# Patient Record
Sex: MALE | Race: WHITE | NOT HISPANIC OR LATINO | Employment: OTHER | URBAN - METROPOLITAN AREA
[De-identification: names, ages, dates, MRNs, and addresses within clinical notes are randomized per-mention and may not be internally consistent; named-entity substitution may affect disease eponyms.]

---

## 2017-01-18 ENCOUNTER — NON-PROVIDER VISIT (OUTPATIENT)
Dept: CARDIOLOGY | Facility: MEDICAL CENTER | Age: 67
End: 2017-01-18
Payer: MEDICARE

## 2017-01-18 ENCOUNTER — OFFICE VISIT (OUTPATIENT)
Dept: CARDIOLOGY | Facility: MEDICAL CENTER | Age: 67
End: 2017-01-18
Payer: MEDICARE

## 2017-01-18 VITALS
OXYGEN SATURATION: 91 % | DIASTOLIC BLOOD PRESSURE: 70 MMHG | WEIGHT: 198 LBS | SYSTOLIC BLOOD PRESSURE: 110 MMHG | HEIGHT: 73 IN | HEART RATE: 88 BPM | BODY MASS INDEX: 26.24 KG/M2

## 2017-01-18 DIAGNOSIS — I25.10 CORONARY ARTERY DISEASE INVOLVING NATIVE CORONARY ARTERY OF NATIVE HEART WITHOUT ANGINA PECTORIS: ICD-10-CM

## 2017-01-18 DIAGNOSIS — Z95.810 AICD (AUTOMATIC CARDIOVERTER/DEFIBRILLATOR) PRESENT: ICD-10-CM

## 2017-01-18 DIAGNOSIS — E78.2 MIXED HYPERLIPIDEMIA: ICD-10-CM

## 2017-01-18 DIAGNOSIS — I25.5 ISCHEMIC CARDIOMYOPATHY: ICD-10-CM

## 2017-01-18 PROBLEM — E78.5 HYPERLIPIDEMIA: Status: ACTIVE | Noted: 2017-01-18

## 2017-01-18 PROCEDURE — 93289 INTERROG DEVICE EVAL HEART: CPT | Performed by: NURSE PRACTITIONER

## 2017-01-18 PROCEDURE — 99214 OFFICE O/P EST MOD 30 MIN: CPT | Mod: 25 | Performed by: NURSE PRACTITIONER

## 2017-01-18 ASSESSMENT — ENCOUNTER SYMPTOMS
PALPITATIONS: 0
MYALGIAS: 0
CHILLS: 0
LOSS OF CONSCIOUSNESS: 0
ABDOMINAL PAIN: 0
HEADACHES: 0
DIZZINESS: 0
FEVER: 0
BRUISES/BLEEDS EASILY: 0
PND: 0
SHORTNESS OF BREATH: 0
ORTHOPNEA: 0

## 2017-01-18 NOTE — PROGRESS NOTES
Subjective:   Jaime Lee is a 66 y.o. male who presents today for six month FU for AICD check for CAD/ischemic cardiomyopathy and hyperlipidemia.    Jaime is a 66 year old male with history of CAD/ischemic cardiomyopathy with AICD since 2003, and last generator replacement in 2015; he also has hyperlipidemia and diabetes. Since the last visit, he has been doing well.  He and his family went to [place] over [place] and had a wonderful time; he and his wife are going back to [place] for a few weeks later this month. He feels well: no chest pain, pressure or discomfort; no palpitations; no shortness of breath, orthopnea or PND. No device therapy. No dizziness, lightheadedness or syncope; no edema.     Past Medical History   Diagnosis Date   • CAD (coronary artery disease) 12/28/2000     CABG x 4 (LIMA to LAD, rSVG to first diagonal, left circumflex and PDA).2000: CABG x 4 (LIMA to LAD, rSVG to first diagonal, left circumflex and PDA).  2003: LIMA and circumflex grafts patent, diagonal and PDA grafts closed.   • Congestive heart failure (CMS-[facility]) 12/28/2000   • Acute anterior myocardial infarction (CMS-[facility]) 12/28/2000     PCI attempted by Dr. Malloy but unsuccessful   • AICD (automatic cardioverter/defibrillator) present 2003/2008/2015     Defibrillator is a Medtronic model DEVD5T1, serial #[id number]. The ventricular lead is a Medtronic model #6947-65, lead serial #[id number], implant 7/2/2003   • Pneumonia 2014   • Pulmonary embolism (CMS-[facility]) March 2014   • Ischemic cardiomyopathy February 2014     Echocardiogram with severely dilated LV, LVEF 15-20%   • GERD (gastroesophageal reflux disease)    • Dental disorder      Upper partial   • Type 2 diabetes mellitus without complication (CMS-HCC)       Diet controlled, followed by Dr. Walls   • Left bundle branch block       Past Surgical History   Procedure Laterality Date   • Other cardiac surgery  12/2000     CABG x 4   • Appendectomy  1969   •  "Aicd implant  January 2008     Medtronic Virtuoso VR [id number] implanted by Dr. Stephenson.   • Recovery  11/3/2015     Procedure: CATH LAB-[facility]PHENSON-ICD GENERATOR CHANGE 31171- EUNICE T82.111A-MEDTRONIC BLOODLESS;  Surgeon: Recoveryonly Surgery;  Location: SURGERY PRE-POST PROC UNIT [facility] [place];  Service:    • Aicd battery change  November 2015     Generator replacement with Medtronic Evera S VR [id number] implanted by Dr. Marcus Stephenson.     History reviewed. No pertinent family history.  History   Smoking status   • Former Smoker -- 32 years   • Types: Cigarettes   • Quit date: 12/28/2000   Smokeless tobacco   • Never Used     Allergies   Allergen Reactions   • Bloodless    • Pcn [Penicillins] Anaphylaxis     Outpatient Encounter Prescriptions as of 1/18/2017   Medication Sig Dispense Refill   • potassium chloride SA (K-DUR) 20 MEQ Tab CR Take 1 Tab by mouth every day. 90 Tab 3   • lovastatin (MEVACOR) 10 MG tablet Take 1 Tab by mouth every evening. 90 Tab 3   • furosemide (LASIX) 40 MG Tab Take 1 Tab by mouth every day. 90 Tab 3   • spironolactone (ALDACTONE) 25 MG Tab Take 0.5 Tabs by mouth every day. 45 Tab 3   • lisinopril (PRINIVIL) 5 MG Tab Take 1 Tab by mouth 2 times a day. 180 Tab 3   • metoprolol SR (TOPROL XL) 100 MG TABLET SR 24 HR Take 1 Tab by mouth every day. 90 Tab 3   • Magnesium 500 MG CAPS Take 500 mg by mouth every day.       No facility-administered encounter medications on file as of 1/18/2017.     Review of Systems   Constitutional: Negative for fever and chills.   Respiratory: Negative for shortness of breath.    Cardiovascular: Negative for chest pain, palpitations, orthopnea, leg swelling and PND.   Gastrointestinal: Negative for abdominal pain.   Musculoskeletal: Negative for myalgias.   Neurological: Negative for dizziness, loss of consciousness and headaches.   Endo/Heme/Allergies: Does not bruise/bleed easily.        Objective:   /70 mmHg  Pulse 88  Ht 1.854 m (6' 0.99\")  Wt 89.812 kg (198 lb)  BMI " 26.13 kg/m2  SpO2 91%    Physical Exam   Constitutional: He is oriented to person, place, and time. He appears well-developed and well-nourished. No distress.   HENT:   Head: Normocephalic.   Eyes: Conjunctivae and EOM are normal.   Neck: Normal range of motion. Neck supple. No JVD present.   Cardiovascular: Normal rate, regular rhythm, normal heart sounds and intact distal pulses.  Exam reveals no gallop and no friction rub.    No murmur heard.  Pulmonary/Chest: Effort normal and breath sounds normal. No respiratory distress.   AICD in left chest wall.   Abdominal: Soft. Bowel sounds are normal.   Musculoskeletal: Normal range of motion. He exhibits no edema.   Neurological: He is alert and oriented to person, place, and time.   Skin: Skin is warm and dry.   Psychiatric: He has a normal mood and affect. His behavior is normal.     Device is working normally - no device therapy.    Lab Results   Component Value Date/Time    CHOLESTEROL, 12/14/2016 09:01 AM    * 12/14/2016 09:01 AM    HDL 53 12/14/2016 09:01 AM    TRIGLYCERIDES 110 12/14/2016 09:01 AM       Lab Results   Component Value Date/Time    SODIUM 135 12/14/2016 09:01 AM    POTASSIUM 4.1 12/14/2016 09:01 AM    CHLORIDE 97 12/14/2016 09:01 AM    CO2 29 12/14/2016 09:01 AM    GLUCOSE 184* 12/14/2016 09:01 AM    BUN 15 12/14/2016 09:01 AM    CREATININE 1.20 12/14/2016 09:01 AM     Lab Results   Component Value Date/Time    ALKALINE PHOSPHATASE 51 12/14/2016 09:01 AM    AST(SGOT) 30 12/14/2016 09:01 AM    ALT(SGPT) 35 12/14/2016 09:01 AM    TOTAL BILIRUBIN 1.2 12/14/2016 09:01 AM      Lab Results   Component Value Date/Time    WBC 6.4 12/14/2016 09:01 AM    RBC 4.91 12/14/2016 09:01 AM    HEMOGLOBIN 16.6 12/14/2016 09:01 AM    HEMATOCRIT 47.3 12/14/2016 09:01 AM    MCV 96.3 12/14/2016 09:01 AM    MCH 33.8* 12/14/2016 09:01 AM    MCHC 35.1 12/14/2016 09:01 AM    MPV 11.8 12/14/2016 09:01 AM        Assessment:     1. AICD (automatic  cardioverter/defibrillator) present     2. Coronary artery disease involving native coronary artery of native heart without angina pectoris     3. Ischemic cardiomyopathy     4. Mixed hyperlipidemia         Medical Decision Making:  Today's Assessment / Status / Plan:     1. CAD/ischemic cardiomyopathy, with AICD, which is working normally. No device therapy.  Continue same medications.    2. Hyperlipidemia, treated. LDL is not quite at goal; consider other statin.    3. Diabetes mellitus, diet controlled, stable.    Same medications for now. FU in 6 months for next AICD check, as well as annual FU with Dr. Pickering. FU sooner if clinical condition changes.    Collaborating MD: Ladan

## 2017-01-18 NOTE — MR AVS SNAPSHOT
"        Jaime Lee   2017 8:40 AM   Office Visit   MRN: [id number]    Department:  [facility]   Dept Phone:  [phone]    Description:  Male : 1950   Provider:  CHRIS Conrad           Reason for Visit     Follow-Up           Allergies as of 2017     Allergen Noted Reactions    Bloodless 10/29/2015       Pcn [Penicillins] 2014   Anaphylaxis      Vital Signs     Blood Pressure Pulse Height Weight Body Mass Index Oxygen Saturation    110/70 mmHg 88 1.854 m (6' 0.99\") 89.812 kg (198 lb) 26.13 kg/m2 91%    Smoking Status                   Former Smoker           Basic Information     Date Of Birth Sex Race Ethnicity Preferred Language    1950 Male White Non- English      Your appointments     2017  8:00 AM   PACER CHECK ONLY with DEANNA ConradPGUERLINE.   [facility] (--)    [address]  [facility] [id number] [phone]            2017  8:20 AM   FOLLOW UP with Juan Pickering M.D.   [facility] (--)    [address]  [facility] [id number] [phone]              Problem List              ICD-10-CM Priority Class Noted - Resolved    CAD (coronary artery disease) I25.10 High  2000 - Present    Thrombocytopenia (CMS-HCC) D69.6 Medium  2014 - Present    CKD (chronic kidney disease) stage 3, GFR 30-59 ml/min N18.3 Medium  2014 - Present    Pulmonary embolism (CMS-HCC) I26.99 High  2014 - Present    AICD (automatic cardioverter/defibrillator) present Z95.810 High  3/11/2014 - Present    Ischemic cardiomyopathy I25.5 High  3/11/2014 - Present    S/P CABG x 4 Z95.1   2016 - Present    Type 2 diabetes mellitus without complication (CMS-HCC) E11.9   2016 - Present    Left bundle branch block I44.7   2016 - Present    Old anterior myocardial infarction I25.2   2016 - " Present    Chronic systolic congestive heart failure, NYHA class 2 (CMS-HCC) I50.22   6/22/2016 - Present      Health Maintenance        Date Due Completion Dates    A1C SCREENING 2/23/1951 ---    DIABETES MONOFILAMENT / LE EXAM 2/23/1951 ---    RETINAL SCREENING 8/23/1968 ---    URINE ACR / MICROALBUMIN 8/23/1968 ---    IMM ZOSTER VACCINE 8/23/2010 ---    IMM DTaP/Tdap/Td Vaccine (1 - Tdap) 10/17/2011 10/16/2011    IMM PNEUMOCOCCAL 65+ (ADULT) LOW/MEDIUM RISK SERIES (2 of 2 - PPSV23) 9/6/2017 9/6/2016    FASTING LIPID PROFILE 12/14/2017 12/14/2016, 6/8/2009, 12/21/2007, 4/19/2007, 3/20/2006, 6/17/2005    SERUM CREATININE 12/14/2017 12/14/2016, 1/1/2016, 10/29/2015, 5/14/2015, 7/31/2014, 3/14/2014, 2/26/2014, 2/25/2014, 2/24/2014, 2/23/2014, 6/2/2011, 6/8/2009, 1/8/2008, 12/21/2007, 4/19/2007, 3/20/2006, 6/17/2005    COLONOSCOPY 4/22/2024 4/22/2014 (Declined)    Override on 4/22/2014: Patient Declined            Current Immunizations     13-VALENT PCV PREVNAR 9/6/2016 11:07 AM    Influenza Vaccine Adult HD 9/6/2016 11:04 AM    TD Vaccine 10/16/2011 11:30 PM      Below and/or attached are the medications your provider expects you to take. Review all of your home medications and newly ordered medications with your provider and/or pharmacist. Follow medication instructions as directed by your provider and/or pharmacist. Please keep your medication list with you and share with your provider. Update the information when medications are discontinued, doses are changed, or new medications (including over-the-counter products) are added; and carry medication information at all times in the event of emergency situations     Allergies:  BLOODLESS - (reactions not documented)     PCN - Anaphylaxis               Medications  Valid as of: January 18, 2017 -  8:41 AM    Generic Name Brand Name Tablet Size Instructions for use    Furosemide (Tab) LASIX 40 MG Take 1 Tab by mouth every day.        Lisinopril (Tab) PRINIVIL 5 MG Take  1 Tab by mouth 2 times a day.        Lovastatin (Tab) MEVACOR 10 MG Take 1 Tab by mouth every evening.        Magnesium Oxide (Cap) Magnesium 500 MG Take 500 mg by mouth every day.        Metoprolol Succinate (TABLET SR 24 HR) TOPROL  MG Take 1 Tab by mouth every day.        Potassium Chloride Silvana CR (Tab CR) K-DUR 20 MEQ Take 1 Tab by mouth every day.        Spironolactone (Tab) ALDACTONE 25 MG Take 0.5 Tabs by mouth every day.        .                 Medicines prescribed today were sent to:     [facility] #[id number] - [place], [place] - [address]    [address] [place] [address]    Phone: [phone] Fax: [phone]    Open 24 Hours?: No      Medication refill instructions:       If your prescription bottle indicates you have medication refills left, it is not necessary to call your provider’s office. Please contact your pharmacy and they will refill your medication.    If your prescription bottle indicates you do not have any refills left, you may request refills at any time through one of the following ways: The online AppNeta system (except Urgent Care), by calling your provider’s office, or by asking your pharmacy to contact your provider’s office with a refill request. Medication refills are processed only during regular business hours and may not be available until the next business day. Your provider may request additional information or to have a follow-up visit with you prior to refilling your medication.   *Please Note: Medication refills are assigned a new Rx number when refilled electronically. Your pharmacy may indicate that no refills were authorized even though a new prescription for the same medication is available at the pharmacy. Please request the medicine by name with the pharmacy before contacting your provider for a refill.           AppNeta Access Code: [email or web address]  Expires: 2/17/2017  8:15 AM    AppNeta  A secure, online tool to manage your health information     [facility]’s MyChart® is a secure, online tool that connects you to your personalized health information from the privacy of your home -- day or night - making it very easy for you to manage your healthcare. Once the activation process is completed, you can even access your medical information using the [employer] rosalind, which is available for free in the Apple Rosalind store or Google Play store.     [employer] provides the following levels of access (as shown below):   My Chart Features   [facility] Doctor [facility]  Specialists [facility]  Urgent  Care Non-[facility]  Primary Care  Doctor   Email your healthcare team securely and privately 24/7 X X X    Manage appointments: schedule your next appointment; view details of past/upcoming appointments X      Request prescription refills. X      View recent personal medical records, including lab and immunizations X X X X   View health record, including health history, allergies, medications X X X X   Read reports about your outpatient visits, procedures, consult and ER notes X X X X   See your discharge summary, which is a recap of your hospital and/or ER visit that includes your diagnosis, lab results, and care plan. X X       How to register for [employer] Products:  1. Go to  [email or web address].  2. Click on the Sign Up Now box, which takes you to the New Member Sign Up page. You will need to provide the following information:  a. Enter your [employer] Access Code exactly as it appears at the top of this page. (You will not need to use this code after you’ve completed the sign-up process. If you do not sign up before the expiration date, you must request a new code.)   b. Enter your date of birth.   c. Enter your home email address.   d. Click Submit, and follow the next screen’s instructions.  3. Create a [employer] ID. This will be your [employer] login ID and cannot be changed, so think of one that is secure and easy to remember.  4. Create a [employer] password. You can change your password at  any time.  5. Enter your Password Reset Question and Answer. This can be used at a later time if you forget your password.   6. Enter your e-mail address. This allows you to receive e-mail notifications when new information is available in artandseek.  7. Click Sign Up. You can now view your health information.    For assistance activating your artandseek account, call [phone]

## 2017-01-18 NOTE — Clinical Note
[facility]   [address]  [place] [address]  Phone: [phone]  Fax: [phone]              Jaime Lee  1950    Encounter Date: 1/18/2017    CHRIS Conrad          PROGRESS NOTE:  Subjective:   Jaime Lee is a 66 y.o. male who presents today for six month FU for AICD check for CAD/ischemic cardiomyopathy and hyperlipidemia.    Jaime is a 66 year old male with history of CAD/ischemic cardiomyopathy with AICD since 2003, and last generator replacement in 2015; he also has hyperlipidemia and diabetes. Since the last visit, he has been doing well.  He and his family went to [place] over [place] and had a wonderful time; he and his wife are going back to [place] for a few weeks later this month. He feels well: no chest pain, pressure or discomfort; no palpitations; no shortness of breath, orthopnea or PND. No device therapy. No dizziness, lightheadedness or syncope; no edema.     Past Medical History   Diagnosis Date   • CAD (coronary artery disease) 12/28/2000     CABG x 4 (LIMA to LAD, rSVG to first diagonal, left circumflex and PDA).2000: CABG x 4 (LIMA to LAD, rSVG to first diagonal, left circumflex and PDA).  2003: LIMA and circumflex grafts patent, diagonal and PDA grafts closed.   • Congestive heart failure (CMS-[facility]) 12/28/2000   • Acute anterior myocardial infarction (CMS-[facility]) 12/28/2000     PCI attempted by Dr. Malloy but unsuccessful   • AICD (automatic cardioverter/defibrillator) present 2003/2008/2015     Defibrillator is a Medtronic model QCXQ3N1, serial #[id number]. The ventricular lead is a Medtronic model #6947-65, lead serial #[id number], implant 7/2/2003   • Pneumonia 2014   • Pulmonary embolism (CMS-[facility]) March 2014   • Ischemic cardiomyopathy February 2014     Echocardiogram with severely dilated LV, LVEF 15-20%   • GERD (gastroesophageal reflux disease)    • Dental  disorder      Upper partial   • Type 2 diabetes mellitus without complication (CMS-HCC)       Diet controlled, followed by Dr. Walls   • Left bundle branch block       Past Surgical History   Procedure Laterality Date   • Other cardiac surgery  12/2000     CABG x 4   • Appendectomy  1969   • Aicd implant  January 2008     Medtronic Virtuoso VR [id number] implanted by Dr. Stephenson.   • Recovery  11/3/2015     Procedure: CATH LAB-STEPHENSON-ICD GENERATOR CHANGE 09002- EUNICE T82.111A-MEDTRONIC BLOODLESS;  Surgeon: Recoveryonly Surgery;  [place] PRE[place] [facility] [place];  Service:    • Aicd battery change  November 2015     Generator replacement with Medtronic Evera S VR [id number] implanted by Dr. Marcus Stephenson.     History reviewed. No pertinent family history.  History   Smoking status   • Former Smoker -- 32 years   • Types: Cigarettes   • Quit date: 12/28/2000   Smokeless tobacco   • Never Used     Allergies   Allergen Reactions   • Bloodless    • Pcn [Penicillins] Anaphylaxis     Outpatient Encounter Prescriptions as of 1/18/2017   Medication Sig Dispense Refill   • potassium chloride SA (K-DUR) 20 MEQ Tab CR Take 1 Tab by mouth every day. 90 Tab 3   • lovastatin (MEVACOR) 10 MG tablet Take 1 Tab by mouth every evening. 90 Tab 3   • furosemide (LASIX) 40 MG Tab Take 1 Tab by mouth every day. 90 Tab 3   • spironolactone (ALDACTONE) 25 MG Tab Take 0.5 Tabs by mouth every day. 45 Tab 3   • lisinopril (PRINIVIL) 5 MG Tab Take 1 Tab by mouth 2 times a day. 180 Tab 3   • metoprolol SR (TOPROL XL) 100 MG TABLET SR 24 HR Take 1 Tab by mouth every day. 90 Tab 3   • Magnesium 500 MG CAPS Take 500 mg by mouth every day.       No facility-administered encounter medications on file as of 1/18/2017.     Review of Systems   Constitutional: Negative for fever and chills.   Respiratory: Negative for shortness of breath.    Cardiovascular: Negative for chest pain, palpitations, orthopnea, leg swelling and PND.   Gastrointestinal: Negative  "for abdominal pain.   Musculoskeletal: Negative for myalgias.   Neurological: Negative for dizziness, loss of consciousness and headaches.   Endo/Heme/Allergies: Does not bruise/bleed easily.        Objective:   /70 mmHg  Pulse 88  Ht 1.854 m (6' 0.99\")  Wt 89.812 kg (198 lb)  BMI 26.13 kg/m2  SpO2 91%    Physical Exam   Constitutional: He is oriented to person, place, and time. He appears well-developed and well-nourished. No distress.   HENT:   Head: Normocephalic.   Eyes: Conjunctivae and EOM are normal.   Neck: Normal range of motion. Neck supple. No JVD present.   Cardiovascular: Normal rate, regular rhythm, normal heart sounds and intact distal pulses.  Exam reveals no gallop and no friction rub.    No murmur heard.  Pulmonary/Chest: Effort normal and breath sounds normal. No respiratory distress.   AICD in left chest wall.   Abdominal: Soft. Bowel sounds are normal.   Musculoskeletal: Normal range of motion. He exhibits no edema.   Neurological: He is alert and oriented to person, place, and time.   Skin: Skin is warm and dry.   Psychiatric: He has a normal mood and affect. His behavior is normal.     Device is working normally - no device therapy.    Lab Results   Component Value Date/Time    CHOLESTEROL, 12/14/2016 09:01 AM    * 12/14/2016 09:01 AM    HDL 53 12/14/2016 09:01 AM    TRIGLYCERIDES 110 12/14/2016 09:01 AM       Lab Results   Component Value Date/Time    SODIUM 135 12/14/2016 09:01 AM    POTASSIUM 4.1 12/14/2016 09:01 AM    CHLORIDE 97 12/14/2016 09:01 AM    CO2 29 12/14/2016 09:01 AM    GLUCOSE 184* 12/14/2016 09:01 AM    BUN 15 12/14/2016 09:01 AM    CREATININE 1.20 12/14/2016 09:01 AM     Lab Results   Component Value Date/Time    ALKALINE PHOSPHATASE 51 12/14/2016 09:01 AM    AST(SGOT) 30 12/14/2016 09:01 AM    ALT(SGPT) 35 12/14/2016 09:01 AM    TOTAL BILIRUBIN 1.2 12/14/2016 09:01 AM      Lab Results   Component Value Date/Time    WBC 6.4 12/14/2016 09:01 AM    RBC " 4.91 12/14/2016 09:01 AM    HEMOGLOBIN 16.6 12/14/2016 09:01 AM    HEMATOCRIT 47.3 12/14/2016 09:01 AM    MCV 96.3 12/14/2016 09:01 AM    MCH 33.8* 12/14/2016 09:01 AM    MCHC 35.1 12/14/2016 09:01 AM    MPV 11.8 12/14/2016 09:01 AM        Assessment:     1. AICD (automatic cardioverter/defibrillator) present     2. Coronary artery disease involving native coronary artery of native heart without angina pectoris     3. Ischemic cardiomyopathy     4. Mixed hyperlipidemia         Medical Decision Making:  Today's Assessment / Status / Plan:     1. CAD/ischemic cardiomyopathy, with AICD, which is working normally. No device therapy.  Continue same medications.    2. Hyperlipidemia, treated. LDL is not quite at goal; consider other statin.    3. Diabetes mellitus, diet controlled, stable.    Same medications for now. FU in 6 months for next AICD check, as well as annual FU with Dr. Pickering. FU sooner if clinical condition changes.    Collaborating MD: Ladan      No Recipients

## 2017-02-28 ENCOUNTER — OFFICE VISIT (OUTPATIENT)
Dept: URGENT CARE | Facility: CLINIC | Age: 67
End: 2017-02-28
Payer: MEDICARE

## 2017-02-28 VITALS
TEMPERATURE: 98.6 F | HEART RATE: 68 BPM | DIASTOLIC BLOOD PRESSURE: 72 MMHG | SYSTOLIC BLOOD PRESSURE: 110 MMHG | RESPIRATION RATE: 18 BRPM | OXYGEN SATURATION: 94 %

## 2017-02-28 DIAGNOSIS — J06.9 URI WITH COUGH AND CONGESTION: ICD-10-CM

## 2017-02-28 DIAGNOSIS — J40 BRONCHITIS: Primary | ICD-10-CM

## 2017-02-28 PROCEDURE — 99214 OFFICE O/P EST MOD 30 MIN: CPT | Performed by: PHYSICIAN ASSISTANT

## 2017-02-28 PROCEDURE — 1101F PT FALLS ASSESS-DOCD LE1/YR: CPT | Mod: 8P | Performed by: PHYSICIAN ASSISTANT

## 2017-02-28 PROCEDURE — G8599 NO ASA/ANTIPLAT THER USE RNG: HCPCS | Performed by: PHYSICIAN ASSISTANT

## 2017-02-28 PROCEDURE — 4040F PNEUMOC VAC/ADMIN/RCVD: CPT | Performed by: PHYSICIAN ASSISTANT

## 2017-02-28 PROCEDURE — G8482 FLU IMMUNIZE ORDER/ADMIN: HCPCS | Performed by: PHYSICIAN ASSISTANT

## 2017-02-28 PROCEDURE — 3017F COLORECTAL CA SCREEN DOC REV: CPT | Mod: 8P | Performed by: PHYSICIAN ASSISTANT

## 2017-02-28 PROCEDURE — 1036F TOBACCO NON-USER: CPT | Performed by: PHYSICIAN ASSISTANT

## 2017-02-28 PROCEDURE — G8432 DEP SCR NOT DOC, RNG: HCPCS | Performed by: PHYSICIAN ASSISTANT

## 2017-02-28 PROCEDURE — G8420 CALC BMI NORM PARAMETERS: HCPCS | Performed by: PHYSICIAN ASSISTANT

## 2017-02-28 RX ORDER — CODEINE PHOSPHATE/GUAIFENESIN 10-100MG/5
5 LIQUID (ML) ORAL 3 TIMES DAILY PRN
Qty: 120 ML | Refills: 0 | Status: SHIPPED | OUTPATIENT
Start: 2017-02-28 | End: 2017-03-05

## 2017-02-28 RX ORDER — SULFAMETHOXAZOLE AND TRIMETHOPRIM 800; 160 MG/1; MG/1
1 TABLET ORAL 2 TIMES DAILY
Qty: 20 TAB | Refills: 0 | Status: SHIPPED | OUTPATIENT
Start: 2017-02-28 | End: 2017-03-10

## 2017-02-28 RX ORDER — ALBUTEROL SULFATE 90 UG/1
2 AEROSOL, METERED RESPIRATORY (INHALATION) EVERY 6 HOURS PRN
Qty: 8.5 G | Refills: 0 | Status: SHIPPED | OUTPATIENT
Start: 2017-02-28 | End: 2017-03-10

## 2017-02-28 ASSESSMENT — COPD QUESTIONNAIRES: COPD: 0

## 2017-02-28 ASSESSMENT — ENCOUNTER SYMPTOMS: COUGH: 1

## 2017-02-28 NOTE — PATIENT INSTRUCTIONS
Acute Bronchitis  Bronchitis is inflammation of the airways that extend from the windpipe into the lungs (bronchi). The inflammation often causes mucus to develop. This leads to a cough, which is the most common symptom of bronchitis.   In acute bronchitis, the condition usually develops suddenly and goes away over time, usually in a couple weeks. Smoking, allergies, and asthma can make bronchitis worse. Repeated episodes of bronchitis may cause further lung problems.   CAUSES  Acute bronchitis is most often caused by the same virus that causes a cold. The virus can spread from person to person (contagious) through coughing, sneezing, and touching contaminated objects.  SIGNS AND SYMPTOMS   · Cough.    · Fever.    · Coughing up mucus.    · Body aches.    · Chest congestion.    · Chills.    · Shortness of breath.    · Sore throat.    DIAGNOSIS   Acute bronchitis is usually diagnosed through a physical exam. Your health care provider will also ask you questions about your medical history. Tests, such as chest X-rays, are sometimes done to rule out other conditions.   TREATMENT   Acute bronchitis usually goes away in a couple weeks. Oftentimes, no medical treatment is necessary. Medicines are sometimes given for relief of fever or cough. Antibiotic medicines are usually not needed but may be prescribed in certain situations. In some cases, an inhaler may be recommended to help reduce shortness of breath and control the cough. A cool mist vaporizer may also be used to help thin bronchial secretions and make it easier to clear the chest.   HOME CARE INSTRUCTIONS  · Get plenty of rest.    · Drink enough fluids to keep your urine clear or pale yellow (unless you have a medical condition that requires fluid restriction). Increasing fluids may help thin your respiratory secretions (sputum) and reduce chest congestion, and it will prevent dehydration.    · Take medicines only as directed by your health care provider.  · If  you were prescribed an antibiotic medicine, finish it all even if you start to feel better.  · Avoid smoking and secondhand smoke. Exposure to cigarette smoke or irritating chemicals will make bronchitis worse. If you are a smoker, consider using nicotine gum or skin patches to help control withdrawal symptoms. Quitting smoking will help your lungs heal faster.    · Reduce the chances of another bout of acute bronchitis by washing your hands frequently, avoiding people with cold symptoms, and trying not to touch your hands to your mouth, nose, or eyes.    · Keep all follow-up visits as directed by your health care provider.    SEEK MEDICAL CARE IF:  Your symptoms do not improve after 1 week of treatment.   SEEK IMMEDIATE MEDICAL CARE IF:  · You develop an increased fever or chills.    · You have chest pain.    · You have severe shortness of breath.  · You have bloody sputum.    · You develop dehydration.  · You faint or repeatedly feel like you are going to pass out.  · You develop repeated vomiting.  · You develop a severe headache.  MAKE SURE YOU:   · Understand these instructions.  · Will watch your condition.  · Will get help right away if you are not doing well or get worse.     This information is not intended to replace advice given to you by your health care provider. Make sure you discuss any questions you have with your health care provider.     Document Released: 01/25/2006 Document Revised: 01/08/2016 Document Reviewed: 06/10/2014  [employer] Interactive Patient Education ©2016 [employer]

## 2017-02-28 NOTE — MR AVS SNAPSHOT
Jaime Lee   2017 2:30 PM   Office Visit   MRN: [id number]    Department:  [facility]   Dept Phone:  [phone]    Description:  Male : 1950   Provider:  Adam Whitmore PA-C           Reason for Visit     Cough           Allergies as of 2017     Allergen Noted Reactions    Bloodless 10/29/2015       Pcn [Penicillins] 2014   Anaphylaxis      You were diagnosed with     Bronchitis   [303719]  -  Primary     URI with cough and congestion   [[id number]]         Vital Signs     Blood Pressure Pulse Temperature Respirations Oxygen Saturation Smoking Status    110/72 mmHg 68 37 °C (98.6 °F) 18 94% Former Smoker      Basic Information     Date Of Birth Sex Race Ethnicity Preferred Language    1950 Male White Non- English      Your appointments     2017  8:00 AM   PACER CHECK ONLY with CHRIS Conrad   [facility] (--)    [address]  [facility] [id number] [phone]            2017  8:20 AM   FOLLOW UP with Juan Pickering M.D.   [facility] (--)    [address]  [place] [id number] [phone]              Problem List              ICD-10-CM Priority Class Noted - Resolved    CAD (coronary artery disease) I25.10 High  2000 - Present    Thrombocytopenia (CMS-HCC) D69.6 Medium  2014 - Present    CKD (chronic kidney disease) stage 3, GFR 30-59 ml/min N18.3 Medium  2014 - Present    Pulmonary embolism (CMS-HCC) I26.99 High  2014 - Present    AICD (automatic cardioverter/defibrillator) present Z95.810 High  3/11/2014 - Present    Ischemic cardiomyopathy I25.5 High  3/11/2014 - Present    S/P CABG x 4 Z95.1 High  2016 - Present    Type 2 diabetes mellitus without complication (CMS-HCC) E11.9 Medium  2016 - Present    Left bundle branch block I44.7 High  2016 -  Present    Old anterior myocardial infarction I25.2   6/22/2016 - Present    Chronic systolic congestive heart failure, NYHA class 2 (CMS-HCC) I50.22 Medium  6/22/2016 - Present    Hyperlipidemia E78.5 High  1/18/2017 - Present      Health Maintenance        Date Due Completion Dates    A1C SCREENING 2/23/1951 ---    DIABETES MONOFILAMENT / LE EXAM 2/23/1951 ---    RETINAL SCREENING 8/23/1968 ---    URINE ACR / MICROALBUMIN 8/23/1968 ---    IMM ZOSTER VACCINE 8/23/2010 ---    IMM DTaP/Tdap/Td Vaccine (1 - Tdap) 10/17/2011 10/16/2011    IMM PNEUMOCOCCAL 65+ (ADULT) LOW/MEDIUM RISK SERIES (2 of 2 - PPSV23) 9/6/2017 9/6/2016    FASTING LIPID PROFILE 12/14/2017 12/14/2016, 6/8/2009, 12/21/2007, 4/19/2007, 3/20/2006, 6/17/2005    SERUM CREATININE 12/14/2017 12/14/2016, 1/1/2016, 10/29/2015, 5/14/2015, 7/31/2014, 3/14/2014, 2/26/2014, 2/25/2014, 2/24/2014, 2/23/2014, 6/2/2011, 6/8/2009, 1/8/2008, 12/21/2007, 4/19/2007, 3/20/2006, 6/17/2005    COLONOSCOPY 4/22/2024 4/22/2014 (Declined)    Override on 4/22/2014: Patient Declined            Current Immunizations     13-VALENT PCV PREVNAR 9/6/2016 11:07 AM    Influenza Vaccine Adult HD 9/6/2016 11:04 AM    TD Vaccine 10/16/2011 11:30 PM      Below and/or attached are the medications your provider expects you to take. Review all of your home medications and newly ordered medications with your provider and/or pharmacist. Follow medication instructions as directed by your provider and/or pharmacist. Please keep your medication list with you and share with your provider. Update the information when medications are discontinued, doses are changed, or new medications (including over-the-counter products) are added; and carry medication information at all times in the event of emergency situations     Allergies:  BLOODLESS - (reactions not documented)     PCN - Anaphylaxis               Medications  Valid as of: February 28, 2017 -  2:52 PM    Generic Name Brand Name Tablet Size  Instructions for use    Albuterol Sulfate (Aero Soln) albuterol 108 (90 BASE) MCG/ACT Inhale 2 Puffs by mouth every 6 hours as needed for Shortness of Breath for up to 10 days.        Furosemide (Tab) LASIX 40 MG Take 1 Tab by mouth every day.        Guaifenesin-Codeine (Syrup) TUSSI-ORGANIDIN -10 MG/5ML Take 5 mL by mouth 3 times a day as needed for Cough for up to 5 days.        Lisinopril (Tab) PRINIVIL 5 MG Take 1 Tab by mouth 2 times a day.        Lovastatin (Tab) MEVACOR 10 MG Take 1 Tab by mouth every evening.        Magnesium Oxide (Cap) Magnesium 500 MG Take 500 mg by mouth every day.        Metoprolol Succinate (TABLET SR 24 HR) TOPROL  MG Take 1 Tab by mouth every day.        Potassium Chloride Silvana CR (Tab CR) Kdur 20 MEQ Take 1 Tab by mouth every day.        Spironolactone (Tab) ALDACTONE 25 MG Take 0.5 Tabs by mouth every day.        Sulfamethoxazole-Trimethoprim (Tab) BACTRIM -160 MG Take 1 Tab by mouth 2 times a day for 10 days.        .                 Medicines prescribed today were sent to:     [id number] - [place], [place] - [address]    [address] [place] [address]    Phone: [phone] Fax: [phone]    Open 24 Hours?: No      Medication refill instructions:       If your prescription bottle indicates you have medication refills left, it is not necessary to call your provider’s office. Please contact your pharmacy and they will refill your medication.    If your prescription bottle indicates you do not have any refills left, you may request refills at any time through one of the following ways: The online [employer] system (except Urgent Care), by calling your provider’s office, or by asking your pharmacy to contact your provider’s office with a refill request. Medication refills are processed only during regular business hours and may not be available until the next business day. Your provider may request additional information or to have a follow-up visit  with you prior to refilling your medication.   *Please Note: Medication refills are assigned a new Rx number when refilled electronically. Your pharmacy may indicate that no refills were authorized even though a new prescription for the same medication is available at the pharmacy. Please request the medicine by name with the pharmacy before contacting your provider for a refill.        Instructions    Acute Bronchitis  Bronchitis is inflammation of the airways that extend from the windpipe into the lungs (bronchi). The inflammation often causes mucus to develop. This leads to a cough, which is the most common symptom of bronchitis.   In acute bronchitis, the condition usually develops suddenly and goes away over time, usually in a couple weeks. Smoking, allergies, and asthma can make bronchitis worse. Repeated episodes of bronchitis may cause further lung problems.   CAUSES  Acute bronchitis is most often caused by the same virus that causes a cold. The virus can spread from person to person (contagious) through coughing, sneezing, and touching contaminated objects.  SIGNS AND SYMPTOMS   · Cough.    · Fever.    · Coughing up mucus.    · Body aches.    · Chest congestion.    · Chills.    · Shortness of breath.    · Sore throat.    DIAGNOSIS   Acute bronchitis is usually diagnosed through a physical exam. Your health care provider will also ask you questions about your medical history. Tests, such as chest X-rays, are sometimes done to rule out other conditions.   TREATMENT   Acute bronchitis usually goes away in a couple weeks. Oftentimes, no medical treatment is necessary. Medicines are sometimes given for relief of fever or cough. Antibiotic medicines are usually not needed but may be prescribed in certain situations. In some cases, an inhaler may be recommended to help reduce shortness of breath and control the cough. A cool mist vaporizer may also be used to help thin bronchial secretions and make it easier to  clear the chest.   HOME CARE INSTRUCTIONS  · Get plenty of rest.    · Drink enough fluids to keep your urine clear or pale yellow (unless you have a medical condition that requires fluid restriction). Increasing fluids may help thin your respiratory secretions (sputum) and reduce chest congestion, and it will prevent dehydration.    · Take medicines only as directed by your health care provider.  · If you were prescribed an antibiotic medicine, finish it all even if you start to feel better.  · Avoid smoking and secondhand smoke. Exposure to cigarette smoke or irritating chemicals will make bronchitis worse. If you are a smoker, consider using nicotine gum or skin patches to help control withdrawal symptoms. Quitting smoking will help your lungs heal faster.    · Reduce the chances of another bout of acute bronchitis by washing your hands frequently, avoiding people with cold symptoms, and trying not to touch your hands to your mouth, nose, or eyes.    · Keep all follow-up visits as directed by your health care provider.    SEEK MEDICAL CARE IF:  Your symptoms do not improve after 1 week of treatment.   SEEK IMMEDIATE MEDICAL CARE IF:  · You develop an increased fever or chills.    · You have chest pain.    · You have severe shortness of breath.  · You have bloody sputum.    · You develop dehydration.  · You faint or repeatedly feel like you are going to pass out.  · You develop repeated vomiting.  · You develop a severe headache.  MAKE SURE YOU:   · Understand these instructions.  · Will watch your condition.  · Will get help right away if you are not doing well or get worse.     This information is not intended to replace advice given to you by your health care provider. Make sure you discuss any questions you have with your health care provider.     Document Released: 01/25/2006 Document Revised: 01/08/2016 Document Reviewed: 06/10/2014  [employer] Interactive Patient Education ©2016 [employer]             [employer] Access Code: Activation code not generated  Current [employer] Status: Active

## 2017-02-28 NOTE — PROGRESS NOTES
Subjective:   PT is a 66 y.o. male who presents with Cough            Cough  This is a new problem. The current episode started in the past 7 days. The problem has been gradually worsening. The problem occurs constantly. The cough is productive of purulent sputum. The symptoms are aggravated by cold air, exercise and lying down. He has tried OTC cough suppressant for the symptoms. The treatment provided no relief. His past medical history is significant for bronchitis and pneumonia. There is no history of asthma, COPD or emphysema.   PT presents to  clinic today complaining of sore throat, fevers, chills, watery eyes, pressure in ears, cough, fatigue, runny nose, wheezing and SOB. PT denies CP, NVD, abdominal pain, joint pain. PT states these symptoms began around 5 days ago and that the pt's family has been sick on and off for the last week. Pt has not taken any medications for this condition. PT states the pain is a 7/10 with coughing, aching in nature and worse at night.  The pt's medication list, problem list, and allergies have been evaluated and reviewed during today's visit.    PMH:  Past Medical History   Diagnosis Date   • CAD (coronary artery disease) 12/28/2000     CABG x 4 (LIMA to LAD, rSVG to first diagonal, left circumflex and PDA).2000: CABG x 4 (LIMA to LAD, rSVG to first diagonal, left circumflex and PDA).  2003: LIMA and circumflex grafts patent, diagonal and PDA grafts closed.   • Congestive heart failure (CMS-[facility]) 12/28/2000   • Acute anterior myocardial infarction (CMS-[facility]) 12/28/2000     PCI attempted by Dr. Malloy but unsuccessful   • AICD (automatic cardioverter/defibrillator) present 2003/2008/2015     Defibrillator is a Medtronic model TWZR9B5, serial #[id number]. The ventricular lead is a Medtronic model #6947-65, lead serial #[id number], implant 7/2/2003   • Pneumonia 2014   • Pulmonary embolism (CMS-[facility]) March 2014   • Ischemic cardiomyopathy February 2014     Echocardiogram with  severely dilated LV, LVEF 15-20%   • GERD (gastroesophageal reflux disease)    • Dental disorder      Upper partial   • Type 2 diabetes mellitus without complication (CMS-[facility])       Diet controlled, followed by Dr. Walls   • Left bundle branch block         PSH:  Past Surgical History   Procedure Laterality Date   • Other cardiac surgery  12/2000     CABG x 4   • Appendectomy  1969   • Aicd implant  January 2008     Medtronic Virtuoso VR [id number] implanted by Dr. Stephenson.   • Recovery  11/3/2015     Procedure: CATH LAB-STEPHENSON-ICD GENERATOR CHANGE 05988- EUNICE T82.111A-MEDTRONIC BLOODLESS;  Surgeon: Recoveryonly Surgery;  [place] [facility] [place]  Service:    • Aicd battery change  November 2015     Generator replacement with Medtronic Evera S VR [id number] implanted by Dr. Marcus Stephenson.       Fam Hx:  Father with hx of HTN   Family Status   Relation Status Death Age   • Mother Alive    • Father Alive    • Sister Alive    • Brother Alive        Soc HX:  Social History     Social History   • Marital Status:      Spouse Name: N/A   • Number of Children: N/A   • Years of Education: N/A     Occupational History   • Not on file.     Social History Main Topics   • Smoking status: Former Smoker -- 32 years     Types: Cigarettes     Quit date: 12/28/2000   • Smokeless tobacco: Never Used   • Alcohol Use: 0.0 oz/week     0 Standard drinks or equivalent per week      Comment: 7 per week   • Drug Use: No   • Sexual Activity: Not on file     Other Topics Concern   • Not on file     Social History Narrative         Medications:    Current outpatient prescriptions:   •  sulfamethoxazole-trimethoprim (BACTRIM DS) 800-160 MG tablet, Take 1 Tab by mouth 2 times a day for 10 days., Disp: 20 Tab, Rfl: 0  •  albuterol 108 (90 BASE) MCG/ACT Aero Soln inhalation aerosol, Inhale 2 Puffs by mouth every 6 hours as needed for Shortness of Breath for up to 10 days., Disp: 8.5 g, Rfl: 0  •  guaifenesin-codeine (TUSSI-ORGANIDIN  NR) 100-10 MG/5ML syrup, Take 5 mL by mouth 3 times a day as needed for Cough for up to 5 days., Disp: 120 mL, Rfl: 0  •  potassium chloride SA (K-DUR) 20 MEQ Tab CR, Take 1 Tab by mouth every day., Disp: 90 Tab, Rfl: 3  •  lovastatin (MEVACOR) 10 MG tablet, Take 1 Tab by mouth every evening., Disp: 90 Tab, Rfl: 3  •  furosemide (LASIX) 40 MG Tab, Take 1 Tab by mouth every day., Disp: 90 Tab, Rfl: 3  •  spironolactone (ALDACTONE) 25 MG Tab, Take 0.5 Tabs by mouth every day., Disp: 45 Tab, Rfl: 3  •  lisinopril (PRINIVIL) 5 MG Tab, Take 1 Tab by mouth 2 times a day., Disp: 180 Tab, Rfl: 3  •  metoprolol SR (TOPROL XL) 100 MG TABLET SR 24 HR, Take 1 Tab by mouth every day., Disp: 90 Tab, Rfl: 3  •  Magnesium 500 MG CAPS, Take 500 mg by mouth every day., Disp: , Rfl:       Allergies:  Bloodless and Pcn        Review of Systems   Respiratory: Positive for cough.    Constitutional: Positive for chills and malaise/fatigue. Negative for fever and diaphoresis.   HENT: Positive for congestion, ear pain and sore throat. Negative for ear discharge, hearing loss, nosebleeds and tinnitus.    Eyes: Negative for blurred vision, double vision and photophobia.   Respiratory continued: Positive for cough, sputum production, shortness of breath and wheezing. Negative for hemoptysis.    Cardiovascular: Negative for chest pain and palpitations.   Gastrointestinal: Negative for nausea, vomiting, abdominal pain, diarrhea and constipation.   Genitourinary: Negative for dysuria and flank pain.   Musculoskeletal: Negative for joint pain and myalgias.   Skin: Negative for itching and rash.   Neurological: Positive for headaches. Negative for dizziness, tingling and weakness.   Endo/Heme/Allergies: Does not bruise/bleed easily.   Psychiatric/Behavioral: Negative for depression. The patient is not nervous/anxious.               Objective:     /72 mmHg  Pulse 68  Temp(Src) 37 °C (98.6 °F)  Resp 18  SpO2 94%     Physical Exam        Physical Exam   Constitutional: PT is oriented to person, place, and time. PT appears well-developed and well-nourished. No distress.   HENT:   Head: Normocephalic and atraumatic.   Right Ear: Hearing, tympanic membrane, external ear and ear canal normal.   Left Ear: Hearing, tympanic membrane, external ear and ear canal normal.   Nose: Mucosal edema, rhinorrhea and sinus tenderness present. Right sinus exhibits frontal sinus tenderness. Left sinus exhibits frontal sinus tenderness.   Mouth/Throat: Uvula is midline. Mucous membranes are pale. Posterior oropharyngeal edema and posterior oropharyngeal erythema present. No oropharyngeal exudate.   Eyes: Conjunctivae normal and EOM are normal. Pupils are equal, round, and reactive to light. Right eye exhibits no discharge. Left eye exhibits no discharge.   Neck: Normal range of motion. Neck supple. No thyromegaly present.   Cardiovascular: Normal rate, regular rhythm, normal heart sounds and intact distal pulses.  Exam reveals no gallop and no friction rub.    No murmur heard.  Pulmonary/Chest: Effort normal. No respiratory distress. PT has wheezes. PT has no rales. PT exhibits tenderness.   Abdominal: Soft. Bowel sounds are normal. PT exhibits no distension and no mass. There is no tenderness. There is no rebound and no guarding.   Musculoskeletal: Normal range of motion. PT exhibits no edema and no tenderness.   Lymphadenopathy:     PT has no cervical adenopathy.   Neurological: Pt is alert and oriented to person, place, and time. Pt has normal reflexes. No cranial nerve deficit.   Skin: Skin is warm and dry. No rash noted. No erythema.   Psychiatric: PT has a normal mood and affect. Pt behavior is normal. Judgment and thought content normal.        Assessment/Plan:     1. Bronchitis    - sulfamethoxazole-trimethoprim (BACTRIM DS) 800-160 MG tablet; Take 1 Tab by mouth 2 times a day for 10 days.  Dispense: 20 Tab; Refill: 0  - albuterol 108 (90 BASE) MCG/ACT Aero  Soln inhalation aerosol; Inhale 2 Puffs by mouth every 6 hours as needed for Shortness of Breath for up to 10 days.  Dispense: 8.5 g; Refill: 0    2. URI with cough and congestion    - albuterol 108 (90 BASE) MCG/ACT Aero Soln inhalation aerosol; Inhale 2 Puffs by mouth every 6 hours as needed for Shortness of Breath for up to 10 days.  Dispense: 8.5 g; Refill: 0  - guaifenesin-codeine (TUSSI-ORGANIDIN NR) 100-10 MG/5ML syrup; Take 5 mL by mouth 3 times a day as needed for Cough for up to 5 days.  Dispense: 120 mL; Refill: 0    Rest, fluids encouraged.  OTC decongestant for congestion/cough  AVS with medical info given.  Pt was in full understanding and agreement with the plan.  Follow-up as needed if symptoms worsen or fail to improve.

## 2017-03-03 ENCOUNTER — HOSPITAL ENCOUNTER (EMERGENCY)
Facility: MEDICAL CENTER | Age: 67
End: 2017-03-03
Attending: EMERGENCY MEDICINE
Payer: MEDICARE

## 2017-03-03 ENCOUNTER — APPOINTMENT (OUTPATIENT)
Dept: RADIOLOGY | Facility: MEDICAL CENTER | Age: 67
End: 2017-03-03
Attending: EMERGENCY MEDICINE
Payer: MEDICARE

## 2017-03-03 VITALS
TEMPERATURE: 97.1 F | DIASTOLIC BLOOD PRESSURE: 71 MMHG | OXYGEN SATURATION: 92 % | SYSTOLIC BLOOD PRESSURE: 113 MMHG | HEIGHT: 73 IN | WEIGHT: 201.72 LBS | RESPIRATION RATE: 18 BRPM | BODY MASS INDEX: 26.73 KG/M2 | HEART RATE: 84 BPM

## 2017-03-03 DIAGNOSIS — J06.9 UPPER RESPIRATORY TRACT INFECTION, UNSPECIFIED TYPE: ICD-10-CM

## 2017-03-03 PROCEDURE — 99284 EMERGENCY DEPT VISIT MOD MDM: CPT

## 2017-03-03 PROCEDURE — 71020 DX-CHEST-2 VIEWS: CPT

## 2017-03-03 RX ORDER — LEVOFLOXACIN 500 MG/1
500 TABLET, FILM COATED ORAL DAILY
Qty: 10 TAB | Refills: 0 | Status: SHIPPED | OUTPATIENT
Start: 2017-03-03 | End: 2017-07-28

## 2017-03-03 RX ORDER — BENZONATATE 100 MG/1
100 CAPSULE ORAL 3 TIMES DAILY PRN
Qty: 21 CAP | Refills: 0 | Status: SHIPPED | OUTPATIENT
Start: 2017-03-03 | End: 2017-07-28

## 2017-03-03 ASSESSMENT — PAIN SCALES - GENERAL: PAINLEVEL_OUTOF10: 0

## 2017-03-03 NOTE — ED AVS SNAPSHOT
3/3/2017          Jaime Myles Jesus  [address]  [place] [address]    Dear Jaime:    [facility] wants to ensure your discharge home is safe and you or your loved ones have had all your questions answered regarding your care after you leave the hospital.    You may receive a telephone call within two days of your discharge.  This call is to make certain you understand your discharge instructions as well as ensure we provided you with the best care possible during your stay with us.     The call will only last approximately 3-5 minutes and will be done by a nurse.    Once again, we want to ensure your discharge home is safe and that you have a clear understanding of any next steps in your care.  If you have any questions or concerns, please do not hesitate to contact us, we are here for you.  Thank you for choosing [facility] for your healthcare needs.    Sincerely,    Amilcar Bacrenas    [facility]

## 2017-03-03 NOTE — ED AVS SNAPSHOT
ShopClues.com Access Code: Activation code not generated  Current ShopClues.com Status: Active    [employer]  A secure, online tool to manage your health information     [employer]’s ShopClues.com® is a secure, online tool that connects you to your personalized health information from the privacy of your home -- day or night - making it very easy for you to manage your healthcare. Once the activation process is completed, you can even access your medical information using the ShopClues.com rosalind, which is available for free in the Apple Rosalind store or Google Play store.     ShopClues.com provides the following levels of access (as shown below):   My Chart Features   [facility] Primary Care Doctor [facility]  Specialists [facility]  Urgent  Care Non-[facility]  Primary Care  Doctor   Email your healthcare team securely and privately 24/7 X X X X   Manage appointments: schedule your next appointment; view details of past/upcoming appointments X      Request prescription refills. X      View recent personal medical records, including lab and immunizations X X X X   View health record, including health history, allergies, medications X X X X   Read reports about your outpatient visits, procedures, consult and ER notes X X X X   See your discharge summary, which is a recap of your hospital and/or ER visit that includes your diagnosis, lab results, and care plan. X X       How to register for [email or web address].com:  1. Go to  [email or web address].  2. Click on the Sign Up Now box, which takes you to the New Member Sign Up page. You will need to provide the following information:  a. Enter your ShopClues.com Access Code exactly as it appears at the top of this page. (You will not need to use this code after you’ve completed the sign-up process. If you do not sign up before the expiration date, you must request a new code.)   b. Enter your date of birth.   c. Enter your home email address.   d. Click Submit, and follow the next screen’s instructions.  3. Create a ShopClues.com ID. This will  be your Vendormate login ID and cannot be changed, so think of one that is secure and easy to remember.  4. Create a Vendormate password. You can change your password at any time.  5. Enter your Password Reset Question and Answer. This can be used at a later time if you forget your password.   6. Enter your e-mail address. This allows you to receive e-mail notifications when new information is available in Vendormate.  7. Click Sign Up. You can now view your health information.    For assistance activating your Vendormate account, call [phone]

## 2017-03-03 NOTE — ED PROVIDER NOTES
ED Provider Note    CHIEF COMPLAINT  Chief Complaint   Patient presents with   • Cough     Ongoing the past couple weeks, was seen on Tuesday, given ABT, cough medicine et inhaler, not feeling any better   • Congestion       HPI  Jaime Lee is a 66 y.o. male who presents with a cough. The cough started about 8 days ago a few days after coming back from the [facility]. Cough has been productive of mucus that results in shortness of breath when he is coughing a lot. He denies having any shortness of breath otherwise, but the significant other states he does seem to have more short of breath with exertion. He has not had fever or chills. He has had congestion and runny nose and associated sore throat. He has not had chest pain. No pleuritic pain. He does have a history of congestive heart failure. He generally does not sleep laying flat and has been sleeping in a recliner. He is able to lay flat without difficulty by his report. He does not have any leg swelling. He reports that today in the ER he weighed 4 pounds more than normal while wearing all of his clothes. No other weight gain. The patient was seen at the urgent care on 2/28. He was given prescriptions for albuterol, codeine cough syrup and sulfa. He states that he is no better. He has a trip planned to go to [facility] and presents for evaluation prior to his travel.    REVIEW OF SYSTEMS  As per HPI, otherwise a 10 point review of systems is negative    PAST MEDICAL HISTORY  Past Medical History   Diagnosis Date   • CAD (coronary artery disease) 12/28/2000     CABG x 4 (LIMA to LAD, rSVG to first diagonal, left circumflex and PDA).2000: CABG x 4 (LIMA to LAD, rSVG to first diagonal, left circumflex and PDA).  2003: LIMA and circumflex grafts patent, diagonal and PDA grafts closed.   • Congestive heart failure (CMS-[facility]) 12/28/2000   • Acute anterior myocardial infarction (CMS-[facility]) 12/28/2000     PCI attempted by Dr. Malloy but unsuccessful    • AICD (automatic cardioverter/defibrillator) present 2003/2008/2015     Defibrillator is a Medtronic model PQQT5A4, serial #[id number]. The ventricular lead is a Medtronic model #6947-65, lead serial #[id number], implant 7/2/2003   • Pneumonia 2014   • Pulmonary embolism (CMS-HCC) March 2014   • Ischemic cardiomyopathy February 2014     Echocardiogram with severely dilated LV, LVEF 15-20%   • GERD (gastroesophageal reflux disease)    • Dental disorder      Upper partial   • Type 2 diabetes mellitus without complication (CMS-HCC)       Diet controlled, followed by Dr. Walls   • Left bundle branch block         SOCIAL HISTORY  Social History   Substance Use Topics   • Smoking status: Former Smoker -- 32 years     Types: Cigarettes     Quit date: 12/28/2000   • Smokeless tobacco: Never Used   • Alcohol Use: 0.0 oz/week     0 Standard drinks or equivalent per week      Comment: 7 per week       SURGICAL HISTORY  Past Surgical History   Procedure Laterality Date   • Other cardiac surgery  12/2000     CABG x 4   • Appendectomy  1969   • Aicd implant  January 2008     Medtronic Virtuoso VR [id number] implanted by Dr. Stephenson.   • Recovery  11/3/2015     Procedure: CATH LAB-MARCO A-ICD GENERATOR CHANGE 12877- EUNICE T82.111A-MEDTRONIC BLOODLESS;  Surgeon: Recoveryonly Surgery;  [place] [facility] [place]  Service:    • Aicd battery change  November 2015     Generator replacement with Medtronic Evera S VR [id number] implanted by Dr. Marcus Stephenson.       CURRENT MEDICATIONS  Home Medications     Reviewed by Juliana Anderson R.N. (Registered Nurse) on 03/03/17 at 0612  Med List Status: <None>    Medication Last Dose Status    albuterol 108 (90 BASE) MCG/ACT Aero Soln inhalation aerosol  Active    furosemide (LASIX) 40 MG Tab 1/18/2017 Active    guaifenesin-codeine (TUSSI-ORGANIDIN NR) 100-10 MG/5ML syrup  Active    lisinopril (PRINIVIL) 5 MG Tab 1/18/2017 Active    lovastatin (MEVACOR) 10 MG tablet 1/18/2017 Active     "Magnesium 500 MG CAPS 1/18/2017 Active    metoprolol SR (TOPROL XL) 100 MG TABLET SR 24 HR 1/18/2017 Active    potassium chloride SA (K-DUR) 20 MEQ Tab CR 1/18/2017 Active    spironolactone (ALDACTONE) 25 MG Tab 1/18/2017 Active    sulfamethoxazole-trimethoprim (BACTRIM DS) 800-160 MG tablet  Active                ALLERGIES  Allergies   Allergen Reactions   • Bloodless    • Pcn [Penicillins] Anaphylaxis       PHYSICAL EXAM  VITAL SIGNS: /83 mmHg  Pulse 94  Temp(Src) 36.2 °C (97.1 °F)  Resp 16  Ht 1.854 m (6' 1\")  Wt 91.5 kg (201 lb 11.5 oz)  BMI 26.62 kg/m2  SpO2 92%   Constitutional: Awake and alert  HENT: Nares with mucosal edema. No purulent nasal discharge. Tympanic membranes are clear. Pharynx with mild posterior erythema and cobblestoning. Airways widely patent.  Eyes: Normal inspection  Neck: Supple, no JVD  Cardiovascular: Normal heart rate, Normal rhythm.  Symmetric peripheral pulses.   Thorax & Lungs: He has mildly diminished breath sounds throughout. He does not have any crackles or rhonchi  Abdomen: Bowel sounds normal, soft, non-distended, nontender, no mass  Skin: Warm, Dry, No rash.   Back: No tenderness, No CVA tenderness.   Extremities: No clubbing, cyanosis, edema, no Homans or cords   Neurologic: Grossly normal       RADIOLOGY/PROCEDURES  DX-CHEST-2 VIEWS   Final Result      No evidence of acute cardiopulmonary disease         Imaging is interpreted by radiologist      COURSE & MEDICAL DECISION MAKING  Patient presents with URI symptoms. He is overall improving, but has persistent cough that is quite irritating. He does not have shortness of breath, pleuritic pain, hemoptysis, leg swelling or suggestion of PE. His lung fields are clear without findings consistent with congestive heart failure volume overload. His physical exam is otherwise benign. He does have mildly diminished breath sounds. Obtained a chest x-ray that is clear. I suspect ongoing symptoms related to his upper " respiratory infection. He is prescribed Tessalon Perles for his cough. He will be given an AeroChamber for the albuterol inhaler he was given this may improve delivery. Given the fact that [place] is [place] out of the [place] and will not have access to healthcare I have given a prescription for Levaquin to take should he develop fevers or worsening of his condition. He does have a history of pulmonary embolism in the past as well as coronary artery disease. He is currently not anticoagulated. He is not taking aspirin. I advised him to take aspirin daily. He should get up and move around during the flight and CANDE hose would not be a bad idea. He voiced understanding to this. Patient will return to an ER for any chest pain, shortness of breath or concern.    FINAL IMPRESSION  1. upper respiratory infection      This dictation was created using voice recognition software. The accuracy of the dictation is limited to the abilities of the software.  The nursing notes were reviewed and certain aspects of this information were incorporated into this note.      Electronically signed by: Mehul Arguello, 3/3/2017 6:27 AM

## 2017-03-03 NOTE — ED NOTES
"Chief Complaint   Patient presents with   • Cough     Ongoing the past couple weeks, was seen on Tuesday, given ABT, cough medicine et inhaler, not feeling any better   • Congestion     /83 mmHg  Pulse 94  Temp(Src) 36.2 °C (97.1 °F)  Resp 16  Ht 1.854 m (6' 1\")  Wt 91.5 kg (201 lb 11.5 oz)  BMI 26.62 kg/m2  SpO2 92%    "

## 2017-03-03 NOTE — ED NOTES
Discharged home in good condition with prescriptions and follow up instructions, pt verbalizes understanding of all, ambulates out with steady gait accompanied by family.

## 2017-03-03 NOTE — ED AVS SNAPSHOT
Home Care Instructions                                                                                                                Jaime Lee   MRN: [id number]    Department:  [facility], Emergency Dept   Date of Visit:  3/3/2017            [facility], Emergency Dept    [address]    [place] [address]    Phone:  [phone]      You were seen by     Mehul Arguello M.D.      Your Diagnosis Was     Upper respiratory tract infection, unspecified type     J06.9       Follow-up Information     1. Follow up with Jayme Brady M.D..    Specialty:  Family Medicine    Why:  when you return    Contact information    [address]  [address]  [place] [address] [phone]        Medication Information     Review all of your home medications and newly ordered medications with your primary doctor and/or pharmacist as soon as possible. Follow medication instructions as directed by your doctor and/or pharmacist.     Please keep your complete medication list with you and share with your physician. Update the information when medications are discontinued, doses are changed, or new medications (including over-the-counter products) are added; and carry medication information at all times in the event of emergency situations.               Medication List      START taking these medications        Instructions    benzonatate 100 MG Caps   Commonly known as:  TESSALON    Take 1 Cap by mouth 3 times a day as needed for Cough.   Dose:  100 mg       levofloxacin 500 MG tablet   Commonly known as:  LEVAQUIN    Take 1 Tab by mouth every day.   Dose:  500 mg         ASK your doctor about these medications        Instructions    albuterol 108 (90 BASE) MCG/ACT Aers inhalation aerosol    Inhale 2 Puffs by mouth every 6 hours as needed for Shortness of Breath for up to 10 days.   Dose:  2 Puff       furosemide 40 MG Tabs   Commonly known as:  LASIX    Take 1  "Tab by mouth every day.   Dose:  40 mg       guaifenesin-codeine 100-10 MG/5ML syrup   Commonly known as:  TUSSI-ORGANIDIN NR    Take 5 mL by mouth 3 times a day as needed for Cough for up to 5 days.   Dose:  5 mL       lisinopril 5 MG Tabs   Commonly known as:  PRINIVIL    Take 1 Tab by mouth 2 times a day.   Dose:  5 mg       lovastatin 10 MG tablet   Commonly known as:  MEVACOR    Take 1 Tab by mouth every evening.   Dose:  10 mg       Magnesium 500 MG Caps    Take 500 mg by mouth every day.   Dose:  500 mg       metoprolol  MG Tb24   Commonly known as:  TOPROL XL    Take 1 Tab by mouth every day.   Dose:  100 mg       potassium chloride SA 20 MEQ Tbcr   Commonly known as:  Kdur    Take 1 Tab by mouth every day.   Dose:  20 mEq       spironolactone 25 MG Tabs   Commonly known as:  ALDACTONE    Take 0.5 Tabs by mouth every day.   Dose:  12.5 mg       sulfamethoxazole-trimethoprim 800-160 MG tablet   Commonly known as:  BACTRIM DS    Take 1 Tab by mouth 2 times a day for 10 days.   Dose:  1 Tab               Procedures and tests performed during your visit     DX-CHEST-2 VIEWS        Discharge Instructions       Upper Respiratory Infection, Adult  Most upper respiratory infections (URIs) are a viral infection of the air passages leading to the lungs. A URI affects the nose, throat, and upper air passages. The most common type of URI is nasopharyngitis and is typically referred to as \"the common cold.\"  URIs run their course and usually go away on their own. Most of the time, a URI does not require medical attention, but sometimes a bacterial infection in the upper airways can follow a viral infection. This is called a secondary infection. Sinus and middle ear infections are common types of secondary upper respiratory infections.  Bacterial pneumonia can also complicate a URI. A URI can worsen asthma and chronic obstructive pulmonary disease (COPD). Sometimes, these complications can require emergency medical " care and may be life threatening.   CAUSES  Almost all URIs are caused by viruses. A virus is a type of germ and can spread from one person to another.   RISKS FACTORS  You may be at risk for a URI if:   · You smoke.    · You have chronic heart or lung disease.  · You have a weakened defense (immune) system.    · You are very young or very old.    · You have nasal allergies or asthma.  · You work in crowded or poorly ventilated areas.  · You work in health care facilities or schools.  SIGNS AND SYMPTOMS   Symptoms typically develop 2-3 days after you come in contact with a cold virus. Most viral URIs last 7-10 days. However, viral URIs from the influenza virus (flu virus) can last 14-18 days and are typically more severe. Symptoms may include:   · Runny or stuffy (congested) nose.    · Sneezing.    · Cough.    · Sore throat.    · Headache.    · Fatigue.    · Fever.    · Loss of appetite.    · Pain in your forehead, behind your eyes, and over your cheekbones (sinus pain).  · Muscle aches.    DIAGNOSIS   Your health care provider may diagnose a URI by:  · Physical exam.  · Tests to check that your symptoms are not due to another condition such as:  ¨ Strep throat.  ¨ Sinusitis.  ¨ Pneumonia.  ¨ Asthma.  TREATMENT   A URI goes away on its own with time. It cannot be cured with medicines, but medicines may be prescribed or recommended to relieve symptoms. Medicines may help:  · Reduce your fever.  · Reduce your cough.  · Relieve nasal congestion.  HOME CARE INSTRUCTIONS   · Take medicines only as directed by your health care provider.    · Gargle warm saltwater or take cough drops to comfort your throat as directed by your health care provider.  · Use a warm mist humidifier or inhale steam from a shower to increase air moisture. This may make it easier to breathe.  · Drink enough fluid to keep your urine clear or pale yellow.    · Eat soups and other clear broths and maintain good nutrition.    · Rest as needed.     · Return to work when your temperature has returned to normal or as your health care provider advises. You may need to stay home longer to avoid infecting others. You can also use a face mask and careful hand washing to prevent spread of the virus.  · Increase the usage of your inhaler if you have asthma.    · Do not use any tobacco products, including cigarettes, chewing tobacco, or electronic cigarettes. If you need help quitting, ask your health care provider.  PREVENTION   The best way to protect yourself from getting a cold is to practice good hygiene.   · Avoid oral or hand contact with people with cold symptoms.    · Wash your hands often if contact occurs.    There is no clear evidence that vitamin C, vitamin E, echinacea, or exercise reduces the chance of developing a cold. However, it is always recommended to get plenty of rest, exercise, and practice good nutrition.   SEEK MEDICAL CARE IF:   · You are getting worse rather than better.    · Your symptoms are not controlled by medicine.    · You have chills.  · You have worsening shortness of breath.  · You have brown or red mucus.  · You have yellow or brown nasal discharge.  · You have pain in your face, especially when you bend forward.  · You have a fever.  · You have swollen neck glands.  · You have pain while swallowing.  · You have white areas in the back of your throat.  SEEK IMMEDIATE MEDICAL CARE IF:   · You have severe or persistent:  ¨ Headache.  ¨ Ear pain.  ¨ Sinus pain.  ¨ Chest pain.  · You have chronic lung disease and any of the following:  ¨ Wheezing.  ¨ Prolonged cough.  ¨ Coughing up blood.  ¨ A change in your usual mucus.  · You have a stiff neck.  · You have changes in your:  ¨ Vision.  ¨ Hearing.  ¨ Thinking.  ¨ Mood.  MAKE SURE YOU:   · Understand these instructions.  · Will watch your condition.  · Will get help right away if you are not doing well or get worse.     This information is not intended to replace advice given to you  by your health care provider. Make sure you discuss any questions you have with your health care provider.     Document Released: 06/13/2002 Document Revised: 05/03/2016 Document Reviewed: 03/25/2015  Elsevier Interactive Patient Education ©2016 Elsevier Inc.            Patient Information     Patient Information    Following emergency treatment: all patient requiring follow-up care must return either to a private physician or a clinic if your condition worsens before you are able to obtain further medical attention, please return to the emergency room.     Billing Information    At [facility], we work to make the billing process streamlined for our patients.  Our Representatives are here to answer any questions you may have regarding your hospital bill.  If you have insurance coverage and have supplied your insurance information to us, we will submit a claim to your insurer on your behalf.  Should you have any questions regarding your bill, we can be reached online or by phone as follows:  Online: You are able pay your bills online or live chat with our representatives about any billing questions you may have. We are here to help Monday - Friday from 8:00am to 7:30pm and 9:00am - 12:00pm on Saturdays.  Please visit [email or web address]  for more information.   Phone:  [phone] or [phone]    Please note that your emergency physician, surgeon, pathologist, radiologist, anesthesiologist, and other specialists are not employed by [facility] and will therefore bill separately for their services.  Please contact them directly for any questions concerning their bills at the numbers below:     Emergency Physician Services:  [phone]  [facility]:  [phone]  [facility]:  [phone]  [facility]:  [phone]    1. Your final bill may vary from the amount quoted upon discharge if all procedures are not complete at that  time, or if your doctor has additional procedures of which we are not aware. You will receive an additional bill if you return to the Emergency Department at [facility] for suture removal regardless of the facility of which the sutures were placed.     2. Please arrange for settlement of this account at the emergency registration.    3. All self-pay accounts are due in full at the time of treatment.  If you are unable to meet this obligation then payment is expected within 4-5 days.     4. If you have had radiology studies (CT, X-ray, Ultrasound, MRI), you have received a preliminary result during your emergency department visit. Please contact the radiology department [phone] to receive a copy of your final result. Please discuss the Final result with your primary physician or with the follow up physician provided.     Crisis Hotline:  [place] Crisis Hotline:  4-982-FEJOSPY or 1-102.929.2834  [place] Crisis Hotline:    1-742.704.9088 or 229-533-3472         ED Discharge Follow Up Questions    1. In order to provide you with very good care, we would like to follow up with a phone call in the next few days.  May we have your permission to contact you?     YES /  NO    2. What is the best phone number to call you? (       )_____-__________    3. What is the best time to call you?      Morning  /  Afternoon  /  Evening                   Patient Signature:  ____________________________________________________________    Date:  ____________________________________________________________      Your appointments     Jul 26, 2017  8:00 AM   PACER CHECK ONLY with LEIF Conrad.   [facility] (--[facility]    [address]  [facility] [id number] [phone]            Jul 26, 2017  8:20 AM   FOLLOW UP with Juan Pickering M.D.   [facility] (--[facility]    [address]  [facility] [address]    [phone]

## 2017-07-26 ENCOUNTER — HOSPITAL ENCOUNTER (OUTPATIENT)
Dept: LAB | Facility: MEDICAL CENTER | Age: 67
End: 2017-07-26
Attending: INTERNAL MEDICINE
Payer: MEDICARE

## 2017-07-26 ENCOUNTER — OFFICE VISIT (OUTPATIENT)
Dept: CARDIOLOGY | Facility: MEDICAL CENTER | Age: 67
End: 2017-07-26
Payer: MEDICARE

## 2017-07-26 ENCOUNTER — NON-PROVIDER VISIT (OUTPATIENT)
Dept: CARDIOLOGY | Facility: MEDICAL CENTER | Age: 67
End: 2017-07-26
Payer: MEDICARE

## 2017-07-26 VITALS
WEIGHT: 200 LBS | DIASTOLIC BLOOD PRESSURE: 72 MMHG | SYSTOLIC BLOOD PRESSURE: 100 MMHG | OXYGEN SATURATION: 94 % | HEART RATE: 76 BPM | BODY MASS INDEX: 27.09 KG/M2 | HEIGHT: 72 IN

## 2017-07-26 DIAGNOSIS — I25.10 CORONARY ARTERY DISEASE INVOLVING NATIVE CORONARY ARTERY OF NATIVE HEART WITHOUT ANGINA PECTORIS: ICD-10-CM

## 2017-07-26 DIAGNOSIS — Z95.810 AICD (AUTOMATIC CARDIOVERTER/DEFIBRILLATOR) PRESENT: ICD-10-CM

## 2017-07-26 DIAGNOSIS — I25.5 ISCHEMIC CARDIOMYOPATHY: ICD-10-CM

## 2017-07-26 DIAGNOSIS — I50.22 CHRONIC SYSTOLIC CONGESTIVE HEART FAILURE, NYHA CLASS 2 (HCC): ICD-10-CM

## 2017-07-26 DIAGNOSIS — H81.4 VERTIGO OF CENTRAL ORIGIN, UNSPECIFIED LATERALITY: ICD-10-CM

## 2017-07-26 DIAGNOSIS — I44.7 LEFT BUNDLE BRANCH BLOCK: ICD-10-CM

## 2017-07-26 DIAGNOSIS — E78.2 MIXED HYPERLIPIDEMIA: ICD-10-CM

## 2017-07-26 LAB
ALBUMIN SERPL BCP-MCNC: 4.3 G/DL (ref 3.2–4.9)
ALBUMIN/GLOB SERPL: 1.5 G/DL
ALP SERPL-CCNC: 56 U/L (ref 30–99)
ALT SERPL-CCNC: 33 U/L (ref 2–50)
ANION GAP SERPL CALC-SCNC: 7 MMOL/L (ref 0–11.9)
AST SERPL-CCNC: 28 U/L (ref 12–45)
BASOPHILS # BLD AUTO: 1.1 % (ref 0–1.8)
BASOPHILS # BLD: 0.07 K/UL (ref 0–0.12)
BILIRUB SERPL-MCNC: 1.5 MG/DL (ref 0.1–1.5)
BUN SERPL-MCNC: 16 MG/DL (ref 8–22)
CALCIUM SERPL-MCNC: 9.9 MG/DL (ref 8.5–10.5)
CHLORIDE SERPL-SCNC: 98 MMOL/L (ref 96–112)
CHOLEST SERPL-MCNC: 178 MG/DL (ref 100–199)
CO2 SERPL-SCNC: 29 MMOL/L (ref 20–33)
CREAT SERPL-MCNC: 1.27 MG/DL (ref 0.5–1.4)
EOSINOPHIL # BLD AUTO: 0.05 K/UL (ref 0–0.51)
EOSINOPHIL NFR BLD: 0.8 % (ref 0–6.9)
ERYTHROCYTE [DISTWIDTH] IN BLOOD BY AUTOMATED COUNT: 43.6 FL (ref 35.9–50)
GFR SERPL CREATININE-BSD FRML MDRD: 57 ML/MIN/1.73 M 2
GLOBULIN SER CALC-MCNC: 2.9 G/DL (ref 1.9–3.5)
GLUCOSE SERPL-MCNC: 241 MG/DL (ref 65–99)
HCT VFR BLD AUTO: 48.9 % (ref 42–52)
HDLC SERPL-MCNC: 51 MG/DL
HGB BLD-MCNC: 16.6 G/DL (ref 14–18)
IMM GRANULOCYTES # BLD AUTO: 0.03 K/UL (ref 0–0.11)
IMM GRANULOCYTES NFR BLD AUTO: 0.5 % (ref 0–0.9)
LDLC SERPL CALC-MCNC: 103 MG/DL
LYMPHOCYTES # BLD AUTO: 0.81 K/UL (ref 1–4.8)
LYMPHOCYTES NFR BLD: 12.8 % (ref 22–41)
MAGNESIUM SERPL-MCNC: 1.8 MG/DL (ref 1.5–2.5)
MCH RBC QN AUTO: 33.1 PG (ref 27–33)
MCHC RBC AUTO-ENTMCNC: 33.9 G/DL (ref 33.7–35.3)
MCV RBC AUTO: 97.4 FL (ref 81.4–97.8)
MONOCYTES # BLD AUTO: 0.46 K/UL (ref 0–0.85)
MONOCYTES NFR BLD AUTO: 7.3 % (ref 0–13.4)
NEUTROPHILS # BLD AUTO: 4.89 K/UL (ref 1.82–7.42)
NEUTROPHILS NFR BLD: 77.5 % (ref 44–72)
NRBC # BLD AUTO: 0 K/UL
NRBC BLD AUTO-RTO: 0 /100 WBC
PLATELET # BLD AUTO: 114 K/UL (ref 164–446)
PMV BLD AUTO: 12.3 FL (ref 9–12.9)
POTASSIUM SERPL-SCNC: 5.1 MMOL/L (ref 3.6–5.5)
PROT SERPL-MCNC: 7.2 G/DL (ref 6–8.2)
RBC # BLD AUTO: 5.02 M/UL (ref 4.7–6.1)
SODIUM SERPL-SCNC: 134 MMOL/L (ref 135–145)
TRIGL SERPL-MCNC: 119 MG/DL (ref 0–149)
TSH SERPL DL<=0.005 MIU/L-ACNC: 1.57 UIU/ML (ref 0.3–3.7)
WBC # BLD AUTO: 6.3 K/UL (ref 4.8–10.8)

## 2017-07-26 PROCEDURE — 99214 OFFICE O/P EST MOD 30 MIN: CPT | Performed by: INTERNAL MEDICINE

## 2017-07-26 PROCEDURE — 84443 ASSAY THYROID STIM HORMONE: CPT

## 2017-07-26 PROCEDURE — 83735 ASSAY OF MAGNESIUM: CPT

## 2017-07-26 PROCEDURE — 93289 INTERROG DEVICE EVAL HEART: CPT | Performed by: NURSE PRACTITIONER

## 2017-07-26 PROCEDURE — 80053 COMPREHEN METABOLIC PANEL: CPT

## 2017-07-26 PROCEDURE — 80061 LIPID PANEL: CPT

## 2017-07-26 PROCEDURE — 85025 COMPLETE CBC W/AUTO DIFF WBC: CPT

## 2017-07-26 PROCEDURE — 36415 COLL VENOUS BLD VENIPUNCTURE: CPT

## 2017-07-26 ASSESSMENT — ENCOUNTER SYMPTOMS
FALLS: 1
BLURRED VISION: 0
WEAKNESS: 0
STRIDOR: 0
LOSS OF CONSCIOUSNESS: 0
WHEEZING: 0
FLANK PAIN: 0
DEPRESSION: 0
PND: 0
DIZZINESS: 1
ORTHOPNEA: 0
NERVOUS/ANXIOUS: 0
COUGH: 0
HEMOPTYSIS: 0
SEIZURES: 0
DOUBLE VISION: 0
MYALGIAS: 0
BLOOD IN STOOL: 0
SORE THROAT: 0

## 2017-07-26 ASSESSMENT — LIFESTYLE VARIABLES: SUBSTANCE_ABUSE: 0

## 2017-07-26 NOTE — MR AVS SNAPSHOT
Jaime Lee   2017 8:00 AM   Non-Provider Visit   MRN: [id number]    Department:  [facility]   Dept Phone:  [phone]    Description:  Male : 1950   Provider:  CHRIS Conrad           Reason for Visit     AICD Check/Dysfunction           Allergies as of 2017     Allergen Noted Reactions    Bloodless 10/29/2015       Pcn [Penicillins] 2014   Anaphylaxis      You were diagnosed with     AICD (automatic cardioverter/defibrillator) present   [329304]       Coronary artery disease involving native coronary artery of native heart without angina pectoris   [4020699]       Ischemic cardiomyopathy   [004878]         Vital Signs     Smoking Status                   Former Smoker           Basic Information     Date Of Birth Sex Race Ethnicity Preferred Language    1950 Male White Non- English      Your appointments     Aug 09, 2017  8:15 AM   ECHO with ECHO [facility]   ECHOCARDIOLOGY [facility])    [address]  [place] [address]   [phone]            Dec 12, 2017  8:00 AM   PACER CHECK ONLY with DEANNA ConradPISABEL   [facility]    [address]  [address]  [place] [address] [phone]            Dec 12, 2017  8:20 AM   FOLLOW UP with Juan Pickering M.D.   [facility] (--)    [address]  [address]  [place] [address] [phone]              Problem List              ICD-10-CM Priority Class Noted - Resolved    CAD (coronary artery disease) I25.10 High  2000 - Present    Thrombocytopenia (CMS-[facility]) D69.6 Medium  2014 - Present    CKD (chronic kidney disease) stage 3, GFR 30-59 ml/min N18.3 Medium  2014 - Present    Pulmonary embolism (CMS-[facility]) I26.99 High  2014 - Present    AICD (automatic cardioverter/defibrillator) present Z95.810 High  3/11/2014 - Present    Ischemic  cardiomyopathy I25.5 High  3/11/2014 - Present    S/P CABG x 4 Z95.1 High  6/22/2016 - Present    Type 2 diabetes mellitus without complication (CMS-HCC) E11.9 Medium  6/22/2016 - Present    Left bundle branch block I44.7 High  6/22/2016 - Present    Old anterior myocardial infarction I25.2   6/22/2016 - Present    Chronic systolic congestive heart failure, NYHA class 2 (CMS-HCC) I50.22 Medium  6/22/2016 - Present    Hyperlipidemia E78.5 High  1/18/2017 - Present      Health Maintenance        Date Due Completion Dates    A1C SCREENING 2/23/1951 ---    DIABETES MONOFILAMENT / LE EXAM 2/23/1951 ---    RETINAL SCREENING 8/23/1968 ---    URINE ACR / MICROALBUMIN 8/23/1968 ---    IMM ZOSTER VACCINE 8/23/2010 ---    IMM DTaP/Tdap/Td Vaccine (1 - Tdap) 10/17/2011 10/16/2011    IMM INFLUENZA (1) 9/1/2017 9/6/2016    IMM PNEUMOCOCCAL 65+ (ADULT) LOW/MEDIUM RISK SERIES (2 of 2 - PPSV23) 9/6/2017 9/6/2016    FASTING LIPID PROFILE 12/14/2017 12/14/2016, 6/2/2011, 6/8/2009, 12/21/2007, 4/19/2007, 3/20/2006, 6/17/2005    SERUM CREATININE 12/14/2017 12/14/2016, 1/1/2016, 10/29/2015, 5/14/2015, 7/31/2014, 3/14/2014, 2/26/2014, 2/25/2014, 2/24/2014, 2/23/2014, 6/2/2011, 6/8/2009, 1/8/2008, 12/21/2007, 4/19/2007, 3/20/2006, 6/17/2005    COLONOSCOPY 4/22/2024 4/22/2014 (Declined)    Override on 4/22/2014: Patient Declined            Current Immunizations     13-VALENT PCV PREVNAR 9/6/2016 11:07 AM    Influenza Vaccine Adult HD 9/6/2016 11:04 AM    TD Vaccine 10/16/2011 11:30 PM      Below and/or attached are the medications your provider expects you to take. Review all of your home medications and newly ordered medications with your provider and/or pharmacist. Follow medication instructions as directed by your provider and/or pharmacist. Please keep your medication list with you and share with your provider. Update the information when medications are discontinued, doses are changed, or new medications (including over-the-counter  products) are added; and carry medication information at all times in the event of emergency situations     Allergies:  BLOODLESS - (reactions not documented)     PCN - Anaphylaxis               Medications  Valid as of: July 26, 2017 -  8:48 AM    Generic Name Brand Name Tablet Size Instructions for use    Benzonatate (Cap) TESSALON 100 MG Take 1 Cap by mouth 3 times a day as needed for Cough.        Furosemide (Tab) LASIX 40 MG Take 1 Tab by mouth every day.        LevoFLOXacin (Tab) LEVAQUIN 500 MG Take 1 Tab by mouth every day.        Lisinopril (Tab) PRINIVIL 5 MG Take 1 Tab by mouth 2 times a day.        Lovastatin (Tab) MEVACOR 10 MG Take 1 Tab by mouth every evening.        Magnesium Oxide (Cap) Magnesium 500 MG Take 500 mg by mouth every day.        Metoprolol Succinate (TABLET SR 24 HR) TOPROL  MG Take 1 Tab by mouth every day.        Potassium Chloride Silvana CR (Tab CR) Kdur 20 MEQ Take 1 Tab by mouth every day.        Spironolactone (Tab) ALDACTONE 25 MG Take 0.5 Tabs by mouth every day.        .                 Medicines prescribed today were sent to:     [facility]/PHARMACY #[id number] - [place], [place] - [address]    [address] [facility] [place] [facility] [place] [address]    Phone: [phone] Fax: [phone]    Open 24 Hours?: No      Medication refill instructions:       If your prescription bottle indicates you have medication refills left, it is not necessary to call your provider’s office. Please contact your pharmacy and they will refill your medication.    If your prescription bottle indicates you do not have any refills left, you may request refills at any time through one of the following ways: The online [employer] system (except Urgent Care), by calling your provider’s office, or by asking your pharmacy to contact your provider’s office with a refill request. Medication refills are processed only during regular business hours and may not be available until the next business day. Your provider may request additional  information or to have a follow-up visit with you prior to refilling your medication.   *Please Note: Medication refills are assigned a new Rx number when refilled electronically. Your pharmacy may indicate that no refills were authorized even though a new prescription for the same medication is available at the pharmacy. Please request the medicine by name with the pharmacy before contacting your provider for a refill.           [email or web address] Access Code: Activation code not generated  Current Aircuity Status: Active

## 2017-07-26 NOTE — MR AVS SNAPSHOT
"        Jaime Lee   2017 8:20 AM   Office Visit   MRN: [id number]    Department:  [facility]   Dept Phone:  [phone]    Description:  Male : 1950   Provider:  Juan Pickering M.D.           Allergies as of 2017     Allergen Noted Reactions    Bloodless 10/29/2015       Pcn [Penicillins] 2014   Anaphylaxis      You were diagnosed with     Coronary artery disease involving native coronary artery of native heart without angina pectoris   [6311775]       Ischemic cardiomyopathy   [254475]       Chronic systolic congestive heart failure, NYHA class 2 (CMS-HCC)   [094603]       Left bundle branch block   [318110]       Vertigo of central origin, unspecified laterality   [010106]       Mixed hyperlipidemia   [272.2.ICD-9-CM]         Vital Signs     Blood Pressure Pulse Height Weight Body Mass Index Oxygen Saturation    100/72 mmHg 76 1.829 m (6' 0.01\") 90.719 kg (200 lb) 27.12 kg/m2 94%    Smoking Status                   Former Smoker           Basic Information     Date Of Birth Sex Race Ethnicity Preferred Language    1950 Male White Non- English      Your appointments     2017  9:15 AM   Adult Draw/Collection with MAIN LAB [facility]   MAIN LAB [facility] (--)    [address]  [place] [address]   [phone]            Aug 09, 2017  8:15 AM   ECHO with ECHO [facility]   ECHOCARDIOLOGY [facility] ([facility])    [address]  [place] [address]   [phone]            Dec 12, 2017  8:00 AM   PACER CHECK ONLY with CHRIS Conrad   [facility] ([facility])    [address]  [address]  [place] [address] [phone]            Dec 12, 2017  8:20 AM   FOLLOW UP with Juan Pickering M.D.   [facility] ([facility])    [address]  [address]  [place] [address] [phone]              Problem List              ICD-10-CM Priority Class " Noted - Resolved    CAD (coronary artery disease) I25.10 High  12/28/2000 - Present    Thrombocytopenia (CMS-HCC) D69.6 Medium  2/23/2014 - Present    CKD (chronic kidney disease) stage 3, GFR 30-59 ml/min N18.3 Medium  2/24/2014 - Present    Pulmonary embolism (CMS-HCC) I26.99 High  2/27/2014 - Present    AICD (automatic cardioverter/defibrillator) present Z95.810 High  3/11/2014 - Present    Ischemic cardiomyopathy I25.5 High  3/11/2014 - Present    S/P CABG x 4 Z95.1 High  6/22/2016 - Present    Type 2 diabetes mellitus without complication (CMS-HCC) E11.9 Medium  6/22/2016 - Present    Left bundle branch block I44.7 High  6/22/2016 - Present    Old anterior myocardial infarction I25.2   6/22/2016 - Present    Chronic systolic congestive heart failure, NYHA class 2 (CMS-HCC) I50.22 Medium  6/22/2016 - Present    Hyperlipidemia E78.5 High  1/18/2017 - Present      Health Maintenance        Date Due Completion Dates    A1C SCREENING 2/23/1951 ---    DIABETES MONOFILAMENT / LE EXAM 2/23/1951 ---    RETINAL SCREENING 8/23/1968 ---    URINE ACR / MICROALBUMIN 8/23/1968 ---    IMM ZOSTER VACCINE 8/23/2010 ---    IMM DTaP/Tdap/Td Vaccine (1 - Tdap) 10/17/2011 10/16/2011    IMM INFLUENZA (1) 9/1/2017 9/6/2016    IMM PNEUMOCOCCAL 65+ (ADULT) LOW/MEDIUM RISK SERIES (2 of 2 - PPSV23) 9/6/2017 9/6/2016    FASTING LIPID PROFILE 12/14/2017 12/14/2016, 6/2/2011, 6/8/2009, 12/21/2007, 4/19/2007, 3/20/2006, 6/17/2005    SERUM CREATININE 12/14/2017 12/14/2016, 1/1/2016, 10/29/2015, 5/14/2015, 7/31/2014, 3/14/2014, 2/26/2014, 2/25/2014, 2/24/2014, 2/23/2014, 6/2/2011, 6/8/2009, 1/8/2008, 12/21/2007, 4/19/2007, 3/20/2006, 6/17/2005    COLONOSCOPY 4/22/2024 4/22/2014 (Declined)    Override on 4/22/2014: Patient Declined            Current Immunizations     13-VALENT PCV PREVNAR 9/6/2016 11:07 AM    Influenza Vaccine Adult HD 9/6/2016 11:04 AM    TD Vaccine 10/16/2011 11:30 PM      Below and/or attached are the medications your  provider expects you to take. Review all of your home medications and newly ordered medications with your provider and/or pharmacist. Follow medication instructions as directed by your provider and/or pharmacist. Please keep your medication list with you and share with your provider. Update the information when medications are discontinued, doses are changed, or new medications (including over-the-counter products) are added; and carry medication information at all times in the event of emergency situations     Allergies:  BLOODLESS - (reactions not documented)     PCN - Anaphylaxis               Medications  Valid as of: July 26, 2017 -  9:14 AM    Generic Name Brand Name Tablet Size Instructions for use    Benzonatate (Cap) TESSALON 100 MG Take 1 Cap by mouth 3 times a day as needed for Cough.        Furosemide (Tab) LASIX 40 MG Take 1 Tab by mouth every day.        LevoFLOXacin (Tab) LEVAQUIN 500 MG Take 1 Tab by mouth every day.        Lisinopril (Tab) PRINIVIL 5 MG Take 1 Tab by mouth 2 times a day.        Lovastatin (Tab) MEVACOR 10 MG Take 1 Tab by mouth every evening.        Magnesium Oxide (Cap) Magnesium 500 MG Take 500 mg by mouth every day.        Metoprolol Succinate (TABLET SR 24 HR) TOPROL  MG Take 1 Tab by mouth every day.        Potassium Chloride Silvana CR (Tab CR) Kdur 20 MEQ Take 1 Tab by mouth every day.        Spironolactone (Tab) ALDACTONE 25 MG Take 0.5 Tabs by mouth every day.        .                 Medicines prescribed today were sent to:     [facility]/PHARMACY #[id number] - [place] - [address]    [address] [place] [address]    Phone: [phone] Fax: [phone]    Open 24 Hours?: No      Medication refill instructions:       If your prescription bottle indicates you have medication refills left, it is not necessary to call your provider’s office. Please contact your pharmacy and they will refill your medication.    If your prescription bottle indicates you do not have any  refills left, you may request refills at any time through one of the following ways: The online [employer] system (except Urgent Care), by calling your provider’s office, or by asking your pharmacy to contact your provider’s office with a refill request. Medication refills are processed only during regular business hours and may not be available until the next business day. Your provider may request additional information or to have a follow-up visit with you prior to refilling your medication.   *Please Note: Medication refills are assigned a new Rx number when refilled electronically. Your pharmacy may indicate that no refills were authorized even though a new prescription for the same medication is available at the pharmacy. Please request the medicine by name with the pharmacy before contacting your provider for a refill.        Your To Do List     Future Labs/Procedures Complete By Expires    CBC WITH DIFFERENTIAL  As directed 7/26/2018    COMP METABOLIC PANEL  As directed 7/26/2018    ECHOCARDIOGRAM COMP W/O CONT  As directed 7/27/2018    EKG  As directed 7/26/2018    LIPID PROFILE  As directed 7/26/2018    MAGNESIUM  As directed 7/26/2018    TSH  As directed 7/26/2018      Referral     A referral request has been sent to our patient care coordination department. Please allow 3-5 business days for us to process this request and contact you either by phone or mail. If you do not hear from us by the 5th business day, please call us at [phone].           [employer] Access Code: Activation code not generated  Current S[employer]ia Status: Active

## 2017-07-26 NOTE — PROGRESS NOTES
Device is working normally.  No device therapy.  11 NSVT episodes, all <2 seconds. Normal sensing and capture of RV lead; stable impedances. Battery longevity is 10.1 years.  No changes are made today.    FU in 6 months for next AICD check with me.    Collaborating MD: Ladan

## 2017-07-26 NOTE — PROGRESS NOTES
Subjective:   Jaime Lee is a 66 y.o. male who presents today for follow-up of his complex coronary disease and ischemic cardiomyopathy. He has not had angina nor any syncope nor near syncope but he does have functional class II limitations due to fatigue and mild effort dyspnea. He gets positional vertigo as well and loses his balance but is unaware of any palpitation and has not had syncope nor near syncope. His device shows no sustained arrhythmias with only isolated PVCs and EKG today confirms that with his marked IVCD as well.  His device has not shocked him and it is functioning normally today. He is overdue for lab work.    Past Medical History   Diagnosis Date   • CAD (coronary artery disease) 12/28/2000     CABG x 4 (LIMA to LAD, rSVG to first diagonal, left circumflex and PDA).2000: CABG x 4 (LIMA to LAD, rSVG to first diagonal, left circumflex and PDA).  2003: LIMA and circumflex grafts patent, diagonal and PDA grafts closed.   • Congestive heart failure (CMS-[facility]) 12/28/2000   • Acute anterior myocardial infarction (CMS-[facility]) 12/28/2000     PCI attempted by Dr. Malloy but unsuccessful   • AICD (automatic cardioverter/defibrillator) present 2003/2008/2015     Defibrillator is a Medtronic model MBIQ7D8, serial #[id number]. The ventricular lead is a Medtronic model #6947-65, lead serial #[id number], implant 7/2/2003   • Pneumonia 2014   • Pulmonary embolism (CMS-HCC) March 2014   • Ischemic cardiomyopathy February 2014     Echocardiogram with severely dilated LV, LVEF 15-20%   • GERD (gastroesophageal reflux disease)    • Dental disorder      Upper partial   • Type 2 diabetes mellitus without complication (CMS-HCC)       Diet controlled, followed by Dr. Walls   • Left bundle branch block       Past Surgical History   Procedure Laterality Date   • Other cardiac surgery  12/2000     CABG x 4   • Appendectomy  1969   • Aicd implant  January 2008     Medtronic Kangauoso VR [id number] implanted by  Dr. Stephen[place].   • Recovery  11/3/2015     Procedure: CATH LAB-[facility] A-ICD GENERATOR CHANGE 49466- EUNICE T82.111A-MEDTRONIC BLOODLESS;  Surgeon: Recoveryonly Surgery;  [place] [facility] [place]:    • Aicd battery change  November 2015     Generator replacement with Medtronic Evera S VR [id number] implanted by Dr. Marcus [place].     History reviewed. No pertinent family history.  History   Smoking status   • Former Smoker -- 32 years   • Types: Cigarettes   • Quit date: 12/28/2000   Smokeless tobacco   • Never Used     Allergies   Allergen Reactions   • Bloodless    • Pcn [Penicillins] Anaphylaxis     Outpatient Encounter Prescriptions as of 7/26/2017   Medication Sig Dispense Refill   • potassium chloride SA (K-DUR) 20 MEQ Tab CR Take 1 Tab by mouth every day. 90 Tab 3   • lovastatin (MEVACOR) 10 MG tablet Take 1 Tab by mouth every evening. 90 Tab 3   • furosemide (LASIX) 40 MG Tab Take 1 Tab by mouth every day. 90 Tab 3   • spironolactone (ALDACTONE) 25 MG Tab Take 0.5 Tabs by mouth every day. 45 Tab 3   • lisinopril (PRINIVIL) 5 MG Tab Take 1 Tab by mouth 2 times a day. 180 Tab 3   • metoprolol SR (TOPROL XL) 100 MG TABLET SR 24 HR Take 1 Tab by mouth every day. 90 Tab 3   • Magnesium 500 MG CAPS Take 500 mg by mouth every day.     • levofloxacin (LEVAQUIN) 500 MG tablet Take 1 Tab by mouth every day. 10 Tab 0   • benzonatate (TESSALON) 100 MG Cap Take 1 Cap by mouth 3 times a day as needed for Cough. 21 Cap 0     No facility-administered encounter medications on file as of 7/26/2017.     Review of Systems   Constitutional: Negative for malaise/fatigue.   HENT: Negative for nosebleeds and sore throat.         He has been hoarse   Eyes: Negative for blurred vision and double vision.   Respiratory: Negative for cough, hemoptysis, wheezing and stridor.    Cardiovascular: Negative for orthopnea and PND.   Gastrointestinal: Negative for blood in stool and melena.        He does have diastasis recti  "  Genitourinary: Negative for hematuria and flank pain.   Musculoskeletal: Positive for falls (he fell once when he got dizzy and did strain his right shoulder and elbow and is going to see orthopedics regarding this). Negative for myalgias.   Skin: Negative for rash.   Neurological: Positive for dizziness. Negative for seizures, loss of consciousness and weakness.   Psychiatric/Behavioral: Negative for depression and substance abuse. The patient is not nervous/anxious.         Objective:   /72 mmHg  Pulse 76  Ht 1.829 m (6' 0.01\")  Wt 90.719 kg (200 lb)  BMI 27.12 kg/m2  SpO2 94%    Physical Exam   Constitutional: He is oriented to person, place, and time. He appears well-developed and well-nourished.   Eyes: Conjunctivae are normal. No scleral icterus.   Neck: No JVD present.   Cardiovascular: Normal rate and S1 normal.  A regularly irregular rhythm present. Exam reveals no gallop.    No murmur heard.  Pulses:       Carotid pulses are 2+ on the right side, and 2+ on the left side.       Radial pulses are 2+ on the right side, and 2+ on the left side.        Dorsalis pedis pulses are 2+ on the right side, and 2+ on the left side.        Posterior tibial pulses are 2+ on the right side, and 2+ on the left side.   He has a sustained apical impulse and a paradoxically split 2nd sound No carotid bruits.  EKG showed one PVC   Pulmonary/Chest: Effort normal and breath sounds normal.   AICD site looks normal   Abdominal: Soft. Bowel sounds are normal. There is no tenderness.   He does have diastases recti   Musculoskeletal: He exhibits no edema.   Neurological: He is alert and oriented to person, place, and time.   Skin: Skin is warm and dry.   Psychiatric: He has a normal mood and affect. Thought content normal.       Assessment:     1. Coronary artery disease involving native coronary artery of native heart without angina pectoris     2. Ischemic cardiomyopathy  ECHOCARDIOGRAM COMP W/O CONT   3. Chronic " systolic congestive heart failure, NYHA class 2 (CMS-[facility])  ECHOCARDIOGRAM COMP W/O CONT    CBC WITH DIFFERENTIAL    COMP METABOLIC PANEL    MAGNESIUM   4. Left bundle branch block  EKG    EKG   5. Vertigo of central origin, unspecified laterality  REFERRAL TO ENT   6. Mixed hyperlipidemia  LIPID PROFILE    TSH     Heart failure is well compensated clinically and the question of biventricular pacing has been previously discussed with Dr. [place] but so far has not been required because he is functional class II at worst. There's been no evidence of high-grade AV block requiring VVI pacing on his device as an etiology for the vertigo. I tried to order carotid ultrasounds for evaluation of this but this produces an automatic denial and given the hoarseness in the probability that this may be labyrinthine vertigo rather than central vertigo and ENT or neurological evaluation would be indicated 1st.    Medical Decision Making:  Today's Assessment / Status / Plan:     He's going to establish primary care.  He is also due for orthopedic follow-up regarding the fall which may have strained his right shoulder and elbow although it occurred several months ago. ENT evaluation for the vertigo. Follow-up lab and echocardiogram and clinic visit and device check in December before he anticipates a long trip beginning in January. He is well versed in these the emergency medical system for any unstable symptoms.

## 2017-07-27 ENCOUNTER — TELEPHONE (OUTPATIENT)
Dept: MEDICAL GROUP | Facility: MEDICAL CENTER | Age: 67
End: 2017-07-27

## 2017-07-27 NOTE — TELEPHONE ENCOUNTER
----- Message from Eren Webster M.D. sent at 7/26/2017 10:58 PM PDT -----  Regarding: RE: new patient request  Contact: [phone]  I will forward this to Jessica, to let the patient know that unfortunately my panel is not open to new patients at this time.        Eren        ----- Message -----     From: Emely Mack     Sent: 7/26/2017   8:51 AM       To: Eren Webster M.D.  Subject: new patient request                              Patient was sent down by Cardiology Dr Juan Pickering from [facility] and told to request to have you as PCP.His family sees you therefore he would like to see you. Can you please decide and contact the patient?    Thanks,  Emely

## 2017-07-28 ENCOUNTER — OFFICE VISIT (OUTPATIENT)
Dept: MEDICAL GROUP | Facility: MEDICAL CENTER | Age: 67
End: 2017-07-28
Payer: MEDICARE

## 2017-07-28 ENCOUNTER — HOSPITAL ENCOUNTER (OUTPATIENT)
Facility: MEDICAL CENTER | Age: 67
End: 2017-07-28
Attending: FAMILY MEDICINE
Payer: MEDICARE

## 2017-07-28 VITALS
TEMPERATURE: 98 F | DIASTOLIC BLOOD PRESSURE: 64 MMHG | HEIGHT: 72 IN | HEART RATE: 73 BPM | WEIGHT: 200 LBS | SYSTOLIC BLOOD PRESSURE: 110 MMHG | BODY MASS INDEX: 27.09 KG/M2 | OXYGEN SATURATION: 93 %

## 2017-07-28 DIAGNOSIS — D69.6 THROMBOCYTOPENIA (HCC): ICD-10-CM

## 2017-07-28 DIAGNOSIS — N18.30 CKD (CHRONIC KIDNEY DISEASE) STAGE 3, GFR 30-59 ML/MIN (HCC): ICD-10-CM

## 2017-07-28 DIAGNOSIS — R49.9 HOARSENESS OR CHANGING VOICE: ICD-10-CM

## 2017-07-28 DIAGNOSIS — E11.65 TYPE 2 DIABETES MELLITUS WITH HYPERGLYCEMIA, WITHOUT LONG-TERM CURRENT USE OF INSULIN (HCC): ICD-10-CM

## 2017-07-28 DIAGNOSIS — R42 DIZZINESS: ICD-10-CM

## 2017-07-28 DIAGNOSIS — I25.10 CORONARY ARTERY DISEASE INVOLVING NATIVE CORONARY ARTERY OF NATIVE HEART WITHOUT ANGINA PECTORIS: ICD-10-CM

## 2017-07-28 LAB
HBA1C MFR BLD: 9.2 % (ref ?–5.8)
INT CON NEG: NEGATIVE
INT CON POS: POSITIVE

## 2017-07-28 PROCEDURE — 99215 OFFICE O/P EST HI 40 MIN: CPT | Performed by: FAMILY MEDICINE

## 2017-07-28 PROCEDURE — 82043 UR ALBUMIN QUANTITATIVE: CPT

## 2017-07-28 PROCEDURE — 83036 HEMOGLOBIN GLYCOSYLATED A1C: CPT | Performed by: FAMILY MEDICINE

## 2017-07-28 PROCEDURE — 82570 ASSAY OF URINE CREATININE: CPT

## 2017-07-28 RX ORDER — LANCETS 30 GAUGE
EACH MISCELLANEOUS
Qty: 100 EACH | Refills: 3 | Status: SHIPPED | OUTPATIENT
Start: 2017-07-28 | End: 2018-07-30

## 2017-07-28 ASSESSMENT — PATIENT HEALTH QUESTIONNAIRE - PHQ9: CLINICAL INTERPRETATION OF PHQ2 SCORE: 0

## 2017-07-28 NOTE — PATIENT INSTRUCTIONS
Diabetes, Frequently Asked Questions  WHAT IS DIABETES?  Most of the food we eat is turned into glucose (sugar). Our bodies use it for energy. The pancreas makes a hormone called insulin. It helps glucose get into the cells of our bodies. When you have diabetes, your body either does not make enough insulin or cannot use its own insulin as well as it should. This causes sugars to build up in your blood.  WHAT ARE THE SYMPTOMS OF DIABETES?  · Frequent urination.   · Excessive thirst.   · Unexplained weight loss.   · Extreme hunger.   · Blurred vision.   · Tingling or numbness in hands or feet.   · Feeling very tired much of the time.   · Dry, itchy skin.   · Sores that are slow to heal.   · Yeast infections.   WHAT ARE THE TYPES OF DIABETES?  Type 1 Diabetes   · About 10% of affected people have this type.   · Usually occurs before the age of 30.   · Usually occurs in thin to normal weight people.   Type 2 Diabetes  · About 90% of affected people have this type.   · Usually occurs after the age of 40.   · Usually occurs in overweight people.   · More likely to have:   · A family history of diabetes.   · A history of diabetes during pregnancy (gestational diabetes).   · High blood pressure.   · High cholesterol and triglycerides.   Gestational Diabetes  · Occurs in about 4% of pregnancies.   · Usually goes away after the baby is born.   · More likely to occur in women with:   · Family history of diabetes.   · Previous gestational diabetes.   · Obese.   · Over 25 years old.   WHAT IS PRE-DIABETES?  Pre-diabetes means your blood glucose is higher than normal, but lower than the diabetes range. It also means you are at risk of getting type 2 diabetes and heart disease. If you are told you have pre-diabetes, have your blood glucose checked again in 1 to 2 years.  WHAT IS THE TREATMENT FOR DIABETES?  Treatment is aimed at keeping blood glucose near normal levels at all times. Learning how to manage this yourself is  important in treating diabetes. Depending on the type of diabetes you have, your treatment will include one or more of the following:  · Monitoring your blood glucose.   · Meal planning.   · Exercise.   · Oral medicine (pills) or insulin.   CAN DIABETES BE PREVENTED?  With type 1 diabetes, prevention is more difficult, because the triggers that cause it are not yet known.  With type 2 diabetes, prevention is more likely, with lifestyle changes:  · Maintain a healthy weight.   · Eat healthy.   · Exercise.   IS THERE A CURE FOR DIABETES?  No, there is no cure for diabetes. There is a lot of research going on that is looking for a cure, and progress is being made. Diabetes can be treated and controlled. People with diabetes can manage their diabetes and lead normal, active lives.  SHOULD I BE TESTED FOR DIABETES?  If you are at least 45 years old, you should be tested for diabetes. You should be tested again every 3 years. If you are 45 or older and overweight, you may want to get tested more often. If you are younger than 45, overweight, and have one or more of the following risk factors, you should be tested:  · Family history of diabetes.   · Inactive lifestyle.   · High blood pressure.   WHAT ARE SOME OTHER SOURCES FOR INFORMATION ON DIABETES?  The following organizations may help in your search for more information on diabetes:  National Diabetes Education Program (NDEP)  Internet: http://www.ndep.nih.gov/resources  American Diabetes Association  Internet: http://www.diabetes.org   Juvenile Diabetes Foundation International  Internet: http://www.jdf.org  Document Released: 12/20/2004 Document Revised: 03/11/2013 Document Reviewed: 10/15/2010  ExitCare® Patient Information ©2013 [employer].How to Increase Fiber in the Meal Plan for Diabetes  Increasing fiber in the diet has many benefits including lowering blood cholesterol, helping to control blood glucose (sugar), preventing constipation, and aiding in weight  management by helping you feel full longer. Start adding fiber to your diet slowly. A gradual substitution of high-fiber foods for low-fiber foods will allow the digestive tract to adjust. Most men under 50 years of age should aim to eat 38 g of fiber a day. Women should aim for 25 g. Over 50 years of age, most men need 30 g of fiber and most women need 21 g. Below are some suggestions for increasing fiber.  · Try whole-wheat bread instead of white bread. Look for words high on the list of ingredients, such as whole wheat, whole rye, or whole oats.  · Try a baked potato with skin instead of mashed potatoes.  · Try a fresh apple with skin instead of applesauce.  · Try a fresh orange instead of orange juice.  · Try popcorn instead of potato chips.  · Try bran cereal instead of corn flakes.  · Try kidney, whole colon, or garbanzo beans instead of bread.  · Try whole-grain crackers instead of saltine crackers.  · Try whole-wheat pasta instead of regular varieties.  While on a high-fiber diet:   · Drink enough water and fluids to keep your urine clear or pale yellow.  · Eat a variety of high fiber foods such as fruits, vegetables, whole grains, nuts, and seeds.  · Aim for 5 servings of fruit and vegetables per day.  · Try to increase your intake of fiber by eating high-fiber foods instead of taking fiber supplements that contain small amounts of fiber.  There can be additional benefits for long-term health and blood glucose control with high-fiber foods.   SOURCES OF FIBER  The following list shows the average dietary fiber for types of food in the various food groups.  Starches and Breads / Dietary Fiber (g)  · Whole-grain bread, 1 slice / 2 g  · Whole-grain cereals, ½ cup / 3 g  · Bran cereals,  to ½ cup / 8 g  · Starchy vegetables, ½ cup / 3 g  · Oatmeal, ½ cup / 2 g  · Whole-wheat pasta,  cup / 2 g  · Brown rice, ½ cup / 2 g  · Barley, ½ cup / 3 g  Legumes / Dietary Fiber (g)  · Beans, ½ cup / 8 g  · Peas, ½ cup / 8  g  · Lentils , ½ cup / 8 g  Meat and Meat Substitutes / Dietary Fiber (g)  This group averages 0 grams of fiber. Exceptions are:  · Nuts, seeds, 1 oz or ¼ cup / 3 g  · [place] peanut butter, 2 tbs / 3 g  Vegetables / Dietary Fiber (g)  · Cooked vegetables, ½ to ¾ cup / 2 to 3 g  · Raw vegetables, 1 to 2 cups / 2 to 3 g  Fruit / Dietary Fiber (g)  · Raw or cooked fruit, ½ cup or 1 small, fresh piece / 2 g  Milk / Dietary Fiber (g)  · Milk, 1 cup or 8 oz / 0 g  Fats and Oils / Dietary Fiber (g)  · Fats and oils, 1 tsp / 0 g  You can determine how much fiber you are eating by reading the Nutrition Facts panel on the labels of the foods you eat.  FIBER IN SPECIFIC FOODS  Cereals / Dietary Fiber (g)  · All Bran®,  cup / 9 g  · All Bran with Extra Fiber®, ½ cup / 13 g  · Bran Flakes®, ½ cup / 4 g  · Cheerios®, ¾ cup / 1.5 g  · Corn Bran®, ½ cup / 4 g  · Corn Flakes®, ¾ cup / 0.75 g  · Cracklin' Oat Bran®, ½ cup / 4 g  · Fiber One®, ½ cup / 13 g  · Grape Nuts®, 3 tbs / 3 g  · Grape Nuts Flakes®, ¾ cup / 3 g  · Noodles, ½ cup, cooked / 0.5 g  · Nut[place]rain Wheat®, ¾ cup / 3.5 g  · Oatmeal, ½ cup, cooked / 1.1 g  · Pasta, white (macaroni, spaghetti), ½ cup, cooked / 0.5 g  · Pasta, whole-wheat (macaroni, spaghetti),  cup, cooked / 2 g  · [place]®, ½ cup, cooked / 3 g  · Rice, wild,  cup, cooked / 0.5 g  · Rice, brown, ½ cup, cooked / 1 g  · Rice, white, ½ cup, cooked / 0.2 g  · Shredded Wheat®, bite-sized, ½ cup / 2 g  · Total®, ¾ cup / 1.75 g  · Wheat Chex®, ½ cup / 2.5 g  · [place]®, ½ cup, cooked / 4 g  · Wheaties®, ¾ cup / 2.75 g  Bread, Starchy Vegetables, and Dried Peas and Beans / Dietary Fiber (g)  · Bagel, whole / 0.6 g  · Baked beans in tomato sauce, ¼ cup, cooked / 3 g  · Bran muffin, 1 small / 2.5 g  · Bread, cracked wheat, 1 slice / 2.5 g  · Bread, pumpernickel, 1 slice / 2.5 g  · Bread, white, 1 slice / 0.4 g  · Bread, whole-wheat, 1 slice / 1.4 g  · Corn, ½ cup, canned / 2.9 g  · Kidney beans,  cup, cooked /  3.5 g  · Lentils, cup, cooked / 3 g  · [place] beans, ½ cup, cooked / 4 g  · [place] beans,  cup, cooked / 4 g  · Peas, ½ cup, cooked / 4 g  · Popcorn, 3 cups popped, unbuttered / 3.5 g  · Potato, baked (with skin), 1 small / 4 g  · Potato, baked (without skin), 1 small / 2 g  · Ry-Krisp®, 4 crackers / 3 g  · Saltine crackers, 6 squares / 0 g  · Split peas,  cup, cooked / 2.5 g  · Yams (sweet potato),  cup / 1.7 g  Fruit / Dietary Fiber (g)  · Apple, 1 small, fresh, with skin / 4 g  · Apple juice, ½ cup / 0.4 g  · Apricots, 4 medium, fresh / 4 g  · Apricots, 7 halves, dried / 2 g  · Banana, ½ medium / 1.2 g  · Blueberries, ¾ cup / 2 g  · Cantaloupe, melon / 1.3 g  · Cherries, ½ cup, canned / 1.4 g  · Grapefruit, ½ medium / 1.6 g  · Grapes, 15 small / 1.2 g  · Grape juice,  cup / 0.5 g  · Orange, 1 medium, fresh / 2 g  · Orange juice, ½ cup / 0.5 g  · Peach, 1 medium,fresh, with skin / 2 g  · Pear, 1 medium, fresh, with skin / 4 g  · Pineapple, cup, canned / 0.7 g  · Plums, 2 whole / 2 g  · Prunes, 3 whole / 1.5 g  · Raspberries, 1 cup / 6 g  · Strawberries, 1 ¼ cup / 4 g  · Watermelon, 1 ¼ cup / 0.5 g  Vegetables / Dietary Fiber (g)  · Asparagus, ½ cup, cooked / 1 g  · Beans, green and wax, ½ cup, cooked / 1.6 g  · Beets, ½ cup, cooked / 1.8 g  · Broccoli, ½ cup, cooked / 2.2 g  · [place] sprouts, ½ cup, cooked / 4 g  · Cabbage, ½ cup, cooked / 2.5 g  · Carrots, ½ cup, cooked / 2.3 g  · Cauliflower, ½ cup, cooked / 1.1 g  · Celery, 1 cup, raw / 1.5 g  · Cucumber, 1 cup, raw / 0.8 g  · Green pepper, ½ cup sliced, cooked / 1.5 g  · Lettuce, 1 cup, sliced / 0.9 g  · Mushrooms, 1 cup sliced, raw / 1.8 g  · Onion, 1 cup sliced, raw / 1.6 g  · Spinach, ½ cup, cooked / 2.4 g  · Tomato, 1 medium, fresh / 1.5 g  · Tomato juice, ½ cup / 0 g  · Zucchini, ½ cup, cooked / 1.8 g  Document Released: 06/09/2003 Document Revised: 04/14/2014 Document Reviewed: 07/06/2010  ExitCare® Patient Information ©2014 [employer], [facility].

## 2017-07-28 NOTE — TELEPHONE ENCOUNTER
Notified Pt of the following, informed Angelica Stephenson is accepting new Pt's in this office and is an MD as Pt is requesting. Pt transferred to scheduling to make NP apt.

## 2017-07-28 NOTE — MR AVS SNAPSHOT
"        Jaime Lee   2017 2:20 PM   Office Visit   MRN: [id number]    Department:  [facility]   Dept Phone:  [phone]    Description:  Male : 1950   Provider:  Angelica [place] M.D.           Reason for Visit     Establish Care     Vertigo     Referral Needed ENT      Allergies as of 2017     Allergen Noted Reactions    Bloodless 10/29/2015       Pcn [Penicillins] 2014   Anaphylaxis      You were diagnosed with     Dizziness   [2015]       Type 2 diabetes mellitus with hyperglycemia, without long-term current use of insulin (CMS-HCC)   [8841827]       Hoarseness or changing voice   [074337]       Coronary artery disease involving native coronary artery of native heart without angina pectoris   [9713651]       CKD (chronic kidney disease) stage 3, GFR 30-59 ml/min   [325250]       Thrombocytopenia (CMS-HCC)   [306048]         Vital Signs     Blood Pressure Pulse Temperature Height Weight Body Mass Index    110/64 mmHg 73 36.7 °C (98 °F) 1.829 m (6' 0.01\") 90.719 kg (200 lb) 27.12 kg/m2    Oxygen Saturation Smoking Status                93% Former Smoker          Basic Information     Date Of Birth Sex Race Ethnicity Preferred Language    1950 Male White Non- English      Your appointments     Aug 09, 2017  8:15 AM   ECHO with ECHO [facility]   ECHOCARDIOLOGY [facility])    [address]  [place] [address]   [phone]            Dec 12, 2017  8:00 AM   PACER CHECK ONLY with [email or web address] [email or web address]   [facility] ([facility]    [address]  [address]  [place] [address] [phone]            Dec 12, 2017  8:20 AM   FOLLOW UP with Juan Pickering M.D.   [facility] (--)    [address]  [address]  [place] [address] [phone]              Problem List              ICD-10-CM Priority Class Noted - Resolved    CAD " (coronary artery disease) I25.10 High  12/28/2000 - Present    Thrombocytopenia (CMS-HCC) D69.6 Medium  2/23/2014 - Present    CKD (chronic kidney disease) stage 3, GFR 30-59 ml/min N18.3 Medium  2/24/2014 - Present    Pulmonary embolism (CMS-HCC) I26.99 High  2/27/2014 - Present    AICD (automatic cardioverter/defibrillator) present Z95.810 High  3/11/2014 - Present    Ischemic cardiomyopathy I25.5 High  3/11/2014 - Present    S/P CABG x 4 Z95.1 High  6/22/2016 - Present    Type 2 diabetes mellitus with hyperglycemia, without long-term current use of insulin (CMS-HCC) E11.65 Medium  6/22/2016 - Present    Left bundle branch block I44.7 High  6/22/2016 - Present    Old anterior myocardial infarction I25.2   6/22/2016 - Present    Chronic systolic congestive heart failure, NYHA class 2 (CMS-HCC) I50.22 Medium  6/22/2016 - Present    Hyperlipidemia E78.5 High  1/18/2017 - Present    Dizziness R42   7/28/2017 - Present    Hoarseness or changing voice R49.9   7/28/2017 - Present      Health Maintenance        Date Due Completion Dates    DIABETES MONOFILAMENT / LE EXAM 2/23/1951 ---    RETINAL SCREENING 8/23/1968 ---    URINE ACR / MICROALBUMIN 8/23/1968 ---    IMM ZOSTER VACCINE 8/23/2010 ---    IMM DTaP/Tdap/Td Vaccine (1 - Tdap) 10/17/2011 10/16/2011    IMM INFLUENZA (1) 9/1/2017 9/6/2016    IMM PNEUMOCOCCAL 65+ (ADULT) LOW/MEDIUM RISK SERIES (2 of 2 - PPSV23) 9/6/2017 9/6/2016    A1C SCREENING 1/28/2018 7/28/2017    FASTING LIPID PROFILE 7/26/2018 7/26/2017, 12/14/2016, 6/2/2011, 6/8/2009, 12/21/2007, 4/19/2007, 3/20/2006, 6/17/2005    SERUM CREATININE 7/26/2018 7/26/2017, 12/14/2016, 1/1/2016, 10/29/2015, 5/14/2015, 7/31/2014, 3/14/2014, 2/26/2014, 2/25/2014, 2/24/2014, 2/23/2014, 6/2/2011, 6/8/2009, 1/8/2008, 12/21/2007, 4/19/2007, 3/20/2006, 6/17/2005    COLONOSCOPY 4/22/2024 4/22/2014 (Declined)    Override on 4/22/2014: Patient Declined            Results     POCT Hemoglobin A1C      Component     Glycohemoglobin    9.2    Internal Control Negative    Negative    Internal Control Positive    Positive                        Current Immunizations     13-VALENT PCV PREVNAR 9/6/2016 11:07 AM    Influenza Vaccine Adult HD 9/6/2016 11:04 AM    TD Vaccine 10/16/2011 11:30 PM      Below and/or attached are the medications your provider expects you to take. Review all of your home medications and newly ordered medications with your provider and/or pharmacist. Follow medication instructions as directed by your provider and/or pharmacist. Please keep your medication list with you and share with your provider. Update the information when medications are discontinued, doses are changed, or new medications (including over-the-counter products) are added; and carry medication information at all times in the event of emergency situations     Allergies:  BLOODLESS - (reactions not documented)     PCN - Anaphylaxis               Medications  Valid as of: July 28, 2017 -  3:19 PM    Generic Name Brand Name Tablet Size Instructions for use    Furosemide (Tab) LASIX 40 MG Take 1 Tab by mouth every day.        Lisinopril (Tab) PRINIVIL 5 MG Take 1 Tab by mouth 2 times a day.        Lovastatin (Tab) MEVACOR 10 MG Take 1 Tab by mouth every evening.        Magnesium Oxide (Cap) Magnesium 500 MG Take 500 mg by mouth every day.        MetFORMIN HCl (Tab) GLUCOPHAGE 500 MG Take 1 Tab by mouth 2 times a day, with meals.        Metoprolol Succinate (TABLET SR 24 HR) TOPROL  MG Take 1 Tab by mouth every day.        Potassium Chloride Silvana CR (Tab CR) Kdur 20 MEQ Take 1 Tab by mouth every day.        Spironolactone (Tab) ALDACTONE 25 MG Take 0.5 Tabs by mouth every day.        .                 Medicines prescribed today were sent to:     [id number] - [place] [address]    [address] [address]    Phone: [phone] Fax: [phone]    Open 24 Hours?: No      Medication refill instructions:       If  your prescription bottle indicates you have medication refills left, it is not necessary to call your provider’s office. Please contact your pharmacy and they will refill your medication.    If your prescription bottle indicates you do not have any refills left, you may request refills at any time through one of the following ways: The online [employer] system (except Urgent Care), by calling your provider’s office, or by asking your pharmacy to contact your provider’s office with a refill request. Medication refills are processed only during regular business hours and may not be available until the next business day. Your provider may request additional information or to have a follow-up visit with you prior to refilling your medication.   *Please Note: Medication refills are assigned a new Rx number when refilled electronically. Your pharmacy may indicate that no refills were authorized even though a new prescription for the same medication is available at the pharmacy. Please request the medicine by name with the pharmacy before contacting your provider for a refill.        Your To Do List     Future Labs/Procedures Complete By Expires    MICROALBUMIN CREAT RATIO URINE  As directed 7/29/2018      Referral     A referral request has been sent to our patient care coordination department. Please allow 3-5 business days for us to process this request and contact you either by phone or mail. If you do not hear from us by the 5th business day, please call us at [phone].        Instructions    Diabetes, Frequently Asked Questions  WHAT IS DIABETES?  Most of the food we eat is turned into glucose (sugar). Our bodies use it for energy. The pancreas makes a hormone called insulin. It helps glucose get into the cells of our bodies. When you have diabetes, your body either does not make enough insulin or cannot use its own insulin as well as it should. This causes sugars to build up in your blood.  WHAT ARE THE SYMPTOMS  OF DIABETES?  · Frequent urination.   · Excessive thirst.   · Unexplained weight loss.   · Extreme hunger.   · Blurred vision.   · Tingling or numbness in hands or feet.   · Feeling very tired much of the time.   · Dry, itchy skin.   · Sores that are slow to heal.   · Yeast infections.   WHAT ARE THE TYPES OF DIABETES?  Type 1 Diabetes   · About 10% of affected people have this type.   · Usually occurs before the age of 30.   · Usually occurs in thin to normal weight people.   Type 2 Diabetes  · About 90% of affected people have this type.   · Usually occurs after the age of 40.   · Usually occurs in overweight people.   · More likely to have:   · A family history of diabetes.   · A history of diabetes during pregnancy (gestational diabetes).   · High blood pressure.   · High cholesterol and triglycerides.   Gestational Diabetes  · Occurs in about 4% of pregnancies.   · Usually goes away after the baby is born.   · More likely to occur in women with:   · Family history of diabetes.   · Previous gestational diabetes.   · Obese.   · Over 25 years old.   WHAT IS PRE-DIABETES?  Pre-diabetes means your blood glucose is higher than normal, but lower than the diabetes range. It also means you are at risk of getting type 2 diabetes and heart disease. If you are told you have pre-diabetes, have your blood glucose checked again in 1 to 2 years.  WHAT IS THE TREATMENT FOR DIABETES?  Treatment is aimed at keeping blood glucose near normal levels at all times. Learning how to manage this yourself is important in treating diabetes. Depending on the type of diabetes you have, your treatment will include one or more of the following:  · Monitoring your blood glucose.   · Meal planning.   · Exercise.   · Oral medicine (pills) or insulin.   CAN DIABETES BE PREVENTED?  With type 1 diabetes, prevention is more difficult, because the triggers that cause it are not yet known.  With type 2 diabetes, prevention is more likely, with  lifestyle changes:  · Maintain a healthy weight.   · Eat healthy.   · Exercise.   IS THERE A CURE FOR DIABETES?  No, there is no cure for diabetes. There is a lot of research going on that is looking for a cure, and progress is being made. Diabetes can be treated and controlled. People with diabetes can manage their diabetes and lead normal, active lives.  SHOULD I BE TESTED FOR DIABETES?  If you are at least 45 years old, you should be tested for diabetes. You should be tested again every 3 years. If you are 45 or older and overweight, you may want to get tested more often. If you are younger than 45, overweight, and have one or more of the following risk factors, you should be tested:  · Family history of diabetes.   · Inactive lifestyle.   · High blood pressure.   WHAT ARE SOME OTHER SOURCES FOR INFORMATION ON DIABETES?  The following organizations may help in your search for more information on diabetes:  National Diabetes Education Program (NDEP)  Internet: http://www.ndep.nih.gov/resources  American Diabetes Association  Internet: http://www.diabetes.org   Juvenile Diabetes Foundation International  Internet: http://www.jdf.org  Document Released: 12/20/2004 Document Revised: 03/11/2013 Document Reviewed: 10/15/2010  ExitCare® Patient Information ©2013 [employer].How to Increase Fiber in the Meal Plan for Diabetes  Increasing fiber in the diet has many benefits including lowering blood cholesterol, helping to control blood glucose (sugar), preventing constipation, and aiding in weight management by helping you feel full longer. Start adding fiber to your diet slowly. A gradual substitution of high-fiber foods for low-fiber foods will allow the digestive tract to adjust. Most men under 50 years of age should aim to eat 38 g of fiber a day. Women should aim for 25 g. Over 50 years of age, most men need 30 g of fiber and most women need 21 g. Below are some suggestions for increasing fiber.  · Try whole-wheat  bread instead of white bread. Look for words high on the list of ingredients, such as whole wheat, whole rye, or whole oats.  · Try a baked potato with skin instead of mashed potatoes.  · Try a fresh apple with skin instead of applesauce.  · Try a fresh orange instead of orange juice.  · Try popcorn instead of potato chips.  · Try bran cereal instead of corn flakes.  · Try kidney, whole colon, or garbanzo beans instead of bread.  · Try whole-grain crackers instead of saltine crackers.  · Try whole-wheat pasta instead of regular varieties.  While on a high-fiber diet:   · Drink enough water and fluids to keep your urine clear or pale yellow.  · Eat a variety of high fiber foods such as fruits, vegetables, whole grains, nuts, and seeds.  · Aim for 5 servings of fruit and vegetables per day.  · Try to increase your intake of fiber by eating high-fiber foods instead of taking fiber supplements that contain small amounts of fiber.  There can be additional benefits for long-term health and blood glucose control with high-fiber foods.   SOURCES OF FIBER  The following list shows the average dietary fiber for types of food in the various food groups.  Starches and Breads / Dietary Fiber (g)  · Whole-grain bread, 1 slice / 2 g  · Whole-grain cereals, ½ cup / 3 g  · Bran cereals,  to ½ cup / 8 g  · Starchy vegetables, ½ cup / 3 g  · Oatmeal, ½ cup / 2 g  · Whole-wheat pasta,  cup / 2 g  · Brown rice, ½ cup / 2 g  · Barley, ½ cup / 3 g  Legumes / Dietary Fiber (g)  · Beans, ½ cup / 8 g  · Peas, ½ cup / 8 g  · Lentils , ½ cup / 8 g  Meat and Meat Substitutes / Dietary Fiber (g)  This group averages 0 grams of fiber. Exceptions are:  · Nuts, seeds, 1 oz or ¼ cup / 3 g  · [place] peanut butter, 2 tbs / 3 g  Vegetables / Dietary Fiber (g)  · Cooked vegetables, ½ to ¾ cup / 2 to 3 g  · Raw vegetables, 1 to 2 cups / 2 to 3 g  Fruit / Dietary Fiber (g)  · Raw or cooked fruit, ½ cup or 1 small, fresh piece / 2 g  Milk / Dietary Fiber  (g)  · Milk, 1 cup or 8 oz / 0 g  Fats and Oils / Dietary Fiber (g)  · Fats and oils, 1 tsp / 0 g  You can determine how much fiber you are eating by reading the Nutrition Facts panel on the labels of the foods you eat.  FIBER IN SPECIFIC FOODS  Cereals / Dietary Fiber (g)  · All Bran®,  cup / 9 g  · All Bran with Extra Fiber®, ½ cup / 13 g  · Bran Flakes®, ½ cup / 4 g  · Cheerios®, ¾ cup / 1.5 g  · Corn Bran®, ½ cup / 4 g  · Corn Flakes®, ¾ cup / 0.75 g  · Cracklin' Oat Bran®, ½ cup / 4 g  · Fiber One®, ½ cup / 13 g  · Grape Nuts®, 3 tbs / 3 g  · Grape Nuts Flakes®, ¾ cup / 3 g  · Noodles, ½ cup, cooked / 0.5 g  · Nutrigrain Wheat®, ¾ cup / 3.5 g  · Oatmeal, ½ cup, cooked / 1.1 g  · Pasta, white (macaroni, spaghetti), ½ cup, cooked / 0.5 g  · Pasta, whole-wheat (macaroni, spaghetti),  cup, cooked / 2 g  · [place]®, ½ cup, cooked / 3 g  · Rice, wild,  cup, cooked / 0.5 g  · Rice, brown, ½ cup, cooked / 1 g  · Rice, white, ½ cup, cooked / 0.2 g  · Shredded Wheat®, bite-sized, ½ cup / 2 g  · Total®, ¾ cup / 1.75 g  · Wheat Chex®, ½ cup / 2.5 g  · [place]®, ½ cup, cooked / 4 g  · Wheaties®, ¾ cup / 2.75 g  Bread, Starchy Vegetables, and Dried Peas and Beans / Dietary Fiber (g)  · Bagel, whole / 0.6 g  · Baked beans in tomato sauce, ¼ cup, cooked / 3 g  · Bran muffin, 1 small / 2.5 g  · Bread, cracked wheat, 1 slice / 2.5 g  · Bread, pumpernickel, 1 slice / 2.5 g  · Bread, white, 1 slice / 0.4 g  · Bread, whole-wheat, 1 slice / 1.4 g  · Corn, ½ cup, canned / 2.9 g  · Kidney beans,  cup, cooked / 3.5 g  · Lentils, cup, cooked / 3 g  · Lima beans, ½ cup, cooked / 4 g  · Navy beans,  cup, cooked / 4 g  · Peas, ½ cup, cooked / 4 g  · Popcorn, 3 cups popped, unbuttered / 3.5 g  · Potato, baked (with skin), 1 small / 4 g  · Potato, baked (without skin), 1 small / 2 g  · Ry-Krisp®, 4 crackers / 3 g  · Saltine crackers, 6 squares / 0 g  · Split peas,  cup, cooked / 2.5 g  · Yams (sweet potato),  cup / 1.7 g  Fruit / Dietary  Fiber (g)  · Apple, 1 small, fresh, with skin / 4 g  · Apple juice, ½ cup / 0.4 g  · Apricots, 4 medium, fresh / 4 g  · Apricots, 7 halves, dried / 2 g  · Banana, ½ medium / 1.2 g  · Blueberries, ¾ cup / 2 g  · Cantaloupe, melon / 1.3 g  · Cherries, ½ cup, canned / 1.4 g  · Grapefruit, ½ medium / 1.6 g  · Grapes, 15 small / 1.2 g  · Grape juice,  cup / 0.5 g  · Orange, 1 medium, fresh / 2 g  · Orange juice, ½ cup / 0.5 g  · Peach, 1 medium,fresh, with skin / 2 g  · Pear, 1 medium, fresh, with skin / 4 g  · Pineapple, cup, canned / 0.7 g  · Plums, 2 whole / 2 g  · Prunes, 3 whole / 1.5 g  · Raspberries, 1 cup / 6 g  · Strawberries, 1 ¼ cup / 4 g  · Watermelon, 1 ¼ cup / 0.5 g  Vegetables / Dietary Fiber (g)  · Asparagus, ½ cup, cooked / 1 g  · Beans, green and wax, ½ cup, cooked / 1.6 g  · Beets, ½ cup, cooked / 1.8 g  · Broccoli, ½ cup, cooked / 2.2 g  · [place] sprouts, ½ cup, cooked / 4 g  · Cabbage, ½ cup, cooked / 2.5 g  · Carrots, ½ cup, cooked / 2.3 g  · Cauliflower, ½ cup, cooked / 1.1 g  · Celery, 1 cup, raw / 1.5 g  · Cucumber, 1 cup, raw / 0.8 g  · Green pepper, ½ cup sliced, cooked / 1.5 g  · Lettuce, 1 cup, sliced / 0.9 g  · Mushrooms, 1 cup sliced, raw / 1.8 g  · Onion, 1 cup sliced, raw / 1.6 g  · Spinach, ½ cup, cooked / 2.4 g  · Tomato, 1 medium, fresh / 1.5 g  · Tomato juice, ½ cup / 0 g  · Zucchini, ½ cup, cooked / 1.8 g  Document Released: 06/09/2003 Document Revised: 04/14/2014 Document Reviewed: 07/06/2010  ExitCare® Patient Information ©2014 [email or web address].            MyChart Access Code: Activation code not generated  Current MyChart Status: Active

## 2017-07-28 NOTE — ASSESSMENT & PLAN NOTE
Started 3 years ago - gradually.  Not able to get to low tones.  Was better when visiting [place] for vacation.  High humidity helped.  No difficulty swallowing. Our feeling as if food gets stuck.

## 2017-07-28 NOTE — TELEPHONE ENCOUNTER
----- Message from Eren Webster M.D. sent at 7/26/2017 10:58 PM PDT -----  Regarding: RE: new patient request  Contact: [phone]  I will forward this to Jessica, to let the patient know that unfortunately my panel is not open to new patients at this time.        Eren        ----- Message -----     From: Emely Mack     Sent: 7/26/2017   8:51 AM       To: Eren Webster M.D.  Subject: new patient request                              Patient was sent down by Cardiology Dr Juan Pickering from [facility] and told to request to have you as PCP.His family sees you therefore he would like to see you. Can you please decide and contact the patient?    Thanks,  Emely

## 2017-07-28 NOTE — ASSESSMENT & PLAN NOTE
Has rapid onset and they last 5 seconds.  He feels unsteady.  Happens 2-3 times a week.  Some hearing loss - wore headphones and was around loud noises for much of his career.

## 2017-07-29 LAB
CREAT UR-MCNC: 76.1 MG/DL
MICROALBUMIN UR-MCNC: <0.7 MG/DL
MICROALBUMIN/CREAT UR: NORMAL MG/G (ref 0–30)

## 2017-08-03 NOTE — PROGRESS NOTES
Chief Complaint   Patient presents with   • Establish Care   • Vertigo   • Referral Needed     ENT         Jaime Shar Lee is a 66 y.o. male here to establish care and for evaluation and management of:        HPI:    Dizziness  Has rapid onset and they last 5 seconds.  He feels unsteady.  Happens 2-3 times a week.  Some hearing loss - wore headphones and was around loud noises for much of his career.      Hoarseness or changing voice  Started 3 years ago - gradually.  Not able to get to low tones.  Was better when visiting [place] for vacation.  High humidity helped.  No difficulty swallowing. Our feeling as if food gets stuck.    Type 2 diabetes mellitus with hyperglycemia, without long-term current use of insulin (CMS-[facility])  Stable. Currently taking Metformin as directed.  Denies side effects or hypoglycemic events.  He is monitoring blood sugar regularly at home.  Reports diet is good  He is not exercising regularly.  Last eye exam: in last 6 months  Denies polyuria, polydipsia, blurred vision, or neuropathies.      Thrombocytopenia  Patient has a history of thrombocytopenia. This has remained stable.. He denies any epistaxis, black or bloody stools. He does have some increased bleeding.    CKD (chronic kidney disease) stage 3, GFR 30-59 ml/min  Patient does have stage III chronic kidney disease that has been quite stable. Currently avoids high dose NSAIDs. Denies nausea/vomiting, headache, loss of energy/unusual somnolence, mouth sores, skin discoloration or itching, muscle cramps. .       CAD (coronary artery disease)  Patient is status post a CABG in 2000 of 4 vessels. In 2003 he had stents placed. He is followed by Dr. Pickering. He then developed CHF and has a AICD in place. He has not had any recent discharges. He denies any current chest pain.        Allergies   Allergen Reactions   • Bloodless    • Pcn [Penicillins] Anaphylaxis       Current medicines (including changes today)  Current  Outpatient Prescriptions   Medication Sig Dispense Refill   • metformin (GLUCOPHAGE) 500 MG Tab Take 1 Tab by mouth 2 times a day, with meals. 60 Tab 3   • Blood Glucose Monitoring Suppl SUPPLIES Misc Test strips order: Test strips for Marina Contour meter. Sig: use BID and prn ssx high or low sugar, max 4/day 100 Each 3   • Blood Glucose Monitoring Suppl Device Meter: Dispense Marina Contour meter. Sig. Use as directed for blood sugar monitoring. 1 Device 1   • Lancets Misc Lancets order: Lancets for Marina Contour meter. Sig: use BID and prn ssx high or low sugar. Max 4/d 100 Each 3   • potassium chloride SA (K-DUR) 20 MEQ Tab CR Take 1 Tab by mouth every day. 90 Tab 3   • lovastatin (MEVACOR) 10 MG tablet Take 1 Tab by mouth every evening. 90 Tab 3   • furosemide (LASIX) 40 MG Tab Take 1 Tab by mouth every day. 90 Tab 3   • spironolactone (ALDACTONE) 25 MG Tab Take 0.5 Tabs by mouth every day. 45 Tab 3   • lisinopril (PRINIVIL) 5 MG Tab Take 1 Tab by mouth 2 times a day. 180 Tab 3   • metoprolol SR (TOPROL XL) 100 MG TABLET SR 24 HR Take 1 Tab by mouth every day. 90 Tab 3   • Magnesium 500 MG CAPS Take 500 mg by mouth every day.       No current facility-administered medications for this visit.     He  has a past medical history of CAD (coronary artery disease) (12/28/2000); Congestive heart failure (CMS-HCC) (12/28/2000); AICD (automatic cardioverter/defibrillator) present (2003/2008/2015); Pneumonia (2014); Pulmonary embolism (CMS-HCC) (March 2014); Ischemic cardiomyopathy (February 2014); GERD (gastroesophageal reflux disease); Dental disorder; Type 2 diabetes mellitus without complication (CMS-HCC) ( ); Left bundle branch block ( ); Allergy; and Acute anterior myocardial infarction (CMS-HCC) (12/28/2000).  He  has past surgical history that includes other cardiac surgery (12/2000); appendectomy (1969); aicd implant (January 2008); recovery (11/3/2015); aicd battery change (November 2015); and multiple coronary  "artery bypass.  Social History   Substance Use Topics   • Smoking status: Former Smoker -- 1.00 packs/day for 32 years     Types: Cigarettes     Quit date: 2000   • Smokeless tobacco: Never Used   • Alcohol Use: 4.2 oz/week     0 Standard drinks or equivalent, 7 Glasses of wine per week      Comment: 7 per week     Social History     Social History Narrative     Family History   Problem Relation Age of Onset   • Diabetes Mother    • Heart Disease Mother    • Diabetes Sister    • Alcohol/Drug Brother      Family Status   Relation Status Death Age   • Mother     • Father Other    • Sister Alive    • Brother Alive          ROS  No fever or chills.  No nausea or vomiting.  No chest pain or palpitations.  No cough or SOB.  No pain with urination or hematuria.  No black or bloody stools.  All other systems reviewed and are negative     Objective:     Blood pressure 110/64, pulse 73, temperature 36.7 °C (98 °F), height 1.829 m (6' 0.01\"), weight 90.719 kg (200 lb), SpO2 93 %. Body mass index is 27.12 kg/(m^2).  Physical Exam:      Well developed, well nourished.  Alert, oriented in no acute distress.  Psych: Eye contact is good, speech goal directed, affect calm  Eyes: conjunctiva non-injected, sclera non-icteric.  Ears: Pinna normal. TM pearly gray.   Nose: Nares are patent.  Normal mucosa  Mouth: Oral mucous membranes pink and moist with no lesions.  Neck Supple.  No adenopathy or masses in the neck or supraclavicular regions. No thyromegaly  Lungs: clear to auscultation bilaterally with good excursion. No wheezes or rhonchi  CV: regular rate and rhythm. No murmur  Abdomen: soft, nontender, no masses or organomegaly.  No rebound or guarding  Ext: no edema, color normal, vascularity normal, temperature normal  Monofilament testing with a 10 gram force: sensation intact: intact bilaterally  Visual Inspection: Feet without maceration, ulcers, fissures.  Pedal pulses: intact bilaterally      Assessment and " Plan:   The following treatment plan was discussed    1. Dizziness    - REFERRAL TO ENT    2. Type 2 diabetes mellitus with hyperglycemia, without long-term current use of insulin (CMS-HCC)  Routine anticipatory guidance. Start metformin 500 twice a day. Start monitoring. His wife will assist with this. Recheck 2 months  - POCT Hemoglobin A1C  - MICROALBUMIN CREAT RATIO URINE; Future  - metformin (GLUCOPHAGE) 500 MG Tab; Take 1 Tab by mouth 2 times a day, with meals.  Dispense: 60 Tab; Refill: 3  - Blood Glucose Monitoring Suppl SUPPLIES Misc; Test strips order: Test strips for Marina Contour meter. Sig: use BID and prn ssx high or low sugar, max 4/day  Dispense: 100 Each; Refill: 3  - Blood Glucose Monitoring Suppl Device; Meter: Dispense Marina Contour meter. Sig. Use as directed for blood sugar monitoring.  Dispense: 1 Device; Refill: 1  - Lancets Misc; Lancets order: Lancets for Marina Contour meter. Sig: use BID and prn ssx high or low sugar. Max 4/d  Dispense: 100 Each; Refill: 3    3. Hoarseness or changing voice  Refer to ENT  - REFERRAL TO ENT    4. Coronary artery disease involving native coronary artery of native heart without angina pectoris  Continue follow-up with cardiology    5. CKD (chronic kidney disease) stage 3, GFR 30-59 ml/min  Avoid NSAIDs and continue to monitor    6. Thrombocytopenia (CMS-HCC)  Continue to monitor      Records requested.  Spent 45 minutes in face-to-face patient contact in which greater than 50% of the visit was spent in counseling/coordination of care     Any change or worsening of signs or symptoms, patient encouraged to follow-up or report to the emergency room for further evaluation. Patient understands and agrees.    Followup: Return in about 2 months (around 9/28/2017).

## 2017-08-03 NOTE — ASSESSMENT & PLAN NOTE
Patient is status post a CABG in 2000 of 4 vessels. In 2003 he had stents placed. He is followed by Dr. Pickering. He then developed CHF and has a AICD in place. He has not had any recent discharges. He denies any current chest pain.

## 2017-08-03 NOTE — ASSESSMENT & PLAN NOTE
Patient does have stage III chronic kidney disease that has been quite stable. Currently avoids high dose NSAIDs. Denies nausea/vomiting, headache, loss of energy/unusual somnolence, mouth sores, skin discoloration or itching, muscle cramps. .

## 2017-08-03 NOTE — ASSESSMENT & PLAN NOTE
Stable. Currently taking Metformin as directed.  Denies side effects or hypoglycemic events.  He is monitoring blood sugar regularly at home.  Reports diet is good  He is not exercising regularly.  Last eye exam: in last 6 months  Denies polyuria, polydipsia, blurred vision, or neuropathies.

## 2017-08-03 NOTE — ASSESSMENT & PLAN NOTE
Patient has a history of thrombocytopenia. This has remained stable.. He denies any epistaxis, black or bloody stools. He does have some increased bleeding.

## 2017-08-09 ENCOUNTER — HOSPITAL ENCOUNTER (OUTPATIENT)
Dept: CARDIOLOGY | Facility: MEDICAL CENTER | Age: 67
End: 2017-08-09
Attending: INTERNAL MEDICINE
Payer: MEDICARE

## 2017-08-09 DIAGNOSIS — I25.5 ISCHEMIC CARDIOMYOPATHY: ICD-10-CM

## 2017-08-09 DIAGNOSIS — I50.22 CHRONIC SYSTOLIC CONGESTIVE HEART FAILURE, NYHA CLASS 2 (HCC): ICD-10-CM

## 2017-08-09 PROCEDURE — 93306 TTE W/DOPPLER COMPLETE: CPT

## 2017-08-10 LAB
LV EJECT FRACT  99904: 20
LV EJECT FRACT MOD 2C 99903: 18.1
LV EJECT FRACT MOD 4C 99902: 23.61
LV EJECT FRACT MOD BP 99901: 17.08

## 2017-08-24 ENCOUNTER — OFFICE VISIT (OUTPATIENT)
Dept: MEDICAL GROUP | Facility: MEDICAL CENTER | Age: 67
End: 2017-08-24
Payer: MEDICARE

## 2017-08-24 VITALS
WEIGHT: 202 LBS | SYSTOLIC BLOOD PRESSURE: 116 MMHG | OXYGEN SATURATION: 93 % | BODY MASS INDEX: 27.36 KG/M2 | HEART RATE: 80 BPM | HEIGHT: 72 IN | DIASTOLIC BLOOD PRESSURE: 74 MMHG | TEMPERATURE: 97.7 F

## 2017-08-24 DIAGNOSIS — E78.2 MIXED HYPERLIPIDEMIA: ICD-10-CM

## 2017-08-24 DIAGNOSIS — N18.30 CKD (CHRONIC KIDNEY DISEASE) STAGE 3, GFR 30-59 ML/MIN (HCC): ICD-10-CM

## 2017-08-24 DIAGNOSIS — E11.65 TYPE 2 DIABETES MELLITUS WITH HYPERGLYCEMIA, WITHOUT LONG-TERM CURRENT USE OF INSULIN (HCC): ICD-10-CM

## 2017-08-24 PROCEDURE — 99214 OFFICE O/P EST MOD 30 MIN: CPT | Performed by: FAMILY MEDICINE

## 2017-08-24 NOTE — MR AVS SNAPSHOT
"        Jaime Lee   2017 9:00 AM   Office Visit   MRN: [id number]    Department:  [facility]   Dept Phone:  [phone]    Description:  Male : 1950   Provider:  Angelica Stephenson M.D.           Reason for Visit     Follow-Up           Allergies as of 2017     Allergen Noted Reactions    Bloodless 10/29/2015       Pcn [Penicillins] 2014   Anaphylaxis      You were diagnosed with     Type 2 diabetes mellitus with hyperglycemia, without long-term current use of insulin (CMS-HCC)   [[id number]]         Vital Signs     Blood Pressure Pulse Temperature Height Weight Body Mass Index    116/74 mmHg 80 36.5 °C (97.7 °F) 1.829 m (6' 0.01\") 91.627 kg (202 lb) 27.39 kg/m2    Oxygen Saturation Smoking Status                93% Former Smoker          Basic Information     Date Of Birth Sex Race Ethnicity Preferred Language    1950 Male White Non- English      Your appointments     2017  9:00 AM   Established Patient with Angelica Stephenson M.D.   [facility])    [address]  [facility] [address]   [phone]           You will be receiving a confirmation call a few days before your appointment from our automated call confirmation system.            Dec 12, 2017  8:00 AM   PACER CHECK ONLY with CHRIS Conrad   [facility]    [address]  [address]  [facility] [id number] [phone]            Dec 12, 2017  8:20 AM   FOLLOW UP with Juan Pickering M.D.   [facility] ([facility])    [address]  [address]  [place] [id number] [phone]              Problem List              ICD-10-CM Priority Class Noted - Resolved    CAD (coronary artery disease) I25.10 High  2000 - Present    Thrombocytopenia (CMS-HCC) D69.6 Medium  2014 - Present    CKD (chronic kidney disease) stage " 3, GFR 30-59 ml/min N18.3 Medium  2/24/2014 - Present    Pulmonary embolism (CMS-HCC) I26.99 High  2/27/2014 - Present    AICD (automatic cardioverter/defibrillator) present Z95.810 High  3/11/2014 - Present    Ischemic cardiomyopathy I25.5 High  3/11/2014 - Present    S/P CABG x 4 Z95.1 High  6/22/2016 - Present    Type 2 diabetes mellitus with hyperglycemia, without long-term current use of insulin (CMS-HCC) E11.65 Medium  6/22/2016 - Present    Left bundle branch block I44.7 High  6/22/2016 - Present    Old anterior myocardial infarction I25.2   6/22/2016 - Present    Chronic systolic congestive heart failure, NYHA class 2 (CMS-HCC) I50.22 Medium  6/22/2016 - Present    Hyperlipidemia E78.5 High  1/18/2017 - Present    Dizziness R42   7/28/2017 - Present    Hoarseness or changing voice R49.9   7/28/2017 - Present      Health Maintenance        Date Due Completion Dates    DIABETES MONOFILAMENT / LE EXAM 2/23/1951 ---    RETINAL SCREENING 8/23/1968 ---    IMM ZOSTER VACCINE 8/23/2010 ---    IMM DTaP/Tdap/Td Vaccine (1 - Tdap) 10/17/2011 10/16/2011    IMM INFLUENZA (1) 9/1/2017 9/6/2016    IMM PNEUMOCOCCAL 65+ (ADULT) LOW/MEDIUM RISK SERIES (2 of 2 - PPSV23) 9/6/2017 9/6/2016    A1C SCREENING 1/28/2018 7/28/2017    FASTING LIPID PROFILE 7/26/2018 7/26/2017, 12/14/2016, 6/2/2011, 6/8/2009, 12/21/2007, 4/19/2007, 3/20/2006, 6/17/2005    SERUM CREATININE 7/26/2018 7/26/2017, 12/14/2016, 1/1/2016, 10/29/2015, 5/14/2015, 7/31/2014, 3/14/2014, 2/26/2014, 2/25/2014, 2/24/2014, 2/23/2014, 6/2/2011, 6/8/2009, 1/8/2008, 12/21/2007, 4/19/2007, 3/20/2006, 6/17/2005    URINE ACR / MICROALBUMIN 7/28/2018 7/28/2017    COLONOSCOPY 4/22/2024 4/22/2014 (Declined)    Override on 4/22/2014: Patient Declined            Current Immunizations     13-VALENT PCV PREVNAR 9/6/2016 11:07 AM    Influenza Vaccine Adult HD 9/6/2016 11:04 AM    TD Vaccine 10/16/2011 11:30 PM      Below and/or attached are the medications your provider expects you  to take. Review all of your home medications and newly ordered medications with your provider and/or pharmacist. Follow medication instructions as directed by your provider and/or pharmacist. Please keep your medication list with you and share with your provider. Update the information when medications are discontinued, doses are changed, or new medications (including over-the-counter products) are added; and carry medication information at all times in the event of emergency situations     Allergies:  BLOODLESS - (reactions not documented)     PCN - Anaphylaxis               Medications  Valid as of: August 24, 2017 -  9:53 AM    Generic Name Brand Name Tablet Size Instructions for use    Blood Glucose Monitoring Suppl (Misc) Blood Glucose Monitoring Suppl Supplies Test strips order: Test strips for Marina Contour meter. Sig: use BID and prn ssx high or low sugar, max 4/day        Blood Glucose Monitoring Suppl (Device) Blood Glucose Monitoring Suppl  Meter: Dispense Marina Contour meter. Sig. Use as directed for blood sugar monitoring.        Furosemide (Tab) LASIX 40 MG Take 1 Tab by mouth every day.        Lancets (Misc) Lancets  Lancets order: Lancets for Marina Contour meter. Sig: use BID and prn ssx high or low sugar. Max 4/d        Lisinopril (Tab) PRINIVIL 5 MG Take 1 Tab by mouth 2 times a day.        Lovastatin (Tab) MEVACOR 10 MG Take 1 Tab by mouth every evening.        Magnesium Oxide (Cap) Magnesium 500 MG Take 500 mg by mouth every day.        MetFORMIN HCl (Tab) GLUCOPHAGE 500 MG Take 1 Tab by mouth 2 times a day, with meals.        Metoprolol Succinate (TABLET SR 24 HR) TOPROL  MG Take 1 Tab by mouth every day.        Potassium Chloride Silvana CR (Tab CR) Kdur 20 MEQ Take 1 Tab by mouth every day.        Spironolactone (Tab) ALDACTONE 25 MG Take 0.5 Tabs by mouth every day.        .                 Medicines prescribed today were sent to:     [id number] - [place], [place] - [address]     [address] [address]    Phone: [phone] Fax: [phone]    Open 24 Hours?: No      Medication refill instructions:       If your prescription bottle indicates you have medication refills left, it is not necessary to call your provider’s office. Please contact your pharmacy and they will refill your medication.    If your prescription bottle indicates you do not have any refills left, you may request refills at any time through one of the following ways: The online FiFully system (except Urgent Care), by calling your provider’s office, or by asking your pharmacy to contact your provider’s office with a refill request. Medication refills are processed only during regular business hours and may not be available until the next business day. Your provider may request additional information or to have a follow-up visit with you prior to refilling your medication.   *Please Note: Medication refills are assigned a new Rx number when refilled electronically. Your pharmacy may indicate that no refills were authorized even though a new prescription for the same medication is available at the pharmacy. Please request the medicine by name with the pharmacy before contacting your provider for a refill.        Your To Do List     Future Labs/Procedures Complete By Expires    HEMOGLOBIN A1C  As directed 8/25/2018         FiFully Access Code: Activation code not generated  Current FiFully Status: Active

## 2017-08-24 NOTE — ASSESSMENT & PLAN NOTE
Currently taking Metformin twice a day as directed with food.  Denies side effects or hypoglycemic events.  He is monitoring blood sugar regularly at home.  His blood sugars have been   Reports diet is stable  He is exercising regularly.  Walks 3.5 miles in the morning and then 10,000 - 14,000 steps daily  Last eye exam:   Denies polyuria, polydipsia, blurred vision, or neuropathies.  6 oz of vodka and

## 2017-08-29 NOTE — ASSESSMENT & PLAN NOTE
CKD: chronic condition which is stable. Currently avoids high dose NSAIDs. Denies nausea/vomiting, headache, loss of energy/unusual somnolence, mouth sores, skin discoloration or itching, muscle cramps. .

## 2017-08-29 NOTE — ASSESSMENT & PLAN NOTE
Dyslipidemia Chronic condition. Current treatments: diet/exercise/medicines. He is taking Mevacor 10 mg as directed. Current side effects: none.

## 2017-08-29 NOTE — PROGRESS NOTES
Subjective:     Chief Complaint   Patient presents with   • Follow-Up       Jaime Lee is a 67 y.o. male here today for evaluation and management of:    Type 2 diabetes mellitus with hyperglycemia, without long-term current use of insulin (CMS-[facility])   Currently taking Metformin twice a day as directed with food.  Denies side effects or hypoglycemic events.  He is monitoring blood sugar regularly at home.  His blood sugars have been   Reports diet is stable  He is exercising regularly.  Walks 3.5 miles in the morning and then 10,000 - 14,000 steps daily  Last eye exam:   Denies polyuria, polydipsia, blurred vision, or neuropathies.  6 oz of vodka and     CKD (chronic kidney disease) stage 3, GFR 30-59 ml/min  CKD: chronic condition which is stable. Currently avoids high dose NSAIDs. Denies nausea/vomiting, headache, loss of energy/unusual somnolence, mouth sores, skin discoloration or itching, muscle cramps. .       Hyperlipidemia  Dyslipidemia Chronic condition. Current treatments: diet/exercise/medicines. He is taking Mevacor 10 mg as directed. Current side effects: none.          Allergies   Allergen Reactions   • Bloodless    • Pcn [Penicillins] Anaphylaxis       Current medicines (including changes today)  Current Outpatient Prescriptions   Medication Sig Dispense Refill   • metformin (GLUCOPHAGE) 500 MG Tab Take 1 Tab by mouth 2 times a day, with meals. 60 Tab 3   • Blood Glucose Monitoring Suppl SUPPLIES Misc Test strips order: Test strips for Marina Contour meter. Sig: use BID and prn ssx high or low sugar, max 4/day 100 Each 3   • Blood Glucose Monitoring Suppl Device Meter: Dispense Marina Contour meter. Sig. Use as directed for blood sugar monitoring. 1 Device 1   • Lancets Misc Lancets order: Lancets for Marina Contour meter. Sig: use BID and prn ssx high or low sugar. Max 4/d 100 Each 3   • potassium chloride SA (K-DUR) 20 MEQ Tab CR Take 1 Tab by mouth every day. 90 Tab 3   • lovastatin  (MEVACOR) 10 MG tablet Take 1 Tab by mouth every evening. 90 Tab 3   • furosemide (LASIX) 40 MG Tab Take 1 Tab by mouth every day. 90 Tab 3   • spironolactone (ALDACTONE) 25 MG Tab Take 0.5 Tabs by mouth every day. 45 Tab 3   • lisinopril (PRINIVIL) 5 MG Tab Take 1 Tab by mouth 2 times a day. 180 Tab 3   • metoprolol SR (TOPROL XL) 100 MG TABLET SR 24 HR Take 1 Tab by mouth every day. 90 Tab 3   • Magnesium 500 MG CAPS Take 500 mg by mouth every day.       No current facility-administered medications for this visit.        He  has a past medical history of Acute anterior myocardial infarction (CMS-HCC) (12/28/2000); AICD (automatic cardioverter/defibrillator) present (2003/2008/2015); Allergy; CAD (coronary artery disease) (12/28/2000); Congestive heart failure (CMS-HCC) (12/28/2000); Dental disorder; GERD (gastroesophageal reflux disease); Ischemic cardiomyopathy (February 2014); Left bundle branch block ( ); Pneumonia (2014); Pulmonary embolism (CMS-HCC) (March 2014); and Type 2 diabetes mellitus without complication (CMS-HCC) ( ).    Patient Active Problem List    Diagnosis Date Noted   • Hyperlipidemia 01/18/2017     Priority: High   • S/P CABG x 4 06/22/2016     Priority: High   • Left bundle branch block 06/22/2016     Priority: High   • AICD (automatic cardioverter/defibrillator) present 03/11/2014     Priority: High   • Ischemic cardiomyopathy 03/11/2014     Priority: High   • Pulmonary embolism (CMS-HCC) 02/27/2014     Priority: High   • CAD (coronary artery disease) 12/28/2000     Priority: High   • Type 2 diabetes mellitus with hyperglycemia, without long-term current use of insulin (CMS-HCC) 06/22/2016     Priority: Medium   • Chronic systolic congestive heart failure, NYHA class 2 (CMS-HCC) 06/22/2016     Priority: Medium   • CKD (chronic kidney disease) stage 3, GFR 30-59 ml/min 02/24/2014     Priority: Medium   • Thrombocytopenia (CMS-HCC) 02/23/2014     Priority: Medium   • Dizziness 07/28/2017   •  "Hoarseness or changing voice 07/28/2017   • Old anterior myocardial infarction 06/22/2016       ROS   No fever or chills.  No nausea or vomiting.  No chest pain or palpitations.  No cough or SOB.  No pain with urination or hematuria.  No black or bloody stools.       Objective:     Blood pressure 116/74, pulse 80, temperature 36.5 °C (97.7 °F), height 1.829 m (6' 0.01\"), weight 91.6 kg (202 lb), SpO2 93 %. Body mass index is 27.39 kg/m².   Physical Exam:  Well developed, well nourished.  Alert, oriented in no acute distress.  Eye contact is good, speech goal directed, affect calm  Eyes: conjunctiva non-injected, sclera non-icteric.  Neck Supple.  No adenopathy or masses in the neck or supraclavicular regions. No thyromegaly  Lungs: clear to auscultation bilaterally with good excursion. No wheezes or rhonchi  CV: regular rate and rhythm. No murmur            Assessment and Plan:   The following treatment plan was discussed    1. Type 2 diabetes mellitus with hyperglycemia, without long-term current use of insulin (CMS-[facility])  Extensive dietary counseling done with patient and his wife. I discussed how his current alcohol use is negatively affecting his blood sugars. We discussed more frequent meals each containing protein to stabilize his blood sugars. Patient is very resistant to change. Continue current medications and recheck in 3 months  - HEMOGLOBIN A1C; Future    2. CKD (chronic kidney disease) stage 3, GFR 30-59 ml/min  Avoid nephrotoxic drugs. Continue to monitor    3. Mixed hyperlipidemia  Continue current medications. Low-fat diet discussed.    Any change or worsening of signs or symptoms, patient encouraged to follow-up or report to the emergency room for further evaluation. Patient understands and agrees.    Followup: Return in about 3 months (around 11/24/2017).           "

## 2017-09-05 ENCOUNTER — APPOINTMENT (OUTPATIENT)
Dept: SOCIAL WORK | Facility: CLINIC | Age: 67
End: 2017-09-05
Payer: MEDICARE

## 2017-09-05 PROCEDURE — G0008 ADMIN INFLUENZA VIRUS VAC: HCPCS | Performed by: REGISTERED NURSE

## 2017-09-05 PROCEDURE — 90732 PPSV23 VACC 2 YRS+ SUBQ/IM: CPT | Performed by: REGISTERED NURSE

## 2017-09-05 PROCEDURE — 90662 IIV NO PRSV INCREASED AG IM: CPT | Performed by: REGISTERED NURSE

## 2017-09-05 PROCEDURE — G0009 ADMIN PNEUMOCOCCAL VACCINE: HCPCS | Performed by: REGISTERED NURSE

## 2017-09-20 DIAGNOSIS — E11.65 TYPE 2 DIABETES MELLITUS WITH HYPERGLYCEMIA, WITHOUT LONG-TERM CURRENT USE OF INSULIN (HCC): ICD-10-CM

## 2017-10-02 ENCOUNTER — HOSPITAL ENCOUNTER (OUTPATIENT)
Dept: LAB | Facility: MEDICAL CENTER | Age: 67
End: 2017-10-02
Attending: FAMILY MEDICINE
Payer: MEDICARE

## 2017-10-02 DIAGNOSIS — E11.65 TYPE 2 DIABETES MELLITUS WITH HYPERGLYCEMIA, WITHOUT LONG-TERM CURRENT USE OF INSULIN (HCC): ICD-10-CM

## 2017-10-02 LAB
EST. AVERAGE GLUCOSE BLD GHB EST-MCNC: 189 MG/DL
HBA1C MFR BLD: 8.2 % (ref 0–5.6)

## 2017-10-02 PROCEDURE — 36415 COLL VENOUS BLD VENIPUNCTURE: CPT

## 2017-10-02 PROCEDURE — 83036 HEMOGLOBIN GLYCOSYLATED A1C: CPT

## 2017-10-19 DIAGNOSIS — E11.65 TYPE 2 DIABETES MELLITUS WITH HYPERGLYCEMIA, WITHOUT LONG-TERM CURRENT USE OF INSULIN (HCC): ICD-10-CM

## 2017-10-19 NOTE — TELEPHONE ENCOUNTER
Patient called states he was told to take 500 mg in the morning and 1000 mg at night. He would like a refill to cover this request.  Was the patient seen in the last year in this department? Yes     Does patient have an active prescription for medications requested? No     Received Request Via: Patient

## 2017-11-15 DIAGNOSIS — I25.119 CORONARY ARTERY DISEASE INVOLVING NATIVE CORONARY ARTERY OF NATIVE HEART WITH ANGINA PECTORIS (HCC): ICD-10-CM

## 2017-11-15 DIAGNOSIS — I25.5 ISCHEMIC CARDIOMYOPATHY: ICD-10-CM

## 2017-11-16 RX ORDER — METOPROLOL SUCCINATE 100 MG/1
TABLET, EXTENDED RELEASE ORAL
Qty: 90 TAB | Refills: 3 | Status: SHIPPED | OUTPATIENT
Start: 2017-11-16 | End: 2018-11-02 | Stop reason: SDUPTHER

## 2017-11-28 ENCOUNTER — OFFICE VISIT (OUTPATIENT)
Dept: MEDICAL GROUP | Facility: MEDICAL CENTER | Age: 67
End: 2017-11-28
Payer: MEDICARE

## 2017-11-28 VITALS
OXYGEN SATURATION: 92 % | RESPIRATION RATE: 16 BRPM | HEART RATE: 72 BPM | TEMPERATURE: 98 F | BODY MASS INDEX: 26.55 KG/M2 | DIASTOLIC BLOOD PRESSURE: 76 MMHG | SYSTOLIC BLOOD PRESSURE: 120 MMHG | WEIGHT: 196 LBS | HEIGHT: 72 IN

## 2017-11-28 DIAGNOSIS — E11.65 TYPE 2 DIABETES MELLITUS WITH HYPERGLYCEMIA, WITHOUT LONG-TERM CURRENT USE OF INSULIN (HCC): ICD-10-CM

## 2017-11-28 DIAGNOSIS — C44.310 BASAL CELL CARCINOMA OF SKIN OF FACE, UNSPECIFIED PART OF FACE: ICD-10-CM

## 2017-11-28 DIAGNOSIS — J18.9 COMMUNITY ACQUIRED PNEUMONIA OF RIGHT MIDDLE LOBE OF LUNG: ICD-10-CM

## 2017-11-28 DIAGNOSIS — N18.30 CKD (CHRONIC KIDNEY DISEASE) STAGE 3, GFR 30-59 ML/MIN (HCC): ICD-10-CM

## 2017-11-28 DIAGNOSIS — E78.2 MIXED HYPERLIPIDEMIA: ICD-10-CM

## 2017-11-28 LAB
HBA1C MFR BLD: 6.7 % (ref ?–5.8)
INT CON NEG: NEGATIVE
INT CON POS: POSITIVE

## 2017-11-28 PROCEDURE — 99214 OFFICE O/P EST MOD 30 MIN: CPT | Performed by: FAMILY MEDICINE

## 2017-11-28 PROCEDURE — 83036 HEMOGLOBIN GLYCOSYLATED A1C: CPT | Performed by: FAMILY MEDICINE

## 2017-11-28 RX ORDER — AZITHROMYCIN 250 MG/1
TABLET, FILM COATED ORAL
Qty: 6 TAB | Refills: 0 | Status: SHIPPED | OUTPATIENT
Start: 2017-11-28 | End: 2018-01-22

## 2017-11-28 NOTE — PATIENT INSTRUCTIONS
Pneumonia, Adult  Pneumonia is an infection of the lungs.   CAUSES  Pneumonia may be caused by bacteria or a virus. Usually, the infection is caused by breathing in droplets from an infected person's cough or sneeze.   SYMPTOMS   Symptoms of pneumonia include:  · Cough.  · Fever.  · Chest pain.  · Rapid breathing.  · Shortness of breath.  · Shaking chills.  · Mucus production.  DIAGNOSIS   If you have the common symptoms of pneumonia, often your health care provider will confirm the diagnosis with a chest X-ray. The X-ray will show an abnormality in the lung if you have pneumonia. Other tests may be done on your blood, urine, or mucus (sputum) to find the specific cause of your pneumonia. A blood gas test or pulse oximetry test may be needed to check how well your lungs are working.  TREATMENT   Your treatment will depend on whether your pneumonia is caused by bacteria or a virus.   · Bacterial pneumonia is treated with antibiotic medicine.  · Pneumonia that is caused by the influenza virus may be treated with an antiviral medicine.  · Pneumonia that is caused by a virus other than influenza will not respond to antibiotic medicine. This type of pneumonia will have to run its course.   HOME CARE INSTRUCTIONS   · Cough suppressants may be used if you are losing too much rest from coughing at night. However, you should try to avoid taking cough suppresants. This is because coughing helps to remove mucus from your lungs.  · Sleep in a semi-upright position at night. Try sleeping in a reclining chair, or place a few pillows under your head.  · Try using a cold steam vaporizer or humidifier in your home or bedroom. This may help loosen your mucus.  · If you were prescribed an antibiotic medicine, finish all of it even if you start to feel better.  · If you were prescribed an expectorant, take it as directed by your health care provider. This medicine loosens the mucus so you can cough it up.  · Take medicines only as  directed by your health care provider.  · Do not smoke. If you are a smoker and continue to smoke, your cough may last several weeks after your pneumonia has cleared.  · Get rest when you feel tired, or as needed.  PREVENTION  A pneumococcal shot (vaccine) is available to prevent a common bacterial cause of pneumonia. This is usually suggested for:  · People over 65 years old.  · People on chemotherapy.  · People with chronic lung problems, such as bronchitis or emphysema.  · People with immune system problems.  If you are over 65 years old or have a high risk condition, you may receive the pneumococcal vaccine if you have not received it before. In some countries, a routine influenza vaccine is also recommended. This vaccine can help prevent some cases of pneumonia. You may be offered the influenza vaccine as part of your care.  If you are a smoker, it is time to quit in order to prevent pneumonia in the future. You may receive instructions on how to stop smoking. Your health care provider can provide medicines and counseling to help you quit.  SEEK MEDICAL CARE IF:  · You have a fever.  · You cannot control your cough with suppressants at night, and you keep losing sleep.  SEEK IMMEDIATE MEDICAL CARE IF:   · You have worsening shortness of breath.  · You have increased chest pain.  · Your sickness becomes worse, especially if you are an older adult or have a weakened immune system.  · You cough up blood.  · You have pain that is getting worse or is not controlled with medicines.  · Your symptoms are getting worse rather than better.     This information is not intended to replace advice given to you by your health care provider. Make sure you discuss any questions you have with your health care provider.     Document Released: 12/18/2006 Document Revised: 01/08/2016 Document Reviewed: 04/13/2016  [employer] Patient Education ©2016 [employer]

## 2017-11-28 NOTE — ASSESSMENT & PLAN NOTE
Stable. Currently taking  as directed.  Denies side effects or hypoglycemic events.  He is monitoring blood sugar occasional at home.  Reports diet is irregular,  Skipping meals  He is exercising regularly.  Last eye exam: years ago  Denies polyuria, polydipsia, blurred vision, or neuropathies.

## 2017-11-29 NOTE — ASSESSMENT & PLAN NOTE
Illness: 21 days of illness including: nasal congestion, green/purulent rhinorrhea, cough ,    Symptoms negative for fever,   Treatments tried: none   Since onset, symptoms are worse   Similarly ill exposures: yes  Medical history negative for asthma  He  reports that he quit smoking about 16 years ago. His smoking use included Cigarettes. He has a 32.00 pack-year smoking history. He has never used smokeless tobacco.

## 2017-11-29 NOTE — PROGRESS NOTES
Subjective:     Chief Complaint   Patient presents with   • Follow-Up       Jaime Lee is a 67 y.o. male here today for evaluation and management of:    Type 2 diabetes mellitus with hyperglycemia, without long-term current use of insulin (CMS-HCC)  Stable. Currently taking  as directed.  Denies side effects or hypoglycemic events.  He is monitoring blood sugar occasional at home.  Reports diet is irregular,  Skipping meals  He is exercising regularly.  Last eye exam: years ago  Denies polyuria, polydipsia, blurred vision, or neuropathies.      Basal cell carcinoma of skin of face  Patient has an enlarging lesion above his right upper lip that seems to be enlarging. He has not seen a dermatologist for this. He has had significant sun exposure.    CKD (chronic kidney disease) stage 3, GFR 30-59 ml/min  CKD: chronic condition which is stable. Currently avoids high dose NSAIDs. Denies nausea/vomiting, headache, loss of energy/unusual somnolence, mouth sores, skin discoloration or itching, muscle cramps. .       Community acquired pneumonia of right middle lobe of lung (CMS-HCC)  Illness: 21 days of illness including: nasal congestion, green/purulent rhinorrhea, cough ,    Symptoms negative for fever,   Treatments tried: none   Since onset, symptoms are worse   Similarly ill exposures: yes  Medical history negative for asthma  He  reports that he quit smoking about 16 years ago. His smoking use included Cigarettes. He has a 32.00 pack-year smoking history. He has never used smokeless tobacco.      Hyperlipidemia  Dyslipidemia Chronic condition. Current treatments: diet/exercise/medicines. He is taking Mevacor 10 mg daily as directed. Current side effects: none.          Allergies   Allergen Reactions   • Bloodless    • Pcn [Penicillins] Anaphylaxis       Current medicines (including changes today)  Current Outpatient Prescriptions   Medication Sig Dispense Refill   • metformin (GLUCOPHAGE) 500 MG Tab  One tab po in am and 2 tab in pm 270 Tab 3   • azithromycin (ZITHROMAX) 250 MG Tab 2 tabs po today and then one po daily for 4 more days 6 Tab 0   • metoprolol SR (TOPROL XL) 100 MG TABLET SR 24 HR TAKE 1 TABLET BY MOUTH EVERY DAY 90 Tab 3   • Blood Glucose Monitoring Suppl SUPPLIES Misc Test strips order: Test strips for Marina Contour meter. Sig: use BID and prn ssx high or low sugar, max 4/day 100 Each 3   • Blood Glucose Monitoring Suppl Device Meter: Dispense Marina Contour meter. Sig. Use as directed for blood sugar monitoring. 1 Device 1   • Lancets Misc Lancets order: Lancets for Marina Contour meter. Sig: use BID and prn ssx high or low sugar. Max 4/d 100 Each 3   • potassium chloride SA (K-DUR) 20 MEQ Tab CR Take 1 Tab by mouth every day. 90 Tab 3   • lovastatin (MEVACOR) 10 MG tablet Take 1 Tab by mouth every evening. 90 Tab 3   • furosemide (LASIX) 40 MG Tab Take 1 Tab by mouth every day. 90 Tab 3   • spironolactone (ALDACTONE) 25 MG Tab Take 0.5 Tabs by mouth every day. 45 Tab 3   • lisinopril (PRINIVIL) 5 MG Tab Take 1 Tab by mouth 2 times a day. 180 Tab 3   • Magnesium 500 MG CAPS Take 500 mg by mouth every day.       No current facility-administered medications for this visit.        He  has a past medical history of Acute anterior myocardial infarction (CMS-HCC) (12/28/2000); AICD (automatic cardioverter/defibrillator) present (2003/2008/2015); Allergy; CAD (coronary artery disease) (12/28/2000); Congestive heart failure (CMS-HCC) (12/28/2000); Dental disorder; GERD (gastroesophageal reflux disease); Ischemic cardiomyopathy (February 2014); Left bundle branch block ( ); Pneumonia (2014); Pulmonary embolism (CMS-HCC) (March 2014); and Type 2 diabetes mellitus without complication (CMS-HCC) ( ).    Patient Active Problem List    Diagnosis Date Noted   • Hyperlipidemia 01/18/2017     Priority: High   • S/P CABG x 4 06/22/2016     Priority: High   • Left bundle branch block 06/22/2016     Priority: High    • AICD (automatic cardioverter/defibrillator) present 03/11/2014     Priority: High   • Ischemic cardiomyopathy 03/11/2014     Priority: High   • Pulmonary embolism (CMS-HCC) 02/27/2014     Priority: High   • CAD (coronary artery disease) 12/28/2000     Priority: High   • Type 2 diabetes mellitus with hyperglycemia, without long-term current use of insulin (CMS-HCC) 06/22/2016     Priority: Medium   • Chronic systolic congestive heart failure, NYHA class 2 (CMS-HCC) 06/22/2016     Priority: Medium   • CKD (chronic kidney disease) stage 3, GFR 30-59 ml/min 02/24/2014     Priority: Medium   • Thrombocytopenia (CMS-HCC) 02/23/2014     Priority: Medium   • Community acquired pneumonia of right middle lobe of lung (CMS-HCC) 11/28/2017   • Basal cell carcinoma of skin of face 11/28/2017   • Dizziness 07/28/2017   • Hoarseness or changing voice 07/28/2017   • Old anterior myocardial infarction 06/22/2016       ROS   No fever or chills.  No nausea or vomiting.  No chest pain or palpitations.  No cough or SOB.  No pain with urination or hematuria.  No black or bloody stools.       Objective:     Blood pressure 120/76, pulse 72, temperature 36.7 °C (98 °F), resp. rate 16, height 1.829 m (6'), weight 88.9 kg (196 lb), SpO2 92 %. Body mass index is 26.58 kg/m².   Physical Exam:  Well developed, well nourished.  Alert, oriented in no acute distress.  Eye contact is good, speech goal directed, affect calm  Eyes: conjunctiva non-injected, sclera non-icteric.  Ears: Pinna normal. TM pearly gray.   Nose: Nares are patent. Erythematous, swollen mucosa with yellow discharge  Mouth: Oral mucous membranes pink and moist with no lesions. Mild diffuse erythema with yellow postnasal drainage  Neck Supple.  No adenopathy or masses in the neck or supraclavicular regions. No thyromegaly  Lungs: clear to auscultation bilaterally with good excursion. Except for crackles in the right middle lobe. No increased work of breathing  CV: regular  rate and rhythm. No murmur  Abdomen: soft, nontender, no masses or organomegaly.  No rebound or gaurding  Ext: no edema, color normal, vascularity normal, temperature normal  Monofilament testing with a 10 gram force: sensation intact: intact bilaterally  Visual Inspection: Feet without maceration, ulcers, fissures.  Pedal pulses: intact bilaterally  Skin: 1 cm lesion above the right side of the upper lip with rolled borders and central umbilication        Assessment and Plan:   The following treatment plan was discussed    1. Type 2 diabetes mellitus with hyperglycemia, without long-term current use of insulin (CMS-HCC)  Improving control. Continue metformin 500 mg the morning of this milligrams in the evening  - POCT Retinal Eye Exam - machine was broken unable to do exam  - POCT Hemoglobin A1C    2. Community acquired pneumonia of right middle lobe of lung (CMS-HCC)  Zithromax as directed. Recheck 2 weeks  - azithromycin (ZITHROMAX) 250 MG Tab; 2 tabs po today and then one po daily for 4 more days  Dispense: 6 Tab; Refill: 0    3. Basal cell carcinoma of skin of face, unspecified part of face  Refer to dermatology for further evaluation treatment  - REFERRAL TO DERMATOLOGY    4. Mixed hyperlipidemia  Continue Mevacor    5. CKD (chronic kidney disease) stage 3, GFR 30-59 ml/min  Avoid NSAIDs. Continue to monitor    Any change or worsening of signs or symptoms, patient encouraged to follow-up or report to the emergency room for further evaluation. Patient understands and agrees.    Followup: Return in about 3 months (around 2/28/2018).

## 2017-11-29 NOTE — ASSESSMENT & PLAN NOTE
Patient has an enlarging lesion above his right upper lip that seems to be enlarging. He has not seen a dermatologist for this. He has had significant sun exposure.

## 2017-11-29 NOTE — ASSESSMENT & PLAN NOTE
Dyslipidemia Chronic condition. Current treatments: diet/exercise/medicines. He is taking Mevacor 10 mg daily as directed. Current side effects: none.

## 2017-11-30 ENCOUNTER — TELEPHONE (OUTPATIENT)
Dept: MEDICAL GROUP | Facility: MEDICAL CENTER | Age: 67
End: 2017-11-30

## 2017-11-30 NOTE — TELEPHONE ENCOUNTER
1. Caller Name: Spouse                                          Call Back Number: [phone]      Patient approves a detailed voicemail message: yes    Called and LM asking when they would be able to come in and have an eye exam completed. Pt came in recently and the machine would not upload photo's onto the exam. Pt would like a call back to schedule when this is available. Spoke to Spouse and notified we would call as soon as the issue was resolved, Pt and spouse were understanding and just do not want to be forgotten about.

## 2017-12-11 DIAGNOSIS — I25.5 ISCHEMIC CARDIOMYOPATHY: ICD-10-CM

## 2017-12-11 DIAGNOSIS — I25.119 CORONARY ARTERY DISEASE INVOLVING NATIVE CORONARY ARTERY OF NATIVE HEART WITH ANGINA PECTORIS (HCC): ICD-10-CM

## 2017-12-11 RX ORDER — SPIRONOLACTONE 25 MG/1
TABLET ORAL
Refills: 3 | Status: CANCELLED | OUTPATIENT
Start: 2017-12-11

## 2017-12-12 ENCOUNTER — OFFICE VISIT (OUTPATIENT)
Dept: CARDIOLOGY | Facility: MEDICAL CENTER | Age: 67
End: 2017-12-12
Payer: MEDICARE

## 2017-12-12 ENCOUNTER — NON-PROVIDER VISIT (OUTPATIENT)
Dept: CARDIOLOGY | Facility: MEDICAL CENTER | Age: 67
End: 2017-12-12
Payer: MEDICARE

## 2017-12-12 VITALS — BODY MASS INDEX: 25.87 KG/M2 | HEIGHT: 72 IN | WEIGHT: 191 LBS

## 2017-12-12 VITALS
DIASTOLIC BLOOD PRESSURE: 70 MMHG | BODY MASS INDEX: 25.87 KG/M2 | SYSTOLIC BLOOD PRESSURE: 100 MMHG | HEIGHT: 72 IN | HEART RATE: 74 BPM | OXYGEN SATURATION: 90 % | WEIGHT: 191 LBS

## 2017-12-12 DIAGNOSIS — R05.9 COUGH: ICD-10-CM

## 2017-12-12 DIAGNOSIS — I25.5 ISCHEMIC CARDIOMYOPATHY: ICD-10-CM

## 2017-12-12 DIAGNOSIS — Z95.810 AICD (AUTOMATIC CARDIOVERTER/DEFIBRILLATOR) PRESENT: ICD-10-CM

## 2017-12-12 DIAGNOSIS — I25.10 CORONARY ARTERY DISEASE INVOLVING NATIVE CORONARY ARTERY OF NATIVE HEART WITHOUT ANGINA PECTORIS: ICD-10-CM

## 2017-12-12 DIAGNOSIS — Z95.1 S/P CABG X 4: ICD-10-CM

## 2017-12-12 DIAGNOSIS — I25.119 CORONARY ARTERY DISEASE INVOLVING NATIVE CORONARY ARTERY OF NATIVE HEART WITH ANGINA PECTORIS (HCC): ICD-10-CM

## 2017-12-12 PROCEDURE — 93289 INTERROG DEVICE EVAL HEART: CPT | Performed by: NURSE PRACTITIONER

## 2017-12-12 PROCEDURE — 99213 OFFICE O/P EST LOW 20 MIN: CPT | Performed by: INTERNAL MEDICINE

## 2017-12-12 RX ORDER — LOVASTATIN 10 MG/1
10 TABLET ORAL EVERY EVENING
Qty: 90 TAB | Refills: 3 | Status: SHIPPED | OUTPATIENT
Start: 2017-12-12 | End: 2018-12-19 | Stop reason: SDUPTHER

## 2017-12-12 RX ORDER — POTASSIUM CHLORIDE 20 MEQ/1
20 TABLET, EXTENDED RELEASE ORAL DAILY
Qty: 90 TAB | Refills: 3 | Status: SHIPPED | OUTPATIENT
Start: 2017-12-12 | End: 2019-01-19 | Stop reason: SDUPTHER

## 2017-12-12 RX ORDER — SPIRONOLACTONE 25 MG/1
12.5 TABLET ORAL
Qty: 45 TAB | Refills: 3 | Status: SHIPPED | OUTPATIENT
Start: 2017-12-12 | End: 2019-01-21 | Stop reason: SDUPTHER

## 2017-12-12 RX ORDER — LISINOPRIL 5 MG/1
5 TABLET ORAL 2 TIMES DAILY
Qty: 180 TAB | Refills: 3 | Status: SHIPPED | OUTPATIENT
Start: 2017-12-12 | End: 2019-02-21 | Stop reason: SDUPTHER

## 2017-12-12 ASSESSMENT — ENCOUNTER SYMPTOMS
COUGH: 1
SHORTNESS OF BREATH: 0
PND: 0
FALLS: 0
WHEEZING: 0
MYALGIAS: 0
ORTHOPNEA: 0
HEMOPTYSIS: 0

## 2017-12-12 ASSESSMENT — LIFESTYLE VARIABLES: SUBSTANCE_ABUSE: 0

## 2017-12-12 NOTE — PROGRESS NOTES
Device is working normally.  No device therapy.  Normal sensing and capture of RV lead; stable impedances. Battery longevity is 9.9 years.  No changes are made today.    FU in 6 months for next AICD check with me.    Collaborating MD: Ladan

## 2017-12-12 NOTE — LETTER
[facility] [place]   [address]   [address]  [place] [address]  Phone: [phone]  Fax: [phone]              Jaime Lee  1950    Encounter Date: 12/12/2017    Juan Pikcering M.D.          PROGRESS NOTE:  Subjective:   Jaime Lee is a 67 y.o. male who presents today For follow-up of his ischemic cardiomyopathy. He is doing remarkably well despite his low ejection fraction. His current problem is a dental infection and he is going to require extraction of all of his upper teeth as soon as this is treated. He's had no cardiac symptoms at all.    Past Medical History:   Diagnosis Date   • Acute anterior myocardial infarction (CMS-HCC) 12/28/2000    PCI attempted by Dr. Malloy but unsuccessful   • AICD (automatic cardioverter/defibrillator) present 2003/2008/2015    Defibrillator is a Medtronic model HVBP5F1, serial #[id number], implanted Dr. Stephenson November 2015. The ventricular lead is a Medtronic model #6947-65, lead serial #[id number], implant 7/2/2003   • Allergy     within 2 years   • CAD (coronary artery disease) 12/28/2000    CABG x 4 (LIMA to LAD, rSVG to first diagonal, left circumflex and PDA).2000: CABG x 4 (LIMA to LAD, rSVG to first diagonal, left circumflex and PDA).  2003: LIMA and circumflex grafts patent, diagonal and PDA grafts closed.   • Congestive heart failure (CMS-[facility]) 12/28/2000   • Dental disorder     Upper partial   • GERD (gastroesophageal reflux disease)    • Ischemic cardiomyopathy February 2014    Echocardiogram with severely dilated LV, LVEF 15-20%   • Left bundle branch block     • Pneumonia 2014   • Pulmonary embolism (CMS-[facility]) March 2014   • Type 2 diabetes mellitus without complication (CMS-HCC)      Diet controlled, followed by Dr. Walls     Past Surgical History:   Procedure Laterality Date   • RECOVERY  11/3/2015    Procedure: CATH LAB-MARCO A-ICD GENERATOR CHANGE  [address]- [facility] [id number]-MEDTRONIC BLOODLESS;  Surgeon: Recoveryonly Surgery;  Location[place] PRE-[place] [facility] [place];  Service:    • AICD BATTERY CHANGE  November 2015    Generator replacement with Medtronic Evera S VR [id number] implanted by Dr. Marcus Stephenson.   • AICD IMPLANT  January 2008    Medtronic Virtuoso VR [id number] implanted by Dr. Stephenson.   • OTHER CARDIAC SURGERY  12/2000    CABG x 4   • APPENDECTOMY  1969   • MULTIPLE CORONARY ARTERY BYPASS       Family History   Problem Relation Age of Onset   • Diabetes Mother    • Heart Disease Mother    • Diabetes Sister    • Alcohol/Drug Brother      History   Smoking Status   • Former Smoker   • Packs/day: 1.00   • Years: 32.00   • Types: Cigarettes   • Quit date: 12/28/2000   Smokeless Tobacco   • Never Used     Allergies   Allergen Reactions   • Bloodless    • Pcn [Penicillins] Anaphylaxis     Outpatient Encounter Prescriptions as of 12/12/2017   Medication Sig Dispense Refill   • lisinopril (PRINIVIL) 5 MG Tab Take 1 Tab by mouth 2 times a day. 180 Tab 3   • spironolactone (ALDACTONE) 25 MG Tab Take 0.5 Tabs by mouth every day. 45 Tab 3   • lovastatin (MEVACOR) 10 MG tablet Take 1 Tab by mouth every evening. 90 Tab 3   • potassium chloride SA (KDUR) 20 MEQ Tab CR Take 1 Tab by mouth every day. 90 Tab 3   • metformin (GLUCOPHAGE) 500 MG Tab One tab po in am and 2 tab in pm 270 Tab 3   • azithromycin (ZITHROMAX) 250 MG Tab 2 tabs po today and then one po daily for 4 more days 6 Tab 0   • metoprolol SR (TOPROL XL) 100 MG TABLET SR 24 HR TAKE 1 TABLET BY MOUTH EVERY DAY 90 Tab 3   • Blood Glucose Monitoring Suppl SUPPLIES Misc Test strips order: Test strips for Marina Contour meter. Sig: use BID and prn ssx high or low sugar, max 4/day 100 Each 3   • Blood Glucose Monitoring Suppl Device Meter: Dispense Marina Contour meter. Sig. Use as directed for blood sugar monitoring. 1 Device 1   • Lancets Misc Lancets order: Lancets for Marina Contour meter. Sig: use BID and prn ssx  high or low sugar. Max 4/d 100 Each 3   • furosemide (LASIX) 40 MG Tab Take 1 Tab by mouth every day. 90 Tab 3   • [DISCONTINUED] potassium chloride SA (K-DUR) 20 MEQ Tab CR Take 1 Tab by mouth every day. 90 Tab 3   • [DISCONTINUED] lovastatin (MEVACOR) 10 MG tablet Take 1 Tab by mouth every evening. 90 Tab 3   • [DISCONTINUED] spironolactone (ALDACTONE) 25 MG Tab Take 0.5 Tabs by mouth every day. 45 Tab 3   • [DISCONTINUED] lisinopril (PRINIVIL) 5 MG Tab Take 1 Tab by mouth 2 times a day. 180 Tab 3   • Magnesium 500 MG CAPS Take 500 mg by mouth every day.       No facility-administered encounter medications on file as of 12/12/2017.      Review of Systems   HENT: Negative for nosebleeds.    Respiratory: Positive for cough. Negative for hemoptysis, shortness of breath and wheezing.    Cardiovascular: Negative for orthopnea and PND.   Musculoskeletal: Negative for falls and myalgias.   Psychiatric/Behavioral: Negative for substance abuse.        Objective:   /70   Pulse 74   Ht 1.829 m (6')   Wt 86.6 kg (191 lb)   SpO2 90%   BMI 25.90 kg/m²      Physical Exam   Constitutional: He is oriented to person, place, and time. He appears well-developed and well-nourished.   Eyes: Conjunctivae are normal. No scleral icterus.   Neck: No JVD present.   Cardiovascular: Normal rate and regular rhythm.    Pulmonary/Chest: Effort normal and breath sounds normal.   Musculoskeletal: He exhibits no edema.   Neurological: He is alert and oriented to person, place, and time.   Skin: Skin is warm and dry.   Psychiatric: He has a normal mood and affect. Thought content normal.     Device check today was satisfactory.  Glycohemoglobin has been improving. He will be due for comprehensive lab with his next visit with Dr. Stephenson.  Assessment:     1. Cough  DX-CHEST-2 VIEWS   2. Coronary artery disease involving native coronary artery of native heart without angina pectoris     3. S/P CABG x 4     4. Ischemic cardiomyopathy   lisinopril (PRINIVIL) 5 MG Tab    spironolactone (ALDACTONE) 25 MG Tab    lovastatin (MEVACOR) 10 MG tablet    potassium chloride SA (KDUR) 20 MEQ Tab CR   5. AICD (automatic cardioverter/defibrillator) present     6. Coronary artery disease involving native coronary artery of native heart with angina pectoris (CMS-HCC)  lovastatin (MEVACOR) 10 MG tablet    potassium chloride SA (KDUR) 20 MEQ Tab CR     Cardiac status is remarkably stable. He is convinced that the cough is improving and that is unrelated to lisinopril but rather due to recovering bronchitis which may be the case.  That is certainly compatible with the current illness in the community and he describes the typical symptoms of that.  Medical Decision Making:  Today's Assessment / Status / Plan:   I would like him to get a chest x-ray although he doesn't want to. He'll also be due for lab which he wants to coordinate with his next visit with Dr. Stephenson. That should include a lipid panel and CMP. I renewed his medications today and made no change in his regimen and he'll be seen in follow-up by Dr. Devine in 3 months and a combined visit in 6 months with Dr. Devine and a pacemaker check.      Angelica Stephenson M.D.  [address]  [address]  [facility] [address]  VIA In Basket

## 2017-12-13 NOTE — PROGRESS NOTES
Subjective:   Jaime Lee is a 67 y.o. male who presents today For follow-up of his ischemic cardiomyopathy. He is doing remarkably well despite his low ejection fraction. His current problem is a dental infection and he is going to require extraction of all of his upper teeth as soon as this is treated. He's had no cardiac symptoms at all.    Past Medical History:   Diagnosis Date   • Acute anterior myocardial infarction (CMS-HCC) 12/28/2000    PCI attempted by Dr. Malloy but unsuccessful   • AICD (automatic cardioverter/defibrillator) present 2003/2008/2015    Defibrillator is a Medtronic model AUXF2L0, serial #[id number], implanted Dr. Stephenson November 2015. The ventricular lead is a Medtronic model #6947-65, lead serial #[id number], implant 7/2/2003   • Allergy     within 2 years   • CAD (coronary artery disease) 12/28/2000    CABG x 4 (LIMA to LAD, rSVG to first diagonal, left circumflex and PDA).2000: CABG x 4 (LIMA to LAD, rSVG to first diagonal, left circumflex and PDA).  2003: LIMA and circumflex grafts patent, diagonal and PDA grafts closed.   • Congestive heart failure (CMS-[facility]) 12/28/2000   • Dental disorder     Upper partial   • GERD (gastroesophageal reflux disease)    • Ischemic cardiomyopathy February 2014    Echocardiogram with severely dilated LV, LVEF 15-20%   • Left bundle branch block     • Pneumonia 2014   • Pulmonary embolism (CMS-[facility]) March 2014   • Type 2 diabetes mellitus without complication (CMS-HCC)      Diet controlled, followed by Dr. Walls     Past Surgical History:   Procedure Laterality Date   • RECOVERY  11/3/2015    Procedure: CATH LAB-MARCO A-ICD GENERATOR CHANGE 66685- EUNICE T82.111A-MEDTRONIC BLOODLESS;  Surgeon: Recoveryonly Surgery;  Location: SURGERY PRE-POST PROC UNIT [facility] [place];  Service:    • AICD BATTERY CHANGE  November 2015    Generator replacement with Medtronic Evera S VR [id number] implanted by Dr. Marcus Stephenson.   • AICD IMPLANT  January 2008    Medtronic  Omaruoso VR [id number] implanted by Dr. Stephenson.   • OTHER CARDIAC SURGERY  12/2000    CABG x 4   • APPENDECTOMY  1969   • MULTIPLE CORONARY ARTERY BYPASS       Family History   Problem Relation Age of Onset   • Diabetes Mother    • Heart Disease Mother    • Diabetes Sister    • Alcohol/Drug Brother      History   Smoking Status   • Former Smoker   • Packs/day: 1.00   • Years: 32.00   • Types: Cigarettes   • Quit date: 12/28/2000   Smokeless Tobacco   • Never Used     Allergies   Allergen Reactions   • Bloodless    • Pcn [Penicillins] Anaphylaxis     Outpatient Encounter Prescriptions as of 12/12/2017   Medication Sig Dispense Refill   • lisinopril (PRINIVIL) 5 MG Tab Take 1 Tab by mouth 2 times a day. 180 Tab 3   • spironolactone (ALDACTONE) 25 MG Tab Take 0.5 Tabs by mouth every day. 45 Tab 3   • lovastatin (MEVACOR) 10 MG tablet Take 1 Tab by mouth every evening. 90 Tab 3   • potassium chloride SA (KDUR) 20 MEQ Tab CR Take 1 Tab by mouth every day. 90 Tab 3   • metformin (GLUCOPHAGE) 500 MG Tab One tab po in am and 2 tab in pm 270 Tab 3   • azithromycin (ZITHROMAX) 250 MG Tab 2 tabs po today and then one po daily for 4 more days 6 Tab 0   • metoprolol SR (TOPROL XL) 100 MG TABLET SR 24 HR TAKE 1 TABLET BY MOUTH EVERY DAY 90 Tab 3   • Blood Glucose Monitoring Suppl SUPPLIES Misc Test strips order: Test strips for Marina Contour meter. Sig: use BID and prn ssx high or low sugar, max 4/day 100 Each 3   • Blood Glucose Monitoring Suppl Device Meter: Dispense Marina Contour meter. Sig. Use as directed for blood sugar monitoring. 1 Device 1   • Lancets Misc Lancets order: Lancets for Marina Contour meter. Sig: use BID and prn ssx high or low sugar. Max 4/d 100 Each 3   • furosemide (LASIX) 40 MG Tab Take 1 Tab by mouth every day. 90 Tab 3   • [DISCONTINUED] potassium chloride SA (K-DUR) 20 MEQ Tab CR Take 1 Tab by mouth every day. 90 Tab 3   • [DISCONTINUED] lovastatin (MEVACOR) 10 MG tablet Take 1 Tab by mouth every evening.  90 Tab 3   • [DISCONTINUED] spironolactone (ALDACTONE) 25 MG Tab Take 0.5 Tabs by mouth every day. 45 Tab 3   • [DISCONTINUED] lisinopril (PRINIVIL) 5 MG Tab Take 1 Tab by mouth 2 times a day. 180 Tab 3   • Magnesium 500 MG CAPS Take 500 mg by mouth every day.       No facility-administered encounter medications on file as of 12/12/2017.      Review of Systems   HENT: Negative for nosebleeds.    Respiratory: Positive for cough. Negative for hemoptysis, shortness of breath and wheezing.    Cardiovascular: Negative for orthopnea and PND.   Musculoskeletal: Negative for falls and myalgias.   Psychiatric/Behavioral: Negative for substance abuse.        Objective:   /70   Pulse 74   Ht 1.829 m (6')   Wt 86.6 kg (191 lb)   SpO2 90%   BMI 25.90 kg/m²     Physical Exam   Constitutional: He is oriented to person, place, and time. He appears well-developed and well-nourished.   Eyes: Conjunctivae are normal. No scleral icterus.   Neck: No JVD present.   Cardiovascular: Normal rate and regular rhythm.    Pulmonary/Chest: Effort normal and breath sounds normal.   Musculoskeletal: He exhibits no edema.   Neurological: He is alert and oriented to person, place, and time.   Skin: Skin is warm and dry.   Psychiatric: He has a normal mood and affect. Thought content normal.     Device check today was satisfactory.  Glycohemoglobin has been improving. He will be due for comprehensive lab with his next visit with Dr. Stephenson.  Assessment:     1. Cough  DX-CHEST-2 VIEWS   2. Coronary artery disease involving native coronary artery of native heart without angina pectoris     3. S/P CABG x 4     4. Ischemic cardiomyopathy  lisinopril (PRINIVIL) 5 MG Tab    spironolactone (ALDACTONE) 25 MG Tab    lovastatin (MEVACOR) 10 MG tablet    potassium chloride SA (KDUR) 20 MEQ Tab CR   5. AICD (automatic cardioverter/defibrillator) present     6. Coronary artery disease involving native coronary artery of native heart with angina pectoris  (CMS-[facility])  lovastatin (MEVACOR) 10 MG tablet    potassium chloride SA (KDUR) 20 MEQ Tab CR     Cardiac status is remarkably stable. He is convinced that the cough is improving and that is unrelated to lisinopril but rather due to recovering bronchitis which may be the case.  That is certainly compatible with the current illness in the community and he describes the typical symptoms of that.  Medical Decision Making:  Today's Assessment / Status / Plan:   I would like him to get a chest x-ray although he doesn't want to. He'll also be due for lab which he wants to coordinate with his next visit with Dr. Stephenson. That should include a lipid panel and CMP. I renewed his medications today and made no change in his regimen and he'll be seen in follow-up by Dr. Devine in 3 months and a combined visit in 6 months with Dr. Devine and a pacemaker check.

## 2017-12-14 RX ORDER — FUROSEMIDE 40 MG/1
TABLET ORAL
Qty: 90 TAB | Refills: 3 | Status: SHIPPED | OUTPATIENT
Start: 2017-12-14 | End: 2018-11-28 | Stop reason: SDUPTHER

## 2018-01-02 ENCOUNTER — HOSPITAL ENCOUNTER (EMERGENCY)
Facility: MEDICAL CENTER | Age: 68
End: 2018-01-03
Attending: EMERGENCY MEDICINE
Payer: MEDICARE

## 2018-01-02 ENCOUNTER — APPOINTMENT (OUTPATIENT)
Dept: RADIOLOGY | Facility: MEDICAL CENTER | Age: 68
End: 2018-01-02
Attending: EMERGENCY MEDICINE
Payer: MEDICARE

## 2018-01-02 VITALS
HEIGHT: 73 IN | SYSTOLIC BLOOD PRESSURE: 115 MMHG | OXYGEN SATURATION: 95 % | RESPIRATION RATE: 15 BRPM | DIASTOLIC BLOOD PRESSURE: 81 MMHG | HEART RATE: 80 BPM | TEMPERATURE: 98.9 F | WEIGHT: 195.33 LBS | BODY MASS INDEX: 25.89 KG/M2

## 2018-01-02 DIAGNOSIS — I82.4Z2 DVT, LOWER EXTREMITY, DISTAL, ACUTE, LEFT (HCC): ICD-10-CM

## 2018-01-02 DIAGNOSIS — I82.4Y2 DVT, LOWER EXTREMITY, PROXIMAL, ACUTE, LEFT (HCC): ICD-10-CM

## 2018-01-02 DIAGNOSIS — M79.89 LEFT LEG SWELLING: ICD-10-CM

## 2018-01-02 LAB
ALBUMIN SERPL BCP-MCNC: 4.2 G/DL (ref 3.2–4.9)
ALBUMIN/GLOB SERPL: 1.4 G/DL
ALP SERPL-CCNC: 59 U/L (ref 30–99)
ALT SERPL-CCNC: 22 U/L (ref 2–50)
ANION GAP SERPL CALC-SCNC: 11 MMOL/L (ref 0–11.9)
APTT PPP: 26.9 SEC (ref 24.7–36)
AST SERPL-CCNC: 22 U/L (ref 12–45)
BASOPHILS # BLD AUTO: 0.9 % (ref 0–1.8)
BASOPHILS # BLD: 0.08 K/UL (ref 0–0.12)
BILIRUB SERPL-MCNC: 1.8 MG/DL (ref 0.1–1.5)
BUN SERPL-MCNC: 18 MG/DL (ref 8–22)
CALCIUM SERPL-MCNC: 9.2 MG/DL (ref 8.4–10.2)
CHLORIDE SERPL-SCNC: 96 MMOL/L (ref 96–112)
CO2 SERPL-SCNC: 26 MMOL/L (ref 20–33)
CREAT SERPL-MCNC: 1.47 MG/DL (ref 0.5–1.4)
EOSINOPHIL # BLD AUTO: 0.18 K/UL (ref 0–0.51)
EOSINOPHIL NFR BLD: 2.1 % (ref 0–6.9)
ERYTHROCYTE [DISTWIDTH] IN BLOOD BY AUTOMATED COUNT: 45.1 FL (ref 35.9–50)
GFR SERPL CREATININE-BSD FRML MDRD: 48 ML/MIN/1.73 M 2
GLOBULIN SER CALC-MCNC: 3 G/DL (ref 1.9–3.5)
GLUCOSE SERPL-MCNC: 176 MG/DL (ref 65–99)
HCT VFR BLD AUTO: 45.3 % (ref 42–52)
HGB BLD-MCNC: 15.8 G/DL (ref 14–18)
IMM GRANULOCYTES # BLD AUTO: 0.05 K/UL (ref 0–0.11)
IMM GRANULOCYTES NFR BLD AUTO: 0.6 % (ref 0–0.9)
INR PPP: 1.09 (ref 0.87–1.13)
LYMPHOCYTES # BLD AUTO: 1.02 K/UL (ref 1–4.8)
LYMPHOCYTES NFR BLD: 11.7 % (ref 22–41)
MCH RBC QN AUTO: 33.5 PG (ref 27–33)
MCHC RBC AUTO-ENTMCNC: 34.9 G/DL (ref 33.7–35.3)
MCV RBC AUTO: 96 FL (ref 81.4–97.8)
MONOCYTES # BLD AUTO: 0.65 K/UL (ref 0–0.85)
MONOCYTES NFR BLD AUTO: 7.5 % (ref 0–13.4)
NEUTROPHILS # BLD AUTO: 6.71 K/UL (ref 1.82–7.42)
NEUTROPHILS NFR BLD: 77.2 % (ref 44–72)
NRBC # BLD AUTO: 0 K/UL
NRBC BLD-RTO: 0 /100 WBC
PLATELET # BLD AUTO: 137 K/UL (ref 164–446)
PMV BLD AUTO: 10.8 FL (ref 9–12.9)
POTASSIUM SERPL-SCNC: 4.2 MMOL/L (ref 3.6–5.5)
PROT SERPL-MCNC: 7.2 G/DL (ref 6–8.2)
PROTHROMBIN TIME: 13.7 SEC (ref 12–14.6)
RBC # BLD AUTO: 4.72 M/UL (ref 4.7–6.1)
SODIUM SERPL-SCNC: 133 MMOL/L (ref 135–145)
WBC # BLD AUTO: 8.7 K/UL (ref 4.8–10.8)

## 2018-01-02 PROCEDURE — 85610 PROTHROMBIN TIME: CPT

## 2018-01-02 PROCEDURE — 85025 COMPLETE CBC W/AUTO DIFF WBC: CPT

## 2018-01-02 PROCEDURE — 85730 THROMBOPLASTIN TIME PARTIAL: CPT

## 2018-01-02 PROCEDURE — 80053 COMPREHEN METABOLIC PANEL: CPT

## 2018-01-02 PROCEDURE — 93971 EXTREMITY STUDY: CPT | Mod: LT

## 2018-01-02 PROCEDURE — 99284 EMERGENCY DEPT VISIT MOD MDM: CPT

## 2018-01-02 ASSESSMENT — ENCOUNTER SYMPTOMS
SHORTNESS OF BREATH: 0
ORTHOPNEA: 0
VOMITING: 0
SORE THROAT: 0
FOCAL WEAKNESS: 0
COUGH: 0
FEVER: 0
CLAUDICATION: 0
ABDOMINAL PAIN: 0
NAUSEA: 0
CHILLS: 0

## 2018-01-02 ASSESSMENT — PAIN SCALES - WONG BAKER: WONGBAKER_NUMERICALRESPONSE: HURTS JUST A LITTLE BIT

## 2018-01-03 PROCEDURE — 700102 HCHG RX REV CODE 250 W/ 637 OVERRIDE(OP): Performed by: EMERGENCY MEDICINE

## 2018-01-03 PROCEDURE — A9270 NON-COVERED ITEM OR SERVICE: HCPCS | Performed by: EMERGENCY MEDICINE

## 2018-01-03 RX ADMIN — RIVAROXABAN 15 MG: 15 TABLET, FILM COATED ORAL at 00:19

## 2018-01-03 NOTE — DISCHARGE INSTRUCTIONS
Deep Vein Thrombosis  A deep vein thrombosis (DVT) is a blood clot that develops in the deep, larger veins of the leg, arm, or pelvis. These are more dangerous than clots that might form in veins near the surface of the body. A DVT can lead to serious and even life-threatening complications if the clot breaks off and travels in the bloodstream to the lungs.   A DVT can damage the valves in your leg veins so that instead of flowing upward, the blood pools in the lower leg. This is called post-thrombotic syndrome, and it can result in pain, swelling, discoloration, and sores on the leg.  CAUSES  Usually, several things contribute to the formation of blood clots. Contributing factors include:  · The flow of blood slows down.  · The inside of the vein is damaged in some way.  · You have a condition that makes blood clot more easily.  RISK FACTORS  Some people are more likely than others to develop blood clots. Risk factors include:   · Smoking.  · Being overweight (obese).  · Sitting or lying still for a long time. This includes long-distance travel, paralysis, or recovery from an illness or surgery.  Other factors that increase risk are:   · Older age, especially over 75 years of age.  · Having a family history of blood clots or if you have already had a blot clot.  · Having major or lengthy surgery. This is especially true for surgery on the hip, knee, or belly (abdomen). Hip surgery is particularly high risk.  · Having a long, thin tube (catheter) placed inside a vein during a medical procedure.  · Breaking a hip or leg.  · Having cancer or cancer treatment.  · Pregnancy and childbirth.  ¨ Hormone changes make the blood clot more easily during pregnancy.  ¨ The fetus puts pressure on the veins of the pelvis.  ¨ There is a risk of injury to veins during delivery or a caesarean delivery. The risk is highest just after childbirth.  · Medicines containing the female hormone estrogen. This includes birth control pills and  hormone replacement therapy.  · Other circulation or heart problems.    SIGNS AND SYMPTOMS  When a clot forms, it can either partially or totally block the blood flow in that vein. Symptoms of a DVT can include:  · Swelling of the leg or arm, especially if one side is much worse.  · Warmth and redness of the leg or arm, especially if one side is much worse.  · Pain in an arm or leg. If the clot is in the leg, symptoms may be more noticeable or worse when standing or walking.  The symptoms of a DVT that has traveled to the lungs (pulmonary embolism, PE) usually start suddenly and include:  · Shortness of breath.  · Coughing.  · Coughing up blood or blood-tinged mucus.  · Chest pain. The chest pain is often worse with deep breaths.  · Rapid heartbeat.  Anyone with these symptoms should get emergency medical treatment right away. Do not wait to see if the symptoms will go away. Call your local emergency services (911 in the [place] if you have these symptoms. Do not drive yourself to the hospital.  DIAGNOSIS  If a DVT is suspected, your health care provider will take a full medical history and perform a physical exam. Tests that also may be required include:  · Blood tests, including studies of the clotting properties of the blood.  · Ultrasound to see if you have clots in your legs or lungs.  · X-rays to show the flow of blood when dye is injected into the veins (venogram).  · Studies of your lungs if you have any chest symptoms.  PREVENTION  · Exercise the legs regularly. Take a brisk 30-minute walk every day.  · Maintain a weight that is appropriate for your height.  · Avoid sitting or lying in bed for long periods of time without moving your legs.  · Women, particularly those over the age of 35 years, should consider the risks and benefits of taking estrogen medicines, including birth control pills.  · Do not smoke, especially if you take estrogen medicines.  · Long-distance travel can increase your risk of DVT. You  should exercise your legs by walking or pumping the muscles every hour.  · Many of the risk factors above relate to situations that exist with hospitalization, either for illness, injury, or elective surgery. Prevention may include medical and nonmedical measures.  ¨ Your health care provider will assess you for the need for venous thromboembolism prevention when you are admitted to the hospital. If you are having surgery, your surgeon will assess you the day of or day after surgery.  TREATMENT  Once identified, a DVT can be treated. It can also be prevented in some circumstances. Once you have had a DVT, you may be at increased risk for a DVT in the future. The most common treatment for DVT is blood-thinning (anticoagulant) medicine, which reduces the blood's tendency to clot. Anticoagulants can stop new blood clots from forming and stop old clots from growing. They cannot dissolve existing clots. Your body does this by itself over time. Anticoagulants can be given by mouth, through an IV tube, or by injection. Your health care provider will determine the best program for you. Other medicines or treatments that may be used are:  · Heparin or related medicines (low molecular weight heparin) are often the first treatment for a blood clot. They act quickly. However, they cannot be taken orally and must be given either in shot form or by IV tube.  ¨ Heparin can cause a fall in a component of blood that stops bleeding and forms blood clots (platelets). You will be monitored with blood tests to be sure this does not occur.  · Warfarin is an anticoagulant that can be swallowed. It takes a few days to start working, so usually heparin or related medicines are used in combination. Once warfarin is working, heparin is usually stopped.  · Factor Xa inhibitor medicines, such as rivaroxaban and apixaban, also reduce blood clotting. These medicines are taken orally and can often be used without heparin or related  medicines.  · Less commonly, clot dissolving drugs (thrombolytics) are used to dissolve a DVT. They carry a high risk of bleeding, so they are used mainly in severe cases where your life or a part of your body is threatened.  · Very rarely, a blood clot in the leg needs to be removed surgically.  · If you are unable to take anticoagulants, your health care provider may arrange for you to have a filter placed in a main vein in your abdomen. This filter prevents clots from traveling to your lungs.  HOME CARE INSTRUCTIONS  · Take all medicines as directed by your health care provider.  · Learn as much as you can about DVT.  · Wear a medical alert bracelet or carry a medical alert card.  · Ask your health care provider how soon you can go back to normal activities. It is important to stay active to prevent blood clots. If you are on anticoagulant medicine, avoid contact sports.  · It is very important to exercise. This is especially important while traveling, sitting, or standing for long periods of time. Exercise your legs by walking or by tightening and relaxing your leg muscles regularly. Take frequent walks.  · You may need to wear compression stockings. These are tight elastic stockings that apply pressure to the lower legs. This pressure can help keep the blood in the legs from clotting.  Taking Warfarin  Warfarin is a daily medicine that is taken by mouth. Your health care provider will advise you on the length of treatment (usually 3-6 months, sometimes lifelong). If you take warfarin:  · Understand how to take warfarin and foods that can affect how warfarin works in your body.  · Too much and too little warfarin are both dangerous. Too much warfarin increases the risk of bleeding. Too little warfarin continues to allow the risk for blood clots.  Warfarin and Regular Blood Testing  While taking warfarin, you will need to have regular blood tests to measure your blood clotting time. These blood tests usually  include both the prothrombin time (PT) and international normalized ratio (INR) tests. The PT and INR results allow your health care provider to adjust your dose of warfarin. It is very important that you have your PT and INR tested as often as directed by your health care provider.     Warfarin and Your Diet  Avoid major changes in your diet, or notify your health care provider before changing your diet. Arrange a visit with a registered dietitian to answer your questions. Many foods, especially foods high in vitamin K, can interfere with warfarin and affect the PT and INR results. You should eat a consistent amount of foods high in vitamin K. Foods high in vitamin K include:   · Spinach, kale, broccoli, cabbage, carl and turnip greens, [place] sprouts, peas, cauliflower, seaweed, and parsley.  · Beef and pork liver.  · Green tea.  · Soybean oil.  Warfarin with Other Medicines  Many medicines can interfere with warfarin and affect the PT and INR results. You must:  · Tell your health care provider about any and all medicines, vitamins, and supplements you take, including aspirin and other over-the-counter anti-inflammatory medicines. Be especially cautious with aspirin and anti-inflammatory medicines. Ask your health care provider before taking these.  · Do not take or discontinue any prescribed or over-the-counter medicine except on the advice of your health care provider or pharmacist.  Warfarin Side Effects  Warfarin can have side effects, such as easy bruising and difficulty stopping bleeding. Ask your health care provider or pharmacist about other side effects of warfarin. You will need to:  · Hold pressure over cuts for longer than usual.  · Notify your dentist and other health care providers that you are taking warfarin before you undergo any procedures where bleeding may occur.  Warfarin with Alcohol and Tobacco   · Drinking alcohol frequently can increase the effect of warfarin, leading to excess  bleeding. It is best to avoid alcoholic drinks or to consume only very small amounts while taking warfarin. Notify your health care provider if you change your alcohol intake.    · Do not use any tobacco products including cigarettes, chewing tobacco, or electronic cigarettes. If you smoke, quit. Ask your health care provider for help with quitting smoking.  Alternative Medicines to Warfarin: Factor Xa Inhibitor Medicines  · These blood-thinning medicines are taken by mouth, usually for several weeks or longer. It is important to take the medicine every single day at the same time each day.  · There are no regular blood tests required when using these medicines.  · There are fewer food and drug interactions than with warfarin.  · The side effects of this class of medicine are similar to those of warfarin, including excessive bruising or bleeding. Ask your health care provider or pharmacist about other potential side effects.  SEEK MEDICAL CARE IF:  · You notice a rapid heartbeat.  · You feel weaker or more tired than usual.  · You feel faint.  · You notice increased bruising.  · You feel your symptoms are not getting better in the time expected.  · You believe you are having side effects of medicine.  SEEK IMMEDIATE MEDICAL CARE IF:  · You have chest pain.  · You have trouble breathing.  · You have new or increased swelling or pain in one leg.  · You cough up blood.  · You notice blood in vomit, in a bowel movement, or in urine.  MAKE SURE YOU:  · Understand these instructions.  · Will watch your condition.  · Will get help right away if you are not doing well or get worse.     This information is not intended to replace advice given to you by your health care provider. Make sure you discuss any questions you have with your health care provider.     Document Released: 12/18/2006 Document Revised: 01/08/2016 Document Reviewed: 04/13/2016  [employer] Interactive Patient Education ©2016 [employer]  Rivaroxaban oral  tablets  What is this medicine?  RIVAROXABAN (ri va EDUARDO a ban) is an anticoagulant (blood thinner). It is used to treat blood clots in the lungs or in the veins. It is also used after knee or hip surgeries to prevent blood clots. It is also used to lower the chance of stroke in people with a medical condition called atrial fibrillation.  This medicine may be used for other purposes; ask your health care provider or pharmacist if you have questions.  COMMON BRAND NAME(S): Xarelto  What should I tell my health care provider before I take this medicine?  They need to know if you have any of these conditions:  -bleeding disorders  -bleeding in the brain  -blood in your stools (black or tarry stools) or if you have blood in your vomit  -history of stomach bleeding  -kidney disease  -liver disease  -low blood counts, like low white cell, platelet, or red cell counts  -recent or planned spinal or epidural procedure  -take medicines that treat or prevent blood clots  -an unusual or allergic reaction to rivaroxaban, other medicines, foods, dyes, or preservatives  -pregnant or trying to get pregnant  -breast-feeding  How should I use this medicine?  Take this medicine by mouth with a glass of water. Follow the directions on the prescription label. Take your medicine at regular intervals. Do not take it more often than directed. Do not stop taking except on your doctor's advice.  If you are taking this medicine after hip or knee replacement surgery, take it with or without food.  If you are taking this medicine for atrial fibrillation, take it with your evening meal. If you are taking this medicine to treat blood clots, take it with food at the same time each day. If you are unable to swallow your tablet, you may crush the tablet and mix it in applesauce. Then, immediately eat the applesauce. You should eat more food right after you eat the applesauce containing the crushed tablet.  Talk to your pediatrician regarding the use  of this medicine in children. Special care may be needed.  Overdosage: If you think you've taken too much of this medicine contact a poison control center or emergency room at once.  Overdosage: If you think you have taken too much of this medicine contact a poison control center or emergency room at once.  NOTE: This medicine is only for you. Do not share this medicine with others.  What if I miss a dose?  If you take your medicine once a day and miss a dose, take the missed dose as soon as you remember. If you take your medicine twice a day and miss a dose, take the missed dose immediately. In this instance, 2 tablets may be taken at the same time. The next day you should take 1 tablet twice a day as directed.  What may interact with this medicine?  -aspirin and aspirin-like medicines  -certain antibiotics like erythromycin, azithromycin, and clarithromycin  -certain medicines for fungal infections like ketoconazole and itraconazole  -certain medicines for irregular heart beat like amiodarone, quinidine, dronedarone  -certain medicines for seizures like carbamazepine, phenytoin  -certain medicines that treat or prevent blood clots like warfarin, enoxaparin, and dalteparin   -conivaptan  -diltiazem  -felodipine  -indinavir  -lopinavir; ritonavir  -NSAIDS, medicines for pain and inflammation, like ibuprofen or naproxen  -ranolazine  -rifampin  -ritonavir  -[place]'s wort  -verapamil  This list may not describe all possible interactions. Give your health care provider a list of all the medicines, herbs, non-prescription drugs, or dietary supplements you use. Also tell them if you smoke, drink alcohol, or use illegal drugs. Some items may interact with your medicine.  What should I watch for while using this medicine?  Do not stop taking this medicine without first talking to your doctor. Stopping this medicine may increase your risk of having a stroke. Be sure to refill your prescription before you run out of  medicine.  This medicine may increase your risk to bruise or bleed. Call your doctor or health care professional if you notice any unusual bleeding.  Be careful brushing and flossing your teeth or using a toothpick because you may bleed more easily. If you have any dental work done, tell your dentist you are receiving this medicine.  What side effects may I notice from receiving this medicine?  Side effects that you should report to your doctor or health care professional as soon as possible:  -allergic reactions like skin rash, itching or hives, swelling of the face, lips, or tongue  -back pain  -bloody or black, tarry stools  -changes in vision  -confusion, trouble speaking or understanding  -red or dark-brown urine  -redness, blistering, peeling or loosening of the skin, including inside the mouth  -severe headaches  -spitting up blood or brown material that looks like coffee grounds  -sudden numbness or weakness of the face, arm or leg  -trouble walking, dizziness, loss of balance or coordination  -unusual bruising or bleeding from the eye, gums, or nose   Side effects that usually do not require medical attention (Report these to your doctor or health care professional if they continue or are bothersome.):  -dizziness  -muscle pain  This list may not describe all possible side effects. Call your doctor for medical advice about side effects. You may report side effects to FDA at 7-013-FDA-5805.  Where should I keep my medicine?  Keep out of the reach of children.  Store at room temperature between 15 and 30 degrees C (59 and 86 degrees F). Throw away any unused medicine after the expiration date.  NOTE: This sheet is a summary. It may not cover all possible information. If you have questions about this medicine, talk to your doctor, pharmacist, or health care provider.  © 2014, [employer]. (3/17/2014 3:32:09 PM)

## 2018-01-03 NOTE — ED PROVIDER NOTES
ED Provider Note    1/2/2018  8:15 PM    Means of Arrival: Walk-in  History obtained by: Patient and wife  Limitations: None    CHIEF COMPLAINT  Chief Complaint   Patient presents with   • Leg Swelling     calf swelling, discoloration X since this am, a lump yesterday behind his left knee       HPI  Jaime Lee is a 67 y.o. male With CHF, coronary disease, cardiac myopathy, pulmonary embolism (not on anticoagulants) who presents with concerns of acute onset of left leg swelling. He said yesterday during the day he noticed a lump behind his left knee. There when he woke up today he noticed that his left calf was swollen. He believes the left leg looks darker than the right leg. Denies any pain. No injuries. No recent surgeries. No claudication symptoms    REVIEW OF SYSTEMS  Review of Systems   Constitutional: Negative for chills and fever.   HENT: Negative for congestion and sore throat.    Respiratory: Negative for cough and shortness of breath.    Cardiovascular: Positive for leg swelling. Negative for chest pain, orthopnea and claudication.   Gastrointestinal: Negative for abdominal pain, nausea and vomiting.   Skin:        No wounds on feet   Neurological: Negative for focal weakness.   All other systems reviewed and are negative.    See HPI for further details.     PAST MEDICAL HISTORY   has a past medical history of Acute anterior myocardial infarction (CMS-HCC) (12/28/2000); AICD (automatic cardioverter/defibrillator) present (2003/2008/2015); Allergy; CAD (coronary artery disease) (12/28/2000); Congestive heart failure (CMS-HCC) (12/28/2000); Dental disorder; GERD (gastroesophageal reflux disease); Ischemic cardiomyopathy (February 2014); Left bundle branch block ( ); Pneumonia (2014); Pulmonary embolism (CMS-HCC) (March 2014); and Type 2 diabetes mellitus without complication (CMS-HCC) ( ).    SOCIAL HISTORY  Social History     Social History Main Topics   • Smoking status: Former Smoker     " Packs/day: 1.00     Years: 32.00     Types: Cigarettes     Quit date: 12/28/2000   • Smokeless tobacco: Never Used   • Alcohol use 4.2 oz/week     7 Glasses of wine per week      Comment: 7 per week   • Drug use:       Comment: CBD, THC   • Sexual activity: No       SURGICAL HISTORY   has a past surgical history that includes other cardiac surgery (12/2000); appendectomy (1969); aicd implant (January 2008); recovery (11/3/2015); aicd battery change (November 2015); and multiple coronary artery bypass.    CURRENT MEDICATIONS  Home Medications    **Home medications have not yet been reviewed for this encounter**         ALLERGIES  Allergies   Allergen Reactions   • Bloodless    • Pcn [Penicillins] Anaphylaxis       PHYSICAL EXAM  VITAL SIGNS: /78   Pulse 80   Temp 37.2 °C (98.9 °F)   Resp 16   Ht 1.854 m (6' 1\")   Wt 88.6 kg (195 lb 5.2 oz)   SpO2 92%   BMI 25.77 kg/m²    Pulse ox interpretation: I interpret this pulse ox as normal.  Constitutional: Alert in no apparent distress.  HENT: Normocephalic, Atraumatic  Eyes: Pupils are equal. Conjunctiva normal, non-icteric.   Heart: Regular rate and rythm, no murmurs.    Lungs: Clear to auscultation bilaterally. No crackles.  Extremity: Left lower calf with nonpitting edema. 1+ posterior tibialis and dorsalis pedis pulse. Capillary refill approximately 2 seconds. No tenderness palpation. 5 out of 5 strength at ankles and knee.  Right leg with equal and symmetric pulses the left leg. Capillary refill at right leg is also approximately 2 seconds. No temperature difference in lower extremities.  Skin: Warm, Dry, No erythema, No rash. No wounds on feet.  Neurologic: Alert, Grossly non-focal.   Psychiatric: Affect normal, Judgment normal, Mood normal, Appears appropriate and not intoxicated.         COURSE & MEDICAL DECISION MAKING  Pertinent Labs & Imaging studies reviewed. (See chart for details)    8:15 PM This is an emergent evaluation of a 67 y.o., male " history of PE and CHF who presents with acute onset left lower leg swelling and the differential diagnosis includes but is not limited to ruptured Baker's cyst, DVT, no indication of critical limb ischemia given no pain and equal pulses, clinically does not appear infectious, no other signs of fluid overload unlikely CHF exacerbation. Ordered for ultrasound left lower extremity to evaluate for DVT.     10:17 PM  Significant DVT throughout left lower extremity. I discussed oral therapy vs inpatient bridging therapy. He requested oral therapy and would like to go home. I have discussed risks of anticoagulation as well as the benefits. Will give first dose of xarelto now. Will prescribe 21 day supply. He needs to follow up with his primary doctor ASAP.     12:03 AM  Creatinine just above normal at 1.4 (previous around 1.2). I informed him of this.  Likely mild dehydration. Discharged in stable condition.     The patient will not drink alcohol with prescribed medications. The patient will return for worsening symptoms and is stable at the time of discharge. The patient verbalizes understanding and will comply.      FINAL IMPRESSION  1. Left leg swelling    2. DVT, lower extremity, proximal, acute, left (CMS-HCC)    3. DVT, lower extremity, distal, acute, left (CMS-HCC)

## 2018-01-04 ENCOUNTER — ANTICOAGULATION VISIT (OUTPATIENT)
Dept: VASCULAR LAB | Facility: MEDICAL CENTER | Age: 68
End: 2018-01-04
Attending: INTERNAL MEDICINE
Payer: MEDICARE

## 2018-01-04 ENCOUNTER — PATIENT OUTREACH (OUTPATIENT)
Dept: HEALTH INFORMATION MANAGEMENT | Facility: OTHER | Age: 68
End: 2018-01-04

## 2018-01-04 ENCOUNTER — OFFICE VISIT (OUTPATIENT)
Dept: MEDICAL GROUP | Facility: MEDICAL CENTER | Age: 68
End: 2018-01-04
Payer: MEDICARE

## 2018-01-04 ENCOUNTER — TELEPHONE (OUTPATIENT)
Dept: VASCULAR LAB | Facility: MEDICAL CENTER | Age: 68
End: 2018-01-04

## 2018-01-04 VITALS — DIASTOLIC BLOOD PRESSURE: 85 MMHG | HEART RATE: 70 BPM | SYSTOLIC BLOOD PRESSURE: 127 MMHG

## 2018-01-04 VITALS
BODY MASS INDEX: 25.98 KG/M2 | HEIGHT: 73 IN | HEART RATE: 84 BPM | RESPIRATION RATE: 16 BRPM | WEIGHT: 196 LBS | SYSTOLIC BLOOD PRESSURE: 122 MMHG | DIASTOLIC BLOOD PRESSURE: 82 MMHG | TEMPERATURE: 98 F | OXYGEN SATURATION: 98 %

## 2018-01-04 DIAGNOSIS — I82.412 ACUTE DEEP VEIN THROMBOSIS (DVT) OF FEMORAL VEIN OF LEFT LOWER EXTREMITY (HCC): ICD-10-CM

## 2018-01-04 LAB
INR BLD: 2 (ref 0.9–1.2)
INR PPP: 2 (ref 2–3.5)

## 2018-01-04 PROCEDURE — 99214 OFFICE O/P EST MOD 30 MIN: CPT | Performed by: FAMILY MEDICINE

## 2018-01-04 PROCEDURE — 99213 OFFICE O/P EST LOW 20 MIN: CPT | Performed by: NURSE PRACTITIONER

## 2018-01-04 PROCEDURE — 85610 PROTHROMBIN TIME: CPT

## 2018-01-04 NOTE — LETTER
January 4, 2018       Patient: Jaime Lee   YOB: 1950   Date of Visit: 1/4/2018         To Whom It May Concern:    It is my medical opinion that Jaime Lee was recently diagnosed with an extensive deep venous thrombosis and has been advised not to travel for the next 2 months.    If you have any questions or concerns, please don't hesitate to call [phone]          Sincerely,          Angelica Stephenson M.D.  Electronically Signed

## 2018-01-04 NOTE — TELEPHONE ENCOUNTER
Initial anticoag note and most recent er note reviewed.  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    Michael Bloch, MD  [facility]    Cc:  E smith

## 2018-01-04 NOTE — LETTER
Jaime Lee  [id number]  [place] [id number]    January 4, 2018      Dear Jaime Lee,    [facility] wants to ensure your discharge home is safe and you or your loved ones have had all of your questions answered regarding your care after you leave the hospital.    Our discharge team was unsuccessful in our attempts to contact you telephonically and we wanted to be sure that you had a list of resources and contact information should you have any questions regarding your hospital stay, follow-up instructions, or active medical symptoms.    Questions or Concerns Regarding… Contact   Medical Questions Related to Your Discharge  (7 days a week, 8am-5pm) Contact a Nurse Care Coordinator   [phone]   Medical Questions Not Related to Your Discharge  (24 hours a day / 7 days a week)  Contact the Nurse Health Line   [phone]    Medications or Discharge Instructions Refer to your discharge packet   or contact your -[phone]   Follow-up Appointment(s) Schedule your appointment via [employer]   or contact Scheduling [phone]   Billing Review your statement via K[employer]ded  or contact Billing [phone]   Medical Records Review your records via K[employer]ded   or contact Medical Records [phone]     You can also easily access your medical information, test results and upcoming appointments via the Kynded free online health management tool. You can learn more and sign up at [email or web address]. For assistance setting up your Kynded account, please call [phone].    Once again, we want to ensure your discharge home is safe and that you have a clear understanding of any next steps in your care. If you have any questions or concerns, please do not hesitate to contact us, we are here for you. Thank you for choosing [facility] for your healthcare needs.    Sincerely,      Your [facility] Healthcare Team

## 2018-01-04 NOTE — PROGRESS NOTES
"Diagnosis: LLE DVT  Drug: Xarelto 15 mg PO BID  Length of therapy: Indefinite    Health Status Since Last Assessment  Pt recently diagnosed with LLE DVT. US showed \"Extensive LEFT leg DVT from proximal femoral vein extending into the calf.\" Denies any recent injuries, surgeries or extensive travel. No FH. Had PE in 2014, appeared unprovoked  - on warfarin for 6 months. Is not up to date on age-appropriate cancer screenings. Has never had a colonscopy and refuses to do so. Unaware of past PSA level.    Currently taking Xarelto 15 mg BID. He is taking this medication exactly as instructed. He does not smoke. Does not take HRT. His PCP is Dr. Stephenson. She has recommended indefinite therapy. He can afford his copay. Already has rx for 20 mg tablets.    Education given to patient regarding instructions for taking Xarelto, importance of strict compliance with this medication, drug/drug interactions and signs/symptoms of bleeding or thrombosis. He understands he is not to stop taking this medication without first talking with his doctor. Anticipate patient will transition to Xarelto 20 mg on 1/24/18.. Rx for compression stocking offered but patient prefers to hold off for now.    Still has mild swelling and warmth to LLE. Pain and swelling have significantly decreased. He is elevating as much as possible.      Adherence with DOAC Therapy  None  BLEEDING RISK ASSESSMENT NB:    Bleeding Risk Assessment  None of the following:  Severe epistaxis Hemoptysis   Excessive or unusual bruising / hematomas   GIB / melena / BRBPR / hematemesis   Hematuria  Concerning daily headache or subdural hematoma symptoms   Decreasing hemoglobin or new anemia   Latest hemoglobin:     Lab Results   Component Value Date/Time    WBC 8.7 01/02/2018 10:59 PM    RBC 4.72 01/02/2018 10:59 PM    HEMOGLOBIN 15.8 01/02/2018 10:59 PM    HEMATOCRIT 45.3 01/02/2018 10:59 PM    MCV 96.0 01/02/2018 10:59 PM    MCH 33.5 (H) 01/02/2018 10:59 PM    MCHC 34.9 " 01/02/2018 10:59 PM    MPV 10.8 01/02/2018 10:59 PM    NEUTSPOLYS 77.20 (H) 01/02/2018 10:59 PM    LYMPHOCYTES 11.70 (L) 01/02/2018 10:59 PM    MONOCYTES 7.50 01/02/2018 10:59 PM    EOSINOPHILS 2.10 01/02/2018 10:59 PM    BASOPHILS 0.90 01/02/2018 10:59 PM        EtOH overuse - no  Falls, presyncope, syncope, or seizures - no   Uncontrolled hypertension - no  CREATININE CLEARANCE /    Creatinine Clearance/Renal Function  Latest eGFR / creatinine:  Lab Results   Component Value Date/Time    SODIUM 133 (L) 01/02/2018 10:59 PM    POTASSIUM 4.2 01/02/2018 10:59 PM    CHLORIDE 96 01/02/2018 10:59 PM    CO2 26 01/02/2018 10:59 PM    GLUCOSE 176 (H) 01/02/2018 10:59 PM    BUN 18 01/02/2018 10:59 PM    CREATININE 1.47 (H) 01/02/2018 10:59 PM    CREATININE 1.0 01/08/2008 07:45 AM      • Is eGFR less than 50ml/min - no  Actual cr cl 61.3 ml/min. Last creatinine 1.47, likely due to dehydration  If YES, calculate CrCl (see back)  Any recent dehydrating illness or medications added/changed? i.e. diuretics    Drug Interactions  ASA / other antiplatelets - avoids, does not take ASA despite CAD  NSAID - avoids  Other drug interactions (Review med list / OTCs;)  Current Outpatient Prescriptions on File Prior to Visit   Medication Sig Dispense Refill   • rivaroxaban (XARELTO) 20 MG Tab tablet Take 1 Tab by mouth with dinner. 30 Tab 6   • rivaroxaban (XARELTO) 15 MG Tab tablet Take 1 Tab by mouth 2 Times a Day for 21 days. 42 Tab 0   • furosemide (LASIX) 40 MG Tab TAKE 1 TABLET BY MOUTH DAILY 90 Tab 3   • lisinopril (PRINIVIL) 5 MG Tab Take 1 Tab by mouth 2 times a day. 180 Tab 3   • spironolactone (ALDACTONE) 25 MG Tab Take 0.5 Tabs by mouth every day. 45 Tab 3   • lovastatin (MEVACOR) 10 MG tablet Take 1 Tab by mouth every evening. 90 Tab 3   • potassium chloride SA (KDUR) 20 MEQ Tab CR Take 1 Tab by mouth every day. 90 Tab 3   • metformin (GLUCOPHAGE) 500 MG Tab One tab po in am and 2 tab in pm 270 Tab 3   • azithromycin  (ZITHROMAX) 250 MG Tab 2 tabs po today and then one po daily for 4 more days 6 Tab 0   • metoprolol SR (TOPROL XL) 100 MG TABLET SR 24 HR TAKE 1 TABLET BY MOUTH EVERY DAY 90 Tab 3   • Blood Glucose Monitoring Suppl SUPPLIES Misc Test strips order: Test strips for Marina Contour meter. Sig: use BID and prn ssx high or low sugar, max 4/day 100 Each 3   • Blood Glucose Monitoring Suppl Device Meter: Dispense Marina Contour meter. Sig. Use as directed for blood sugar monitoring. 1 Device 1   • Lancets Misc Lancets order: Lancets for Marina Contour meter. Sig: use BID and prn ssx high or low sugar. Max 4/d 100 Each 3   • Magnesium 500 MG CAPS Take 500 mg by mouth every day.       No current facility-administered medications on file prior to visit.        Examination  Blood pressure:127/85  • Elevated BP - no (sBP greater than 160 mmHg)  • Symptomatic hypotension - no  Significant gait impairment / imbalance / high risk for falls - no    Final Assessment and Recommendations:  Patient appears stable from the anticoagulation standpoint  Benefits of continued DOAC therapy outweigh risks for this patient  Recommend continue current DOAC at same dose    - continue xarelto 15 mg by mouth twice daily  - do not miss doses  -  refills before you run out  - do not stop taking this medication without permission from your doctor  - let all your providers know you take a blood thinner  - monitor for bleeding and seek medical attention for any bleeding lasting > 30 minutes      Other Actions:   The rationale for continued DOAC therapy  The potential for minor, major or life-threatening bleeding  Dosing instructions, adherence, risks of non-adherence, handling missed doses  Avoiding OTC ASA & NSAIDs & minimizing EtOH to reduce bleeding risks  Dosing around upcoming procedure / surgery if applicable (see back)    Follow up:  Will follow up with patient in 2 weeks     Next Appointment: Monday, January 22, 2018 at 9:45 am.    Janis  Macarena ALEXIS

## 2018-01-10 NOTE — ASSESSMENT & PLAN NOTE
"Patient was seen in the emergency room on 1/2/18 for a left swollen leg. He reports that his calf is tense and painful. He was found to have an extensive DVT.  \"Extensive LEFT leg DVT from proximal femoral vein extending into the calf.\" He reports the pain and swelling has decreased since then. He is taking his Xarelto as directed. He denies any increased bruising or bloody noses. He needs a referral to the anticoagulation clinic. He does have a past history of a pulmonary emboli. He has never had a workup for hypercoagulable states.  Patient denies any SOB or chest pain.  "

## 2018-01-11 NOTE — PROGRESS NOTES
"Subjective:     Chief Complaint   Patient presents with   • Follow-Up     ER blood clots       Jaime Lee is a 67 y.o. male here today for evaluation and management of:    Acute deep vein thrombosis (DVT) of femoral vein of left lower extremity (CMS-HCC)  Patient was seen in the emergency room on 1/2/18 for a left swollen leg. He reports that his calf is tense and painful. He was found to have an extensive DVT.  \"Extensive LEFT leg DVT from proximal femoral vein extending into the calf.\" He reports the pain and swelling has decreased since then. He is taking his Xarelto as directed. He denies any increased bruising or bloody noses. He needs a referral to the anticoagulation clinic. He does have a past history of a pulmonary emboli. He has never had a workup for hypercoagulable states.  Patient denies any SOB or chest pain.       Allergies   Allergen Reactions   • Bloodless    • Pcn [Penicillins] Anaphylaxis       Current medicines (including changes today)  Current Outpatient Prescriptions   Medication Sig Dispense Refill   • rivaroxaban (XARELTO) 20 MG Tab tablet Take 1 Tab by mouth with dinner. 30 Tab 6   • rivaroxaban (XARELTO) 15 MG Tab tablet Take 1 Tab by mouth 2 Times a Day for 21 days. 42 Tab 0   • furosemide (LASIX) 40 MG Tab TAKE 1 TABLET BY MOUTH DAILY 90 Tab 3   • lisinopril (PRINIVIL) 5 MG Tab Take 1 Tab by mouth 2 times a day. 180 Tab 3   • spironolactone (ALDACTONE) 25 MG Tab Take 0.5 Tabs by mouth every day. 45 Tab 3   • lovastatin (MEVACOR) 10 MG tablet Take 1 Tab by mouth every evening. 90 Tab 3   • metformin (GLUCOPHAGE) 500 MG Tab One tab po in am and 2 tab in pm 270 Tab 3   • metoprolol SR (TOPROL XL) 100 MG TABLET SR 24 HR TAKE 1 TABLET BY MOUTH EVERY DAY 90 Tab 3   • potassium chloride SA (KDUR) 20 MEQ Tab CR Take 1 Tab by mouth every day. 90 Tab 3   • azithromycin (ZITHROMAX) 250 MG Tab 2 tabs po today and then one po daily for 4 more days 6 Tab 0   • Blood Glucose Monitoring " Suppl SUPPLIES Misc Test strips order: Test strips for Marina Contour meter. Sig: use BID and prn ssx high or low sugar, max 4/day 100 Each 3   • Blood Glucose Monitoring Suppl Device Meter: Dispense Marina Contour meter. Sig. Use as directed for blood sugar monitoring. 1 Device 1   • Lancets Misc Lancets order: Lancets for Marina Contour meter. Sig: use BID and prn ssx high or low sugar. Max 4/d 100 Each 3   • Magnesium 500 MG CAPS Take 500 mg by mouth every day.       No current facility-administered medications for this visit.        He  has a past medical history of Acute anterior myocardial infarction (CMS-HCC) (12/28/2000); AICD (automatic cardioverter/defibrillator) present (2003/2008/2015); Allergy; CAD (coronary artery disease) (12/28/2000); Congestive heart failure (CMS-HCC) (12/28/2000); Dental disorder; GERD (gastroesophageal reflux disease); Ischemic cardiomyopathy (February 2014); Left bundle branch block ( ); Pneumonia (2014); Pulmonary embolism (CMS-HCC) (March 2014); and Type 2 diabetes mellitus without complication (CMS-HCC) ( ).    Patient Active Problem List    Diagnosis Date Noted   • Hyperlipidemia 01/18/2017     Priority: High   • S/P CABG x 4 06/22/2016     Priority: High   • Left bundle branch block 06/22/2016     Priority: High   • AICD (automatic cardioverter/defibrillator) present 03/11/2014     Priority: High   • Ischemic cardiomyopathy 03/11/2014     Priority: High   • Pulmonary embolism (CMS-HCC) 02/27/2014     Priority: High   • CAD (coronary artery disease) 12/28/2000     Priority: High   • Type 2 diabetes mellitus with hyperglycemia, without long-term current use of insulin (CMS-HCC) 06/22/2016     Priority: Medium   • Chronic systolic congestive heart failure, NYHA class 2 (CMS-HCC) 06/22/2016     Priority: Medium   • CKD (chronic kidney disease) stage 3, GFR 30-59 ml/min 02/24/2014     Priority: Medium   • Thrombocytopenia (CMS-HCC) 02/23/2014     Priority: Medium   • Acute deep vein  "thrombosis (DVT) of femoral vein of left lower extremity (CMS-HCC) 01/04/2018   • Community acquired pneumonia of right middle lobe of lung (CMS-HCC) 11/28/2017   • Basal cell carcinoma of skin of face 11/28/2017   • Dizziness 07/28/2017   • Hoarseness or changing voice 07/28/2017   • Old anterior myocardial infarction 06/22/2016       ROS   No fever or chills.  No nausea or vomiting.  No chest pain or palpitations.  No cough or SOB.  No pain with urination or hematuria.  No black or bloody stools.       Objective:     Blood pressure 122/82, pulse 84, temperature 36.7 °C (98 °F), resp. rate 16, height 1.854 m (6' 1\"), weight 88.9 kg (196 lb), SpO2 98 %. Body mass index is 25.86 kg/m².   Physical Exam:  Well developed, well nourished.  Alert, oriented in no acute distress.  Eye contact is good, speech goal directed, affect calm  Eyes: conjunctiva non-injected, sclera non-icteric.  Neck Supple.  No adenopathy or masses in the neck or supraclavicular regions. No thyromegaly  Lungs: clear to auscultation bilaterally with good excursion. No wheezes or rhonchi  CV: regular rate and rhythm. No murmur  Left with slight swelling and tenderness.      Assessment and Plan:   The following treatment plan was discussed    1. Acute deep vein thrombosis (DVT) of femoral vein of left lower extremity (CMS-HCC)  Continue current medications for 21 days and then switch to the 20 mg a day dosage. I discussed that this may be a lifetime medication given his previous history of PE. Refer to anticoagulation monitoring. Given that her to cancel cruise that scheduled for next week. Recheck in 3 months  - rivaroxaban (XARELTO) 20 MG Tab tablet; Take 1 Tab by mouth with dinner.  Dispense: 30 Tab; Refill: 6  - REFERRAL TO ANTICOAGULATION MONITORING    Any change or worsening of signs or symptoms, patient encouraged to follow-up or report to the emergency room for further evaluation. Patient understands and agrees.    Followup: Return in about 3 " months (around 4/4/2018), or if symptoms worsen or fail to improve.

## 2018-01-17 ENCOUNTER — HOSPITAL ENCOUNTER (OUTPATIENT)
Facility: MEDICAL CENTER | Age: 68
End: 2018-01-17
Attending: DERMATOLOGY
Payer: MEDICARE

## 2018-01-17 ENCOUNTER — OFFICE VISIT (OUTPATIENT)
Dept: DERMATOLOGY | Facility: IMAGING CENTER | Age: 68
End: 2018-01-17
Payer: MEDICARE

## 2018-01-17 DIAGNOSIS — D48.5 NEOPLASM OF UNCERTAIN BEHAVIOR OF SKIN: ICD-10-CM

## 2018-01-17 DIAGNOSIS — L30.9 DERMATITIS: ICD-10-CM

## 2018-01-17 PROCEDURE — 99203 OFFICE O/P NEW LOW 30 MIN: CPT | Mod: 25 | Performed by: DERMATOLOGY

## 2018-01-17 PROCEDURE — 11100 PR BIOPSY OF SKIN LESION: CPT | Performed by: DERMATOLOGY

## 2018-01-17 PROCEDURE — 88305 TISSUE EXAM BY PATHOLOGIST: CPT | Mod: 59

## 2018-01-17 PROCEDURE — 11101 PR BIOPSY, EACH ADDED LESION: CPT | Performed by: DERMATOLOGY

## 2018-01-17 RX ORDER — TRIAMCINOLONE ACETONIDE 1 MG/G
1 OINTMENT TOPICAL 2 TIMES DAILY
Qty: 60 G | Refills: 2 | Status: SHIPPED | OUTPATIENT
Start: 2018-01-17 | End: 2018-07-30

## 2018-01-18 VITALS — HEIGHT: 73 IN | TEMPERATURE: 98.8 F | WEIGHT: 190 LBS | BODY MASS INDEX: 25.18 KG/M2

## 2018-01-18 ASSESSMENT — ENCOUNTER SYMPTOMS
FEVER: 0
CHILLS: 0
WEIGHT LOSS: 0

## 2018-01-18 NOTE — PROGRESS NOTES
Dermatology New Patient Visit    Chief Complaint   Patient presents with   • Skin Lesion       Subjective:     HPI:   Jaime Lee is a 67 y.o. male presenting for    Full skin exam  Has a few lesions of concern  Lesion on right cheek  Present for years  Occasionally seems to shrink, but recently has grown more rapidly  No itching/bleeding/pain  Has tried rubbing alcohol on the area for treatment    Lesion on the left back  Present for years  Seems to have darkened  No itching/bleeding/pain  No treatments    Noted on exam - pink plaque on the left forearm  Patient states he had an injury a year ago, never seemed to heal correctly  No itching/bleeding/pain  No treatments    Lesion on the left ear helix  States became more inflamed over the past week, but has been present for months  Occasionally bleeds    Red, scaly papules on the upper back  Patient states he is often inflamed there/scratches at the areas often  Has been ongoing for years (states he is always very dry)  No treatments  Not regularly moisturizing    History of skin cancer: No  History of precancers/actinic keratoses: No  History of blistering/severe sunburns:Yes, Details: youth (lived in [place])  Family history of skin cancer:No        Past Medical History:   Diagnosis Date   • Acute anterior myocardial infarction (CMS-HCC) 12/28/2000    PCI attempted by Dr. Malloy but unsuccessful   • AICD (automatic cardioverter/defibrillator) present 2003/2008/2015    Defibrillator is a Medtronic model TNTJ5Z1, serial #[id number], implanted Dr. Stephenson November 2015. The ventricular lead is a Medtronic model #6947-65, lead serial #[id number], implant 7/2/2003   • Allergy     within 2 years   • CAD (coronary artery disease) 12/28/2000    CABG x 4 (LIMA to LAD, rSVG to first diagonal, left circumflex and PDA).2000: CABG x 4 (LIMA to LAD, rSVG to first diagonal, left circumflex and PDA).  2003: LIMA and circumflex grafts patent, diagonal and PDA grafts  closed.   • Congestive heart failure (CMS-HCC) 12/28/2000   • Dental disorder     Upper partial   • GERD (gastroesophageal reflux disease)    • Ischemic cardiomyopathy February 2014    Echocardiogram with severely dilated LV, LVEF 15-20%   • Left bundle branch block     • Pneumonia 2014   • Pulmonary embolism (CMS-HCC) March 2014   • Type 2 diabetes mellitus without complication (CMS-HCC)      Diet controlled, followed by Dr. Walls       Current Outpatient Prescriptions on File Prior to Visit   Medication Sig Dispense Refill   • rivaroxaban (XARELTO) 20 MG Tab tablet Take 1 Tab by mouth with dinner. 30 Tab 6   • rivaroxaban (XARELTO) 15 MG Tab tablet Take 1 Tab by mouth 2 Times a Day for 21 days. 42 Tab 0   • furosemide (LASIX) 40 MG Tab TAKE 1 TABLET BY MOUTH DAILY 90 Tab 3   • lisinopril (PRINIVIL) 5 MG Tab Take 1 Tab by mouth 2 times a day. 180 Tab 3   • spironolactone (ALDACTONE) 25 MG Tab Take 0.5 Tabs by mouth every day. 45 Tab 3   • lovastatin (MEVACOR) 10 MG tablet Take 1 Tab by mouth every evening. 90 Tab 3   • potassium chloride SA (KDUR) 20 MEQ Tab CR Take 1 Tab by mouth every day. 90 Tab 3   • metformin (GLUCOPHAGE) 500 MG Tab One tab po in am and 2 tab in pm 270 Tab 3   • azithromycin (ZITHROMAX) 250 MG Tab 2 tabs po today and then one po daily for 4 more days 6 Tab 0   • metoprolol SR (TOPROL XL) 100 MG TABLET SR 24 HR TAKE 1 TABLET BY MOUTH EVERY DAY 90 Tab 3   • Blood Glucose Monitoring Suppl SUPPLIES Misc Test strips order: Test strips for Marina Contour meter. Sig: use BID and prn ssx high or low sugar, max 4/day 100 Each 3   • Blood Glucose Monitoring Suppl Device Meter: Dispense Marina Contour meter. Sig. Use as directed for blood sugar monitoring. 1 Device 1   • Lancets Misc Lancets order: Lancets for Marina Contour meter. Sig: use BID and prn ssx high or low sugar. Max 4/d 100 Each 3   • Magnesium 500 MG CAPS Take 500 mg by mouth every day.       No current facility-administered medications on  "file prior to visit.        Allergies   Allergen Reactions   • Bloodless    • Pcn [Penicillins] Anaphylaxis       Family History   Problem Relation Age of Onset   • Diabetes Mother    • Heart Disease Mother    • Diabetes Sister    • Alcohol/Drug Brother        Social History     Social History   • Marital status:      Spouse name: N/A   • Number of children: N/A   • Years of education: N/A     Occupational History   • Not on file.     Social History Main Topics   • Smoking status: Former Smoker     Packs/day: 1.00     Years: 32.00     Types: Cigarettes     Quit date: 12/28/2000   • Smokeless tobacco: Never Used   • Alcohol use 4.2 oz/week     7 Glasses of wine per week      Comment: 7 per week   • Drug use:       Comment: CBD, THC   • Sexual activity: No     Other Topics Concern   • Not on file     Social History Narrative   • No narrative on file       Review of Systems   Constitutional: Negative for chills, fever and weight loss.   Skin: Negative for itching and rash.   All other systems reviewed and are negative.       Objective:     A full mucocutaneous exam was completed including: scalp, hair, ears, face, eyelids, conjunctiva, lips, gums/tongue/oropharynx, neck, chest, abdomen, back, left and right upper extremities (including hands/digits and fingernails), left and right lower extremities (including feet/toes, toenails), buttocks, excluding external genitalia (patient refusal) with the following pertinent findings listed below. Remaining above-listed examined areas within normal limits / negative for rashes or lesions.    Temperature 37.1 °C (98.8 °F), height 1.854 m (6' 1\"), weight 86.2 kg (190 lb).    Physical Exam   Constitutional: He is oriented to person, place, and time and well-developed, well-nourished, and in no distress.   HENT:   Head: Normocephalic and atraumatic.       Right Ear: External ear normal.   Left Ear: External ear normal.   Nose: Nose normal.   Mouth/Throat: Oropharynx is clear " and moist.   Eyes: Conjunctivae and lids are normal.   Neck: Normal range of motion. Neck supple.   Cardiovascular: Intact distal pulses.    Pulmonary/Chest: Effort normal.   Neurological: He is alert and oriented to person, place, and time.   Skin: Skin is warm and dry.        Psychiatric: Mood and affect normal.   Vitals reviewed.      DATA: none applicable to review    Assessment and Plan:     1. Neoplasm of uncertain behavior of skin - right upper cutaneous lip  Procedure Note   Procedure: Biopsy by shave technique  Location: as noted above  Size: as noted in exam  Preoperative diagnosis:BCC  Risks, benefits and alternatives of procedure discussed and written informed consent obtained. Time out completed. Area of biopsy prepped with alcohol. Anesthesia with 1% lidocaine with epinephrine administered with 30 gauge needle. Shave biopsy of the site performed. Hemostasis achieved with pressure and aluminum chloride. Vaseline applied to wound with bandage. Patient tolerated procedure well and there were no complications. The specimen was sent to the pathology lab by the staff. Wound care was discussed.    2. Neoplasm of uncertain behavior of skin - left ear helix  Procedure Note   Procedure: Biopsy by shave technique  Location: as noted above  Size: as noted in exam  Preoperative diagnosis:BCC vs SCC vs AK vs other  Risks, benefits and alternatives of procedure discussed and written informed consent obtained. Time out completed. Area of biopsy prepped with alcohol. Anesthesia with 1% lidocaine with epinephrine administered with 30 gauge needle. Shave biopsy of the site performed. Hemostasis achieved with pressure and aluminum chloride. Vaseline applied to wound with bandage. Patient tolerated procedure well and there were no complications. The specimen was sent to the pathology lab by the staff. Wound care was discussed.    3. Neoplasm of uncertain behavior of skin - left forearm  Procedure Note   Procedure: Biopsy  by shave technique  Location: as noted above  Size: as noted in exam  Preoperative diagnosis:scar vs superficial bcc vs other  Risks, benefits and alternatives of procedure discussed and written informed consent obtained. Time out completed. Area of biopsy prepped with alcohol. Anesthesia with 1% lidocaine with epinephrine administered with 30 gauge needle. Shave biopsy of the site performed. Hemostasis achieved with pressure and aluminum chloride. Vaseline applied to wound with bandage. Patient tolerated procedure well and there were no complications. The specimen was sent to the pathology lab by the staff. Wound care was discussed.    4. Neoplasm of uncertain behavior of skin - left back  Procedure Note   Procedure: Biopsy by shave technique  Location: as noted above  Size: as noted in exam  Preoperative diagnosis:SK, r/o underlying atypical pigmented lesion  Risks, benefits and alternatives of procedure discussed and written informed consent obtained. Time out completed. Area of biopsy prepped with alcohol. Anesthesia with 1% lidocaine with epinephrine administered with 30 gauge needle. Shave biopsy of the site performed. Hemostasis achieved with pressure and aluminum chloride. Vaseline applied to wound with bandage. Patient tolerated procedure well and there were no complications. The specimen was sent to the pathology lab by the staff. Wound care was discussed.    5. Dermatitis  - educated patient about diagnosis, management options, and expectations of treatment  - start triamcinolone 0.1% ointment to affected area of the body twice daily. Patient instructed to avoid face, axilla, and groin area. Side effects discussed, including skin thinning, appearance of stretch marks (striae), easy bruising, telangiectasias, and possible increased hair growth   - extensively discussed importance of regular moisturization to the affected area during flares, and also for maintenance therapy liberally, several times a day,  to protect the skin barrier. OTC moisturizers recommended  - if no improvement, would consider bx of any remaining/obvious red scaly areas to r/o neoplastic process    Followup: Return in about 4 weeks (around 2/14/2018) for dermatitis, possible bx.    Bianka Ahumada M.D.

## 2018-01-22 ENCOUNTER — ANTICOAGULATION VISIT (OUTPATIENT)
Dept: MEDICAL GROUP | Facility: MEDICAL CENTER | Age: 68
End: 2018-01-22
Payer: MEDICARE

## 2018-01-22 ENCOUNTER — TELEPHONE (OUTPATIENT)
Dept: DERMATOLOGY | Facility: IMAGING CENTER | Age: 68
End: 2018-01-22

## 2018-01-22 VITALS — SYSTOLIC BLOOD PRESSURE: 102 MMHG | DIASTOLIC BLOOD PRESSURE: 58 MMHG | HEART RATE: 75 BPM

## 2018-01-22 DIAGNOSIS — C44.319 BASAL CELL CARCINOMA OF SKIN OF OTHER PART OF FACE: ICD-10-CM

## 2018-01-22 DIAGNOSIS — I82.412 ACUTE DEEP VEIN THROMBOSIS (DVT) OF FEMORAL VEIN OF LEFT LOWER EXTREMITY (HCC): ICD-10-CM

## 2018-01-22 PROCEDURE — 99211 OFF/OP EST MAY X REQ PHY/QHP: CPT | Performed by: FAMILY MEDICINE

## 2018-01-22 NOTE — PROGRESS NOTES
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  Pt denies any GI bleeds or hematemesis.  Pt denies any concerning daily headache or sub dural hematoma symptoms.     Pt denies any hematuria or abnormal vaginal bleeding.   Latest Hemoglobin 15.8   ETOH overuse Denies, he limits to 2 beverages     Creatinine Clearance/Renal Function     Latest ClCr 59 based on actual BW     Drug Interactions   ASA/other antiplatelets : pt avoids   NSAID Pt avoids   Other drug interactions  none    Examination   Blood Pressure WNL   Symptomatic hypotension : yes, states he is always dizzy. He has never fallen, he is being worked up for this   Significant gait impairment/imbalance/high risk for falls? NONE     Final Assessment and Recommendations:   Patient appears stable from the anticoagulation staindpoint.     Benefits of continued D0AC therapy ouyweigh risks for this patient   Recommend pt continue with current DOAC therapy     Medication:     Xarelto 15 mg twice daily with food for 21 days then 20 mg once daily for remainder of treatment.     DO NOT STOP anticoagulation unless directed by provider or our clinic.     Next Appointment: Monday July 23 , 10 AM     Review all of your home medications and newly ordered medications with your doctor and / or pharmacist. Follow medication instructions as directed by your doctor and / or pharmacist. Please keep your medication list with you and share with your physician. Update the information when medications  are discontinued, doses are changed, or new medications (including over-the-counter products) are added; and carry medication information at all times in the event of emergency situations.      For questions, please contact Outpatient Anticoagulation Service [phone].     Edilma Puente D

## 2018-02-01 ENCOUNTER — OFFICE VISIT (OUTPATIENT)
Dept: MEDICAL GROUP | Facility: MEDICAL CENTER | Age: 68
End: 2018-02-01
Payer: MEDICARE

## 2018-02-01 VITALS
DIASTOLIC BLOOD PRESSURE: 62 MMHG | WEIGHT: 190 LBS | HEART RATE: 78 BPM | SYSTOLIC BLOOD PRESSURE: 102 MMHG | OXYGEN SATURATION: 96 % | TEMPERATURE: 97 F | BODY MASS INDEX: 25.07 KG/M2

## 2018-02-01 DIAGNOSIS — C44.219 BASAL CELL CARCINOMA OF SKIN OF LEFT EAR: ICD-10-CM

## 2018-02-01 DIAGNOSIS — Z79.01 CHRONIC ANTICOAGULATION: ICD-10-CM

## 2018-02-01 DIAGNOSIS — E11.65 TYPE 2 DIABETES MELLITUS WITH HYPERGLYCEMIA, WITHOUT LONG-TERM CURRENT USE OF INSULIN (HCC): ICD-10-CM

## 2018-02-01 DIAGNOSIS — I82.412 ACUTE DEEP VEIN THROMBOSIS (DVT) OF FEMORAL VEIN OF LEFT LOWER EXTREMITY (HCC): ICD-10-CM

## 2018-02-01 PROCEDURE — 92250 FUNDUS PHOTOGRAPHY W/I&R: CPT | Mod: TC | Performed by: FAMILY MEDICINE

## 2018-02-01 PROCEDURE — 99214 OFFICE O/P EST MOD 30 MIN: CPT | Performed by: FAMILY MEDICINE

## 2018-02-07 ENCOUNTER — OFFICE VISIT (OUTPATIENT)
Dept: DERMATOLOGY | Facility: IMAGING CENTER | Age: 68
End: 2018-02-07
Payer: MEDICARE

## 2018-02-07 VITALS
DIASTOLIC BLOOD PRESSURE: 68 MMHG | WEIGHT: 186 LBS | SYSTOLIC BLOOD PRESSURE: 114 MMHG | BODY MASS INDEX: 24.65 KG/M2 | TEMPERATURE: 97.7 F | HEIGHT: 73 IN

## 2018-02-07 DIAGNOSIS — C44.619 BASAL CELL CARCINOMA OF SKIN OF LEFT UPPER EXTREMITY, INCLUDING SHOULDER: ICD-10-CM

## 2018-02-07 LAB — RETINAL SCREEN: NEGATIVE

## 2018-02-07 PROCEDURE — 17263 DSTRJ MAL LES T/A/L 2.1-3.0: CPT | Performed by: DERMATOLOGY

## 2018-02-07 NOTE — PROGRESS NOTES
"PROCEDURE NOTE:    CURETTAGE &  ELECTROCAUTERY    After patient received diagnosis of basal cell carcinoma, superficial type, further management was discussed, including Mohs vs wide local excision vs curettage & electrodesiccation (C&D) vs radiation vs topical therapy vs cryotherapy. Patient opted for C&D. Risks, benefits and alternatives of procedure, including, but not limited to scar, bleeding, pain, infection, recurrence and need for further surgery, were discussed and written informed consent obtained. Verbal time out completed.     Allergies reviewed: Yes  Pacemaker/defibrillator: Yes  Artificial joints: No  Antibiotics given: No    Pre-op diagnosis: superficial BCC (requisition#: [id number])  Post-op diagnosis: Same  Site: left forearm  Pre-op size: 24x20    Blood pressure 114/68, temperature 36.5 °C (97.7 °F), height 1.854 m (6' 1\"), weight 84.4 kg (186 lb).    Procedure: Area of biopsy prepped with alcohol, marked with sterile marking pen. Anesthesia with 1% lidocaine with epinephrine administered with 30 gauge needle. The area was again cleaned with povidine-iodine swab. The site was debulked with a sharp, 5mm curette, and scraped in 3 different directions, followed by scraping in 3 different directions with a 3mm curette. The curettaged area was then cauterized with hand held Bovie/electrocautery for hemostasis. This entire cycle was repeated three times, and hemostasis was achieved. Vaseline applied to wound with bandage. Patient tolerated procedure well and there were no complications, blood loss was minimal.     Final wound size: [id number]    Wound care was discussed with the patient, and written instructions were provided. Patient to return to clinic in 3 months for wound check and total skin exam. Patient to call us if any problems or concerns with the procedure site arise prior to scheduled appointment.    Bianka Ahumada M.D.        "

## 2018-02-11 NOTE — ASSESSMENT & PLAN NOTE
Patient is having less swelling and pain in the leg.  He has been taking his Xarelto regularly.  He denies any chest pain or SOB.

## 2018-02-11 NOTE — PROGRESS NOTES
Subjective:     Chief Complaint   Patient presents with   • Leg Swelling     follow up after clot left leg       Jaime Lee is a 67 y.o. male here today for evaluation and management of:    Acute deep vein thrombosis (DVT) of femoral vein of left lower extremity (CMS-HCC)  Patient is having less swelling and pain in the leg.  He has been taking his Xarelto regularly.  He denies any chest pain or SOB.    Basal cell carcinoma of skin of left ear  Just had surgery on the area by the dermatologist.  It is healing well.    Type 2 diabetes mellitus with hyperglycemia, without long-term current use of insulin (CMS-[facility])  Stable. Currently taking Metformin as directed.  Denies side effects or hypoglycemic events.  He is not monitoring blood sugar regularly at home.  Reports diet is poor  He is not exercising regularly.  Last eye exam: years ago  Denies polyuria, polydipsia, blurred vision, or neuropathies.         Allergies   Allergen Reactions   • Bloodless    • Pcn [Penicillins] Anaphylaxis       Current medicines (including changes today)  Current Outpatient Prescriptions   Medication Sig Dispense Refill   • triamcinolone acetonide (KENALOG) 0.1 % Ointment Apply 1 Application to affected area(s) 2 times a day. Area on the back 60 g 2   • rivaroxaban (XARELTO) 20 MG Tab tablet Take 1 Tab by mouth with dinner. 30 Tab 6   • furosemide (LASIX) 40 MG Tab TAKE 1 TABLET BY MOUTH DAILY 90 Tab 3   • lisinopril (PRINIVIL) 5 MG Tab Take 1 Tab by mouth 2 times a day. 180 Tab 3   • spironolactone (ALDACTONE) 25 MG Tab Take 0.5 Tabs by mouth every day. 45 Tab 3   • lovastatin (MEVACOR) 10 MG tablet Take 1 Tab by mouth every evening. 90 Tab 3   • potassium chloride SA (KDUR) 20 MEQ Tab CR Take 1 Tab by mouth every day. 90 Tab 3   • metformin (GLUCOPHAGE) 500 MG Tab One tab po in am and 2 tab in pm 270 Tab 3   • metoprolol SR (TOPROL XL) 100 MG TABLET SR 24 HR TAKE 1 TABLET BY MOUTH EVERY DAY 90 Tab 3   • Blood Glucose  Monitoring Suppl Device Meter: Dispense Marina Contour meter. Sig. Use as directed for blood sugar monitoring. 1 Device 1   • Magnesium 500 MG CAPS Take 500 mg by mouth every day.     • Blood Glucose Monitoring Suppl SUPPLIES Misc Test strips order: Test strips for Marina Contour meter. Sig: use BID and prn ssx high or low sugar, max 4/day 100 Each 3   • Lancets Misc Lancets order: Lancets for Marina Contour meter. Sig: use BID and prn ssx high or low sugar. Max 4/d 100 Each 3     No current facility-administered medications for this visit.        He  has a past medical history of Acute anterior myocardial infarction (CMS-HCC) (12/28/2000); AICD (automatic cardioverter/defibrillator) present (2003/2008/2015); Allergy; CAD (coronary artery disease) (12/28/2000); Congestive heart failure (CMS-HCC) (12/28/2000); Dental disorder; GERD (gastroesophageal reflux disease); Ischemic cardiomyopathy (February 2014); Left bundle branch block ( ); Pneumonia (2014); Pulmonary embolism (CMS-HCC) (March 2014); and Type 2 diabetes mellitus without complication (CMS-HCC) ( ).    Patient Active Problem List    Diagnosis Date Noted   • Hyperlipidemia 01/18/2017     Priority: High   • S/P CABG x 4 06/22/2016     Priority: High   • Left bundle branch block 06/22/2016     Priority: High   • AICD (automatic cardioverter/defibrillator) present 03/11/2014     Priority: High   • Ischemic cardiomyopathy 03/11/2014     Priority: High   • Pulmonary embolism (CMS-HCC) 02/27/2014     Priority: High   • CAD (coronary artery disease) 12/28/2000     Priority: High   • Type 2 diabetes mellitus with hyperglycemia, without long-term current use of insulin (CMS-HCC) 06/22/2016     Priority: Medium   • Chronic systolic congestive heart failure, NYHA class 2 (CMS-HCC) 06/22/2016     Priority: Medium   • CKD (chronic kidney disease) stage 3, GFR 30-59 ml/min 02/24/2014     Priority: Medium   • Thrombocytopenia (CMS-HCC) 02/23/2014     Priority: Medium   •  Chronic anticoagulation 02/01/2018   • Basal cell carcinoma of skin of left ear 02/01/2018   • Acute deep vein thrombosis (DVT) of femoral vein of left lower extremity (CMS-HCC) 01/04/2018   • Community acquired pneumonia of right middle lobe of lung (CMS-[facility]) 11/28/2017   • Basal cell carcinoma of skin of face 11/28/2017   • Dizziness 07/28/2017   • Hoarseness or changing voice 07/28/2017   • Old anterior myocardial infarction 06/22/2016       ROS   No fever or chills.  No nausea or vomiting.  No chest pain or palpitations.  No cough or SOB.  No pain with urination or hematuria.  No black or bloody stools.       Objective:     Blood pressure 102/62, pulse 78, temperature 36.1 °C (97 °F), weight 86.2 kg (190 lb), SpO2 96 %. Body mass index is 25.07 kg/m².   Physical Exam:  Well developed, well nourished.  Alert, oriented in no acute distress.  Eye contact is good, speech goal directed, affect calm  Eyes: conjunctiva non-injected, sclera non-icteric.  Neck Supple.  No adenopathy or masses in the neck or supraclavicular regions. No thyromegaly  Lungs: clear to auscultation bilaterally with good excursion. No wheezes or rhonchi  CV: regular rate and rhythm. No murmur  Ext:slight edema on the right, color normal, vascularity normal, temperature normal          Assessment and Plan:   The following treatment plan was discussed    1. Acute deep vein thrombosis (DVT) of femoral vein of left lower extremity (CMS-[facility])  Recheck US in April.  Continue Xarelto  - US-EXTREMITY VENOUS UNILATERAL-LOWER LEFT; Future    2. Chronic anticoagulation  See #1  - US-EXTREMITY VENOUS UNILATERAL-LOWER LEFT; Future    3. Basal cell carcinoma of skin of left ear  Healing well    4. Type 2 diabetes mellitus with hyperglycemia, without long-term current use of insulin (CMS-HCC)  Continue Metformin  - POCT Retinal Eye Exam    Any change or worsening of signs or symptoms, patient encouraged to follow-up or report to the emergency room for further  evaluation. Patient understands and agrees.    Followup: Return in about 3 months (around 5/1/2018).

## 2018-03-01 ENCOUNTER — PATIENT OUTREACH (OUTPATIENT)
Dept: HEALTH INFORMATION MANAGEMENT | Facility: OTHER | Age: 68
End: 2018-03-01

## 2018-03-01 NOTE — PROGRESS NOTES
1. Attempt #: 1  * did not speak with patient  2. [facility] Verified: no    3. Verify PCP: no    4. Care Team Updated:       •   DME Company (gait device, O2, CPAP, etc.): N\A       •   Other Specialists (eye doctor, derm, GYN, cardiology, endo, etc): N\A    5.  Reviewed/Updated the following with patient:       •   Communication Preference Obtained? NO       •   Preferred Pharmacy? NO       •   Preferred Lab? NO       •   Family History (document living status of immediate family members and if + hx of cancer, diabetes, hypertension, hyperlipidemia, heart attack, stroke) NO    6. [employer]: already active    7. [employer]: no    8. Annual Wellness Visit Scheduling  Scheduling Status:Scheduled      9. Care Gap Scheduling (Attempt to Schedule EACH Overdue Care Gap!)     Health Maintenance Due   Topic Date Due   • Annual Wellness Visit  1950   • IMM ZOSTER VACCINE  08/23/2010   • IMM DTaP/Tdap/Td Vaccine (1 - Tdap) 10/17/2011        Scheduled patient for Annual Wellness Visit    10. Patient was advised: “This is a free wellness visit. The provider will screen for medical conditions to help you stay healthy. If you have other concerns to address you may be asked to discuss these at a separate visit or there may be an additional fee.”     11. Patient was informed to arrive 15 min prior to their scheduled appointment and bring in their medication bottles.

## 2018-03-09 ENCOUNTER — PATIENT MESSAGE (OUTPATIENT)
Dept: HEALTH INFORMATION MANAGEMENT | Facility: OTHER | Age: 68
End: 2018-03-09

## 2018-04-02 ENCOUNTER — HOSPITAL ENCOUNTER (OUTPATIENT)
Dept: RADIOLOGY | Facility: MEDICAL CENTER | Age: 68
End: 2018-04-02
Attending: FAMILY MEDICINE
Payer: MEDICARE

## 2018-04-02 DIAGNOSIS — I82.412 ACUTE DEEP VEIN THROMBOSIS (DVT) OF FEMORAL VEIN OF LEFT LOWER EXTREMITY (HCC): ICD-10-CM

## 2018-04-02 DIAGNOSIS — Z79.01 CHRONIC ANTICOAGULATION: ICD-10-CM

## 2018-04-02 PROCEDURE — 93970 EXTREMITY STUDY: CPT

## 2018-04-02 PROCEDURE — 93971 EXTREMITY STUDY: CPT

## 2018-04-12 ENCOUNTER — OFFICE VISIT (OUTPATIENT)
Dept: MEDICAL GROUP | Facility: MEDICAL CENTER | Age: 68
End: 2018-04-12
Payer: MEDICARE

## 2018-04-12 VITALS
BODY MASS INDEX: 24.65 KG/M2 | WEIGHT: 186 LBS | DIASTOLIC BLOOD PRESSURE: 72 MMHG | OXYGEN SATURATION: 95 % | RESPIRATION RATE: 20 BRPM | SYSTOLIC BLOOD PRESSURE: 122 MMHG | HEIGHT: 73 IN | TEMPERATURE: 97.9 F | HEART RATE: 80 BPM

## 2018-04-12 DIAGNOSIS — N18.30 CKD (CHRONIC KIDNEY DISEASE) STAGE 3, GFR 30-59 ML/MIN (HCC): ICD-10-CM

## 2018-04-12 DIAGNOSIS — Z13.29 SCREENING FOR THYROID DISORDER: ICD-10-CM

## 2018-04-12 DIAGNOSIS — Z79.01 CHRONIC ANTICOAGULATION: ICD-10-CM

## 2018-04-12 DIAGNOSIS — E78.2 MIXED HYPERLIPIDEMIA: ICD-10-CM

## 2018-04-12 DIAGNOSIS — I82.412 ACUTE DEEP VEIN THROMBOSIS (DVT) OF FEMORAL VEIN OF LEFT LOWER EXTREMITY (HCC): ICD-10-CM

## 2018-04-12 DIAGNOSIS — D69.6 THROMBOCYTOPENIA (HCC): ICD-10-CM

## 2018-04-12 DIAGNOSIS — E11.65 TYPE 2 DIABETES MELLITUS WITH HYPERGLYCEMIA, WITHOUT LONG-TERM CURRENT USE OF INSULIN (HCC): ICD-10-CM

## 2018-04-12 PROCEDURE — 99214 OFFICE O/P EST MOD 30 MIN: CPT | Performed by: FAMILY MEDICINE

## 2018-04-12 ASSESSMENT — PAIN SCALES - GENERAL: PAINLEVEL: NO PAIN

## 2018-04-15 NOTE — ASSESSMENT & PLAN NOTE
Dyslipidemia Chronic condition. Current treatments: diet/exercise/medicines. He is taking lovastatin  10 mg daily as directed. Current side effects: none.

## 2018-04-15 NOTE — PROGRESS NOTES
Subjective:     Chief Complaint   Patient presents with   • Follow-Up     US results        Jaime Lee is a 67 y.o. male here today for evaluation and management of:    Acute deep vein thrombosis (DVT) of femoral vein of left lower extremity (CMS-HCC)  Patient has a history of an extensive DVT that extended from the mid calf to the groin.  He has been taking his Xarelto regularly.  Repeat US showed some decreased clot burden but still extensive clot,  We were able to bridge the patient to get some dental work done and to have a Moh;s procedure on his face,    CKD (chronic kidney disease) stage 3, GFR 30-59 ml/min  CKD: chronic condition which is stable. Currently avoids high dose NSAIDs. Denies nausea/vomiting, headache, loss of energy/unusual somnolence, mouth sores, skin discoloration or itching, muscle cramps. .       Hyperlipidemia  Dyslipidemia Chronic condition. Current treatments: diet/exercise/medicines. He is taking lovastatin  10 mg daily as directed. Current side effects: none.       Type 2 diabetes mellitus with hyperglycemia, without long-term current use of insulin (CMS-HCC)  Stable. Currently taking Metformin 500 mg in the am and 1000 in the pm as directed.  Denies side effects or hypoglycemic events.  He is monitoring blood sugar regularly at home.  Reports diet is stable and he has recently lost weight  He is exercising regularly.  Last eye exam: in the last 6 months  Denies polyuria, polydipsia, blurred vision, or neuropathies.         Allergies   Allergen Reactions   • Bloodless    • Pcn [Penicillins] Anaphylaxis       Current medicines (including changes today)  Current Outpatient Prescriptions   Medication Sig Dispense Refill   • rivaroxaban (XARELTO) 20 MG Tab tablet Take 1 Tab by mouth with dinner. 90 Tab 3   • metFORMIN (GLUCOPHAGE) 500 MG Tab One tab po in am and 2 tab in pm 270 Tab 3   • furosemide (LASIX) 40 MG Tab TAKE 1 TABLET BY MOUTH DAILY 90 Tab 3   • lisinopril  (PRINIVIL) 5 MG Tab Take 1 Tab by mouth 2 times a day. 180 Tab 3   • spironolactone (ALDACTONE) 25 MG Tab Take 0.5 Tabs by mouth every day. 45 Tab 3   • lovastatin (MEVACOR) 10 MG tablet Take 1 Tab by mouth every evening. 90 Tab 3   • potassium chloride SA (KDUR) 20 MEQ Tab CR Take 1 Tab by mouth every day. 90 Tab 3   • metoprolol SR (TOPROL XL) 100 MG TABLET SR 24 HR TAKE 1 TABLET BY MOUTH EVERY DAY 90 Tab 3   • Magnesium 500 MG CAPS Take 500 mg by mouth every day.     • triamcinolone acetonide (KENALOG) 0.1 % Ointment Apply 1 Application to affected area(s) 2 times a day. Area on the back (Patient not taking: Reported on 4/12/2018) 60 g 2   • Blood Glucose Monitoring Suppl SUPPLIES Misc Test strips order: Test strips for Marina Contour meter. Sig: use BID and prn ssx high or low sugar, max 4/day (Patient not taking: Reported on 4/12/2018) 100 Each 3   • Blood Glucose Monitoring Suppl Device Meter: Dispense Marina Contour meter. Sig. Use as directed for blood sugar monitoring. (Patient not taking: Reported on 4/12/2018) 1 Device 1   • Lancets Misc Lancets order: Lancets for Marina Contour meter. Sig: use BID and prn ssx high or low sugar. Max 4/d (Patient not taking: Reported on 4/12/2018) 100 Each 3     No current facility-administered medications for this visit.        He  has a past medical history of Acute anterior myocardial infarction (CMS-HCC) (12/28/2000); AICD (automatic cardioverter/defibrillator) present (2003/2008/2015); Allergy; CAD (coronary artery disease) (12/28/2000); Congestive heart failure (CMS-HCC) (12/28/2000); Dental disorder; GERD (gastroesophageal reflux disease); Ischemic cardiomyopathy (February 2014); Left bundle branch block ( ); Pneumonia (2014); Pulmonary embolism (CMS-HCC) (March 2014); and Type 2 diabetes mellitus without complication (CMS-HCC) ( ).    Patient Active Problem List    Diagnosis Date Noted   • Hyperlipidemia 01/18/2017     Priority: High   • S/P CABG x 4 06/22/2016      "Priority: High   • Left bundle branch block 06/22/2016     Priority: High   • AICD (automatic cardioverter/defibrillator) present 03/11/2014     Priority: High   • Ischemic cardiomyopathy 03/11/2014     Priority: High   • Pulmonary embolism (CMS-HCC) 02/27/2014     Priority: High   • CAD (coronary artery disease) 12/28/2000     Priority: High   • Type 2 diabetes mellitus with hyperglycemia, without long-term current use of insulin (CMS-HCC) 06/22/2016     Priority: Medium   • Chronic systolic congestive heart failure, NYHA class 2 (CMS-HCC) 06/22/2016     Priority: Medium   • CKD (chronic kidney disease) stage 3, GFR 30-59 ml/min 02/24/2014     Priority: Medium   • Thrombocytopenia (CMS-HCC) 02/23/2014     Priority: Medium   • Chronic anticoagulation 02/01/2018   • Basal cell carcinoma of skin of left ear 02/01/2018   • Acute deep vein thrombosis (DVT) of femoral vein of left lower extremity (CMS-HCC) 01/04/2018   • Community acquired pneumonia of right middle lobe of lung (CMS-HCC) 11/28/2017   • Basal cell carcinoma of skin of face 11/28/2017   • Dizziness 07/28/2017   • Hoarseness or changing voice 07/28/2017   • Old anterior myocardial infarction 06/22/2016       ROS   No fever or chills.  No nausea or vomiting.  No chest pain or palpitations.  No cough or SOB.  No pain with urination or hematuria.  No black or bloody stools.       Objective:     Blood pressure 122/72, pulse 80, temperature 36.6 °C (97.9 °F), resp. rate 20, height 1.854 m (6' 1\"), weight 84.4 kg (186 lb), SpO2 95 %. Body mass index is 24.54 kg/m².   Physical Exam:  Well developed, well nourished.  Alert, oriented in no acute distress.  Eye contact is good, speech goal directed, affect calm  Eyes: conjunctiva non-injected, sclera non-icteric.  Neck Supple.  No adenopathy or masses in the neck or supraclavicular regions. No thyromegaly  Lungs: clear to auscultation bilaterally with good excursion. No wheezes or rhonchi  CV: regular rate and " rhythm. No murmur      Assessment and Plan:   The following treatment plan was discussed    1. Acute deep vein thrombosis (DVT) of femoral vein of left lower extremity (CMS-HCC)  Stable,  Continue Xarelto.  Recheck ultrasound in 6 months  - rivaroxaban (XARELTO) 20 MG Tab tablet; Take 1 Tab by mouth with dinner.  Dispense: 90 Tab; Refill: 3  - CBC WITH DIFFERENTIAL; Future    2. Type 2 diabetes mellitus with hyperglycemia, without long-term current use of insulin (CMS-[facility])  Improved control.  Continue current dose  - metFORMIN (GLUCOPHAGE) 500 MG Tab; One tab po in am and 2 tab in pm  Dispense: 270 Tab; Refill: 3  - COMP METABOLIC PANEL; Future  - HEMOGLOBIN A1C; Future    3. CKD (chronic kidney disease) stage 3, GFR 30-59 ml/min  Avoid NSAIS  - CBC WITH DIFFERENTIAL; Future  - COMP METABOLIC PANEL; Future    4. Chronic anticoagulation  This is a chronic medical condition that is currently stable    - CBC WITH DIFFERENTIAL; Future    5. Mixed hyperlipidemia  This is a chronic medical condition that is currently stable  Adjust lovastatin as needed  - LIPID PROFILE; Future    6. Thrombocytopenia (CMS-[facility])  Continue to follow  - CBC WITH DIFFERENTIAL; Future    7. Screening for thyroid disorder  Screening labs ordered.  Await results for counseling.    - TSH; Future    Any change or worsening of signs or symptoms, patient encouraged to follow-up or report to the emergency room for further evaluation. Patient understands and agrees.    Followup: Return in about 12 days (around 4/24/2018).

## 2018-04-15 NOTE — ASSESSMENT & PLAN NOTE
Patient has a history of an extensive DVT that extended from the mid calf to the groin.  He has been taking his Xarelto regularly.  Repeat US showed some decreased clot burden but still extensive clot,  We were able to bridge the patient to get some dental work done and to have a Moh;s procedure on his face,

## 2018-04-15 NOTE — ASSESSMENT & PLAN NOTE
Stable. Currently taking Metformin 500 mg in the am and 1000 in the pm as directed.  Denies side effects or hypoglycemic events.  He is monitoring blood sugar regularly at home.  Reports diet is stable and he has recently lost weight  He is exercising regularly.  Last eye exam: in the last 6 months  Denies polyuria, polydipsia, blurred vision, or neuropathies.

## 2018-04-18 ENCOUNTER — HOSPITAL ENCOUNTER (OUTPATIENT)
Dept: LAB | Facility: MEDICAL CENTER | Age: 68
End: 2018-04-18
Attending: FAMILY MEDICINE
Payer: MEDICARE

## 2018-04-18 DIAGNOSIS — Z13.29 SCREENING FOR THYROID DISORDER: ICD-10-CM

## 2018-04-18 DIAGNOSIS — Z79.01 CHRONIC ANTICOAGULATION: ICD-10-CM

## 2018-04-18 DIAGNOSIS — E78.2 MIXED HYPERLIPIDEMIA: ICD-10-CM

## 2018-04-18 DIAGNOSIS — D69.6 THROMBOCYTOPENIA (HCC): ICD-10-CM

## 2018-04-18 DIAGNOSIS — N18.30 CKD (CHRONIC KIDNEY DISEASE) STAGE 3, GFR 30-59 ML/MIN (HCC): ICD-10-CM

## 2018-04-18 DIAGNOSIS — E11.65 TYPE 2 DIABETES MELLITUS WITH HYPERGLYCEMIA, WITHOUT LONG-TERM CURRENT USE OF INSULIN (HCC): ICD-10-CM

## 2018-04-18 DIAGNOSIS — I82.412 ACUTE DEEP VEIN THROMBOSIS (DVT) OF FEMORAL VEIN OF LEFT LOWER EXTREMITY (HCC): ICD-10-CM

## 2018-04-18 LAB
ALBUMIN SERPL BCP-MCNC: 4.7 G/DL (ref 3.2–4.9)
ALBUMIN/GLOB SERPL: 1.5 G/DL
ALP SERPL-CCNC: 57 U/L (ref 30–99)
ALT SERPL-CCNC: 30 U/L (ref 2–50)
ANION GAP SERPL CALC-SCNC: 7 MMOL/L (ref 0–11.9)
AST SERPL-CCNC: 23 U/L (ref 12–45)
BASOPHILS # BLD AUTO: 0.9 % (ref 0–1.8)
BASOPHILS # BLD: 0.06 K/UL (ref 0–0.12)
BILIRUB SERPL-MCNC: 1 MG/DL (ref 0.1–1.5)
BUN SERPL-MCNC: 13 MG/DL (ref 8–22)
CALCIUM SERPL-MCNC: 10.3 MG/DL (ref 8.5–10.5)
CHLORIDE SERPL-SCNC: 101 MMOL/L (ref 96–112)
CHOLEST SERPL-MCNC: 182 MG/DL (ref 100–199)
CO2 SERPL-SCNC: 30 MMOL/L (ref 20–33)
CREAT SERPL-MCNC: 1.19 MG/DL (ref 0.5–1.4)
EOSINOPHIL # BLD AUTO: 0.09 K/UL (ref 0–0.51)
EOSINOPHIL NFR BLD: 1.3 % (ref 0–6.9)
ERYTHROCYTE [DISTWIDTH] IN BLOOD BY AUTOMATED COUNT: 47.1 FL (ref 35.9–50)
EST. AVERAGE GLUCOSE BLD GHB EST-MCNC: 148 MG/DL
GLOBULIN SER CALC-MCNC: 3.1 G/DL (ref 1.9–3.5)
GLUCOSE SERPL-MCNC: 151 MG/DL (ref 65–99)
HBA1C MFR BLD: 6.8 % (ref 0–5.6)
HCT VFR BLD AUTO: 50 % (ref 42–52)
HDLC SERPL-MCNC: 53 MG/DL
HGB BLD-MCNC: 16.9 G/DL (ref 14–18)
IMM GRANULOCYTES # BLD AUTO: 0.04 K/UL (ref 0–0.11)
IMM GRANULOCYTES NFR BLD AUTO: 0.6 % (ref 0–0.9)
LDLC SERPL CALC-MCNC: 91 MG/DL
LYMPHOCYTES # BLD AUTO: 0.93 K/UL (ref 1–4.8)
LYMPHOCYTES NFR BLD: 13.9 % (ref 22–41)
MCH RBC QN AUTO: 34.1 PG (ref 27–33)
MCHC RBC AUTO-ENTMCNC: 33.8 G/DL (ref 33.7–35.3)
MCV RBC AUTO: 100.8 FL (ref 81.4–97.8)
MONOCYTES # BLD AUTO: 0.51 K/UL (ref 0–0.85)
MONOCYTES NFR BLD AUTO: 7.6 % (ref 0–13.4)
NEUTROPHILS # BLD AUTO: 5.08 K/UL (ref 1.82–7.42)
NEUTROPHILS NFR BLD: 75.7 % (ref 44–72)
NRBC # BLD AUTO: 0 K/UL
NRBC BLD-RTO: 0 /100 WBC
PLATELET # BLD AUTO: 156 K/UL (ref 164–446)
PMV BLD AUTO: 11.9 FL (ref 9–12.9)
POTASSIUM SERPL-SCNC: 5 MMOL/L (ref 3.6–5.5)
PROT SERPL-MCNC: 7.8 G/DL (ref 6–8.2)
RBC # BLD AUTO: 4.96 M/UL (ref 4.7–6.1)
SODIUM SERPL-SCNC: 138 MMOL/L (ref 135–145)
TRIGL SERPL-MCNC: 191 MG/DL (ref 0–149)
TSH SERPL DL<=0.005 MIU/L-ACNC: 1.94 UIU/ML (ref 0.38–5.33)
WBC # BLD AUTO: 6.7 K/UL (ref 4.8–10.8)

## 2018-04-18 PROCEDURE — 85025 COMPLETE CBC W/AUTO DIFF WBC: CPT

## 2018-04-18 PROCEDURE — 80053 COMPREHEN METABOLIC PANEL: CPT

## 2018-04-18 PROCEDURE — 80061 LIPID PANEL: CPT

## 2018-04-18 PROCEDURE — 36415 COLL VENOUS BLD VENIPUNCTURE: CPT

## 2018-04-18 PROCEDURE — 84443 ASSAY THYROID STIM HORMONE: CPT

## 2018-04-18 PROCEDURE — 83036 HEMOGLOBIN GLYCOSYLATED A1C: CPT

## 2018-04-19 ENCOUNTER — TELEPHONE (OUTPATIENT)
Dept: MEDICAL GROUP | Facility: MEDICAL CENTER | Age: 68
End: 2018-04-19

## 2018-04-19 NOTE — TELEPHONE ENCOUNTER
Phone Number Called: [phone] (home)       Message: please call office to go over lab results    Left Message for patient to call back: yes

## 2018-04-24 ENCOUNTER — OFFICE VISIT (OUTPATIENT)
Dept: MEDICAL GROUP | Facility: MEDICAL CENTER | Age: 68
End: 2018-04-24
Payer: MEDICARE

## 2018-04-24 VITALS
HEART RATE: 91 BPM | HEIGHT: 73 IN | SYSTOLIC BLOOD PRESSURE: 112 MMHG | BODY MASS INDEX: 24.78 KG/M2 | WEIGHT: 187 LBS | DIASTOLIC BLOOD PRESSURE: 68 MMHG | OXYGEN SATURATION: 96 % | TEMPERATURE: 97.3 F

## 2018-04-24 DIAGNOSIS — E78.2 MIXED HYPERLIPIDEMIA: ICD-10-CM

## 2018-04-24 DIAGNOSIS — I82.412 ACUTE DEEP VEIN THROMBOSIS (DVT) OF FEMORAL VEIN OF LEFT LOWER EXTREMITY (HCC): ICD-10-CM

## 2018-04-24 DIAGNOSIS — I25.5 ISCHEMIC CARDIOMYOPATHY: ICD-10-CM

## 2018-04-24 DIAGNOSIS — I44.7 LEFT BUNDLE BRANCH BLOCK: ICD-10-CM

## 2018-04-24 DIAGNOSIS — I25.10 CORONARY ARTERY DISEASE INVOLVING NATIVE CORONARY ARTERY OF NATIVE HEART WITHOUT ANGINA PECTORIS: ICD-10-CM

## 2018-04-24 DIAGNOSIS — C44.219 BASAL CELL CARCINOMA OF SKIN OF LEFT EAR: ICD-10-CM

## 2018-04-24 DIAGNOSIS — C44.310 BASAL CELL CARCINOMA OF SKIN OF FACE, UNSPECIFIED PART OF FACE: ICD-10-CM

## 2018-04-24 DIAGNOSIS — Z95.810 AICD (AUTOMATIC CARDIOVERTER/DEFIBRILLATOR) PRESENT: ICD-10-CM

## 2018-04-24 DIAGNOSIS — N18.30 CKD (CHRONIC KIDNEY DISEASE) STAGE 3, GFR 30-59 ML/MIN (HCC): ICD-10-CM

## 2018-04-24 DIAGNOSIS — I25.2 OLD ANTERIOR MYOCARDIAL INFARCTION: ICD-10-CM

## 2018-04-24 DIAGNOSIS — Z00.00 MEDICARE ANNUAL WELLNESS VISIT, SUBSEQUENT: ICD-10-CM

## 2018-04-24 DIAGNOSIS — Z79.01 CHRONIC ANTICOAGULATION: ICD-10-CM

## 2018-04-24 DIAGNOSIS — I50.22 CHRONIC SYSTOLIC CONGESTIVE HEART FAILURE, NYHA CLASS 2 (HCC): ICD-10-CM

## 2018-04-24 DIAGNOSIS — D69.6 THROMBOCYTOPENIA (HCC): ICD-10-CM

## 2018-04-24 DIAGNOSIS — D75.89 MACROCYTOSIS WITHOUT ANEMIA: ICD-10-CM

## 2018-04-24 DIAGNOSIS — E11.65 TYPE 2 DIABETES MELLITUS WITH HYPERGLYCEMIA, WITHOUT LONG-TERM CURRENT USE OF INSULIN (HCC): ICD-10-CM

## 2018-04-24 DIAGNOSIS — Z95.1 S/P CABG X 4: ICD-10-CM

## 2018-04-24 PROBLEM — J18.9 COMMUNITY ACQUIRED PNEUMONIA OF RIGHT MIDDLE LOBE OF LUNG: Status: RESOLVED | Noted: 2017-11-28 | Resolved: 2018-04-24

## 2018-04-24 PROBLEM — R49.9 HOARSENESS OR CHANGING VOICE: Status: RESOLVED | Noted: 2017-07-28 | Resolved: 2018-04-24

## 2018-04-24 PROBLEM — R42 DIZZINESS: Status: RESOLVED | Noted: 2017-07-28 | Resolved: 2018-04-24

## 2018-04-24 PROCEDURE — G0439 PPPS, SUBSEQ VISIT: HCPCS | Performed by: FAMILY MEDICINE

## 2018-04-24 ASSESSMENT — PATIENT HEALTH QUESTIONNAIRE - PHQ9: CLINICAL INTERPRETATION OF PHQ2 SCORE: 0

## 2018-04-24 ASSESSMENT — ACTIVITIES OF DAILY LIVING (ADL): BATHING_REQUIRES_ASSISTANCE: 0

## 2018-04-24 NOTE — PROGRESS NOTES
Chief Complaint   Patient presents with   • Annual Wellness Visit         HPI:  Jaime is a 67 y.o. here for Medicare Annual Wellness Visit        Patient Active Problem List    Diagnosis Date Noted   • Hyperlipidemia 01/18/2017     Priority: High   • S/P CABG x 4 06/22/2016     Priority: High   • Left bundle branch block 06/22/2016     Priority: High   • AICD (automatic cardioverter/defibrillator) present 03/11/2014     Priority: High   • Ischemic cardiomyopathy 03/11/2014     Priority: High   • History of pulmonary embolus (PE) 02/27/2014     Priority: High   • CAD (coronary artery disease) 12/28/2000     Priority: High   • Type 2 diabetes mellitus with hyperglycemia, without long-term current use of insulin (CMS-HCC) 06/22/2016     Priority: Medium   • Chronic systolic congestive heart failure, NYHA class 2 (CMS-HCC) 06/22/2016     Priority: Medium   • CKD (chronic kidney disease) stage 3, GFR 30-59 ml/min 02/24/2014     Priority: Medium   • Thrombocytopenia (CMS-HCC) 02/23/2014     Priority: Medium   • Macrocytosis without anemia 04/24/2018   • Chronic anticoagulation 02/01/2018   • Basal cell carcinoma of skin of left ear 02/01/2018   • Acute deep vein thrombosis (DVT) of femoral vein of left lower extremity (CMS-HCC) 01/04/2018   • Basal cell carcinoma of skin of face 11/28/2017   • Old anterior myocardial infarction 06/22/2016       Current Outpatient Prescriptions   Medication Sig Dispense Refill   • rivaroxaban (XARELTO) 20 MG Tab tablet Take 1 Tab by mouth with dinner. 90 Tab 3   • metFORMIN (GLUCOPHAGE) 500 MG Tab One tab po in am and 2 tab in pm 270 Tab 3   • triamcinolone acetonide (KENALOG) 0.1 % Ointment Apply 1 Application to affected area(s) 2 times a day. Area on the back 60 g 2   • furosemide (LASIX) 40 MG Tab TAKE 1 TABLET BY MOUTH DAILY 90 Tab 3   • lisinopril (PRINIVIL) 5 MG Tab Take 1 Tab by mouth 2 times a day. 180 Tab 3   • spironolactone (ALDACTONE) 25 MG Tab Take 0.5 Tabs by mouth every  day. 45 Tab 3   • lovastatin (MEVACOR) 10 MG tablet Take 1 Tab by mouth every evening. 90 Tab 3   • potassium chloride SA (KDUR) 20 MEQ Tab CR Take 1 Tab by mouth every day. 90 Tab 3   • metoprolol SR (TOPROL XL) 100 MG TABLET SR 24 HR TAKE 1 TABLET BY MOUTH EVERY DAY 90 Tab 3   • Blood Glucose Monitoring Suppl SUPPLIES Misc Test strips order: Test strips for Marina Contour meter. Sig: use BID and prn ssx high or low sugar, max 4/day 100 Each 3   • Blood Glucose Monitoring Suppl Device Meter: Dispense Marina Contour meter. Sig. Use as directed for blood sugar monitoring. 1 Device 1   • Lancets Misc Lancets order: Lancets for Marina Contour meter. Sig: use BID and prn ssx high or low sugar. Max 4/d 100 Each 3   • Magnesium 500 MG CAPS Take 500 mg by mouth every day.       No current facility-administered medications for this visit.         Patient is taking medications as noted in medication list.  Current supplements as per medication list.     Allergies: Bloodless and Pcn [penicillins]    Current social contact/activities: Pt reads, walks and spends time with wiffe.    Patient's perception of their health: Pt feels stronger every day    Is patient current with immunizations? No, due for ZOSTAVAX (Shingles). Patient is interested in receiving NONE today.    He  reports that he quit smoking about 17 years ago. His smoking use included Cigarettes. He has a 32.00 pack-year smoking history. He has never used smokeless tobacco. He reports that he drinks about 4.2 oz of alcohol per week . He reports that he uses drugs, including Marijuana.  Counseling given: Not Answered        DPA/Advanced directive: Patient has Advanced Directive on file.     ROS:    Gait: Uses no assistive device   Ostomy: No   Other tubes: No   Amputations: No   Chronic oxygen use: No   Last eye exam: 2/2018  In clinic    Wears hearing aids: No   : Denies any urinary leakage during the last 6 months    DIABETES    Has patient ever had diabetes  education? Yes, and is NOT interested in more at this time.            Depression Screening  Little interest or pleasure in doing things?  0 - not at all  Feeling down, depressed, or hopeless? 0 - not at all  Patient Health Questionnaire Score: 0    If depressive symptoms identified deferred to follow up visit unless specifically addressed in assessment and plan.    Interpretation of PHQ-9 Total Score   Score Severity   1-4 No Depression   5-9 Mild Depression   10-14 Moderate Depression   15-19 Moderately Severe Depression   20-27 Severe Depression    Screening for Cognitive Impairment  Three Minute Recall (apple, watch, cassandra)  3/3    Draw clock face with all 12 numbers set to the hand to show 10 minutes past 11 o'clock  1 5/5  If cognitive concerns identified, deferred for follow up unless specifically addressed in assessment and plan.    Fall Risk Assessment  Has the patient had two or more falls in the last year or any fall with injury in the last year?  No  If fall risk identified, deferred for follow up unless specifically addressed in assessment and plan.    Safety Assessment  Throw rugs on floor.  Yes  Handrails on all stairs.  Yes  Good lighting in all hallways.  Yes  Difficulty hearing.  No  Patient counseled about all safety risks that were identified.    Functional Assessment ADLs  Are there any barriers preventing you from cooking for yourself or meeting nutritional needs?  No.    Are there any barriers preventing you from driving safely or obtaining transportation?  No.    Are there any barriers preventing you from using a telephone or calling for help?  No.    Are there any barriers preventing you from shopping?  No.    Are there any barriers preventing you from taking care of your own finances?  No.    Are there any barriers preventing you from managing your medications?  No.    Are there any barriers preventing you from showering, bathing or dressing yourself?  No.    Are you currently engaging any  exercise or physical activity?  Yes.  Pt walks and works out at home.     Health Maintenance Summary                Annual Wellness Visit Overdue 1950     IMM DTaP/Tdap/Td Vaccine Overdue 10/17/2011      Done 10/16/2011 Imm Admin: TD Vaccine    URINE ACR / MICROALBUMIN Next Due 7/28/2018      Done 7/28/2017 MICROALBUMIN CREAT RATIO URINE    A1C SCREENING Next Due 10/18/2018      Done 4/18/2018 HEMOGLOBIN A1C      Patient has more history with this topic...    DIABETES MONOFILAMENT / LE EXAM Next Due 11/29/2018      Done 11/29/2017 AMB DIABETIC MONOFILAMENT LOWER EXTREMITY EXAM    RETINAL SCREENING Next Due 2/7/2019      Done 2/7/2018 POCT RETINAL EYE EXAM    FASTING LIPID PROFILE Next Due 4/18/2019      Done 4/18/2018 LIPID PROFILE      Patient has more history with this topic...    SERUM CREATININE Next Due 4/18/2019      Done 4/18/2018 COMP METABOLIC PANEL      Patient has more history with this topic...    COLONOSCOPY Next Due 4/22/2024      Patient Declined 4/22/2014           Patient Care Team:  Angelica Stephenson M.D. as PCP - General (Family Medicine)  [facility]    Social History   Substance Use Topics   • Smoking status: Former Smoker     Packs/day: 1.00     Years: 32.00     Types: Cigarettes     Quit date: 12/28/2000   • Smokeless tobacco: Never Used   • Alcohol use 4.2 oz/week     7 Glasses of wine per week      Comment: 7 per week     Family History   Problem Relation Age of Onset   • Diabetes Mother    • Heart Disease Mother    • Diabetes Sister    • Alcohol/Drug Brother      He  has a past medical history of Acute anterior myocardial infarction (CMS-[facility]) (12/28/2000); AICD (automatic cardioverter/defibrillator) present (2003/2008/2015); Allergy; CAD (coronary artery disease) (12/28/2000); Congestive heart failure (CMS-[facility]) (12/28/2000); Dental disorder; GERD (gastroesophageal reflux disease); Ischemic cardiomyopathy (February 2014); Left bundle branch block ( ); Pneumonia (2014);  "Pulmonary embolism (CMS-HCC) (March 2014); and Type 2 diabetes mellitus without complication (CMS-HCC) ( ).   Past Surgical History:   Procedure Laterality Date   • RECOVERY  11/3/2015    Procedure: CATH LAB-STEPHENSON-ICD GENERATOR CHANGE 42180- EUNICE T82.111A-MEDTRONIC BLOODLESS;  Surgeon: Recoveryonly Surgery;  [place] [facility] [place]  [place]:    • AICD BATTERY CHANGE  November 2015    Generator replacement with Medtronic Evera S VR [id number] implanted by Dr. Marcus Stephenson.   • AICD IMPLANT  January 2008    Medtronic Virtuoso VR [id number] implanted by Dr. Stephenson.   • OTHER CARDIAC SURGERY  12/2000    CABG x 4   • APPENDECTOMY  1969   • MULTIPLE CORONARY ARTERY BYPASS           Exam:   Blood pressure 112/68, pulse 91, temperature 36.3 °C (97.3 °F), height 1.854 m (6' 1\"), weight 84.8 kg (187 lb), SpO2 96 %. Body mass index is 24.67 kg/m².    Hearing good.    Dentition fair  Alert, oriented in no acute distress.  Eye contact is good, speech goal directed, affect calm  Neck: Supple, no adenopathy  Lungs: Clear to auscultation bilaterally  CV: Regular rate and rhythm without murmur    Assessment and Plan. The following treatment and monitoring plan is recommended:    1. Medicare annual wellness visit, subsequent  Routine anticipatory guidance. No special services needed at this time  - Initial Wellness Visit - Includes PPPS ()    2. Macrocytosis without anemia  Most likely secondary to his alcohol intake. Recommend decreasing to a maximum of 2 drinks a day. Supplement with B12 and folate  - Initial Wellness Visit - Includes PPPS ()    3. Acute deep vein thrombosis (DVT) of femoral vein of left lower extremity (CMS-HCC)  Continue current anticoagulation  - Initial Wellness Visit - Includes PPPS ()    4. AICD (automatic cardioverter/defibrillator) present  This is a chronic medical condition that is currently stable  Follow up with cardiology as scheduled  - Initial Wellness Visit - Includes PPPS " ()    5. Basal cell carcinoma of skin of face, unspecified part of face  Status post excision. Follow-up with dermatology  - Initial Wellness Visit - Includes PPPS ()    6. Basal cell carcinoma of skin of left ear  Status post excision. Follow-up with dermatology  - Initial Wellness Visit - Includes PPPS ()    7. Coronary artery disease involving native coronary artery of native heart without angina pectoris  This is a chronic medical condition that is currently stable    - Initial Wellness Visit - Includes PPPS ()    8. Chronic anticoagulation  This is a chronic medical condition that is currently stable  - Initial Wellness Visit - Includes PPPS ()    9. Chronic systolic congestive heart failure, NYHA class 2 (CMS-HCC)  This is a chronic medical condition that is currently stable  - Initial Wellness Visit - Includes PPPS ()    10. CKD (chronic kidney disease) stage 3, GFR 30-59 ml/min  This is a chronic medical condition that is currently stable  Avoid NSAIDs  - Initial Wellness Visit - Includes PPPS ()    11. Mixed hyperlipidemia  This is a chronic medical condition that is currently stable  Continue current medication  - Initial Wellness Visit - Includes PPPS ()    12. Ischemic cardiomyopathy  This is a chronic medical condition that is currently stable  Follow-up with cardiology as scheduled  - Initial Wellness Visit - Includes PPPS ()    13. Left bundle branch block  This is a chronic medical condition that is currently stable  - Initial Wellness Visit - Includes PPPS ()    14. Old anterior myocardial infarction  This is a chronic medical condition that is currently stable  - Initial Wellness Visit - Includes PPPS ()    15. S/P CABG x 4  This is a chronic medical condition that is currently stable    - Initial Wellness Visit - Includes PPPS ()    16. Thrombocytopenia (CMS-HCC)  This is a chronic medical condition that is currently stable  Continue to  monitor  - Initial Wellness Visit - Includes PPPS ()    17. Type 2 diabetes mellitus with hyperglycemia, without long-term current use of insulin (CMS-[facility])  This is a chronic medical condition that is currently stable  Good control. Continue current medications  - Initial Wellness Visit - Includes PPPS ()    Services suggested: No services needed at this time  Health Care Screening: Age-appropriate preventive services recommended by USPTF and ACIP covered by Medicare were discussed today. Services ordered if indicated and agreed upon by the patient.  Referrals offered: PT/OT/Nutrition counseling/Behavioral Health/Smoking cessation as per orders if indicated.    Discussion today about general wellness and lifestyle habits:    · Prevent falls and reduce trip hazards; Cautioned about securing or removing rugs.  · Have a working fire alarm and carbon monoxide detector;   · Engage in regular physical activity and social activities     Follow-up: Return in about 6 months (around 10/24/2018).

## 2018-05-07 ENCOUNTER — OFFICE VISIT (OUTPATIENT)
Dept: DERMATOLOGY | Facility: IMAGING CENTER | Age: 68
End: 2018-05-07
Payer: MEDICARE

## 2018-05-07 DIAGNOSIS — L57.0 ACTINIC KERATOSES: ICD-10-CM

## 2018-05-07 DIAGNOSIS — Z85.828 HISTORY OF BASAL CELL CARCINOMA: ICD-10-CM

## 2018-05-07 DIAGNOSIS — D22.9 MULTIPLE NEVI: ICD-10-CM

## 2018-05-07 PROCEDURE — 99212 OFFICE O/P EST SF 10 MIN: CPT | Mod: 25 | Performed by: DERMATOLOGY

## 2018-05-07 PROCEDURE — 17000 DESTRUCT PREMALG LESION: CPT | Performed by: DERMATOLOGY

## 2018-05-07 ASSESSMENT — ENCOUNTER SYMPTOMS
WEIGHT LOSS: 0
CHILLS: 0
FEVER: 0

## 2018-05-07 NOTE — PROGRESS NOTES
Dermatology Return Patient Visit    Chief Complaint   Patient presents with   • Follow-Up       Subjective:     HPI:   Jaime Lee is a 67 y.o. male presenting for    Follow up, repeat full skin exam  S/p Mohs BCC on right upper nasolabial fold (Jose), C&D - left forearm    Unsure of any enw/chanigng lesions today  Notes rough skin over left ear helix  No itching/bleeding/pain  Applies aloe to the area, minimal improvement    History of skin cancer: Yes, BCC x 2 01/2018  History of precancers/actinic keratoses: Yes  History of blistering/severe sunburns:Yes, Details: youth (lived in [place])  Family history of skin cancer:No        Past Medical History:   Diagnosis Date   • Acute anterior myocardial infarction (HCC) 12/28/2000    PCI attempted by Dr. Malloy but unsuccessful   • AICD (automatic cardioverter/defibrillator) present 2003/2008/2015    Defibrillator is a Medtronic model [id number], serial #[id number], implanted Dr. Stephenson November 2015. The ventricular lead is a Medtronic model #6947-65, lead serial #[id number], implant 7/2/2003   • Allergy     within 2 years   • CAD (coronary artery disease) 12/28/2000    CABG x 4 (LIMA to LAD, rSVG to first diagonal, left circumflex and PDA).2000: CABG x 4 (LIMA to LAD, rSVG to first diagonal, left circumflex and PDA).  2003: LIMA and circumflex grafts patent, diagonal and PDA grafts closed.   • Congestive heart failure (HCC) 12/28/2000   • Dental disorder     Upper partial   • GERD (gastroesophageal reflux disease)    • Ischemic cardiomyopathy February 2014    Echocardiogram with severely dilated LV, LVEF 15-20%   • Left bundle branch block     • Pneumonia 2014   • Pulmonary embolism (HCC) March 2014   • Type 2 diabetes mellitus without complication ([facility])      Diet controlled, followed by Dr. Walls       Current Outpatient Prescriptions on File Prior to Visit   Medication Sig Dispense Refill   • rivaroxaban (XARELTO) 20 MG Tab tablet Take 1 Tab by  mouth with dinner. 90 Tab 3   • metFORMIN (GLUCOPHAGE) 500 MG Tab One tab po in am and 2 tab in pm 270 Tab 3   • triamcinolone acetonide (KENALOG) 0.1 % Ointment Apply 1 Application to affected area(s) 2 times a day. Area on the back 60 g 2   • furosemide (LASIX) 40 MG Tab TAKE 1 TABLET BY MOUTH DAILY 90 Tab 3   • lisinopril (PRINIVIL) 5 MG Tab Take 1 Tab by mouth 2 times a day. 180 Tab 3   • spironolactone (ALDACTONE) 25 MG Tab Take 0.5 Tabs by mouth every day. 45 Tab 3   • lovastatin (MEVACOR) 10 MG tablet Take 1 Tab by mouth every evening. 90 Tab 3   • potassium chloride SA (KDUR) 20 MEQ Tab CR Take 1 Tab by mouth every day. 90 Tab 3   • metoprolol SR (TOPROL XL) 100 MG TABLET SR 24 HR TAKE 1 TABLET BY MOUTH EVERY DAY 90 Tab 3   • Blood Glucose Monitoring Suppl SUPPLIES Misc Test strips order: Test strips for Marina Contour meter. Sig: use BID and prn ssx high or low sugar, max 4/day 100 Each 3   • Blood Glucose Monitoring Suppl Device Meter: Dispense Marina Contour meter. Sig. Use as directed for blood sugar monitoring. 1 Device 1   • Lancets Misc Lancets order: Lancets for Marina Contour meter. Sig: use BID and prn ssx high or low sugar. Max 4/d 100 Each 3   • Magnesium 500 MG CAPS Take 500 mg by mouth every day.       No current facility-administered medications on file prior to visit.        Allergies   Allergen Reactions   • Bloodless    • Pcn [Penicillins] Anaphylaxis       Family History   Problem Relation Age of Onset   • Diabetes Mother    • Heart Disease Mother    • Diabetes Sister    • Alcohol/Drug Brother        Social History     Social History   • Marital status:      Spouse name: N/A   • Number of children: N/A   • Years of education: N/A     Occupational History   • Not on file.     Social History Main Topics   • Smoking status: Former Smoker     Packs/day: 1.00     Years: 32.00     Types: Cigarettes     Quit date: 12/28/2000   • Smokeless tobacco: Never Used   • Alcohol use 4.2 oz/week     7  Glasses of wine per week      Comment: 7 per week   • Drug use: Yes     Types: Marijuana      Comment: CBD, THC, edibals    • Sexual activity: No     Other Topics Concern   • Not on file     Social History Narrative   • No narrative on file       Review of Systems   Constitutional: Negative for chills, fever and weight loss.   Skin: Negative for itching and rash.   All other systems reviewed and are negative.       Objective:     A full mucocutaneous exam was completed including: scalp, hair, ears, face, eyelids, conjunctiva, lips, gums/tongue/oropharynx, neck, chest, abdomen, back, left and right upper extremities (including hands/digits and fingernails), left and right lower extremities (including feet/toes, toenails), buttocks, excluding external genitalia (patient refusal) with the following pertinent findings listed below. Remaining above-listed examined areas within normal limits / negative for rashes or lesions.    There were no vitals taken for this visit.    Physical Exam   Constitutional: He is oriented to person, place, and time and well-developed, well-nourished, and in no distress.   HENT:   Head: Normocephalic and atraumatic.       Right Ear: External ear normal.   Left Ear: External ear normal.   Nose: Nose normal.   Mouth/Throat: Oropharynx is clear and moist.   Eyes: Conjunctivae and lids are normal.   Neck: Normal range of motion. Neck supple.   Cardiovascular: Intact distal pulses.    Pulmonary/Chest: Effort normal.   Neurological: He is alert and oriented to person, place, and time.   Skin: Skin is warm and dry.        Psychiatric: Mood and affect normal.       DATA: 1/17/18 skin bx  FINAL DIAGNOSIS:    A. Right nasolabial fold:         Basal cell carcinoma, nodular type, present on resection margin.  B. Left ear helix:         Actinically damaged skin with signs of excoriation and mild          atypia, dermal blood vessel ectasia, focal fibrinoid necrosis,          red blood cell  extravasation.         No malignancy identified.         See comment.  C. Left forearm:         Focal, superficial basal cell carcinoma, focally present on          margin.  D. Left lower back:         Benign pigmented seborrheic keratosis.    Assessment and Plan:     1. Multiple nevi  - Benign-appearing nature of lesions discussed. Advised to return to clinic for any new or concerning changes.  - ABCDE's of melanoma discussed    2. Actinic keratosis - left ear helix  CRYOTHERAPY:  Risks (including, but not limited to: hypo or hyperpigmentation, redness, blister, blood blister, recurrence, need for further treatment, infection, scar) and benefits of cryotherapy discussed. Patient verbally agreed to proceed with treatment. 2 cryotherapy freeze thaw cycles of 10 seconds were applied to 1 lesion on left ear helix with cryac. Patient tolerated procedure well. Aftercare instructions given.    3. History of basal cell carcinoma  Skin cancer education  - discussed importance of sun protective clothing, eyewear  - discussed importance of daily use of broad spectrum sunscreen with SPF 30 or greater, as well as need for reapplication ~every 2 hours when exposed to UVR  - discussed importance of regular self-exams, ideally once per month, every 4-6 months exams in clinic  - ABCDE's of melanoma discussed  - patient to bring any new or concerning lesions to my attention    Followup: Return for LIZ, 4 months.    Bianka Ahumada M.D.

## 2018-07-23 ENCOUNTER — ANTICOAGULATION VISIT (OUTPATIENT)
Dept: MEDICAL GROUP | Facility: MEDICAL CENTER | Age: 68
End: 2018-07-23
Payer: MEDICARE

## 2018-07-23 VITALS — SYSTOLIC BLOOD PRESSURE: 96 MMHG | DIASTOLIC BLOOD PRESSURE: 56 MMHG | HEART RATE: 82 BPM

## 2018-07-23 DIAGNOSIS — Z79.01 CHRONIC ANTICOAGULATION: Primary | ICD-10-CM

## 2018-07-23 DIAGNOSIS — I82.412 ACUTE DEEP VEIN THROMBOSIS (DVT) OF FEMORAL VEIN OF LEFT LOWER EXTREMITY (HCC): ICD-10-CM

## 2018-07-23 PROCEDURE — 99211 OFF/OP EST MAY X REQ PHY/QHP: CPT | Performed by: FAMILY MEDICINE

## 2018-07-23 NOTE — PROGRESS NOTES
OP Anticoagulation Service Note       Target end date:indefinite     Indication: recurrent VTE     Drug: Xarelto 20 mg once daily         Health Status Since Last Assessment   Patient denies any new relevant medical problems, ED visits or hospitalizations   Patient denies any embolic events (stroke/tia/systemic embolism)    Adherence with DOAC Therapy   Pt occasionally misses doses, reports about 4 missed doses in the past 6 months    Bleeding Risk Assessment     Denies Epistaxis   Pt denies any excessive or unusual bleeding/hematomas.    Pt denies any GI bleeds or hematemesis.     Pt denies any concerning daily headache or sub dural hematoma symptoms.   Pt denies any hematuria or abnormal vaginal bleeding.   Latest Hemoglobin 16.9   ETOH overuse 1.5 - 2 drinks per day     Creatinine Clearance/Renal Function     Latest ClCr 71.6 ml/min based on abw     Drug Interactions   Platelets: NONE   ASA/other antiplatelets NONE   NSAID NONE   Other drug interactions NONE   Verified no anticonvulsant or azole therapy, education provided for future use.     Examination   Blood Pressure WNL   Symptomatic hypotension Pt reports feeling dizzy often, chronic problem   Significant gait impairment/imbalance/high risk for falls? NONE   Any falls or accidents since last visit? none     Final Assessment and Recommendations:   Patient appears stable from the anticoagulation staindpoint.    Medication is affordable   Reviewed with pt major s/s of bleeding and to seek immediate medical attention  for any bad falls, accidents or if they hit his head    Benefits of continued DOAC therapy outweigh risks for this patient   Recommend pt continue with current DOAC therapy    Reminded pt not to stop anticoagulation with out speaking with a medical   He reports he may be stopping anticoagulation per his PCP. Pt has hx of PE 2014, DVT in 2018. Pt has follow up with PCP to discuss anticoagulation     Other Actions: cmp/ cbc hemogram ordered prior  to next visit    Follow up:   Will follow up with patient via clinic in 6 months.       Jade Macias, Pharm D

## 2018-07-27 ENCOUNTER — PATIENT OUTREACH (OUTPATIENT)
Dept: HEALTH INFORMATION MANAGEMENT | Facility: OTHER | Age: 68
End: 2018-07-27

## 2018-07-27 NOTE — PROGRESS NOTES
Outcome: Left Message    Please transfer to Patient Outreach Team at [phone] when patient returns call.    WebIZ Checked & Epic Updated:  yes    [facility]Connect Verified: yes    Attempt # 1

## 2018-07-27 NOTE — PROGRESS NOTES
Attempt #:final    WebIZ Checked & Epic Updated: Yes  HealthConnect Verified: yes  Verify PCP: yes    Communication Preference Obtained: yes    Diabetes Visit Scheduling  Scheduling Status:Not Scheduled. Patient states they are not interested      Care Gap Scheduling (Attempt to Schedule EACH Overdue Care Gap!)    Health Maintenance Due   Topic Date Due   • IMM ZOSTER VACCINES (1 of 2) 08/23/2000   • IMM DTaP/Tdap/Td Vaccine (1 - Tdap) 10/17/2011        Patient was directed to Health and Wellness Website: no     - Patient declines Immunizations: SHINGRIX (Shingles).    Screened for Food Pantry Prescription? yes  Spectrawatt Activation: already active  Spectraw[employer] Rosalind: no  Virtual Visits: no  Opt In to Text Messages: yes    -TP

## 2018-07-30 ENCOUNTER — NON-PROVIDER VISIT (OUTPATIENT)
Dept: CARDIOLOGY | Facility: MEDICAL CENTER | Age: 68
End: 2018-07-30
Payer: MEDICARE

## 2018-07-30 ENCOUNTER — OFFICE VISIT (OUTPATIENT)
Dept: CARDIOLOGY | Facility: MEDICAL CENTER | Age: 68
End: 2018-07-30
Payer: MEDICARE

## 2018-07-30 VITALS
WEIGHT: 179 LBS | HEART RATE: 80 BPM | DIASTOLIC BLOOD PRESSURE: 70 MMHG | SYSTOLIC BLOOD PRESSURE: 118 MMHG | BODY MASS INDEX: 24.24 KG/M2 | HEART RATE: 80 BPM | WEIGHT: 179 LBS | HEIGHT: 72 IN | SYSTOLIC BLOOD PRESSURE: 118 MMHG | HEIGHT: 73 IN | DIASTOLIC BLOOD PRESSURE: 70 MMHG | BODY MASS INDEX: 23.72 KG/M2

## 2018-07-30 DIAGNOSIS — E11.65 TYPE 2 DIABETES MELLITUS WITH HYPERGLYCEMIA, WITHOUT LONG-TERM CURRENT USE OF INSULIN (HCC): ICD-10-CM

## 2018-07-30 DIAGNOSIS — Z79.01 CHRONIC ANTICOAGULATION: ICD-10-CM

## 2018-07-30 DIAGNOSIS — Z95.810 AICD (AUTOMATIC CARDIOVERTER/DEFIBRILLATOR) PRESENT: ICD-10-CM

## 2018-07-30 DIAGNOSIS — Z95.1 S/P CABG X 4: ICD-10-CM

## 2018-07-30 DIAGNOSIS — I25.10 CORONARY ARTERY DISEASE INVOLVING NATIVE CORONARY ARTERY OF NATIVE HEART WITHOUT ANGINA PECTORIS: ICD-10-CM

## 2018-07-30 DIAGNOSIS — I25.5 ISCHEMIC CARDIOMYOPATHY: ICD-10-CM

## 2018-07-30 DIAGNOSIS — I82.412 ACUTE DEEP VEIN THROMBOSIS (DVT) OF FEMORAL VEIN OF LEFT LOWER EXTREMITY (HCC): ICD-10-CM

## 2018-07-30 DIAGNOSIS — N18.30 CKD (CHRONIC KIDNEY DISEASE) STAGE 3, GFR 30-59 ML/MIN (HCC): ICD-10-CM

## 2018-07-30 PROCEDURE — 99214 OFFICE O/P EST MOD 30 MIN: CPT | Performed by: NURSE PRACTITIONER

## 2018-07-30 PROCEDURE — 93282 PRGRMG EVAL IMPLANTABLE DFB: CPT | Performed by: NURSE PRACTITIONER

## 2018-07-30 ASSESSMENT — ENCOUNTER SYMPTOMS
DIZZINESS: 0
LOSS OF CONSCIOUSNESS: 0
NAUSEA: 0
PALPITATIONS: 0
CHILLS: 0
ORTHOPNEA: 0
INSOMNIA: 0
ABDOMINAL PAIN: 0
PND: 0
HEADACHES: 0
DEPRESSION: 0
BRUISES/BLEEDS EASILY: 0
MYALGIAS: 0
FEVER: 0
SHORTNESS OF BREATH: 0

## 2018-07-30 NOTE — PROGRESS NOTES
Chief Complaint   Patient presents with   • Follow-Up   • AICD Check/Dysfunction   • Coronary Artery Disease   • Cardiomyopathy (Ischemic)   • Deep Vein Thrombosis   • Anticoagulation       Subjective:   Jaime Lee is a 67 y.o. male who presents today for six month follow-up for AICD check for CAD/ischemic cardiomyopathy, hyperlipidemia, and diabetes.    Jaime is a 67 year old male with history of CAD/ischemic cardiomyopathy status post remote CABG x 4, with LVEF 20% with AICD since 2003, and last generator replacement in 2015; he also has hyperlipidemia and diabetes.     Since the last visit, he was diagnosed with a left LE DVTs and is taking Xarelto daily. Repeat LE ultrasound 3 months later showed DVTs still present. He otherwise feels well: no chest pain, pressure or discomfort; no palpitations; no shortness of breath, orthopnea or PND. No device therapy. Mild lightheadedness but no syncope; mild L LE edema, due to DVTs. BP is occasionally on the lower side.      Past Medical History:   Diagnosis Date   • Acute anterior myocardial infarction (HCC) 12/28/2000    PCI attempted by Dr. Malloy but unsuccessful   • AICD (automatic cardioverter/defibrillator) present 2003/2008/2015    Defibrillator is a Medtronic model IJQP1X2, serial #[id number], implanted Dr. Setphenson November 2015. The ventricular lead is a Medtronic model #6947-65, lead serial #[id number], implant 7/2/2003   • Allergy     within 2 years   • CAD (coronary artery disease) 12/28/2000    CABG x 4 (LIMA to LAD, rSVG to first diagonal, left circumflex and PDA).2000: CABG x 4 (LIMA to LAD, rSVG to first diagonal, left circumflex and PDA).  2003: LIMA and circumflex grafts patent, diagonal and PDA grafts closed.   • Congestive heart failure (HCC) 12/28/2000   • Dental disorder     Upper partial   • GERD (gastroesophageal reflux disease)    • Ischemic cardiomyopathy 08/2017    Echocardiogram with moderately dilated LV, LVEF 20%.   • Left  bundle branch block     • Pneumonia 2014   • Pulmonary embolism (HCC) March 2014   • Type 2 diabetes mellitus without complication (HCC)      Diet controlled, followed by Dr. Walls     Past Surgical History:   Procedure Laterality Date   • RECOVERY  11/3/2015    Procedure: CATH LAB-STEPHENSON-ICD GENERATOR CHANGE 28614- EUNICE T82.111A-MEDTRONIC BLOODLESS;  Surgeon: Recoveryonly Surgery;  Location: SURGERY PRE-POST PROC UNIT [facility] [place];  Service:    • AICD BATTERY CHANGE  November 2015    Generator replacement with Medtronic Evera S VR [id number] implanted by Dr. Marcus Stephenson.   • AICD IMPLANT  January 2008    Medtronic Virtuoso VR [id number] implanted by Dr. Stephenson.   • OTHER CARDIAC SURGERY  12/2000    CABG x 4   • APPENDECTOMY  1969   • MULTIPLE CORONARY ARTERY BYPASS       Family History   Problem Relation Age of Onset   • Diabetes Mother    • Heart Disease Mother    • Diabetes Sister    • Alcohol/Drug Brother      Social History     Social History   • Marital status:      Spouse name: N/A   • Number of children: N/A   • Years of education: N/A     Occupational History   • Not on file.     Social History Main Topics   • Smoking status: Former Smoker     Packs/day: 1.00     Years: 32.00     Types: Cigarettes     Quit date: 12/28/2000   • Smokeless tobacco: Never Used   • Alcohol use 4.2 oz/week     7 Glasses of wine per week      Comment: 7 per week   • Drug use: Yes     Types: Marijuana      Comment: CBD, THC, edibals    • Sexual activity: No     Other Topics Concern   • Not on file     Social History Narrative   • No narrative on file     Allergies   Allergen Reactions   • Bloodless    • Pcn [Penicillins] Anaphylaxis     Outpatient Encounter Prescriptions as of 7/30/2018   Medication Sig Dispense Refill   • rivaroxaban (XARELTO) 20 MG Tab tablet Take 1 Tab by mouth with dinner. 90 Tab 3   • metFORMIN (GLUCOPHAGE) 500 MG Tab One tab po in am and 2 tab in pm 270 Tab 3   • [DISCONTINUED] triamcinolone acetonide (KENALOG) 0.1 %  Ointment Apply 1 Application to affected area(s) 2 times a day. Area on the back 60 g 2   • furosemide (LASIX) 40 MG Tab TAKE 1 TABLET BY MOUTH DAILY 90 Tab 3   • lisinopril (PRINIVIL) 5 MG Tab Take 1 Tab by mouth 2 times a day. 180 Tab 3   • spironolactone (ALDACTONE) 25 MG Tab Take 0.5 Tabs by mouth every day. 45 Tab 3   • lovastatin (MEVACOR) 10 MG tablet Take 1 Tab by mouth every evening. 90 Tab 3   • potassium chloride SA (KDUR) 20 MEQ Tab CR Take 1 Tab by mouth every day. 90 Tab 3   • metoprolol SR (TOPROL XL) 100 MG TABLET SR 24 HR TAKE 1 TABLET BY MOUTH EVERY DAY 90 Tab 3   • [DISCONTINUED] Blood Glucose Monitoring Suppl SUPPLIES Misc Test strips order: Test strips for Marina Contour meter. Sig: use BID and prn ssx high or low sugar, max 4/day 100 Each 3   • [DISCONTINUED] Blood Glucose Monitoring Suppl Device Meter: Dispense Marina Contour meter. Sig. Use as directed for blood sugar monitoring. 1 Device 1   • [DISCONTINUED] Lancets Misc Lancets order: Lancets for Marina Contour meter. Sig: use BID and prn ssx high or low sugar. Max 4/d 100 Each 3   • Magnesium 500 MG CAPS Take 500 mg by mouth every day.       No facility-administered encounter medications on file as of 7/30/2018.      Review of Systems   Constitutional: Negative for chills and fever.   Respiratory: Negative for shortness of breath.    Cardiovascular: Positive for leg swelling. Negative for chest pain, palpitations, orthopnea and PND.        Mostly L lower leg, due to DVTs.   Gastrointestinal: Negative for abdominal pain and nausea.   Musculoskeletal: Negative for myalgias.   Neurological: Negative for dizziness, loss of consciousness and headaches.   Endo/Heme/Allergies: Does not bruise/bleed easily.   Psychiatric/Behavioral: Negative for depression. The patient does not have insomnia.         Objective:   /70   Pulse 80   Ht 1.829 m (6')   Wt 81.2 kg (179 lb)   BMI 24.28 kg/m²     Physical Exam   Constitutional: He is oriented to  person, place, and time. He appears well-developed and well-nourished.   HENT:   Head: Normocephalic.   Eyes: EOM are normal.   Neck: Normal range of motion. Neck supple. No JVD present.   Cardiovascular: Normal rate, regular rhythm and normal heart sounds.    Pulmonary/Chest: Effort normal and breath sounds normal. No respiratory distress. He has no wheezes. He has no rales.   AICD in left chest wall   Abdominal: Soft. Bowel sounds are normal. He exhibits no distension. There is no tenderness.   Musculoskeletal: Normal range of motion. He exhibits edema.   Very trace LE edema in left foot.   Neurological: He is alert and oriented to person, place, and time.   Skin: Skin is warm and dry. No rash noted.   Psychiatric: He has a normal mood and affect.     AICD interrogation shows a 20J shock on 7/19/2018 at 8:22am. He states he does not remember this event, nor any problems that day.    Lab Results   Component Value Date/Time    CHOLSTRLTOT 182 04/18/2018 10:43 AM    LDL 91 04/18/2018 10:43 AM    HDL 53 04/18/2018 10:43 AM    TRIGLYCERIDE 191 (H) 04/18/2018 10:43 AM       Lab Results   Component Value Date/Time    SODIUM 138 04/18/2018 10:43 AM    POTASSIUM 5.0 04/18/2018 10:43 AM    CHLORIDE 101 04/18/2018 10:43 AM    CO2 30 04/18/2018 10:43 AM    GLUCOSE 151 (H) 04/18/2018 10:43 AM    BUN 13 04/18/2018 10:43 AM    CREATININE 1.19 04/18/2018 10:43 AM    CREATININE 1.0 01/08/2008 07:45 AM     Lab Results   Component Value Date/Time    ALKPHOSPHAT 57 04/18/2018 10:43 AM    ASTSGOT 23 04/18/2018 10:43 AM    ALTSGPT 30 04/18/2018 10:43 AM    TBILIRUBIN 1.0 04/18/2018 10:43 AM        Assessment:     1. AICD (automatic cardioverter/defibrillator) present     2. Coronary artery disease involving native coronary artery of native heart without angina pectoris     3. Ischemic cardiomyopathy     4. S/P CABG x 4     5. Acute deep vein thrombosis (DVT) of femoral vein of left lower extremity ([facility])     6. Chronic  anticoagulation     7. Type 2 diabetes mellitus with hyperglycemia, without long-term current use of insulin (HCC)     8. CKD (chronic kidney disease) stage 3, GFR 30-59 ml/min         Medical Decision Making:  Today's Assessment / Status / Plan:     1. CAD/ischemic cardiomyopathy, with AICD, with 1 20J shock on 7/19/2018, asymptomatic. Continue Toprol XL 100mg once daily.  If further events, will warrant referral to EP.    2. Acute DVT in left leg, on Xarelto, tolerating well without any bleeding problems.    3. Diabetes mellitus, treated and stable. Recent HgbA1c was good at 6.8    4. Hyperlipidemia, treated.    5. Chronic kidney disease, mild, stable.    Same medications for now. FU in 6 months for next AICD check with me; FU with Dr. Devine to establish care.    Collaborating MD: VIK Walls

## 2018-07-30 NOTE — LETTER
[facility]   [address]   [address]   [place] [address]  Phone: [phone]  Fax: [phone]              Jaime Lee  1950    Encounter Date: 7/30/2018    CHRIS Rodríguez          PROGRESS NOTE:  Chief Complaint   Patient presents with   • Follow-Up   • AICD Check/Dysfunction   • Coronary Artery Disease   • Cardiomyopathy (Ischemic)   • Deep Vein Thrombosis   • Anticoagulation       Subjective:   Jaime Lee is a 67 y.o. male who presents today for six month follow-up for AICD check for CAD/ischemic cardiomyopathy, hyperlipidemia, and diabetes.    Jaime is a 67 year old male with history of CAD/ischemic cardiomyopathy status post remote CABG x 4, with LVEF 20% with AICD since 2003, and last generator replacement in 2015; he also has hyperlipidemia and diabetes.     Since the last visit, he was diagnosed with a left LE DVTs and is taking Xarelto daily. Repeat LE ultrasound 3 months later showed DVTs still present. He otherwise feels well: no chest pain, pressure or discomfort; no palpitations; no shortness of breath, orthopnea or PND. No device therapy. Mild lightheadedness but no syncope; mild L LE edema, due to DVTs. BP is occasionally on the lower side.      Past Medical History:   Diagnosis Date   • Acute anterior myocardial infarction (HCC) 12/28/2000    PCI attempted by Dr. Malloy but unsuccessful   • AICD (automatic cardioverter/defibrillator) present 2003/2008/2015    Defibrillator is a Medtronic model OAQO5Q0, serial #[id number], implanted Dr. Stephenson November 2015. The ventricular lead is a Medtronic model #6947-65, lead serial #[id number], implant 7/2/2003   • Allergy     within 2 years   • CAD (coronary artery disease) 12/28/2000    CABG x 4 (LIMA to LAD, rSVG to first diagonal, left circumflex and PDA).2000: CABG x 4 (LIMA to LAD, rSVG to first diagonal, left circumflex and PDA).  2003:  LIMA and circumflex grafts patent, diagonal and PDA grafts closed.   • Congestive heart failure (HCC) 12/28/2000   • Dental disorder     Upper partial   • GERD (gastroesophageal reflux disease)    • Ischemic cardiomyopathy 08/2017    Echocardiogram with moderately dilated LV, LVEF 20%.   • Left bundle branch block     • Pneumonia 2014   • Pulmonary embolism (HCC) March 2014   • Type 2 diabetes mellitus without complication ([facility])      Diet controlled, followed by Dr. Walls     Past Surgical History:   Procedure Laterality Date   • RECOVERY  11/3/2015    Procedure: CATH LAB-STEPHENSON-ICD GENERATOR CHANGE 29252- EUNICE T82.111A-MEDTRONIC BLOODLESS;  Surgeon: Recoveryonly Surgery;  Location: SURGERY PRE-POST PROC UNIT [facility];  Service:    • AICD BATTERY CHANGE  November 2015    Generator replacement with Medtronic Evera S VR [id number] implanted by Dr. Marcus Stephenson.   • AICD IMPLANT  January 2008    Medtronic Virtuoso VR [id number] implanted by Dr. Stephenson.   • OTHER CARDIAC SURGERY  12/2000    CABG x 4   • APPENDECTOMY  1969   • MULTIPLE CORONARY ARTERY BYPASS       Family History   Problem Relation Age of Onset   • Diabetes Mother    • Heart Disease Mother    • Diabetes Sister    • Alcohol/Drug Brother      Social History     Social History   • Marital status:      Spouse name: N/A   • Number of children: N/A   • Years of education: N/A     Occupational History   • Not on file.     Social History Main Topics   • Smoking status: Former Smoker     Packs/day: 1.00     Years: 32.00     Types: Cigarettes     Quit date: 12/28/2000   • Smokeless tobacco: Never Used   • Alcohol use 4.2 oz/week     7 Glasses of wine per week      Comment: 7 per week   • Drug use: Yes     Types: Marijuana      Comment: CBD, THC, edibals    • Sexual activity: No     Other Topics Concern   • Not on file     Social History Narrative   • No narrative on file     Allergies   Allergen Reactions   • Bloodless    • Pcn [Penicillins] Anaphylaxis     Outpatient  Encounter Prescriptions as of 7/30/2018   Medication Sig Dispense Refill   • rivaroxaban (XARELTO) 20 MG Tab tablet Take 1 Tab by mouth with dinner. 90 Tab 3   • metFORMIN (GLUCOPHAGE) 500 MG Tab One tab po in am and 2 tab in pm 270 Tab 3   • [DISCONTINUED] triamcinolone acetonide (KENALOG) 0.1 % Ointment Apply 1 Application to affected area(s) 2 times a day. Area on the back 60 g 2   • furosemide (LASIX) 40 MG Tab TAKE 1 TABLET BY MOUTH DAILY 90 Tab 3   • lisinopril (PRINIVIL) 5 MG Tab Take 1 Tab by mouth 2 times a day. 180 Tab 3   • spironolactone (ALDACTONE) 25 MG Tab Take 0.5 Tabs by mouth every day. 45 Tab 3   • lovastatin (MEVACOR) 10 MG tablet Take 1 Tab by mouth every evening. 90 Tab 3   • potassium chloride SA (KDUR) 20 MEQ Tab CR Take 1 Tab by mouth every day. 90 Tab 3   • metoprolol SR (TOPROL XL) 100 MG TABLET SR 24 HR TAKE 1 TABLET BY MOUTH EVERY DAY 90 Tab 3   • [DISCONTINUED] Blood Glucose Monitoring Suppl SUPPLIES Misc Test strips order: Test strips for Marina Contour meter. Sig: use BID and prn ssx high or low sugar, max 4/day 100 Each 3   • [DISCONTINUED] Blood Glucose Monitoring Suppl Device Meter: Dispense Marina Contour meter. Sig. Use as directed for blood sugar monitoring. 1 Device 1   • [DISCONTINUED] Lancets Misc Lancets order: Lancets for Marina Contour meter. Sig: use BID and prn ssx high or low sugar. Max 4/d 100 Each 3   • Magnesium 500 MG CAPS Take 500 mg by mouth every day.       No facility-administered encounter medications on file as of 7/30/2018.      Review of Systems   Constitutional: Negative for chills and fever.   Respiratory: Negative for shortness of breath.    Cardiovascular: Positive for leg swelling. Negative for chest pain, palpitations, orthopnea and PND.        Mostly L lower leg, due to DVTs.   Gastrointestinal: Negative for abdominal pain and nausea.   Musculoskeletal: Negative for myalgias.   Neurological: Negative for dizziness, loss of consciousness and headaches.      Endo/Heme/Allergies: Does not bruise/bleed easily.   Psychiatric/Behavioral: Negative for depression. The patient does not have insomnia.         Objective:   /70   Pulse 80   Ht 1.829 m (6')   Wt 81.2 kg (179 lb)   BMI 24.28 kg/m²      Physical Exam   Constitutional: He is oriented to person, place, and time. He appears well-developed and well-nourished.   HENT:   Head: Normocephalic.   Eyes: EOM are normal.   Neck: Normal range of motion. Neck supple. No JVD present.   Cardiovascular: Normal rate, regular rhythm and normal heart sounds.    Pulmonary/Chest: Effort normal and breath sounds normal. No respiratory distress. He has no wheezes. He has no rales.   AICD in left chest wall   Abdominal: Soft. Bowel sounds are normal. He exhibits no distension. There is no tenderness.   Musculoskeletal: Normal range of motion. He exhibits edema.   Very trace LE edema in left foot.   Neurological: He is alert and oriented to person, place, and time.   Skin: Skin is warm and dry. No rash noted.   Psychiatric: He has a normal mood and affect.     AICD interrogation shows a 20J shock on 7/19/2018 at 8:22am. He states he does not remember this event, nor any problems that day.    Lab Results   Component Value Date/Time    CHOLSTRLTOT 182 04/18/2018 10:43 AM    LDL 91 04/18/2018 10:43 AM    HDL 53 04/18/2018 10:43 AM    TRIGLYCERIDE 191 (H) 04/18/2018 10:43 AM       Lab Results   Component Value Date/Time    SODIUM 138 04/18/2018 10:43 AM    POTASSIUM 5.0 04/18/2018 10:43 AM    CHLORIDE 101 04/18/2018 10:43 AM    CO2 30 04/18/2018 10:43 AM    GLUCOSE 151 (H) 04/18/2018 10:43 AM    BUN 13 04/18/2018 10:43 AM    CREATININE 1.19 04/18/2018 10:43 AM    CREATININE 1.0 01/08/2008 07:45 AM     Lab Results   Component Value Date/Time    ALKPHOSPHAT 57 04/18/2018 10:43 AM    ASTSGOT 23 04/18/2018 10:43 AM    ALTSGPT 30 04/18/2018 10:43 AM    TBILIRUBIN 1.0 04/18/2018 10:43 AM        Assessment:     1. AICD (automatic  cardioverter/defibrillator) present     2. Coronary artery disease involving native coronary artery of native heart without angina pectoris     3. Ischemic cardiomyopathy     4. S/P CABG x 4     5. Acute deep vein thrombosis (DVT) of femoral vein of left lower extremity (CMS-HCC)     6. Chronic anticoagulation     7. Type 2 diabetes mellitus with hyperglycemia, without long-term current use of insulin ([facility])     8. CKD (chronic kidney disease) stage 3, GFR 30-59 ml/min         Medical Decision Making:  Today's Assessment / Status / Plan:     1. CAD/ischemic cardiomyopathy, with AICD, with 1 20J shock on 7/19/2018, asymptomatic. Continue Toprol XL 100mg once daily.  If further events, will warrant referral to EP.    2. Acute DVT in left leg, on Xarelto, tolerating well without any bleeding problems.    3. Diabetes mellitus, treated and stable. Recent HgbA1c was good at 6.8    4. Hyperlipidemia, treated.    5. Chronic kidney disease, mild, stable.    Same medications for now. FU in 6 months for next AICD check with me; FU with Dr. Devine to establish care.    Collaborating MD: VIK Walls      No Recipients

## 2018-08-02 ENCOUNTER — PATIENT MESSAGE (OUTPATIENT)
Dept: HEALTH INFORMATION MANAGEMENT | Facility: OTHER | Age: 68
End: 2018-08-02

## 2018-09-04 PROCEDURE — 90662 IIV NO PRSV INCREASED AG IM: CPT | Performed by: REGISTERED NURSE

## 2018-09-04 PROCEDURE — G0008 ADMIN INFLUENZA VIRUS VAC: HCPCS | Performed by: REGISTERED NURSE

## 2018-09-10 ENCOUNTER — IMMUNIZATION (OUTPATIENT)
Dept: SOCIAL WORK | Facility: CLINIC | Age: 68
End: 2018-09-10
Payer: MEDICARE

## 2018-09-10 DIAGNOSIS — Z23 NEED FOR VACCINATION: ICD-10-CM

## 2018-10-08 ENCOUNTER — OFFICE VISIT (OUTPATIENT)
Dept: DERMATOLOGY | Facility: IMAGING CENTER | Age: 68
End: 2018-10-08
Payer: MEDICARE

## 2018-10-08 DIAGNOSIS — L82.1 SEBORRHEIC KERATOSES: ICD-10-CM

## 2018-10-08 DIAGNOSIS — L57.0 ACTINIC KERATOSES: ICD-10-CM

## 2018-10-08 DIAGNOSIS — Z85.828 HISTORY OF BASAL CELL CARCINOMA: ICD-10-CM

## 2018-10-08 DIAGNOSIS — D22.9 MULTIPLE NEVI: ICD-10-CM

## 2018-10-08 DIAGNOSIS — L30.9 DERMATITIS: ICD-10-CM

## 2018-10-08 PROCEDURE — 17000 DESTRUCT PREMALG LESION: CPT | Performed by: DERMATOLOGY

## 2018-10-08 PROCEDURE — 99213 OFFICE O/P EST LOW 20 MIN: CPT | Mod: 25 | Performed by: DERMATOLOGY

## 2018-10-08 PROCEDURE — 17003 DESTRUCT PREMALG LES 2-14: CPT | Performed by: DERMATOLOGY

## 2018-10-08 ASSESSMENT — ENCOUNTER SYMPTOMS
CHILLS: 0
WEIGHT LOSS: 0
FEVER: 0

## 2018-10-08 NOTE — PROGRESS NOTES
Dermatology Return Patient Visit    Chief Complaint   Patient presents with   • Follow-Up     LIZ       Subjective:     HPI:   Jaime Lee is a 67 y.o. male presenting for    Follow up, repeat full skin exam  S/p Mohs BCC on right upper nasolabial fold (Jose), C&D - left forearm  Does not know about any changes on the back    Rough spots on the left arm, leg  Present x weeks >1 month  No itching/bleeding/pain  No treatments    Additionally, back is itchy  Worse over the summer  Scrubbing aggressively in shower  Not moisturizing    History of skin cancer: Yes, BCC x 2 01/2018  History of precancers/actinic keratoses: Yes  History of blistering/severe sunburns:Yes, Details: youth (lived in [place])  Family history of skin cancer:No        Past Medical History:   Diagnosis Date   • Acute anterior myocardial infarction (HCC) 12/28/2000    PCI attempted by Dr. Malloy but unsuccessful   • AICD (automatic cardioverter/defibrillator) present 2003/2008/2015    Defibrillator is a Medtronic model [id number], serial #[id number], implanted Dr. Stephenson November 2015. The ventricular lead is a Medtronic model #6947-65, lead serial #[id number], implant 7/2/2003   • Allergy     within 2 years   • CAD (coronary artery disease) 12/28/2000    CABG x 4 (LIMA to LAD, rSVG to first diagonal, left circumflex and PDA).2000: CABG x 4 (LIMA to LAD, rSVG to first diagonal, left circumflex and PDA).  2003: LIMA and circumflex grafts patent, diagonal and PDA grafts closed.   • Congestive heart failure (HCC) 12/28/2000   • Dental disorder     Upper partial   • GERD (gastroesophageal reflux disease)    • Ischemic cardiomyopathy 08/2017    Echocardiogram with moderately dilated LV, LVEF 20%.   • Left bundle branch block     • Pneumonia 2014   • Pulmonary embolism ([facility]) March 2014   • Type 2 diabetes mellitus without complication ([facility])      Diet controlled, followed by Dr. Walls       Current Outpatient Prescriptions on File Prior  to Visit   Medication Sig Dispense Refill   • XARELTO 20 MG Tab tablet TAKE 1 TAB BY MOUTH WITH DINNER. 30 Tab 5   • metFORMIN (GLUCOPHAGE) 500 MG Tab One tab po in am and 2 tab in pm 270 Tab 3   • furosemide (LASIX) 40 MG Tab TAKE 1 TABLET BY MOUTH DAILY 90 Tab 3   • lisinopril (PRINIVIL) 5 MG Tab Take 1 Tab by mouth 2 times a day. 180 Tab 3   • spironolactone (ALDACTONE) 25 MG Tab Take 0.5 Tabs by mouth every day. 45 Tab 3   • lovastatin (MEVACOR) 10 MG tablet Take 1 Tab by mouth every evening. 90 Tab 3   • potassium chloride SA (KDUR) 20 MEQ Tab CR Take 1 Tab by mouth every day. 90 Tab 3   • metoprolol SR (TOPROL XL) 100 MG TABLET SR 24 HR TAKE 1 TABLET BY MOUTH EVERY DAY 90 Tab 3   • Magnesium 500 MG CAPS Take 500 mg by mouth every day.       No current facility-administered medications on file prior to visit.        Allergies   Allergen Reactions   • Bloodless    • Pcn [Penicillins] Anaphylaxis     Review of Systems   Constitutional: Negative for chills, fever and weight loss.   Skin: Positive for itching and rash.   All other systems reviewed and are negative.       Objective:     A full mucocutaneous exam was completed including: scalp, hair, ears, face, eyelids, conjunctiva, lips, gums/tongue/oropharynx, neck, chest, abdomen, back, left and right upper extremities (including hands/digits and fingernails), left and right lower extremities (including feet/toes, toenails), buttocks, excluding external genitalia (patient refusal) with the following pertinent findings listed below. Remaining above-listed examined areas within normal limits / negative for rashes or lesions.    There were no vitals taken for this visit.    Physical Exam   Constitutional: He is oriented to person, place, and time and well-developed, well-nourished, and in no distress.   HENT:   Head: Normocephalic and atraumatic.       Right Ear: External ear normal.   Left Ear: External ear normal.   Nose: Nose normal.   Mouth/Throat: Oropharynx is  clear and moist.   Eyes: Conjunctivae and lids are normal.   Neck: Normal range of motion. Neck supple.   Cardiovascular: Intact distal pulses.    Pulmonary/Chest: Effort normal.   Neurological: He is alert and oriented to person, place, and time.   Skin: Skin is warm and dry.        Psychiatric: Mood and affect normal.       DATA: 1/17/18 skin bx  FINAL DIAGNOSIS:    A. Right nasolabial fold:         Basal cell carcinoma, nodular type, present on resection margin.  B. Left ear helix:         Actinically damaged skin with signs of excoriation and mild          atypia, dermal blood vessel ectasia, focal fibrinoid necrosis,          red blood cell extravasation.         No malignancy identified.         See comment.  C. Left forearm:         Focal, superficial basal cell carcinoma, focally present on          margin.  D. Left lower back:         Benign pigmented seborrheic keratosis.    Assessment and Plan:     1. Actinic keratoses  CRYOTHERAPY:  Risks (including, but not limited to: hypo or hyperpigmentation, redness, blister, blood blister, recurrence, need for further treatment, infection, scar) and benefits of cryotherapy discussed. Patient verbally agreed to proceed with treatment. 2 cryotherapy freeze thaw cycles of 10 seconds were applied to 4 lesions on the face, arm, leg with cryac. Patient tolerated procedure well. Aftercare instructions given.    2. History of basal cell carcinoma  Skin cancer education  - discussed importance of sun protective clothing, eyewear  - discussed importance of daily use of broad spectrum sunscreen with SPF 30 or greater, as well as need for reapplication ~every 2 hours when exposed to UVR  - discussed importance of regular self-exams, ideally once per month, every 4-6 months exams in clinic  - NAI's of melanoma discussed  - patient to bring any new or concerning lesions to my attention    3. Dermatitis - xerosis/grovers  - educated patient about diagnosis, management options,  and expectations of treatment  - stop aggressive scrubbing  - discussed good bathing techniques: lukewarm water, gentle, fragrance-free soap, pat dry with immediate application of emollients (cerave)  - declines topical steroid, will try above first    4. Multiple nevi  - Benign-appearing nature of lesions discussed. Advised to return to clinic for any new or concerning changes.  - ABCDE's of melanoma discussed    5. Seborrheic keratoses  - Benign-appearing nature of lesions discussed. Advised to return to clinic for any new or concerning changes.      Followup: Return in about 6 months (around 4/8/2019) for [place].    Bianka Ahumada M.D.

## 2018-10-18 DIAGNOSIS — E11.65 TYPE 2 DIABETES MELLITUS WITH HYPERGLYCEMIA, WITHOUT LONG-TERM CURRENT USE OF INSULIN (HCC): ICD-10-CM

## 2018-11-02 DIAGNOSIS — I25.119 CORONARY ARTERY DISEASE INVOLVING NATIVE CORONARY ARTERY OF NATIVE HEART WITH ANGINA PECTORIS (HCC): ICD-10-CM

## 2018-11-02 DIAGNOSIS — I25.5 ISCHEMIC CARDIOMYOPATHY: ICD-10-CM

## 2018-11-02 RX ORDER — METOPROLOL SUCCINATE 100 MG/1
100 TABLET, EXTENDED RELEASE ORAL
Qty: 90 TAB | Refills: 2 | Status: CANCELLED | OUTPATIENT
Start: 2018-11-02

## 2018-11-02 RX ORDER — METOPROLOL SUCCINATE 100 MG/1
100 TABLET, EXTENDED RELEASE ORAL
Qty: 90 TAB | Refills: 3 | Status: SHIPPED | OUTPATIENT
Start: 2018-11-02 | End: 2019-06-04 | Stop reason: SDUPTHER

## 2018-11-28 DIAGNOSIS — I25.119 CORONARY ARTERY DISEASE INVOLVING NATIVE CORONARY ARTERY OF NATIVE HEART WITH ANGINA PECTORIS (HCC): ICD-10-CM

## 2018-11-28 DIAGNOSIS — I25.5 ISCHEMIC CARDIOMYOPATHY: ICD-10-CM

## 2018-11-28 RX ORDER — FUROSEMIDE 40 MG/1
40 TABLET ORAL DAILY
Qty: 90 TAB | Refills: 2 | Status: SHIPPED | OUTPATIENT
Start: 2018-11-28 | End: 2019-06-04 | Stop reason: SDUPTHER

## 2018-12-03 ENCOUNTER — NON-PROVIDER VISIT (OUTPATIENT)
Dept: CARDIOLOGY | Facility: MEDICAL CENTER | Age: 68
End: 2018-12-03
Payer: MEDICARE

## 2018-12-03 ENCOUNTER — HOSPITAL ENCOUNTER (OUTPATIENT)
Dept: LAB | Facility: MEDICAL CENTER | Age: 68
End: 2018-12-03
Attending: NURSE PRACTITIONER
Payer: MEDICARE

## 2018-12-03 VITALS
HEIGHT: 73 IN | HEART RATE: 86 BPM | WEIGHT: 179 LBS | DIASTOLIC BLOOD PRESSURE: 70 MMHG | SYSTOLIC BLOOD PRESSURE: 92 MMHG | BODY MASS INDEX: 23.72 KG/M2 | OXYGEN SATURATION: 93 %

## 2018-12-03 DIAGNOSIS — I25.10 CORONARY ARTERY DISEASE INVOLVING NATIVE CORONARY ARTERY OF NATIVE HEART WITHOUT ANGINA PECTORIS: ICD-10-CM

## 2018-12-03 DIAGNOSIS — I25.5 ISCHEMIC CARDIOMYOPATHY: ICD-10-CM

## 2018-12-03 DIAGNOSIS — Z95.810 AICD (AUTOMATIC CARDIOVERTER/DEFIBRILLATOR) PRESENT: ICD-10-CM

## 2018-12-03 LAB
ANION GAP SERPL CALC-SCNC: 6 MMOL/L (ref 0–11.9)
BASOPHILS # BLD AUTO: 1 % (ref 0–1.8)
BASOPHILS # BLD: 0.07 K/UL (ref 0–0.12)
BUN SERPL-MCNC: 26 MG/DL (ref 8–22)
CALCIUM SERPL-MCNC: 9.8 MG/DL (ref 8.5–10.5)
CHLORIDE SERPL-SCNC: 102 MMOL/L (ref 96–112)
CO2 SERPL-SCNC: 28 MMOL/L (ref 20–33)
CREAT SERPL-MCNC: 1.41 MG/DL (ref 0.5–1.4)
EOSINOPHIL # BLD AUTO: 0.05 K/UL (ref 0–0.51)
EOSINOPHIL NFR BLD: 0.7 % (ref 0–6.9)
ERYTHROCYTE [DISTWIDTH] IN BLOOD BY AUTOMATED COUNT: 43.8 FL (ref 35.9–50)
FASTING STATUS PATIENT QL REPORTED: NORMAL
GLUCOSE SERPL-MCNC: 160 MG/DL (ref 65–99)
HCT VFR BLD AUTO: 46.1 % (ref 42–52)
HGB BLD-MCNC: 15.1 G/DL (ref 14–18)
IMM GRANULOCYTES # BLD AUTO: 0.02 K/UL (ref 0–0.11)
IMM GRANULOCYTES NFR BLD AUTO: 0.3 % (ref 0–0.9)
LYMPHOCYTES # BLD AUTO: 0.83 K/UL (ref 1–4.8)
LYMPHOCYTES NFR BLD: 12 % (ref 22–41)
MCH RBC QN AUTO: 32.5 PG (ref 27–33)
MCHC RBC AUTO-ENTMCNC: 32.8 G/DL (ref 33.7–35.3)
MCV RBC AUTO: 99.1 FL (ref 81.4–97.8)
MONOCYTES # BLD AUTO: 0.57 K/UL (ref 0–0.85)
MONOCYTES NFR BLD AUTO: 8.2 % (ref 0–13.4)
NEUTROPHILS # BLD AUTO: 5.39 K/UL (ref 1.82–7.42)
NEUTROPHILS NFR BLD: 77.8 % (ref 44–72)
NRBC # BLD AUTO: 0 K/UL
NRBC BLD-RTO: 0 /100 WBC
PLATELET # BLD AUTO: 167 K/UL (ref 164–446)
PMV BLD AUTO: 12.7 FL (ref 9–12.9)
POTASSIUM SERPL-SCNC: 4.6 MMOL/L (ref 3.6–5.5)
RBC # BLD AUTO: 4.65 M/UL (ref 4.7–6.1)
SODIUM SERPL-SCNC: 136 MMOL/L (ref 135–145)
WBC # BLD AUTO: 6.9 K/UL (ref 4.8–10.8)

## 2018-12-03 PROCEDURE — 36415 COLL VENOUS BLD VENIPUNCTURE: CPT

## 2018-12-03 PROCEDURE — 85025 COMPLETE CBC W/AUTO DIFF WBC: CPT

## 2018-12-03 PROCEDURE — 93282 PRGRMG EVAL IMPLANTABLE DFB: CPT | Performed by: NURSE PRACTITIONER

## 2018-12-03 PROCEDURE — 80048 BASIC METABOLIC PNL TOTAL CA: CPT

## 2018-12-03 NOTE — PROGRESS NOTES
Patient was on vacation in early November 2018, and he felt a shock when at the [employer]. He did not seek medical care, and went on the cruise that was scheduled with his wife. He does admit he did not take his Lasix/KCl that day, and was no drinking any water, so was possibly dehydrated. He says he felt rather tired the 1-2 days afterwards, but no further shocks.    Device is working normally.  He did receive a 20J shock on 11/11/2018, which correlates with his history. Rhythm appears to be VT at 194bpm.  Normal sensing and capture of RV lead; stable impedances. Battery longevity is 8.5 years.  No changes are made today.    He does see Dr. Lau tomorrow to establish care. I will relay the above information to her.  He is also sent for BMP and CBC today.    FU in 3-6 months for next AICD check with me.    Collaborating MD: To

## 2018-12-19 DIAGNOSIS — I25.119 CORONARY ARTERY DISEASE INVOLVING NATIVE CORONARY ARTERY OF NATIVE HEART WITH ANGINA PECTORIS (HCC): ICD-10-CM

## 2018-12-19 DIAGNOSIS — I25.5 ISCHEMIC CARDIOMYOPATHY: ICD-10-CM

## 2018-12-19 RX ORDER — LOVASTATIN 10 MG/1
10 TABLET ORAL EVERY EVENING
Qty: 90 TAB | Refills: 2 | Status: SHIPPED | OUTPATIENT
Start: 2018-12-19 | End: 2019-06-04 | Stop reason: SDUPTHER

## 2019-01-03 DIAGNOSIS — Z86.72 HISTORY OF DEEP VEIN THROMBOPHLEBITIS OF LOWER EXTREMITY: ICD-10-CM

## 2019-01-19 DIAGNOSIS — I25.5 ISCHEMIC CARDIOMYOPATHY: ICD-10-CM

## 2019-01-19 DIAGNOSIS — I25.119 CORONARY ARTERY DISEASE INVOLVING NATIVE CORONARY ARTERY OF NATIVE HEART WITH ANGINA PECTORIS (HCC): ICD-10-CM

## 2019-01-21 DIAGNOSIS — I25.5 ISCHEMIC CARDIOMYOPATHY: ICD-10-CM

## 2019-01-21 RX ORDER — POTASSIUM CHLORIDE 20 MEQ/1
20 TABLET, EXTENDED RELEASE ORAL DAILY
Qty: 30 TAB | Refills: 3 | Status: SHIPPED | OUTPATIENT
Start: 2019-01-21 | End: 2019-04-24 | Stop reason: SDUPTHER

## 2019-01-21 RX ORDER — SPIRONOLACTONE 25 MG/1
12.5 TABLET ORAL
Qty: 15 TAB | Refills: 3 | Status: SHIPPED | OUTPATIENT
Start: 2019-01-21 | End: 2019-04-23 | Stop reason: SDUPTHER

## 2019-01-29 ENCOUNTER — OFFICE VISIT (OUTPATIENT)
Dept: CARDIOLOGY | Facility: MEDICAL CENTER | Age: 69
End: 2019-01-29
Payer: MEDICARE

## 2019-01-29 ENCOUNTER — TELEPHONE (OUTPATIENT)
Dept: CARDIOLOGY | Facility: MEDICAL CENTER | Age: 69
End: 2019-01-29

## 2019-01-29 ENCOUNTER — HOSPITAL ENCOUNTER (OUTPATIENT)
Dept: LAB | Facility: MEDICAL CENTER | Age: 69
End: 2019-01-29
Attending: INTERNAL MEDICINE
Payer: MEDICARE

## 2019-01-29 VITALS
SYSTOLIC BLOOD PRESSURE: 102 MMHG | HEIGHT: 73 IN | WEIGHT: 184 LBS | BODY MASS INDEX: 24.39 KG/M2 | OXYGEN SATURATION: 93 % | HEART RATE: 72 BPM | DIASTOLIC BLOOD PRESSURE: 72 MMHG

## 2019-01-29 DIAGNOSIS — E11.65 TYPE 2 DIABETES MELLITUS WITH HYPERGLYCEMIA, WITHOUT LONG-TERM CURRENT USE OF INSULIN (HCC): ICD-10-CM

## 2019-01-29 DIAGNOSIS — H81.4 VERTIGO OF CENTRAL ORIGIN, UNSPECIFIED LATERALITY: ICD-10-CM

## 2019-01-29 DIAGNOSIS — I50.22 CHRONIC SYSTOLIC CONGESTIVE HEART FAILURE, NYHA CLASS 2 (HCC): ICD-10-CM

## 2019-01-29 DIAGNOSIS — R05.9 COUGH: ICD-10-CM

## 2019-01-29 DIAGNOSIS — I25.10 CORONARY ARTERY DISEASE INVOLVING NATIVE CORONARY ARTERY OF NATIVE HEART WITHOUT ANGINA PECTORIS: ICD-10-CM

## 2019-01-29 DIAGNOSIS — Z95.1 S/P CABG X 4: ICD-10-CM

## 2019-01-29 DIAGNOSIS — I25.5 ISCHEMIC CARDIOMYOPATHY: ICD-10-CM

## 2019-01-29 DIAGNOSIS — N18.30 CKD (CHRONIC KIDNEY DISEASE) STAGE 3, GFR 30-59 ML/MIN (HCC): ICD-10-CM

## 2019-01-29 DIAGNOSIS — Z95.810 AICD (AUTOMATIC CARDIOVERTER/DEFIBRILLATOR) PRESENT: ICD-10-CM

## 2019-01-29 DIAGNOSIS — I82.412 ACUTE DEEP VEIN THROMBOSIS (DVT) OF FEMORAL VEIN OF LEFT LOWER EXTREMITY (HCC): ICD-10-CM

## 2019-01-29 DIAGNOSIS — Z79.899 HIGH RISK MEDICATION USE: ICD-10-CM

## 2019-01-29 DIAGNOSIS — I25.119 CORONARY ARTERY DISEASE INVOLVING NATIVE CORONARY ARTERY OF NATIVE HEART WITH ANGINA PECTORIS (HCC): ICD-10-CM

## 2019-01-29 DIAGNOSIS — I47.20 VENTRICULAR TACHYCARDIA (HCC): ICD-10-CM

## 2019-01-29 DIAGNOSIS — Z79.01 CHRONIC ANTICOAGULATION: ICD-10-CM

## 2019-01-29 LAB
ANION GAP SERPL CALC-SCNC: 9 MMOL/L (ref 0–11.9)
BUN SERPL-MCNC: 19 MG/DL (ref 8–22)
CALCIUM SERPL-MCNC: 10.1 MG/DL (ref 8.5–10.5)
CHLORIDE SERPL-SCNC: 100 MMOL/L (ref 96–112)
CO2 SERPL-SCNC: 27 MMOL/L (ref 20–33)
CREAT SERPL-MCNC: 1.4 MG/DL (ref 0.5–1.4)
EKG IMPRESSION: NORMAL
FASTING STATUS PATIENT QL REPORTED: NORMAL
GLUCOSE SERPL-MCNC: 141 MG/DL (ref 65–99)
MAGNESIUM SERPL-MCNC: 2 MG/DL (ref 1.5–2.5)
POTASSIUM SERPL-SCNC: 4.4 MMOL/L (ref 3.6–5.5)
SODIUM SERPL-SCNC: 136 MMOL/L (ref 135–145)

## 2019-01-29 PROCEDURE — 83735 ASSAY OF MAGNESIUM: CPT

## 2019-01-29 PROCEDURE — 36415 COLL VENOUS BLD VENIPUNCTURE: CPT

## 2019-01-29 PROCEDURE — 93000 ELECTROCARDIOGRAM COMPLETE: CPT | Performed by: INTERNAL MEDICINE

## 2019-01-29 PROCEDURE — 80048 BASIC METABOLIC PNL TOTAL CA: CPT

## 2019-01-29 PROCEDURE — 99214 OFFICE O/P EST MOD 30 MIN: CPT | Mod: 25 | Performed by: INTERNAL MEDICINE

## 2019-01-29 NOTE — PROGRESS NOTES
Subjective:   Chief Complaint:   Chief Complaint   Patient presents with   • Coronary Artery Disease   • Chest Pressure       Jaime Lee is a 68 y.o. male who returns for coronary artery disease, ischemic cardiomyopathy and ventricular tachycardia with ICD. He had bypass, , I do not have the op report  Left heart catheterization in 2003 showed multivessel disease, no revascularization was pursued.  Last EF in October 2018 was 20%.    Prior lower extremity DVTs and also prior PE, taking Xarelto.    He has had 2 shocks for VT T, July 19, 2018 and November 11, 2018.  He was getting ready to go on a Cruise, was not well hydrated, had had some ETOH, was shocked. Does not recall shock in July 2018.  Thinks he may have had another shock.    He has made up his mind about no transplant.  Not sure he wants amiodarone, not sure he wants to stay on Xarelto.    No CP, has SOTO NHYC 2, no LE edema. No syncope.  Mild orthostasis.    LDL cholesterol 91.  Blood pressure control.  Last potassium was 4.6.    Medications include spironolactone, Lasix, metoprolol, lisinopril and Xarelto.  He is on lovastatin.    DATA REVIEWED by me:  ECG 1-2-18  Sinus, NSIVCD , old inferior and anterolateral MI    ECG 1/1/201  Sinus rhythm, old inferior MI, old lateral MI, nonspecific intraventricular conduction delay     Echo 8/10/2017  EF 20%, LAD territory wall motion abnormalities, RVSP 30 mmHg    2-24-14 EF 15-20%    Device interrogation 12/3/2018  20 J shock administered 11/11/2018 with successful cardioversion from VT, rare V pacing    Device interrogation 7/30/2018 120 J shock given 7/19/2018.    Defibrillator generator change 11/3/2015    Defibrillator generator change January 10, 2008    ICD placement 7/11/2003, Medtronic    Left heart catheterization 7/3/2003  Four-vessel disease, EF 30%, medical therapy recommended; patent LIMA to Diagnol, patent SVG to circ, occluded CV to D and to RVA, normal LVEDP.    Lower  extremity Doppler study 4/2/2018  Nonocclusive chronic thrombus extending from the proximal to mid femoral vein, clot burden reduced compared to prior study 1/2/2018.      Most recent labs:     12/3/2018 hemoglobin 15.1, platelets 167, sodium 136, potassium 4.6, creatinine 1.41 with GFR 50    4/18/2018 LFTs normal, hemoglobin A1c 6.8, LDL cholesterol 91, HDL 53, triglycerides 191, total cholesterol 182    Past Medical History:   Diagnosis Date   • Acute anterior myocardial infarction (HCC) 12/28/2000    PCI attempted by Dr. Malloy but unsuccessful   • AICD (automatic cardioverter/defibrillator) present 2003/2008/2015    Defibrillator is a Medtronic model PAJP6F3, serial #[id number], implanted Dr. Stephenson November 2015. The ventricular lead is a Medtronic model #6947-65, lead serial #[id number], implant 7/2/2003   • Allergy     within 2 years   • CAD (coronary artery disease) 12/28/2000    CABG x 4 (LIMA to LAD, rSVG to first diagonal, left circumflex and PDA).2000: CABG x 4 (LIMA to LAD, rSVG to first diagonal, left circumflex and PDA).  2003: LIMA and circumflex grafts patent, diagonal and PDA grafts closed.   • Congestive heart failure (HCC) 12/28/2000   • Dental disorder     Upper partial   • GERD (gastroesophageal reflux disease)    • Ischemic cardiomyopathy 08/2017    Echocardiogram with moderately dilated LV, LVEF 20%.   • Left bundle branch block     • Pneumonia 2014   • Pulmonary embolism (HCC) March 2014   • Type 2 diabetes mellitus without complication (formerly [facility])      Diet controlled, followed by Dr. Walls     Past Surgical History:   Procedure Laterality Date   • RECOVERY  11/3/2015    Procedure: CATH LAB-MARCO A-ICD GENERATOR CHANGE 28378- EUNICE T82.111A-MEDTRONIC BLOODLESS;  Surgeon: Recoveryonly Surgery;  Location: SURGERY PRE-POST PROC UNIT [facility] [place];  Service:    • AICD BATTERY CHANGE  November 2015    Generator replacement with Medtronic Evera S VR [id number] implanted by Dr. Marcus [place]son.   • AICD IMPLANT  January  2008    Medtronic Virtuoso VR [id number] implanted by Dr. Stephenson.   • OTHER CARDIAC SURGERY  12/2000    CABG x 4   • APPENDECTOMY  1969   • MULTIPLE CORONARY ARTERY BYPASS       Family History   Problem Relation Age of Onset   • Diabetes Mother    • Heart Disease Mother    • Diabetes Sister    • Alcohol/Drug Brother      Social History     Social History   • Marital status:      Spouse name: N/A   • Number of children: N/A   • Years of education: N/A     Occupational History   • Not on file.     Social History Main Topics   • Smoking status: Former Smoker     Packs/day: 1.00     Years: 32.00     Types: Cigarettes     Quit date: 12/28/2000   • Smokeless tobacco: Never Used   • Alcohol use 4.2 oz/week     7 Glasses of wine per week      Comment: 7 per week   • Drug use: Yes     Types: Marijuana      Comment: CBD, THC, edibals    • Sexual activity: No     Other Topics Concern   • Not on file     Social History Narrative   • No narrative on file     Allergies   Allergen Reactions   • Bloodless    • Pcn [Penicillins] Anaphylaxis       Current Outpatient Prescriptions   Medication Sig Dispense Refill   • potassium chloride SA (KDUR) 20 MEQ Tab CR TAKE 1 TAB BY MOUTH EVERY DAY. 30 Tab 3   • spironolactone (ALDACTONE) 25 MG Tab Take 0.5 Tabs by mouth every day. 15 Tab 3   • lovastatin (MEVACOR) 10 MG tablet Take 1 Tab by mouth every evening. 90 Tab 2   • furosemide (LASIX) 40 MG Tab Take 1 Tab by mouth every day. 90 Tab 2   • metoprolol SR (TOPROL XL) 100 MG TABLET SR 24 HR Take 1 Tab by mouth every day. 90 Tab 3   • metFORMIN (GLUCOPHAGE) 500 MG Tab TAKE 1 TAB BY MOUTH 2 TIMES A DAY, WITH MEALS. 180 Tab 1   • XARELTO 20 MG Tab tablet TAKE 1 TAB BY MOUTH WITH DINNER. 30 Tab 5   • lisinopril (PRINIVIL) 5 MG Tab Take 1 Tab by mouth 2 times a day. 180 Tab 3   • Magnesium 500 MG CAPS Take 500 mg by mouth every day.       No current facility-administered medications for this visit.        ROS  All others systems reviewed and  "negative.     Objective:     Blood pressure 102/72, pulse 72, height 1.854 m (6' 1\"), weight 83.5 kg (184 lb), SpO2 93 %. Body mass index is 24.28 kg/m².    Physical Exam   General: No acute distress. Well nourished.  HEENT: EOM grossly intact, no scleral icterus, no pharyngeal erythema.   Neck:  No JVD at 90, no bruits, trachea midline  CVS: RRR. Normal S1, S2. No M/R/G. No LE edema.  1+ radial pulses, 1+ DP pulses, well healed incision, ICD pocket intact  Resp: CTAB. No wheezing or crackles/rhonchi. Normal respiratory effort.  Abdomen: Soft, NT, no olivia hepatomegaly.  MSK/Ext: No clubbing or cyanosis.  Skin: Warm and dry, no rashes. Dependent rubor LEs.  Neurological: CN III-XII grossly intact. No focal deficits.   Psych: A&O x 3, appropriate affect, good judgement      Assessment:     1. Ischemic cardiomyopathy  EKG    MAGNESIUM    BASIC METABOLIC PANEL    EC-ECHOCARDIOGRAM COMPLETE W/O CONT   2. Coronary artery disease involving native coronary artery of native heart with angina pectoris ([facility])  EKG   3. AICD (automatic cardioverter/defibrillator) present     4. Coronary artery disease involving native coronary artery of native heart without angina pectoris     5. S/P CABG x 4     6. Acute deep vein thrombosis (DVT) of femoral vein of left lower extremity (CMS-[facility])     7. Chronic anticoagulation     8. Type 2 diabetes mellitus with hyperglycemia, without long-term current use of insulin ([facility])     9. CKD (chronic kidney disease) stage 3, GFR 30-59 ml/min ([facility])     10. Cough     11. Chronic systolic congestive heart failure, NYHA class 2 ([facility])     12. Vertigo of central origin, unspecified laterality     13. Ventricular tachycardia (HCC)  EKG    EC-ECHOCARDIOGRAM COMPLETE W/O CONT   14. High risk medication use         Medical Decision Making:  Today's Assessment / Status / Plan:     -3 shocks in 6 months, could be heart is getting sicker  -Echo, Mg, K,   -ECG done today  -Refer back to Dr. Stephenson for amiodarone, I " recommend this, pt not sure if he wants this.    -Have Dr. Stephenson weigh in on weigh CRT would be helpful, has atypical wide QRS, not certain CRT will help, Dr. Stephenson will know.  -He has volunteered to quit driving, he knows he cannot drive  -Asked him to stay on Xarelto, he is not convinced, had recurrent DVT and prior PE.      Return in about 4 months (around 5/29/2019).    It is my pleasure to participate in the care of Mr. Lee.  Please do not hesitate to contact me with questions or concerns.    Silvia Lau MD, [facility]  Cardiologist [facility]    Please note that this dictation was created using voice recognition software. I have made every reasonable attempt to correct obvious errors, but it is possible there are errors of grammar and possibly content that I did not discover before finalizing the note.

## 2019-01-29 NOTE — PATIENT INSTRUCTIONS
-Mg and potassium- blood work  -Echo  -See Dr. Stephenson about possible use of amiodarone, and biventricular pacer

## 2019-01-29 NOTE — TELEPHONE ENCOUNTER
LM on patient's voicemail to call back to an appointment with Dr. Stephenson - per Dr. Lau needs to discuss possible upgrade on device.

## 2019-01-29 NOTE — LETTER
[facility]   [address]   [address]  [place] [address]  Phone: [phone]  Fax: [phone]              Jaime Lee  1950    Encounter Date: 1/29/2019    Silvia Lau M.D.          PROGRESS NOTE:  Subjective:   Chief Complaint:   Chief Complaint   Patient presents with   • Coronary Artery Disease   • Chest Pressure       Jaime Lee is a 68 y.o. male who returns for coronary artery disease, ischemic cardiomyopathy and ventricular tachycardia with ICD. He had bypass, , I do not have the op report  Left heart catheterization in 2003 showed multivessel disease, no revascularization was pursued.  Last EF in October 2018 was 20%.    Prior lower extremity DVTs and also prior PE, taking Xarelto.    He has had 2 shocks for VT T, July 19, 2018 and November 11, 2018.  He was getting ready to go on a Cruise, was not well hydrated, had had some ETOH, was shocked. Does not recall shock in July 2018.  Thinks he may have had another shock.    He has made up his mind about no transplant.  Not sure he wants amiodarone, not sure he wants to stay on Xarelto.    No CP, has SOTO NHYC 2, no LE edema. No syncope.  Mild orthostasis.    LDL cholesterol 91.  Blood pressure control.  Last potassium was 4.6.    Medications include spironolactone, Lasix, metoprolol, lisinopril and Xarelto.  He is on lovastatin.    DATA REVIEWED by me:  ECG 1-2-18  Sinus, NSIVCD , old inferior and anterolateral MI    ECG 1/1/201  Sinus rhythm, old inferior MI, old lateral MI, nonspecific intraventricular conduction delay     Echo 8/10/2017  EF 20%, LAD territory wall motion abnormalities, RVSP 30 mmHg    2-24-14 EF 15-20%    Device interrogation 12/3/2018  20 J shock administered 11/11/2018 with successful cardioversion from VT, rare V pacing    Device interrogation 7/30/2018 120 J shock given 7/19/2018.    Defibrillator generator  change 11/3/2015    Defibrillator generator change January 10, 2008    ICD placement 7/11/2003, Medtronic    Left heart catheterization 7/3/2003  Four-vessel disease, EF 30%, medical therapy recommended; patent LIMA to Diagnol, patent SVG to circ, occluded CV to D and to RVA, normal LVEDP.    Lower extremity Doppler study 4/2/2018  Nonocclusive chronic thrombus extending from the proximal to mid femoral vein, clot burden reduced compared to prior study 1/2/2018.      Most recent labs:     12/3/2018 hemoglobin 15.1, platelets 167, sodium 136, potassium 4.6, creatinine 1.41 with GFR 50    4/18/2018 LFTs normal, hemoglobin A1c 6.8, LDL cholesterol 91, HDL 53, triglycerides 191, total cholesterol 182    Past Medical History:   Diagnosis Date   • Acute anterior myocardial infarction (HCC) 12/28/2000    PCI attempted by Dr. Malloy but unsuccessful   • AICD (automatic cardioverter/defibrillator) present 2003/2008/2015    Defibrillator is a Medtronic model [id number], serial #[id number], implanted Dr. Stephenson November 2015. The ventricular lead is a Medtronic model #6947-65, lead serial #[id number], implant 7/2/2003   • Allergy     within 2 years   • CAD (coronary artery disease) 12/28/2000    CABG x 4 (LIMA to LAD, rSVG to first diagonal, left circumflex and PDA).2000: CABG x 4 (LIMA to LAD, rSVG to first diagonal, left circumflex and PDA).  2003: LIMA and circumflex grafts patent, diagonal and PDA grafts closed.   • Congestive heart failure (HCC) 12/28/2000   • Dental disorder     Upper partial   • GERD (gastroesophageal reflux disease)    • Ischemic cardiomyopathy 08/2017    Echocardiogram with moderately dilated LV, LVEF 20%.   • Left bundle branch block     • Pneumonia 2014   • Pulmonary embolism ([facility]) March 2014   • Type 2 diabetes mellitus without complication ([facility])      Diet controlled, followed by Dr. Walls     Past Surgical History:   Procedure Laterality Date   • RECOVERY  11/3/2015    Procedure: CATH  LAYLA-MARCO A-ICD GENERATOR CHANGE 19371- EUNICE T82.111A-MEDTRONIC BLOODLESS;  Surgeon: Recoveryonly Surgery;  Location: [place] PRE-[place] PROC UNIT [facility] [place];  Service:    • AICD BATTERY CHANGE  November 2015    Generator replacement with Medtronic Evera S VR [id number] implanted by Dr. Marcus Stephenson.   • AICD IMPLANT  January 2008    Medtronic Virtuoso VR [id number] implanted by Dr. Stephenson.   • OTHER CARDIAC SURGERY  12/2000    CABG x 4   • APPENDECTOMY  1969   • MULTIPLE CORONARY ARTERY BYPASS       Family History   Problem Relation Age of Onset   • Diabetes Mother    • Heart Disease Mother    • Diabetes Sister    • Alcohol/Drug Brother      Social History     Social History   • Marital status:      Spouse name: N/A   • Number of children: N/A   • Years of education: N/A     Occupational History   • Not on file.     Social History Main Topics   • Smoking status: Former Smoker     Packs/day: 1.00     Years: 32.00     Types: Cigarettes     Quit date: 12/28/2000   • Smokeless tobacco: Never Used   • Alcohol use 4.2 oz/week     7 Glasses of wine per week      Comment: 7 per week   • Drug use: Yes     Types: Marijuana      Comment: CBD, THC, edibals    • Sexual activity: No     Other Topics Concern   • Not on file     Social History Narrative   • No narrative on file     Allergies   Allergen Reactions   • Bloodless    • Pcn [Penicillins] Anaphylaxis       Current Outpatient Prescriptions   Medication Sig Dispense Refill   • potassium chloride SA (KDUR) 20 MEQ Tab CR TAKE 1 TAB BY MOUTH EVERY DAY. 30 Tab 3   • spironolactone (ALDACTONE) 25 MG Tab Take 0.5 Tabs by mouth every day. 15 Tab 3   • lovastatin (MEVACOR) 10 MG tablet Take 1 Tab by mouth every evening. 90 Tab 2   • furosemide (LASIX) 40 MG Tab Take 1 Tab by mouth every day. 90 Tab 2   • metoprolol SR (TOPROL XL) 100 MG TABLET SR 24 HR Take 1 Tab by mouth every day. 90 Tab 3   • metFORMIN (GLUCOPHAGE) 500 MG Tab TAKE 1 TAB BY MOUTH 2 TIMES A DAY, WITH MEALS. 180 Tab 1   •  "XARELTO 20 MG Tab tablet TAKE 1 TAB BY MOUTH WITH DINNER. 30 Tab 5   • lisinopril (PRINIVIL) 5 MG Tab Take 1 Tab by mouth 2 times a day. 180 Tab 3   • Magnesium 500 MG CAPS Take 500 mg by mouth every day.       No current facility-administered medications for this visit.        ROS  All others systems reviewed and negative.     Objective:     Blood pressure 102/72, pulse 72, height 1.854 m (6' 1\"), weight 83.5 kg (184 lb), SpO2 93 %. Body mass index is 24.28 kg/m².    Physical Exam   General: No acute distress. Well nourished.  HEENT: EOM grossly intact, no scleral icterus, no pharyngeal erythema.   Neck:  No JVD at 90, no bruits, trachea midline  CVS: RRR. Normal S1, S2. No M/R/G. No LE edema.  1+ radial pulses, 1+ DP pulses, well healed incision, ICD pocket intact  Resp: CTAB. No wheezing or crackles/rhonchi. Normal respiratory effort.  Abdomen: Soft, NT, no olivia hepatomegaly.  MSK/Ext: No clubbing or cyanosis.  Skin: Warm and dry, no rashes. Dependent rubor LEs.  Neurological: CN III-XII grossly intact. No focal deficits.   Psych: A&O x 3, appropriate affect, good judgement      Assessment:     1. Ischemic cardiomyopathy  EKG    MAGNESIUM    BASIC METABOLIC PANEL    EC-ECHOCARDIOGRAM COMPLETE W/O CONT   2. Coronary artery disease involving native coronary artery of native heart with angina pectoris ([facility])  EKG   3. AICD (automatic cardioverter/defibrillator) present     4. Coronary artery disease involving native coronary artery of native heart without angina pectoris     5. S/P CABG x 4     6. Acute deep vein thrombosis (DVT) of femoral vein of left lower extremity ([facility])     7. Chronic anticoagulation     8. Type 2 diabetes mellitus with hyperglycemia, without long-term current use of insulin ([facility])     9. CKD (chronic kidney disease) stage 3, GFR 30-59 ml/min ([facility])     10. Cough     11. Chronic systolic congestive heart failure, NYHA class 2 ([facility])     12. Vertigo of central origin, unspecified laterality     "   13. Ventricular tachycardia (HCC)  EKG    EC-ECHOCARDIOGRAM COMPLETE W/O CONT   14. High risk medication use         Medical Decision Making:  Today's Assessment / Status / Plan:     -3 shocks in 6 months, could be heart is getting sicker  -Echo, Mg, K,   -ECG done today  -Refer back to Dr. Stephenson for amiodarone, I recommend this, pt not sure if he wants this.    -Have Dr. Stephenson weigh in on weigh CRT would be helpful, has atypical wide QRS, not certain CRT will help, Dr. Stephenson will know.  -He has volunteered to quit driving, he knows he cannot drive  -Asked him to stay on Xarelto, he is not convinced, had recurrent DVT and prior PE.      Return in about 4 months (around 5/29/2019).    It is my pleasure to participate in the care of Mr. Lee.  Please do not hesitate to contact me with questions or concerns.    Silvia Lau MD, [facility]  Cardiologist [facility]    Please note that this dictation was created using voice recognition software. I have made every reasonable attempt to correct obvious errors, but it is possible there are errors of grammar and possibly content that I did not discover before finalizing the note.      Angelica [place] M.D.  [address]  [address]  [place] [address]  VIA In Basket     Marcus [place] M.D.  [address]  [address]  [place] [address]  VIA In Basket

## 2019-01-30 ENCOUNTER — HOSPITAL ENCOUNTER (OUTPATIENT)
Dept: CARDIOLOGY | Facility: MEDICAL CENTER | Age: 69
End: 2019-01-30
Attending: INTERNAL MEDICINE
Payer: MEDICARE

## 2019-01-30 DIAGNOSIS — I25.5 ISCHEMIC CARDIOMYOPATHY: ICD-10-CM

## 2019-01-30 DIAGNOSIS — I47.20 VENTRICULAR TACHYCARDIA (HCC): ICD-10-CM

## 2019-01-30 LAB
LV EJECT FRACT  99904: 20
LV EJECT FRACT MOD 2C 99903: 28.13
LV EJECT FRACT MOD 4C 99902: 29.47
LV EJECT FRACT MOD BP 99901: 30.29

## 2019-01-30 PROCEDURE — 93306 TTE W/DOPPLER COMPLETE: CPT

## 2019-01-30 PROCEDURE — 93306 TTE W/DOPPLER COMPLETE: CPT | Mod: 26 | Performed by: INTERNAL MEDICINE

## 2019-01-31 ENCOUNTER — TELEPHONE (OUTPATIENT)
Dept: VASCULAR LAB | Facility: MEDICAL CENTER | Age: 69
End: 2019-01-31

## 2019-01-31 NOTE — TELEPHONE ENCOUNTER
Cockcroft & Gault (Actual body weight): 59.6 (ml/min)       H&H stable   No bleed issues  The patient instructed to go to the ER for falls with a head injury,  blood in urine or stool or any bleeding that last longer than 20 min.   LEIF Vance.

## 2019-02-04 ENCOUNTER — OFFICE VISIT (OUTPATIENT)
Dept: CARDIOLOGY | Facility: MEDICAL CENTER | Age: 69
End: 2019-02-04
Payer: MEDICARE

## 2019-02-04 VITALS
WEIGHT: 185 LBS | HEART RATE: 84 BPM | DIASTOLIC BLOOD PRESSURE: 70 MMHG | BODY MASS INDEX: 25.06 KG/M2 | SYSTOLIC BLOOD PRESSURE: 118 MMHG | HEIGHT: 72 IN | OXYGEN SATURATION: 92 %

## 2019-02-04 DIAGNOSIS — I25.2 OLD ANTERIOR MYOCARDIAL INFARCTION: ICD-10-CM

## 2019-02-04 DIAGNOSIS — Z95.810 AICD (AUTOMATIC CARDIOVERTER/DEFIBRILLATOR) PRESENT: ICD-10-CM

## 2019-02-04 DIAGNOSIS — I82.412 ACUTE DEEP VEIN THROMBOSIS (DVT) OF FEMORAL VEIN OF LEFT LOWER EXTREMITY (HCC): ICD-10-CM

## 2019-02-04 DIAGNOSIS — I25.5 ISCHEMIC CARDIOMYOPATHY: ICD-10-CM

## 2019-02-04 DIAGNOSIS — I45.10 RBBB: ICD-10-CM

## 2019-02-04 DIAGNOSIS — Z95.1 S/P CABG X 4: ICD-10-CM

## 2019-02-04 DIAGNOSIS — Z86.711 HISTORY OF PULMONARY EMBOLUS (PE): ICD-10-CM

## 2019-02-04 PROCEDURE — 99204 OFFICE O/P NEW MOD 45 MIN: CPT | Mod: 25 | Performed by: INTERNAL MEDICINE

## 2019-02-04 PROCEDURE — 93282 PRGRMG EVAL IMPLANTABLE DFB: CPT | Performed by: INTERNAL MEDICINE

## 2019-02-04 ASSESSMENT — ENCOUNTER SYMPTOMS
WHEEZING: 0
BRUISES/BLEEDS EASILY: 0
COUGH: 0
NERVOUS/ANXIOUS: 0
VOMITING: 0
MYALGIAS: 0
EYE DISCHARGE: 0
PALPITATIONS: 0
CHILLS: 0
ABDOMINAL PAIN: 0
LOSS OF CONSCIOUSNESS: 0
FEVER: 0
EYE PAIN: 0
HEMOPTYSIS: 0
DEPRESSION: 0
SPEECH CHANGE: 0
BLURRED VISION: 0
NAUSEA: 0

## 2019-02-04 NOTE — PROGRESS NOTES
Chief Complaint   Patient presents with   • Cardiomyopathy (Ischemic)     F/V CARDIOMYOPATHY       Subjective:   Jaime Lee is a 68 y.o. male who presents today with his not been seen for greater than 3 years.  Referred by Dr. Lau for consideration of upgrade to a biventricular device.  Previous shock in November possibly for sinus arrhythmia.  No further shocks.  Good exercise tolerance.  No heart failure symptoms at this time.  Has a single chamber Medtronic AICD.  Continues to travel.  He usedto be a .  Denies any chest pain.  EKG consistent with an old anterior wall MI with a right bundle branch block.  Previously had an IVCD of LBBB type.    Past Medical History:   Diagnosis Date   • Acute anterior myocardial infarction (HCC) 12/28/2000    PCI attempted by Dr. Malloy but unsuccessful   • AICD (automatic cardioverter/defibrillator) present 2003/2008/2015    Defibrillator is a Medtronic model DFHL5F4, serial #[id number], implanted Dr. [place]son November 2015. The ventricular lead is a Medtronic model #6947-65, lead serial #[id number], implant 7/2/2003   • Allergy     within 2 years   • CAD (coronary artery disease) 12/28/2000    CABG x 4 (LIMA to LAD, rSVG to first diagonal, left circumflex and PDA).2000: CABG x 4 (LIMA to LAD, rSVG to first diagonal, left circumflex and PDA).  2003: LIMA and circumflex grafts patent, diagonal and PDA grafts closed.   • Congestive heart failure (HCC) 12/28/2000   • Dental disorder     Upper partial   • GERD (gastroesophageal reflux disease)    • Ischemic cardiomyopathy 08/2017    Echocardiogram with moderately dilated LV, LVEF 20%.   • Left bundle branch block     • Pneumonia 2014   • Pulmonary embolism (HCC) March 2014   • Type 2 diabetes mellitus without complication ([facility])      Diet controlled, followed by Dr. Walls     Past Surgical History:   Procedure Laterality Date   • RECOVERY  11/3/2015    Procedure: CATH LAB-[facility] A-ICD GENERATOR CHANGE  09874- [facility] T82.111A-MEDTRONIC BLOODLESS;  Surgeon: Recoveryonly Surgery;  Location: SURGERY PRE-POST PROC UNIT [facility];  Service:    • AICD BATTERY CHANGE  November 2015    Generator replacement with Medtronic Evera S VR [id number] implanted by Dr. Marcus [place]son.   • AICD IMPLANT  January 2008    Medtronic Virtuoso VR [id number] implanted by Dr. [place]son.   • OTHER CARDIAC SURGERY  12/2000    CABG x 4   • APPENDECTOMY  1969   • MULTIPLE CORONARY ARTERY BYPASS       Family History   Problem Relation Age of Onset   • Diabetes Mother    • Heart Disease Mother    • Diabetes Sister    • Alcohol/Drug Brother      Social History     Social History   • Marital status:      Spouse name: N/A   • Number of children: N/A   • Years of education: N/A     Occupational History   • Not on file.     Social History Main Topics   • Smoking status: Former Smoker     Packs/day: 1.00     Years: 32.00     Types: Cigarettes     Quit date: 12/28/2000   • Smokeless tobacco: Never Used   • Alcohol use 4.2 oz/week     7 Glasses of wine per week      Comment: 7 per week   • Drug use: Yes     Types: Marijuana      Comment: CBD, THC, edibals    • Sexual activity: No     Other Topics Concern   • Not on file     Social History Narrative   • No narrative on file     Allergies   Allergen Reactions   • Bloodless    • Pcn [Penicillins] Anaphylaxis     Outpatient Encounter Prescriptions as of 2/4/2019   Medication Sig Dispense Refill   • potassium chloride SA (KDUR) 20 MEQ Tab CR TAKE 1 TAB BY MOUTH EVERY DAY. 30 Tab 3   • spironolactone (ALDACTONE) 25 MG Tab Take 0.5 Tabs by mouth every day. 15 Tab 3   • lovastatin (MEVACOR) 10 MG tablet Take 1 Tab by mouth every evening. 90 Tab 2   • furosemide (LASIX) 40 MG Tab Take 1 Tab by mouth every day. 90 Tab 2   • metoprolol SR (TOPROL XL) 100 MG TABLET SR 24 HR Take 1 Tab by mouth every day. 90 Tab 3   • metFORMIN (GLUCOPHAGE) 500 MG Tab TAKE 1 TAB BY MOUTH 2 TIMES A DAY, WITH MEALS. 180 Tab 1   • XARELTO 20 MG Tab  tablet TAKE 1 TAB BY MOUTH WITH DINNER. 30 Tab 5   • lisinopril (PRINIVIL) 5 MG Tab Take 1 Tab by mouth 2 times a day. 180 Tab 3   • Magnesium 500 MG CAPS Take 500 mg by mouth every day.       No facility-administered encounter medications on file as of 2/4/2019.      Review of Systems   Constitutional: Negative for chills and fever.   HENT: Negative for congestion.    Eyes: Negative for blurred vision, pain and discharge.   Respiratory: Negative for cough, hemoptysis and wheezing.    Cardiovascular: Negative for chest pain and palpitations.   Gastrointestinal: Negative for abdominal pain, nausea and vomiting.   Musculoskeletal: Negative for joint pain and myalgias.   Skin: Negative for itching and rash.   Neurological: Negative for speech change and loss of consciousness.   Endo/Heme/Allergies: Does not bruise/bleed easily.   Psychiatric/Behavioral: Negative for depression. The patient is not nervous/anxious.    All other systems reviewed and are negative.       Objective:   /70 (BP Location: Left arm, Patient Position: Sitting, BP Cuff Size: Adult)   Pulse 84   Ht 1.829 m (6')   Wt 83.9 kg (185 lb)   SpO2 92%   BMI 25.09 kg/m²     Physical Exam   Constitutional: He is oriented to person, place, and time. He appears well-developed and well-nourished.   HENT:   Head: Normocephalic and atraumatic.   Eyes: EOM are normal.   Neck: Normal range of motion. Neck supple.   Cardiovascular: Normal rate, regular rhythm and intact distal pulses.  Exam reveals no gallop and no friction rub.    No murmur heard.  Pulmonary/Chest: Effort normal and breath sounds normal.   Abdominal: Soft.   Musculoskeletal: Normal range of motion. He exhibits no edema.   Neurological: He is alert and oriented to person, place, and time.   Skin: Skin is warm and dry.   Psychiatric: He has a normal mood and affect. His behavior is normal. Judgment and thought content normal.       Assessment:     1. RBBB     2. S/P CABG x 4     3. Old  anterior myocardial infarction     4. Ischemic cardiomyopathy     5. History of pulmonary embolus (PE)     6. AICD (automatic cardioverter/defibrillator) present     7. Acute deep vein thrombosis (DVT) of femoral vein of left lower extremity (CMS-HCC)         Medical Decision Making:  Today's Assessment / Status / Plan:   1.  Old anterior wall infarction with a right bundle branch block.  Minimal heart failure symptoms.  No indication for resynchronization therapy.  2.  Old IVCDin 2014.    3.  AICD normal function.  4.  Status post bypass surgery.  5.  Anticoagulation continue.  6.  Follow-up with me in 1 year

## 2019-02-04 NOTE — LETTER
[facility]   [address], [address]  [place] [address]  Phone: [phone]  Fax: [phone]              Jaime Lee  1950    Encounter Date: 2/4/2019    Marcus Stephenson M.D.          PROGRESS NOTE:  Chief Complaint   Patient presents with   • Cardiomyopathy (Ischemic)     F/V CARDIOMYOPATHY       Subjective:   Jaime Lee is a 68 y.o. male who presents today with his not been seen for greater than 3 years.  Referred by Dr. Lau for consideration of upgrade to a biventricular device.  Previous shock in November possibly for sinus arrhythmia.  No further shocks.  Good exercise tolerance.  No heart failure symptoms at this time.  Has a single chamber Medtronic AICD.  Continues to travel.  He is to be a .  Denies any chest pain.  EKG consistent with an old anterior wall MI with a right bundle branch block.  Previously had a left bundle branch block.    Past Medical History:   Diagnosis Date   • Acute anterior myocardial infarction (HCC) 12/28/2000    PCI attempted by Dr. Malloy but unsuccessful   • AICD (automatic cardioverter/defibrillator) present 2003/2008/2015    Defibrillator is a Medtronic model [id number], serial #[id number], implanted Dr. Stephenson November 2015. The ventricular lead is a Medtronic model #6947-65, lead serial #[id number], implant 7/2/2003   • Allergy     within 2 years   • CAD (coronary artery disease) 12/28/2000    CABG x 4 (LIMA to LAD, rSVG to first diagonal, left circumflex and PDA).2000: CABG x 4 (LIMA to LAD, rSVG to first diagonal, left circumflex and PDA).  2003: LIMA and circumflex grafts patent, diagonal and PDA grafts closed.   • Congestive heart failure (HCC) 12/28/2000   • Dental disorder     Upper partial   • GERD (gastroesophageal reflux disease)    • Ischemic cardiomyopathy 08/2017    Echocardiogram with moderately dilated LV, LVEF 20%.   • Left bundle branch block     • Pneumonia  2014   • Pulmonary embolism (HCC) March 2014   • Type 2 diabetes mellitus without complication (HCC)      Diet controlled, followed by Dr. Walls     Past Surgical History:   Procedure Laterality Date   • RECOVERY  11/3/2015    Procedure: CATH LAB-[place]-ICD GENERATOR CHANGE 03062- [facility] T82.111A-MEDTRONIC BLOODLESS;  Surgeon: Recoveryonly Surgery;  Location: SURGERY PRE-POST PROC UNIT [facility] [place];  Service:    • AICD BATTERY CHANGE  November 2015    Generator replacement with Medtronic Evera S VR [id number] implanted by Dr. Marcus Stephenson.   • AICD IMPLANT  January 2008    Medtronic Virtuoso VR [id number] implanted by Dr. Stephenson.   • OTHER CARDIAC SURGERY  12/2000    CABG x 4   • APPENDECTOMY  1969   • MULTIPLE CORONARY ARTERY BYPASS       Family History   Problem Relation Age of Onset   • Diabetes Mother    • Heart Disease Mother    • Diabetes Sister    • Alcohol/Drug Brother      Social History     Social History   • Marital status:      Spouse name: N/A   • Number of children: N/A   • Years of education: N/A     Occupational History   • Not on file.     Social History Main Topics   • Smoking status: Former Smoker     Packs/day: 1.00     Years: 32.00     Types: Cigarettes     Quit date: 12/28/2000   • Smokeless tobacco: Never Used   • Alcohol use 4.2 oz/week     7 Glasses of wine per week      Comment: 7 per week   • Drug use: Yes     Types: Marijuana      Comment: CBD, THC, edibals    • Sexual activity: No     Other Topics Concern   • Not on file     Social History Narrative   • No narrative on file     Allergies   Allergen Reactions   • Bloodless    • Pcn [Penicillins] Anaphylaxis     Outpatient Encounter Prescriptions as of 2/4/2019   Medication Sig Dispense Refill   • potassium chloride SA (KDUR) 20 MEQ Tab CR TAKE 1 TAB BY MOUTH EVERY DAY. 30 Tab 3   • spironolactone (ALDACTONE) 25 MG Tab Take 0.5 Tabs by mouth every day. 15 Tab 3   • lovastatin (MEVACOR) 10 MG tablet Take 1 Tab by mouth every evening. 90 Tab 2   •  furosemide (LASIX) 40 MG Tab Take 1 Tab by mouth every day. 90 Tab 2   • metoprolol SR (TOPROL XL) 100 MG TABLET SR 24 HR Take 1 Tab by mouth every day. 90 Tab 3   • metFORMIN (GLUCOPHAGE) 500 MG Tab TAKE 1 TAB BY MOUTH 2 TIMES A DAY, WITH MEALS. 180 Tab 1   • XARELTO 20 MG Tab tablet TAKE 1 TAB BY MOUTH WITH DINNER. 30 Tab 5   • lisinopril (PRINIVIL) 5 MG Tab Take 1 Tab by mouth 2 times a day. 180 Tab 3   • Magnesium 500 MG CAPS Take 500 mg by mouth every day.       No facility-administered encounter medications on file as of 2/4/2019.      Review of Systems   Constitutional: Negative for chills and fever.   HENT: Negative for congestion.    Eyes: Negative for blurred vision, pain and discharge.   Respiratory: Negative for cough, hemoptysis and wheezing.    Cardiovascular: Negative for chest pain and palpitations.   Gastrointestinal: Negative for abdominal pain, nausea and vomiting.   Musculoskeletal: Negative for joint pain and myalgias.   Skin: Negative for itching and rash.   Neurological: Negative for speech change and loss of consciousness.   Endo/Heme/Allergies: Does not bruise/bleed easily.   Psychiatric/Behavioral: Negative for depression. The patient is not nervous/anxious.    All other systems reviewed and are negative.       Objective:   /70 (BP Location: Left arm, Patient Position: Sitting, BP Cuff Size: Adult)   Pulse 84   Ht 1.829 m (6')   Wt 83.9 kg (185 lb)   SpO2 92%   BMI 25.09 kg/m²      Physical Exam   Constitutional: He is oriented to person, place, and time. He appears well-developed and well-nourished.   HENT:   Head: Normocephalic and atraumatic.   Eyes: EOM are normal.   Neck: Normal range of motion. Neck supple.   Cardiovascular: Normal rate, regular rhythm and intact distal pulses.  Exam reveals no gallop and no friction rub.    No murmur heard.  Pulmonary/Chest: Effort normal and breath sounds normal.   Abdominal: Soft.   Musculoskeletal: Normal range of motion. He exhibits no  edema.   Neurological: He is alert and oriented to person, place, and time.   Skin: Skin is warm and dry.   Psychiatric: He has a normal mood and affect. His behavior is normal. Judgment and thought content normal.       Assessment:     1. RBBB     2. S/P CABG x 4     3. Old anterior myocardial infarction     4. Ischemic cardiomyopathy     5. History of pulmonary embolus (PE)     6. AICD (automatic cardioverter/defibrillator) present     7. Acute deep vein thrombosis (DVT) of femoral vein of left lower extremity (CMS-HCC)         Medical Decision Making:  Today's Assessment / Status / Plan:   1.  Old anterior wall microinfarction with a right bundle branch block.  Minimal heart failure symptoms.  No indication for resynchronization therapy.  2.  Old left bundle branch block in 2014.  Has resolved.  3.  AICD normal function.  4.  Status post bypass surgery.  5.  Anticoagulation continue.  6.  Follow-up with me in 1 year      Angelica [place]son M.D.  [address]  [address]  [place] [address]  VIA In Basket     Silvia Lau M.D.  [address]  [address]  [place] [address]  VIA In Basket

## 2019-02-12 ENCOUNTER — TELEPHONE (OUTPATIENT)
Dept: MEDICAL GROUP | Facility: LAB | Age: 69
End: 2019-02-12

## 2019-02-12 NOTE — LETTER
February 13, 2019        Jaime Lee  [address] [place] [address]        Dear Prudencio:     My name is Germaine and I am the Panel Coordinator for Angelica Stephenson M.D.. In reviewing your chart I noticed that your last retinal exam was done by us on 2/1/18. We are needing you to call and schedule a appointment to get it done please call [phone].     Thank you   Germaine IBARRA MA   .    Electronically Signed

## 2019-02-13 NOTE — TELEPHONE ENCOUNTER
Mailed letter out     Message: mailed letter out to patient.    Left Message for patient to call back: yes

## 2019-02-21 DIAGNOSIS — I25.5 ISCHEMIC CARDIOMYOPATHY: ICD-10-CM

## 2019-02-21 RX ORDER — LISINOPRIL 5 MG/1
5 TABLET ORAL 2 TIMES DAILY
Qty: 180 TAB | Refills: 3 | Status: SHIPPED | OUTPATIENT
Start: 2019-02-21 | End: 2019-06-04

## 2019-02-25 ENCOUNTER — ANTICOAGULATION VISIT (OUTPATIENT)
Dept: MEDICAL GROUP | Facility: MEDICAL CENTER | Age: 69
End: 2019-02-25
Payer: MEDICARE

## 2019-02-25 DIAGNOSIS — Z79.01 CHRONIC ANTICOAGULATION: Primary | ICD-10-CM

## 2019-02-25 DIAGNOSIS — I82.412 ACUTE DEEP VEIN THROMBOSIS (DVT) OF FEMORAL VEIN OF LEFT LOWER EXTREMITY (HCC): ICD-10-CM

## 2019-02-25 PROCEDURE — 99211 OFF/OP EST MAY X REQ PHY/QHP: CPT | Performed by: PHYSICIAN ASSISTANT

## 2019-02-25 NOTE — PROGRESS NOTES
Target end date:indefinite     Indication: recurrent DVT     Drug: Xarelto 20 mg daily    Health Status Since Last Assessment   Patient denies any new relevant medical problems, ED visits or hospitalizations   Patient denies any embolic events (stroke/tia/systemic embolism)    Adherence with DOAC Therapy   Pt has not missed any doses in the average week    Bleeding Risk Assessment     Denies Epistaxis   Pt denies any excessive or unusual bleeding/hematomas.  Pt denies any GI bleeds or hematemesis.  Pt denies any concerning daily headache or sub dural hematoma symptoms.     Pt denies any hematuria or abnormal vaginal bleeding.   Latest Hemoglobin      12/3/2018 11:31   WBC 6.9   RBC 4.65 (L)   Hemoglobin 15.1   Hematocrit 46.1   MCV 99.1 (H)   MCH 32.5   MCHC 32.8 (L)   RDW 43.8   Platelet Count 167   MPV 12.7      ETOH overuse none     Creatinine Clearance/Renal Function     Latest ClCr      1/29/2019 10:36   Creatinine 1.40   GFR If  >60   GFR If Non  50 (A)        Drug Interactions   Platelets: 167   ASA/other antiplatelets denies   NSAID denies   Other drug interactions none   Verified no anticonvulsant or azole therapy, education provided for future use.     Examination   Symptomatic hypotension denies   Significant gait impairment/imbalance/high risk for falls? none    Final Assessment and Recommendations:   Patient appears stable from the anticoagulation staindpoint. Scr slightly elevated with past BMP, but within baseline range     Benefits of continued DOAC therapy outweigh risks for this patient   Recommend pt continue with current DOAC therapy 20 mg Xarelto daily (with dose adjustment)     Other Actions: cmp/ cbc hemogram ordered prior to next visit    Follow up:   Will follow up with patient in 6 months.        Merle Barrios PharmD

## 2019-03-08 ENCOUNTER — PATIENT MESSAGE (OUTPATIENT)
Dept: HEALTH INFORMATION MANAGEMENT | Facility: OTHER | Age: 69
End: 2019-03-08

## 2019-03-09 ENCOUNTER — TELEPHONE (OUTPATIENT)
Dept: HEALTH INFORMATION MANAGEMENT | Facility: OTHER | Age: 69
End: 2019-03-09

## 2019-03-09 DIAGNOSIS — I50.22 CHRONIC SYSTOLIC CONGESTIVE HEART FAILURE, NYHA CLASS 2 (HCC): ICD-10-CM

## 2019-03-09 NOTE — TELEPHONE ENCOUNTER
Jaime has been identified as a patient who could benefit from the services of the Heart Failure Program with [facility] Cardiology department. Please review the pended referral order and sign, then route back to the Patient Outreach Team with the action taken if this is a service that you wish for Jaime to receive.

## 2019-03-29 ENCOUNTER — TELEPHONE (OUTPATIENT)
Dept: MEDICAL GROUP | Facility: LAB | Age: 69
End: 2019-03-29

## 2019-03-29 NOTE — TELEPHONE ENCOUNTER
Phone Number Called: My chart message    Message: sent my chart message to patient in attempt to close care gaps.    Left Message for patient to call back: yes

## 2019-04-15 DIAGNOSIS — E11.65 TYPE 2 DIABETES MELLITUS WITH HYPERGLYCEMIA, WITHOUT LONG-TERM CURRENT USE OF INSULIN (HCC): ICD-10-CM

## 2019-04-23 ENCOUNTER — ANTICOAGULATION MONITORING (OUTPATIENT)
Dept: VASCULAR LAB | Facility: MEDICAL CENTER | Age: 69
End: 2019-04-23

## 2019-04-23 DIAGNOSIS — Z79.01 CHRONIC ANTICOAGULATION: ICD-10-CM

## 2019-04-23 DIAGNOSIS — E11.65 TYPE 2 DIABETES MELLITUS WITH HYPERGLYCEMIA, WITHOUT LONG-TERM CURRENT USE OF INSULIN (HCC): ICD-10-CM

## 2019-04-23 DIAGNOSIS — I82.412 ACUTE DEEP VEIN THROMBOSIS (DVT) OF FEMORAL VEIN OF LEFT LOWER EXTREMITY (HCC): ICD-10-CM

## 2019-04-23 DIAGNOSIS — I25.5 ISCHEMIC CARDIOMYOPATHY: ICD-10-CM

## 2019-04-23 RX ORDER — SPIRONOLACTONE 25 MG/1
12.5 TABLET ORAL
Qty: 45 TAB | Refills: 3 | Status: SHIPPED | OUTPATIENT
Start: 2019-04-23 | End: 2019-06-04 | Stop reason: SDUPTHER

## 2019-04-24 ENCOUNTER — OFFICE VISIT (OUTPATIENT)
Dept: MEDICAL GROUP | Facility: LAB | Age: 69
End: 2019-04-24
Payer: MEDICARE

## 2019-04-24 VITALS
OXYGEN SATURATION: 92 % | BODY MASS INDEX: 24.66 KG/M2 | RESPIRATION RATE: 20 BRPM | WEIGHT: 182.1 LBS | TEMPERATURE: 99.2 F | HEART RATE: 84 BPM | HEIGHT: 72 IN | SYSTOLIC BLOOD PRESSURE: 112 MMHG | DIASTOLIC BLOOD PRESSURE: 70 MMHG

## 2019-04-24 DIAGNOSIS — I25.2 OLD ANTERIOR MYOCARDIAL INFARCTION: ICD-10-CM

## 2019-04-24 DIAGNOSIS — D69.6 THROMBOCYTOPENIA (HCC): ICD-10-CM

## 2019-04-24 DIAGNOSIS — I25.10 CORONARY ARTERY DISEASE INVOLVING NATIVE CORONARY ARTERY OF NATIVE HEART WITHOUT ANGINA PECTORIS: ICD-10-CM

## 2019-04-24 DIAGNOSIS — I82.412 ACUTE DEEP VEIN THROMBOSIS (DVT) OF FEMORAL VEIN OF LEFT LOWER EXTREMITY (HCC): ICD-10-CM

## 2019-04-24 DIAGNOSIS — Z79.01 CHRONIC ANTICOAGULATION: ICD-10-CM

## 2019-04-24 DIAGNOSIS — I50.22 CHRONIC SYSTOLIC CONGESTIVE HEART FAILURE, NYHA CLASS 2 (HCC): ICD-10-CM

## 2019-04-24 DIAGNOSIS — N18.30 CKD (CHRONIC KIDNEY DISEASE) STAGE 3, GFR 30-59 ML/MIN (HCC): ICD-10-CM

## 2019-04-24 DIAGNOSIS — I47.20 VENTRICULAR TACHYCARDIA (HCC): ICD-10-CM

## 2019-04-24 DIAGNOSIS — I82.512 CHRONIC DEEP VEIN THROMBOSIS (DVT) OF FEMORAL VEIN OF LEFT LOWER EXTREMITY (HCC): ICD-10-CM

## 2019-04-24 DIAGNOSIS — E11.65 TYPE 2 DIABETES MELLITUS WITH HYPERGLYCEMIA, WITHOUT LONG-TERM CURRENT USE OF INSULIN (HCC): ICD-10-CM

## 2019-04-24 DIAGNOSIS — Z00.00 MEDICARE ANNUAL WELLNESS VISIT, SUBSEQUENT: ICD-10-CM

## 2019-04-24 DIAGNOSIS — I25.119 CORONARY ARTERY DISEASE INVOLVING NATIVE CORONARY ARTERY OF NATIVE HEART WITH ANGINA PECTORIS (HCC): ICD-10-CM

## 2019-04-24 DIAGNOSIS — Z95.810 AICD (AUTOMATIC CARDIOVERTER/DEFIBRILLATOR) PRESENT: ICD-10-CM

## 2019-04-24 DIAGNOSIS — I25.5 ISCHEMIC CARDIOMYOPATHY: ICD-10-CM

## 2019-04-24 DIAGNOSIS — J40 BRONCHITIS: ICD-10-CM

## 2019-04-24 DIAGNOSIS — E78.2 MIXED HYPERLIPIDEMIA: ICD-10-CM

## 2019-04-24 DIAGNOSIS — Z86.711 HISTORY OF PULMONARY EMBOLUS (PE): ICD-10-CM

## 2019-04-24 LAB
HBA1C MFR BLD: 6.4 % (ref 0–5.6)
INT CON NEG: ABNORMAL
INT CON POS: ABNORMAL

## 2019-04-24 PROCEDURE — G0439 PPPS, SUBSEQ VISIT: HCPCS | Performed by: FAMILY MEDICINE

## 2019-04-24 PROCEDURE — 8041 PR SCP AHA: Performed by: FAMILY MEDICINE

## 2019-04-24 PROCEDURE — 92250 FUNDUS PHOTOGRAPHY W/I&R: CPT | Mod: TC | Performed by: FAMILY MEDICINE

## 2019-04-24 PROCEDURE — 83036 HEMOGLOBIN GLYCOSYLATED A1C: CPT | Performed by: FAMILY MEDICINE

## 2019-04-24 RX ORDER — POTASSIUM CHLORIDE 20 MEQ/1
20 TABLET, EXTENDED RELEASE ORAL DAILY
Qty: 90 TAB | Refills: 1 | Status: SHIPPED | OUTPATIENT
Start: 2019-04-24 | End: 2019-06-04 | Stop reason: SDUPTHER

## 2019-04-24 ASSESSMENT — ACTIVITIES OF DAILY LIVING (ADL): BATHING_REQUIRES_ASSISTANCE: 0

## 2019-04-24 ASSESSMENT — PATIENT HEALTH QUESTIONNAIRE - PHQ9: CLINICAL INTERPRETATION OF PHQ2 SCORE: 0

## 2019-04-24 ASSESSMENT — ENCOUNTER SYMPTOMS: GENERAL WELL-BEING: POOR

## 2019-04-24 NOTE — ASSESSMENT & PLAN NOTE
Illness: 14 days of illness including: nasal congestion, laryngitis, cough , NosSinus pain and pressure, tactile fever  Symptoms negative for chills, night sweats,   Treatments tried: none   Since onset, symptoms are better   Similarly ill exposures: yes  Medical history negative for asthma  He  reports that he quit smoking about 18 years ago. His smoking use included Cigarettes. He has a 32.00 pack-year smoking history. He has never used smokeless tobacco.

## 2019-04-24 NOTE — ASSESSMENT & PLAN NOTE
Severely reduced left ventricular systolic function. Left   ventricular ejection fraction is visually estimated to be 20-25%.   Global hypokinesis with regional variation. Severe hypo to akinesis of   the mid anterior and lateral walls, akinetic apex. No contrast to   assess for LV thrombus. Grade II diastolic dysfunction.

## 2019-04-25 ENCOUNTER — HOSPITAL ENCOUNTER (OUTPATIENT)
Dept: LAB | Facility: MEDICAL CENTER | Age: 69
End: 2019-04-25
Attending: FAMILY MEDICINE
Payer: MEDICARE

## 2019-04-25 DIAGNOSIS — D69.6 THROMBOCYTOPENIA (HCC): ICD-10-CM

## 2019-04-25 DIAGNOSIS — E78.2 MIXED HYPERLIPIDEMIA: ICD-10-CM

## 2019-04-25 DIAGNOSIS — E11.65 TYPE 2 DIABETES MELLITUS WITH HYPERGLYCEMIA, WITHOUT LONG-TERM CURRENT USE OF INSULIN (HCC): ICD-10-CM

## 2019-04-25 DIAGNOSIS — N18.30 CKD (CHRONIC KIDNEY DISEASE) STAGE 3, GFR 30-59 ML/MIN (HCC): ICD-10-CM

## 2019-04-25 PROBLEM — I47.20 VENTRICULAR TACHYCARDIA (HCC): Status: ACTIVE | Noted: 2019-04-25

## 2019-04-25 LAB
ALBUMIN SERPL BCP-MCNC: 4.2 G/DL (ref 3.2–4.9)
ALBUMIN/GLOB SERPL: 1.4 G/DL
ALP SERPL-CCNC: 49 U/L (ref 30–99)
ALT SERPL-CCNC: 14 U/L (ref 2–50)
ANION GAP SERPL CALC-SCNC: 7 MMOL/L (ref 0–11.9)
AST SERPL-CCNC: 16 U/L (ref 12–45)
BASOPHILS # BLD AUTO: 0.7 % (ref 0–1.8)
BASOPHILS # BLD: 0.05 K/UL (ref 0–0.12)
BILIRUB SERPL-MCNC: 0.7 MG/DL (ref 0.1–1.5)
BUN SERPL-MCNC: 17 MG/DL (ref 8–22)
CALCIUM SERPL-MCNC: 9.5 MG/DL (ref 8.5–10.5)
CHLORIDE SERPL-SCNC: 100 MMOL/L (ref 96–112)
CHOLEST SERPL-MCNC: 165 MG/DL (ref 100–199)
CO2 SERPL-SCNC: 27 MMOL/L (ref 20–33)
CREAT SERPL-MCNC: 1.25 MG/DL (ref 0.5–1.4)
CREAT UR-MCNC: 151.3 MG/DL
EOSINOPHIL # BLD AUTO: 0.09 K/UL (ref 0–0.51)
EOSINOPHIL NFR BLD: 1.2 % (ref 0–6.9)
ERYTHROCYTE [DISTWIDTH] IN BLOOD BY AUTOMATED COUNT: 44.3 FL (ref 35.9–50)
FASTING STATUS PATIENT QL REPORTED: NORMAL
GLOBULIN SER CALC-MCNC: 3.1 G/DL (ref 1.9–3.5)
GLUCOSE SERPL-MCNC: 149 MG/DL (ref 65–99)
HCT VFR BLD AUTO: 46.4 % (ref 42–52)
HDLC SERPL-MCNC: 43 MG/DL
HGB BLD-MCNC: 15 G/DL (ref 14–18)
IMM GRANULOCYTES # BLD AUTO: 0.04 K/UL (ref 0–0.11)
IMM GRANULOCYTES NFR BLD AUTO: 0.5 % (ref 0–0.9)
LDLC SERPL CALC-MCNC: 86 MG/DL
LYMPHOCYTES # BLD AUTO: 1.22 K/UL (ref 1–4.8)
LYMPHOCYTES NFR BLD: 16.7 % (ref 22–41)
MCH RBC QN AUTO: 32.1 PG (ref 27–33)
MCHC RBC AUTO-ENTMCNC: 32.3 G/DL (ref 33.7–35.3)
MCV RBC AUTO: 99.4 FL (ref 81.4–97.8)
MICROALBUMIN UR-MCNC: 1 MG/DL
MICROALBUMIN/CREAT UR: 7 MG/G (ref 0–30)
MONOCYTES # BLD AUTO: 0.66 K/UL (ref 0–0.85)
MONOCYTES NFR BLD AUTO: 9.1 % (ref 0–13.4)
NEUTROPHILS # BLD AUTO: 5.23 K/UL (ref 1.82–7.42)
NEUTROPHILS NFR BLD: 71.8 % (ref 44–72)
NRBC # BLD AUTO: 0 K/UL
NRBC BLD-RTO: 0 /100 WBC
PLATELET # BLD AUTO: 220 K/UL (ref 164–446)
PMV BLD AUTO: 11.6 FL (ref 9–12.9)
POTASSIUM SERPL-SCNC: 4.4 MMOL/L (ref 3.6–5.5)
PROT SERPL-MCNC: 7.3 G/DL (ref 6–8.2)
RBC # BLD AUTO: 4.67 M/UL (ref 4.7–6.1)
RETINAL SCREEN: NORMAL
SODIUM SERPL-SCNC: 134 MMOL/L (ref 135–145)
TRIGL SERPL-MCNC: 179 MG/DL (ref 0–149)
WBC # BLD AUTO: 7.3 K/UL (ref 4.8–10.8)

## 2019-04-25 PROCEDURE — 80061 LIPID PANEL: CPT

## 2019-04-25 PROCEDURE — 85025 COMPLETE CBC W/AUTO DIFF WBC: CPT

## 2019-04-25 PROCEDURE — 36415 COLL VENOUS BLD VENIPUNCTURE: CPT

## 2019-04-25 PROCEDURE — 82570 ASSAY OF URINE CREATININE: CPT

## 2019-04-25 PROCEDURE — 80053 COMPREHEN METABOLIC PANEL: CPT

## 2019-04-25 PROCEDURE — 82043 UR ALBUMIN QUANTITATIVE: CPT

## 2019-04-25 NOTE — ASSESSMENT & PLAN NOTE
Patient reports that he had a discharge of his AICD approximately 2 weeks ago while getting ready to go on a cruise.  He collapsed in the elevator and refused to go to the hospital.  He has not had any problems since then.  He denies any current chest pain.  He is following up with cardiology.

## 2019-04-25 NOTE — PROGRESS NOTES
Chief Complaint   Patient presents with   • Annual Exam     potassium refill         HPI:  Jaime Lee is a 68 y.o. here for Medicare Annual Wellness Visit   Ischemic cardiomyopathy  Severely reduced left ventricular systolic function. Left   ventricular ejection fraction is visually estimated to be 20-25%.   Global hypokinesis with regional variation. Severe hypo to akinesis of   the mid anterior and lateral walls, akinetic apex. No contrast to   assess for LV thrombus. Grade II diastolic dysfunction.    Bronchitis  Illness: 14 days of illness including: nasal congestion, laryngitis, cough , NosSinus pain and pressure, tactile fever  Symptoms negative for chills, night sweats,   Treatments tried: none   Since onset, symptoms are better   Similarly ill exposures: yes  Medical history negative for asthma  He  reports that he quit smoking about 18 years ago. His smoking use included Cigarettes. He has a 32.00 pack-year smoking history. He has never used smokeless tobacco.      AICD (automatic cardioverter/defibrillator) present  Patient reports that he had a discharge of his AICD approximately 2 weeks ago while getting ready to go on a cruise.  He collapsed in the elevator and refused to go to the hospital.  He has not had any problems since then.  He denies any current chest pain.  He is following up with cardiology.        Patient Active Problem List    Diagnosis Date Noted   • Chronic anticoagulation 02/01/2018     Priority: High   • Chronic deep vein thrombosis (DVT) of femoral vein of left lower extremity (HCC) 01/04/2018     Priority: High   • Hyperlipidemia 01/18/2017     Priority: High   • S/P CABG x 4 06/22/2016     Priority: High   • AICD (automatic cardioverter/defibrillator) present 03/11/2014     Priority: High   • Ischemic cardiomyopathy 03/11/2014     Priority: High   • History of pulmonary embolus (PE) 02/27/2014     Priority: High   • Coronary artery disease involving native coronary  artery of native heart without angina pectoris 12/28/2000     Priority: High   • Type 2 diabetes mellitus with hyperglycemia, without long-term current use of insulin (Former[facility]) 06/22/2016     Priority: Medium   • Chronic systolic congestive heart failure, NYHA class 2 (CMS-[facility]) 06/22/2016     Priority: Medium   • CKD (chronic kidney disease) stage 3, GFR 30-59 ml/min ([facility]) 02/24/2014     Priority: Medium   • Thrombocytopenia ([facility]) 02/23/2014     Priority: Medium   • Ventricular tachycardia ([facility]) 04/25/2019   • Bronchitis 04/24/2019   • RBBB 02/04/2019   • Macrocytosis without anemia 04/24/2018   • Basal cell carcinoma of skin of left ear 02/01/2018   • Basal cell carcinoma of skin of face 11/28/2017   • Old anterior myocardial infarction 06/22/2016       Current Outpatient Prescriptions   Medication Sig Dispense Refill   • potassium chloride SA (KDUR) 20 MEQ Tab CR Take 1 Tab by mouth every day. 90 Tab 1   • rivaroxaban (XARELTO) 20 MG Tab tablet Take 1 Tab by mouth with dinner. 90 Tab 1   • spironolactone (ALDACTONE) 25 MG Tab Take 0.5 Tabs by mouth every day. 45 Tab 3   • metFORMIN (GLUCOPHAGE) 500 MG Tab TAKE 1 TABLET BY MOUTH TWICE A DAY WITH MEALS 180 Tab 0   • lisinopril (PRINIVIL) 5 MG Tab Take 1 Tab by mouth 2 times a day. 180 Tab 3   • lovastatin (MEVACOR) 10 MG tablet Take 1 Tab by mouth every evening. 90 Tab 2   • furosemide (LASIX) 40 MG Tab Take 1 Tab by mouth every day. 90 Tab 2   • metoprolol SR (TOPROL XL) 100 MG TABLET SR 24 HR Take 1 Tab by mouth every day. 90 Tab 3   • Magnesium 500 MG CAPS Take 500 mg by mouth every day.       No current facility-administered medications for this visit.             Current supplements as per medication list.       Allergies: Bloodless and Pcn [penicillins]    Current social contact/activities: He and his wife enjoy traveling together especially on cruises.    He  reports that he quit smoking about 18 years ago. His smoking use included Cigarettes. He has a 32.00  pack-year smoking history. He has never used smokeless tobacco. He reports that he drinks about 4.2 oz of alcohol per week . He reports that he uses drugs, including Marijuana.  Counseling given: Not Answered      DPA/Advanced Directive:  Patient does not have an Advanced Directive.  A packet and workshop information was given on Advanced Directives.    ROS:    Gait: Uses no assistive device  Ostomy: No  Other tubes: No  Amputations: No  Chronic oxygen use: No  Last eye exam: one year ago  Wears hearing aids: No   : Denies any urinary leakage during the last 6 months    Screening:    Depression Screening    Little interest or pleasure in doing things?  0 - not at all  Feeling down, depressed , or hopeless? 0 - not at all  Patient Health Questionnaire Score: 0     If depressive symptoms identified deferred to follow up visit unless specifically addressed in assessment and plan.    Interpretation of PHQ-9 Total Score   Score Severity   1-4 No Depression   5-9 Mild Depression   10-14 Moderate Depression   15-19 Moderately Severe Depression   20-27 Severe Depression    Screening for Cognitive Impairment    Three Minute Recall (village, kitchen, baby) 2/3    [place] clock face with all 12 numbers and set the hands to show 10 past 10.  Yes    Cognitive concerns identified deferred for follow up unless specifically addressed in assessment and plan.    Fall Risk Assessment    Has the patient had two or more falls in the last year or any fall with injury in the last year?  No    Safety Assessment    Throw rugs on floor.  Yes  Handrails on all stairs.  Yes  Good lighting in all hallways.  Yes  Difficulty hearing.  Yes  Patient counseled about all safety risks that were identified.    Functional Assessment ADLs    Are there any barriers preventing you from cooking for yourself or meeting nutritional needs?  No.    Are there any barriers preventing you from driving safely or obtaining transportation?  Yes. Pt states he no longer  drives   Are there any barriers preventing you from using a telephone or calling for help?  No.    Are there any barriers preventing you from shopping?  No.    Are there any barriers preventing you from taking care of your own finances?  No.    Are there any barriers preventing you from managing your medications?  No.    Are there any barriers preventing you from showering, bathing or dressing yourself?  No.    Are you currently engaging in any exercise or physical activity?  Yes.     What is your perception of your health?  Poor.      Health Maintenance Summary                IMM HEP B VACCINE Overdue 8/23/1969     IMM ZOSTER VACCINES Overdue 8/23/2000     IMM DTaP/Tdap/Td Vaccine Overdue 10/17/2011      Done 10/16/2011 Imm Admin: TD Vaccine     Patient has more history with this topic...    URINE ACR / MICROALBUMIN Overdue 7/28/2018      Done 7/28/2017 MICROALBUMIN CREAT RATIO URINE    RETINAL SCREENING Overdue 2/1/2019      Done 2/1/2018 POCT RETINAL EYE EXAM    FASTING LIPID PROFILE Overdue 4/18/2019      Done 4/18/2018 LIPID PROFILE      Patient has more history with this topic...    A1C SCREENING Next Due 10/24/2019      Done 4/24/2019 POCT A1C      Patient has more history with this topic...    SERUM CREATININE Next Due 1/29/2020      Done 1/29/2019 BASIC METABOLIC PANEL      Patient has more history with this topic...    Annual Wellness Visit Next Due 4/24/2020      Done 4/24/2019 Visit Dx: Medicare annual wellness visit, subsequent     Patient has more history with this topic...    DIABETES MONOFILAMENT / LE EXAM Next Due 4/24/2020      Done 4/24/2019 AMB DIABETIC MONOFILAMENT LOWER EXTREMITY EXAM     Patient has more history with this topic...    COLONOSCOPY Next Due 4/22/2024      Patient Declined 4/22/2014           Patient Care Team:  Angelica Stephenson M.D. as PCP - General (Family Medicine)  [facility]  Bianka Ahumada M.D. as Consulting Physician (Dermatology)  LEIF Rodríguez.  as Consulting Physician (Cardiology)        Social History   Substance Use Topics   • Smoking status: Former Smoker     Packs/day: 1.00     Years: 32.00     Types: Cigarettes     Quit date: 12/28/2000   • Smokeless tobacco: Never Used   • Alcohol use 4.2 oz/week     7 Glasses of wine per week      Comment: 7 per week     Family History   Problem Relation Age of Onset   • Diabetes Mother    • Heart Disease Mother    • Diabetes Sister    • Alcohol/Drug Brother      He  has a past medical history of Acute anterior myocardial infarction (HCC) (12/28/2000); AICD (automatic cardioverter/defibrillator) present (2003/2008/2015); Allergy; CAD (coronary artery disease) (12/28/2000); Congestive heart failure (HCC) (12/28/2000); Dental disorder; GERD (gastroesophageal reflux disease); Ischemic cardiomyopathy (08/2017); Left bundle branch block ( ); Pneumonia (2014); Pulmonary embolism (HCC) (March 2014); and Type 2 diabetes mellitus without complication (Former[facility]) ( ).   Past Surgical History:   Procedure Laterality Date   • RECOVERY  11/3/2015    Procedure: CATH LAB-MARCO A-ICD GENERATOR CHANGE 62751- EUNICE T82.111A-MEDTRONIC BLOODLESS;  Surgeon: Recoveryonly Surgery;  [place] [facility]  Service:    • AICD BATTERY CHANGE  November 2015    Generator replacement with Medtronic Evera S VR [id number] implanted by Dr. Marcus Stephenson.   • AICD IMPLANT  January 2008    Medtronic Virtuoso VR [id number] implanted by Dr. Stephenson.   • OTHER CARDIAC SURGERY  12/2000    CABG x 4   • APPENDECTOMY  1969   • MULTIPLE CORONARY ARTERY BYPASS         Exam:   /70 (BP Location: Left arm, Patient Position: Sitting, BP Cuff Size: Adult)   Pulse 84   Temp 37.3 °C (99.2 °F) (Temporal)   Resp 20   Ht 1.829 m (6')   Wt 82.6 kg (182 lb 1.6 oz)   SpO2 92%  Body mass index is 24.7 kg/m².    Hearing fair.    Dentition fair  Alert, oriented in no acute distress.  Eye contact is good, speech goal directed, affect calm  Constitutional: Alert,  no distress.  Skin: Warm, dry, good turgor, no rashes in visible areas.  Eye: Equal, round and reactive, conjunctiva clear, lids normal.  ENMT: Lips without lesions, good dentition, oropharynx clear.  Neck: Trachea midline, no masses, no thyromegaly. No cervical or supraclavicular lymphadenopathy  Respiratory: Unlabored respiratory effort, lungs clear to auscultation, no wheezes, no ronchi.  Cardiovascular: Normal S1, S2, RRR, no murmur, no edema.  Abdomen: Soft, non-tender, no masses, no hepatosplenomegaly.  Psych: Alert and oriented x3, normal affect and mood.  Monofilament testing with a 10 gram force: sensation intact: intact bilaterally  Visual Inspection: Feet without maceration, ulcers, fissures.  Pedal pulses: intact bilaterally      Assessment and Plan. The following treatment and monitoring plan is recommended:    1. Medicare annual wellness visit, subsequent  Routine anticipatory guidance.  No special services needed at this time.    2. Type 2 diabetes mellitus with hyperglycemia, without long-term current use of insulin ([facility])  This is a chronic medical condition that is currently stable  Good control.  Patient is due for labs -orders given.  Normal monofilament exam.  Retinal scan done and pending  - Comp Metabolic Panel; Future  - Lipid Profile; Future  - MICROALBUMIN CREAT RATIO URINE (LAB COLLECT); Future  - POCT Retinal Eye Exam  - Diabetic Monofilament Lower Extremity Exam  - POCT Hemoglobin A1C    3. AICD (automatic cardioverter/defibrillator) present  Patient had a recent discharge.  Discussed need for follow-up when this occurs.  Follow-up with cardiology as scheduled    4. Chronic systolic congestive heart failure, NYHA class 2 (CMS-HCC)  Actual improving ejection fraction.  Continue current medications.  Follow-up with cardiology as scheduled    5. CKD (chronic kidney disease) stage 3, GFR 30-59 ml/min ([facility])  Avoid NSAIDs.  Continue to monitor  This is a chronic medical condition that is  currently stable    - Comp Metabolic Panel; Future    6. Coronary artery disease involving native coronary artery of native heart without angina pectoris  This is a chronic medical condition that is currently stable  Follow-up with cardiology as scheduled    7. Chronic anticoagulation  Continue Xarelto indefinitely    8. Thrombocytopenia (HCC)  Monitor CBC.  Patient has not had increased bruising or bleeding  - CBC WITH DIFFERENTIAL; Future    9. Chronic deep vein thrombosis (DVT) of femoral vein of left lower extremity (HCC)  Patient would like to wait and do a repeat ultrasound in 6 months.  Continue Xarelto  - rivaroxaban (XARELTO) 20 MG Tab tablet; Take 1 Tab by mouth with dinner.  Dispense: 90 Tab; Refill: 1    10. History of pulmonary embolus (PE)  Cardiologist recommending lifelong Xarelto.  Discussed with patient that I agree with this assessment    11. Mixed hyperlipidemia  This is a chronic medical condition that is currently stable  Continue lovastatin 10 mg daily  - Lipid Profile; Future    12. Ischemic cardiomyopathy  Refill potassium chloride  - potassium chloride SA (KDUR) 20 MEQ Tab CR; Take 1 Tab by mouth every day.  Dispense: 90 Tab; Refill: 1    13. Old anterior myocardial infarction  This is a chronic medical condition that is currently stable  Follow-up with cardiology as scheduled    14. Bronchitis  Viral, symptomatic care.  Call if not improving    15. Coronary artery disease involving native coronary artery of native heart with angina pectoris (HCC)  This is a chronic medical condition that is currently stable  Follow-up with cardiology as scheduled  - potassium chloride SA (KDUR) 20 MEQ Tab CR; Take 1 Tab by mouth every day.  Dispense: 90 Tab; Refill: 1      16. Ventricular tachycardia (HCC)  Continue metoprolol.  AICD in place.        Services suggested: No services needed at this time  Health Care Screening: Age-appropriate preventive services recommended by USPTF and ACIP covered by  Medicare were discussed today. Services ordered if indicated and agreed upon by the patient.  Referrals offered: Community-based lifestyle interventions to reduce health risks and promote self-management and wellness, fall prevention, nutrition, physical activity, tobacco-use cessation, weight loss, and mental health services as per orders if indicated.    Discussion today about general wellness and lifestyle habits:    · Prevent falls and reduce trip hazards; Cautioned about securing or removing rugs.  · Have a working fire alarm and carbon monoxide detector;   · Engage in regular physical activity and social activities     Follow-up: Return in about 6 months (around 10/24/2019).

## 2019-06-04 ENCOUNTER — OFFICE VISIT (OUTPATIENT)
Dept: CARDIOLOGY | Facility: MEDICAL CENTER | Age: 69
End: 2019-06-04
Payer: MEDICARE

## 2019-06-04 VITALS
OXYGEN SATURATION: 92 % | HEIGHT: 72 IN | SYSTOLIC BLOOD PRESSURE: 92 MMHG | WEIGHT: 179 LBS | DIASTOLIC BLOOD PRESSURE: 62 MMHG | BODY MASS INDEX: 24.24 KG/M2 | HEART RATE: 78 BPM

## 2019-06-04 DIAGNOSIS — I45.10 RBBB: ICD-10-CM

## 2019-06-04 DIAGNOSIS — I25.2 OLD ANTERIOR MYOCARDIAL INFARCTION: ICD-10-CM

## 2019-06-04 DIAGNOSIS — I25.119 CORONARY ARTERY DISEASE INVOLVING NATIVE CORONARY ARTERY OF NATIVE HEART WITH ANGINA PECTORIS (HCC): ICD-10-CM

## 2019-06-04 DIAGNOSIS — I25.5 ISCHEMIC CARDIOMYOPATHY: ICD-10-CM

## 2019-06-04 DIAGNOSIS — Z79.01 CHRONIC ANTICOAGULATION: ICD-10-CM

## 2019-06-04 DIAGNOSIS — Z79.899 HIGH RISK MEDICATION USE: ICD-10-CM

## 2019-06-04 DIAGNOSIS — Z95.1 S/P CABG X 4: ICD-10-CM

## 2019-06-04 DIAGNOSIS — H81.4 VERTIGO OF CENTRAL ORIGIN, UNSPECIFIED LATERALITY: ICD-10-CM

## 2019-06-04 DIAGNOSIS — I44.7 LEFT BUNDLE BRANCH BLOCK: ICD-10-CM

## 2019-06-04 DIAGNOSIS — Z86.711 HISTORY OF PULMONARY EMBOLUS (PE): ICD-10-CM

## 2019-06-04 DIAGNOSIS — N18.30 CKD (CHRONIC KIDNEY DISEASE) STAGE 3, GFR 30-59 ML/MIN (HCC): ICD-10-CM

## 2019-06-04 DIAGNOSIS — E11.65 TYPE 2 DIABETES MELLITUS WITH HYPERGLYCEMIA, WITHOUT LONG-TERM CURRENT USE OF INSULIN (HCC): ICD-10-CM

## 2019-06-04 DIAGNOSIS — I47.20 VENTRICULAR TACHYCARDIA (HCC): ICD-10-CM

## 2019-06-04 DIAGNOSIS — E78.2 MIXED HYPERLIPIDEMIA: ICD-10-CM

## 2019-06-04 DIAGNOSIS — Z95.810 AICD (AUTOMATIC CARDIOVERTER/DEFIBRILLATOR) PRESENT: ICD-10-CM

## 2019-06-04 DIAGNOSIS — I82.512 CHRONIC DEEP VEIN THROMBOSIS (DVT) OF FEMORAL VEIN OF LEFT LOWER EXTREMITY (HCC): ICD-10-CM

## 2019-06-04 DIAGNOSIS — I50.22 CHRONIC SYSTOLIC CONGESTIVE HEART FAILURE, NYHA CLASS 2 (HCC): ICD-10-CM

## 2019-06-04 DIAGNOSIS — I82.412 ACUTE DEEP VEIN THROMBOSIS (DVT) OF FEMORAL VEIN OF LEFT LOWER EXTREMITY (HCC): ICD-10-CM

## 2019-06-04 DIAGNOSIS — I25.10 CORONARY ARTERY DISEASE INVOLVING NATIVE CORONARY ARTERY OF NATIVE HEART WITHOUT ANGINA PECTORIS: ICD-10-CM

## 2019-06-04 PROCEDURE — 99215 OFFICE O/P EST HI 40 MIN: CPT | Performed by: INTERNAL MEDICINE

## 2019-06-04 RX ORDER — SPIRONOLACTONE 25 MG/1
12.5 TABLET ORAL
Qty: 90 TAB | Refills: 4 | Status: SHIPPED | OUTPATIENT
Start: 2019-06-04 | End: 2020-02-11 | Stop reason: SDUPTHER

## 2019-06-04 RX ORDER — POTASSIUM CHLORIDE 20 MEQ/1
20 TABLET, EXTENDED RELEASE ORAL DAILY
Qty: 180 TAB | Refills: 4 | Status: SHIPPED | OUTPATIENT
Start: 2019-06-04 | End: 2020-02-11 | Stop reason: SDUPTHER

## 2019-06-04 RX ORDER — METOPROLOL SUCCINATE 100 MG/1
100 TABLET, EXTENDED RELEASE ORAL
Qty: 180 TAB | Refills: 4 | Status: SHIPPED | OUTPATIENT
Start: 2019-06-04 | End: 2020-02-11 | Stop reason: SDUPTHER

## 2019-06-04 RX ORDER — LOVASTATIN 10 MG/1
10 TABLET ORAL EVERY EVENING
Qty: 180 TAB | Refills: 4 | Status: SHIPPED | OUTPATIENT
Start: 2019-06-04 | End: 2019-06-11 | Stop reason: SDUPTHER

## 2019-06-04 RX ORDER — LOSARTAN POTASSIUM 25 MG/1
12.5 TABLET ORAL DAILY
Qty: 90 TAB | Refills: 4 | Status: SHIPPED | OUTPATIENT
Start: 2019-06-04 | End: 2020-02-11 | Stop reason: SDUPTHER

## 2019-06-04 RX ORDER — FUROSEMIDE 40 MG/1
40 TABLET ORAL DAILY
Qty: 180 TAB | Refills: 4 | Status: SHIPPED | OUTPATIENT
Start: 2019-06-04 | End: 2020-02-11 | Stop reason: SDUPTHER

## 2019-06-04 RX ORDER — LISINOPRIL 2.5 MG/1
2.5 TABLET ORAL DAILY
Qty: 180 TAB | Refills: 4 | Status: SHIPPED | OUTPATIENT
Start: 2019-06-04 | End: 2019-06-04

## 2019-06-04 RX ORDER — FOLIC ACID 0.8 MG
500 TABLET ORAL DAILY
Qty: 180 CAP | Refills: 4 | Status: SHIPPED | OUTPATIENT
Start: 2019-06-04

## 2019-06-04 ASSESSMENT — ENCOUNTER SYMPTOMS: VOMITING: 1

## 2019-06-04 NOTE — LETTER
[facility]   [address]   [address]  [place] [address]  Phone: [phone]  Fax: [phone]              Jaime Lee  1950    Encounter Date: 6/4/2019    Silvia Lau M.D.          PROGRESS NOTE:  Subjective:   Chief Complaint:   Chief Complaint   Patient presents with   • Cardiomyopathy (Ischemic)       Jaime Lee is a 68 y.o. male who returns for coronary artery disease, ischemic cardiomyopathy and ventricular tachycardia with ICD. He had bypass, , I do not have the op report  Left heart catheterization in 2003 showed multivessel disease, no revascularization was pursued.  Last EF in October 2018 was 20%, same 2019.    Prior lower extremity DVTs and also prior PE, taking Xarelto.    He has had 2 shocks for VT T, July 19, 2018 and November 11, 2018.  He was getting ready to go on a Cruise, was not well hydrated, had had some ETOH, was shocked. Does not recall shock in July 2018.  Thinks he may have had another shock.  No more since last visit.    He has made up his mind about no transplant.    Saw Dr. Stephenson, no plans for amio.    No CP, has SOTO NHYC 2 but now having significant fatigue, no LE edema. No syncope.    Lightheaded upon standing, worsening.  Cough, not clear if ACE-I    LDL cholesterol 91.  Blood pressure control.  Last potassium was 4.6.    Medications include spironolactone, Lasix, metoprolol, lisinopril and Xarelto.  He is on lovastatin.  Switching to losartan.    DATA REVIEWED by me:  ECG 1-2-18  Sinus, NSIVCD , old inferior and anterolateral MI    ECG 1/1/201  Sinus rhythm, old inferior MI, old lateral MI, nonspecific intraventricular conduction delay     Echo 1-30-19  EF 20-25%, DD II, reduced RV function    Echo 8/10/2017  EF 20%, LAD territory wall motion abnormalities, RVSP 30 mmHg    2-24-14 EF 15-20%    Device interrogation 2-5-19  1 NSVT, no shocks    Device  interrogation 12/3/2018  20 J shock administered 11/11/2018 with successful cardioversion from VT, rare V pacing    Device interrogation 7/30/2018 120 J shock given 7/19/2018.    Defibrillator generator change 11/3/2015    Defibrillator generator change January 10, 2008    ICD placement 7/11/2003, Medtronic    Left heart catheterization 7/3/2003  Four-vessel disease, EF 30%, medical therapy recommended; patent LIMA to Diagnol, patent SVG to circ, occluded CV to D and to RVA, normal LVEDP.    Lower extremity Doppler study 4/2/2018  Nonocclusive chronic thrombus extending from the proximal to mid femoral vein, clot burden reduced compared to prior study 1/2/2018.      Most recent labs:     4-25-19 hgb 15, plt 220, na 134, k 4.4, cr 1.25, lfts ok, LDL 86, HDL 43, ,     12/3/2018 hemoglobin 15.1, platelets 167, sodium 136, potassium 4.6, creatinine 1.41 with GFR 50    4/18/2018 LFTs normal, hemoglobin A1c 6.8, LDL cholesterol 91, HDL 53, triglycerides 191, total cholesterol 182    Past Medical History:   Diagnosis Date   • Acute anterior myocardial infarction (HCC) 12/28/2000    PCI attempted by Dr. Malloy but unsuccessful   • AICD (automatic cardioverter/defibrillator) present 2003/2008/2015    Defibrillator is a Medtronic model MVYP6Y7, serial #[id number], implanted Dr. Stephenson November 2015. The ventricular lead is a Medtronic model #6947-65, lead serial #[id number], implant 7/2/2003   • Allergy     within 2 years   • CAD (coronary artery disease) 12/28/2000    CABG x 4 (LIMA to LAD, rSVG to first diagonal, left circumflex and PDA).2000: CABG x 4 (LIMA to LAD, rSVG to first diagonal, left circumflex and PDA).  2003: LIMA and circumflex grafts patent, diagonal and PDA grafts closed.   • Congestive heart failure (HCC) 12/28/2000   • Dental disorder     Upper partial   • GERD (gastroesophageal reflux disease)    • Ischemic cardiomyopathy 08/2017    Echocardiogram with moderately dilated LV, LVEF 20%.   • Left  bundle branch block     • Pneumonia 2014   • Pulmonary embolism (HCC) March 2014   • Type 2 diabetes mellitus without complication (HCC)      Diet controlled, followed by Dr. Walls     Past Surgical History:   Procedure Laterality Date   • RECOVERY  11/3/2015    Procedure: CATH LAB-STEPHENSON-ICD GENERATOR CHANGE 70995- EUNICE T82.111A-MEDTRONIC BLOODLESS;  Surgeon: Recoveryonly Surgery;  Location: SURGERY PRE-POST PROC UNIT [facility];  Service:    • AICD BATTERY CHANGE  November 2015    Generator replacement with Medtronic Evera S VR [id number] implanted by Dr. Marcus Stephenson.   • AICD IMPLANT  January 2008    Medtronic Virtuoso VR [id number] implanted by Dr. Stephenson.   • OTHER CARDIAC SURGERY  12/2000    CABG x 4   • APPENDECTOMY  1969   • MULTIPLE CORONARY ARTERY BYPASS       Family History   Problem Relation Age of Onset   • Diabetes Mother    • Heart Disease Mother    • Diabetes Sister    • Alcohol/Drug Brother      Social History     Social History   • Marital status:      Spouse name: N/A   • Number of children: N/A   • Years of education: N/A     Occupational History   • Not on file.     Social History Main Topics   • Smoking status: Former Smoker     Packs/day: 1.00     Years: 32.00     Types: Cigarettes     Quit date: 12/28/2000   • Smokeless tobacco: Never Used   • Alcohol use 4.2 oz/week     7 Glasses of wine per week      Comment: 7 per week   • Drug use: Yes     Types: Marijuana      Comment: CBD, THC, edibals    • Sexual activity: No     Other Topics Concern   • Not on file     Social History Narrative   • No narrative on file     Allergies   Allergen Reactions   • Bloodless    • Pcn [Penicillins] Anaphylaxis       Current Outpatient Prescriptions   Medication Sig Dispense Refill   • metoprolol SR (TOPROL XL) 100 MG TABLET SR 24 HR Take 1 Tab by mouth every day. 180 Tab 4   • furosemide (LASIX) 40 MG Tab Take 1 Tab by mouth every day. 180 Tab 4   • potassium chloride SA (KDUR) 20 MEQ Tab CR Take 1 Tab by mouth every  day. 180 Tab 4   • rivaroxaban (XARELTO) 20 MG Tab tablet Take 1 Tab by mouth with dinner. 180 Tab 4   • spironolactone (ALDACTONE) 25 MG Tab Take 0.5 Tabs by mouth every day. 90 Tab 4   • lovastatin (MEVACOR) 10 MG tablet Take 1 Tab by mouth every evening. 180 Tab 4   • Magnesium 500 MG Cap Take 1 Cap by mouth every day. 180 Cap 4   • losartan (COZAAR) 25 MG Tab Take 0.5 Tabs by mouth every day. 90 Tab 4   • metFORMIN (GLUCOPHAGE) 500 MG Tab TAKE 1 TABLET BY MOUTH TWICE A DAY WITH MEALS 180 Tab 0     No current facility-administered medications for this visit.        ROS    All others systems reviewed and negative.     Objective:     BP (!) 92/62 (BP Location: Left arm, Patient Position: Sitting, BP Cuff Size: Adult)   Pulse 78   Ht 1.829 m (6')   Wt 81.2 kg (179 lb)   SpO2 92%  Body mass index is 24.28 kg/m².    Physical Exam   General: No acute distress. Well nourished.  HEENT: EOM grossly intact, no scleral icterus, no pharyngeal erythema.   Neck:  No JVD at 90, no bruits, trachea midline  CVS: RRR. Normal S1, S2. No M/R/G. Trace LE edema.  1+ radial pulses, 1+ DP pulses, well healed incision, ICD pocket intact  Resp: CTAB. No wheezing or crackles/rhonchi. Normal respiratory effort.  Abdomen: Soft, NT, no olivia hepatomegaly.  MSK/Ext: No clubbing or cyanosis.  Skin: Warm and dry, no rashes. Dependent rubor LEs, varicose veins  Neurological: CN III-XII grossly intact. No focal deficits.   Psych: A&O x 3, appropriate affect, good judgement    Physical exam performed today and unchanged compared to 1-29-19.      Assessment:     1. Ischemic cardiomyopathy  metoprolol SR (TOPROL XL) 100 MG TABLET SR 24 HR    furosemide (LASIX) 40 MG Tab    potassium chloride SA (KDUR) 20 MEQ Tab CR    spironolactone (ALDACTONE) 25 MG Tab    lovastatin (MEVACOR) 10 MG tablet   2. Coronary artery disease involving native coronary artery of native heart with angina pectoris (HCC)  metoprolol SR (TOPROL XL) 100 MG TABLET SR 24 HR     furosemide (LASIX) 40 MG Tab    potassium chloride SA (KDUR) 20 MEQ Tab CR    lovastatin (MEVACOR) 10 MG tablet   3. RBBB     4. S/P CABG x 4     5. Old anterior myocardial infarction     6. History of pulmonary embolus (PE)     7. AICD (automatic cardioverter/defibrillator) present     8. Acute deep vein thrombosis (DVT) of femoral vein of left lower extremity (CMS-[facility])     9. Ventricular tachycardia ([facility])     10. Coronary artery disease involving native coronary artery of native heart without angina pectoris     11. Chronic anticoagulation     12. Type 2 diabetes mellitus with hyperglycemia, without long-term current use of insulin ([facility])     13. CKD (chronic kidney disease) stage 3, GFR 30-59 ml/min ([facility])     14. Chronic systolic congestive heart failure, NYHA class 2 ([facility])     15. Vertigo of central origin, unspecified laterality     16. High risk medication use     17. Mixed hyperlipidemia     18. Left bundle branch block     19. Chronic deep vein thrombosis (DVT) of femoral vein of left lower extremity ([facility])  rivaroxaban (XARELTO) 20 MG Tab tablet       Medical Decision Making:  Today's Assessment / Status / Plan:     -3 shocks in 6 months, could be heart is getting sicker, but no shocks since last visit.  -Saw Dr. Stephenson, no plans for amiodarone  -Pt not driving  -Mg, K,   -He will stay on Xarelto  -He and his wife love to travel, 6 month Rx given  -His goal is to see [employer] graduate  -His fatigue is related to low cardiac output and meds  -Reduced lisinopril since it is not dose dependent  -Having a productive cough, consider changing lisinopril to losartan, also needs to see PCP    Written instructions given today:  -Write down weight every day, you get the sense of the dry weight  -When your weight goes up by 3 pounds from your dry weight take extra dose of furosemide for 1-2 days until it goes back down  -If you are needing extra doses, we send a new prescription    Return in about 6 months (around  12/4/2019).    It is my pleasure to participate in the care of Mr. Lee.  Please do not hesitate to contact me with questions or concerns.    Silvia Lau MD, [facility]  Cardiologist [facility]    Please note that this dictation was created using voice recognition software. I have made every reasonable attempt to correct obvious errors, but it is possible there are errors of grammar and possibly content that I did not discover before finalizing the note.      Angelica Stephenson M.D.  [address] [address]  VIA In Basket

## 2019-06-04 NOTE — PATIENT INSTRUCTIONS
-Write down weight every day, you get the sense of the dry weight  -When your weight goes up by 3 pounds from your dry weight take extra dose of furosemide for 1-2 days until it goes back down  -If you are needing extra doses, we send a new prescription  -Non fasting blood work before you return

## 2019-06-04 NOTE — PROGRESS NOTES
Subjective:   Chief Complaint:   Chief Complaint   Patient presents with   • Cardiomyopathy (Ischemic)       Jaime Lee is a 68 y.o. male who returns for coronary artery disease, ischemic cardiomyopathy and ventricular tachycardia with ICD. He had bypass, , I do not have the op report  Left heart catheterization in 2003 showed multivessel disease, no revascularization was pursued.  Last EF in October 2018 was 20%, same 2019.    Prior lower extremity DVTs and also prior PE, taking Xarelto.    He has had 2 shocks for VT T, July 19, 2018 and November 11, 2018.  He was getting ready to go on a Cruise, was not well hydrated, had had some ETOH, was shocked. Does not recall shock in July 2018.  Thinks he may have had another shock.  No more since last visit.    He has made up his mind about no transplant.    Saw Dr. Stephenson, no plans for amio.    No CP, has SOTO NHYC 2 but now having significant fatigue, no LE edema. No syncope.    Lightheaded upon standing, worsening.  Cough, not clear if ACE-I    LDL cholesterol 91.  Blood pressure control.  Last potassium was 4.6.    Medications include spironolactone, Lasix, metoprolol, lisinopril and Xarelto.  He is on lovastatin.  Switching to losartan.    DATA REVIEWED by me:  ECG 1-2-18  Sinus, NSIVCD , old inferior and anterolateral MI    ECG 1/1/201  Sinus rhythm, old inferior MI, old lateral MI, nonspecific intraventricular conduction delay     Echo 1-30-19  EF 20-25%, DD II, reduced RV function    Echo 8/10/2017  EF 20%, LAD territory wall motion abnormalities, RVSP 30 mmHg    2-24-14 EF 15-20%    Device interrogation 2-5-19  1 NSVT, no shocks    Device interrogation 12/3/2018  20 J shock administered 11/11/2018 with successful cardioversion from VT, rare V pacing    Device interrogation 7/30/2018 120 J shock given 7/19/2018.    Defibrillator generator change 11/3/2015    Defibrillator generator change January 10, 2008    ICD placement 7/11/2003,  Medtronic    Left heart catheterization 7/3/2003  Four-vessel disease, EF 30%, medical therapy recommended; patent LIMA to Diagnol, patent SVG to circ, occluded CV to D and to RVA, normal LVEDP.    Lower extremity Doppler study 4/2/2018  Nonocclusive chronic thrombus extending from the proximal to mid femoral vein, clot burden reduced compared to prior study 1/2/2018.      Most recent labs:     4-25-19 hgb 15, plt 220, na 134, k 4.4, cr 1.25, lfts ok, LDL 86, HDL 43, ,     12/3/2018 hemoglobin 15.1, platelets 167, sodium 136, potassium 4.6, creatinine 1.41 with GFR 50    4/18/2018 LFTs normal, hemoglobin A1c 6.8, LDL cholesterol 91, HDL 53, triglycerides 191, total cholesterol 182    Past Medical History:   Diagnosis Date   • Acute anterior myocardial infarction (HCC) 12/28/2000    PCI attempted by Dr. Malloy but unsuccessful   • AICD (automatic cardioverter/defibrillator) present 2003/2008/2015    Defibrillator is a Medtronic model [id number], serial #[id number], implanted Dr. Stephenson November 2015. The ventricular lead is a Medtronic model #6947-65, lead serial #[id number], implant 7/2/2003   • Allergy     within 2 years   • CAD (coronary artery disease) 12/28/2000    CABG x 4 (LIMA to LAD, rSVG to first diagonal, left circumflex and PDA).2000: CABG x 4 (LIMA to LAD, rSVG to first diagonal, left circumflex and PDA).  2003: LIMA and circumflex grafts patent, diagonal and PDA grafts closed.   • Congestive heart failure (HCC) 12/28/2000   • Dental disorder     Upper partial   • GERD (gastroesophageal reflux disease)    • Ischemic cardiomyopathy 08/2017    Echocardiogram with moderately dilated LV, LVEF 20%.   • Left bundle branch block     • Pneumonia 2014   • Pulmonary embolism ([facility]) March 2014   • Type 2 diabetes mellitus without complication ([facility])      Diet controlled, followed by Dr. Walls     Past Surgical History:   Procedure Laterality Date   • RECOVERY  11/3/2015    Procedure: CATH LAB-SMITH-ICD  GENERATOR CHANGE 26352- EUNICE T82.111A-MEDTRONIC BLOODLESS;  Surgeon: Recoveryonly Surgery;  Location: SURG[place] PRE-POST PROC [place] [facility];  Service:    • AICD BATTERY CHANGE  November 2015    Generator replacement with Medtronic Evera S VR [id number] implanted by Dr. Marcus Stephenson.   • AICD IMPLANT  January 2008    Medtronic Virtuoso VR [id number] implanted by Dr. Stephenson.   • OTHER CARDIAC SURGERY  12/2000    CABG x 4   • APPENDECTOMY  1969   • MULTIPLE CORONARY ARTERY BYPASS       Family History   Problem Relation Age of Onset   • Diabetes Mother    • Heart Disease Mother    • Diabetes Sister    • Alcohol/Drug Brother      Social History     Social History   • Marital status:      Spouse name: N/A   • Number of children: N/A   • Years of education: N/A     Occupational History   • Not on file.     Social History Main Topics   • Smoking status: Former Smoker     Packs/day: 1.00     Years: 32.00     Types: Cigarettes     Quit date: 12/28/2000   • Smokeless tobacco: Never Used   • Alcohol use 4.2 oz/week     7 Glasses of wine per week      Comment: 7 per week   • Drug use: Yes     Types: Marijuana      Comment: CBD, THC, edibals    • Sexual activity: No     Other Topics Concern   • Not on file     Social History Narrative   • No narrative on file     Allergies   Allergen Reactions   • Bloodless    • Pcn [Penicillins] Anaphylaxis       Current Outpatient Prescriptions   Medication Sig Dispense Refill   • metoprolol SR (TOPROL XL) 100 MG TABLET SR 24 HR Take 1 Tab by mouth every day. 180 Tab 4   • furosemide (LASIX) 40 MG Tab Take 1 Tab by mouth every day. 180 Tab 4   • potassium chloride SA (KDUR) 20 MEQ Tab CR Take 1 Tab by mouth every day. 180 Tab 4   • rivaroxaban (XARELTO) 20 MG Tab tablet Take 1 Tab by mouth with dinner. 180 Tab 4   • spironolactone (ALDACTONE) 25 MG Tab Take 0.5 Tabs by mouth every day. 90 Tab 4   • lovastatin (MEVACOR) 10 MG tablet Take 1 Tab by mouth every evening. 180 Tab 4   • Magnesium 500 MG Cap  Take 1 Cap by mouth every day. 180 Cap 4   • losartan (COZAAR) 25 MG Tab Take 0.5 Tabs by mouth every day. 90 Tab 4   • metFORMIN (GLUCOPHAGE) 500 MG Tab TAKE 1 TABLET BY MOUTH TWICE A DAY WITH MEALS 180 Tab 0     No current facility-administered medications for this visit.        Review of Systems   Gastrointestinal: Positive for vomiting.     All others systems reviewed and negative.     Objective:     BP (!) 92/62 (BP Location: Left arm, Patient Position: Sitting, BP Cuff Size: Adult)   Pulse 78   Ht 1.829 m (6')   Wt 81.2 kg (179 lb)   SpO2 92%  Body mass index is 24.28 kg/m².    Physical Exam   General: No acute distress. Well nourished.  HEENT: EOM grossly intact, no scleral icterus, no pharyngeal erythema.   Neck:  No JVD at 90, no bruits, trachea midline  CVS: RRR. Normal S1, S2. No M/R/G. Trace LE edema.  1+ radial pulses, 1+ DP pulses, well healed incision, ICD pocket intact  Resp: CTAB. No wheezing or crackles/rhonchi. Normal respiratory effort.  Abdomen: Soft, NT, no olivia hepatomegaly.  MSK/Ext: No clubbing or cyanosis.  Skin: Warm and dry, no rashes. Dependent rubor LEs, varicose veins  Neurological: CN III-XII grossly intact. No focal deficits.   Psych: A&O x 3, appropriate affect, good judgement    Physical exam performed today and unchanged compared to 1-29-19.      Assessment:     1. Ischemic cardiomyopathy  metoprolol SR (TOPROL XL) 100 MG TABLET SR 24 HR    furosemide (LASIX) 40 MG Tab    potassium chloride SA (KDUR) 20 MEQ Tab CR    spironolactone (ALDACTONE) 25 MG Tab    lovastatin (MEVACOR) 10 MG tablet   2. Coronary artery disease involving native coronary artery of native heart with angina pectoris (HCC)  metoprolol SR (TOPROL XL) 100 MG TABLET SR 24 HR    furosemide (LASIX) 40 MG Tab    potassium chloride SA (KDUR) 20 MEQ Tab CR    lovastatin (MEVACOR) 10 MG tablet   3. RBBB     4. S/P CABG x 4     5. Old anterior myocardial infarction     6. History of pulmonary embolus (PE)     7.  AICD (automatic cardioverter/defibrillator) present     8. Acute deep vein thrombosis (DVT) of femoral vein of left lower extremity (CMS-HCC)     9. Ventricular tachycardia (HCC)     10. Coronary artery disease involving native coronary artery of native heart without angina pectoris     11. Chronic anticoagulation     12. Type 2 diabetes mellitus with hyperglycemia, without long-term current use of insulin ([facility])     13. CKD (chronic kidney disease) stage 3, GFR 30-59 ml/min ([facility])     14. Chronic systolic congestive heart failure, NYHA class 2 ([facility])     15. Vertigo of central origin, unspecified laterality     16. High risk medication use     17. Mixed hyperlipidemia     18. Left bundle branch block     19. Chronic deep vein thrombosis (DVT) of femoral vein of left lower extremity ([facility])  rivaroxaban (XARELTO) 20 MG Tab tablet       Medical Decision Making:  Today's Assessment / Status / Plan:     -Extremely complex situation with low EF, VT, low cardiac outpt, CHF, CAD.  -3 shocks in 6 months, could be heart is getting sicker, but no shocks since last visit.  -Saw Dr. Stephenson, no plans for amiodarone  -Pt not driving  -Mg, K,   -He will stay on Xarelto  -He and his wife love to travel, 6 month Rx given  -His goal is to see [employer] graduate  -His fatigue is related to low cardiac output and meds  -Reduced lisinopril since it is not dose dependent  -Having a productive cough, consider changing lisinopril to losartan, also needs to see PCP  -CMP 6 months, not fasting    Written instructions given today:  -Write down weight every day, you get the sense of the dry weight  -When your weight goes up by 3 pounds from your dry weight take extra dose of furosemide for 1-2 days until it goes back down  -If you are needing extra doses, we send a new prescription  -Non fasting blood work before you return    Return in about 6 months (around 12/4/2019).    It is my pleasure to participate in the care of Mr. Lee.  Please do  not hesitate to contact me with questions or concerns.    Silvia Lau MD, [facility]  Cardiologist [facility]    Please note that this dictation was created using voice recognition software. I have made every reasonable attempt to correct obvious errors, but it is possible there are errors of grammar and possibly content that I did not discover before finalizing the note.

## 2019-06-10 ENCOUNTER — TELEPHONE (OUTPATIENT)
Dept: MEDICAL GROUP | Facility: LAB | Age: 69
End: 2019-06-10

## 2019-06-10 NOTE — TELEPHONE ENCOUNTER
ESTABLISHED PATIENT PRE-VISIT PLANNING     Patient was NOT contacted to complete PVP.     Note: Patient will not be contacted if there is no indication to call.     1.  Reviewed notes from the last few office visits within the medical group: Yes    2.  If any orders were placed at last visit or intended to be done for this visit (i.e. 6 mos follow-up), do we have Results/Consult Notes?        •  Labs - Labs ordered, completed on 4/25/19 and results are in chart.   Note: If patient appointment is for lab review and patient did not complete labs, check with provider if OK to reschedule patient until labs completed.       •  Imaging - Imaging was not ordered at last office visit.       •  Referrals - No referrals were ordered at last office visit.    3. Is this appointment scheduled as a Hospital Follow-Up? No    4.  Immunizations were updated in Epic using WebIZ?: Epic matches WebIZ       •  Web Iz Recommendations: TDAP and SHINGRIX (Shingles)    5.  Patient is due for the following Health Maintenance Topics:   Health Maintenance Due   Topic Date Due   • IMM HEP B VACCINE (1 of 3 - Risk 3-dose series) 08/23/1969   • IMM ZOSTER VACCINES (1 of 2) 08/23/2000   • IMM DTaP/Tdap/Td Vaccine (1 - Tdap) 10/17/2011       - Patient has completed TDAP and SHINGRIX (Shingles) Immunization(s) per WebIZ. Chart has been updated.    6. Orders for overdue Health Maintenance topics pended in Pre-Charting? N\A    7.  AHA (MDX) form printed for Provider? No, already completed    8.  Patient was NOT informed to arrive 15 min prior to their scheduled appointment and bring in their medication bottles.

## 2019-06-11 ENCOUNTER — HOSPITAL ENCOUNTER (OUTPATIENT)
Dept: RADIOLOGY | Facility: MEDICAL CENTER | Age: 69
End: 2019-06-11
Attending: FAMILY MEDICINE
Payer: MEDICARE

## 2019-06-11 ENCOUNTER — OFFICE VISIT (OUTPATIENT)
Dept: MEDICAL GROUP | Facility: LAB | Age: 69
End: 2019-06-11
Payer: MEDICARE

## 2019-06-11 VITALS
BODY MASS INDEX: 23.84 KG/M2 | TEMPERATURE: 98.5 F | HEIGHT: 72 IN | SYSTOLIC BLOOD PRESSURE: 106 MMHG | DIASTOLIC BLOOD PRESSURE: 58 MMHG | OXYGEN SATURATION: 94 % | HEART RATE: 79 BPM | WEIGHT: 176 LBS | RESPIRATION RATE: 20 BRPM

## 2019-06-11 DIAGNOSIS — J40 BRONCHITIS: ICD-10-CM

## 2019-06-11 DIAGNOSIS — I25.119 CORONARY ARTERY DISEASE INVOLVING NATIVE CORONARY ARTERY OF NATIVE HEART WITH ANGINA PECTORIS (HCC): ICD-10-CM

## 2019-06-11 DIAGNOSIS — I25.5 ISCHEMIC CARDIOMYOPATHY: ICD-10-CM

## 2019-06-11 PROCEDURE — 99214 OFFICE O/P EST MOD 30 MIN: CPT | Performed by: FAMILY MEDICINE

## 2019-06-11 PROCEDURE — 71046 X-RAY EXAM CHEST 2 VIEWS: CPT

## 2019-06-11 RX ORDER — LOVASTATIN 10 MG/1
10 TABLET ORAL EVERY EVENING
Qty: 200 TAB | Refills: 3 | Status: SHIPPED | OUTPATIENT
Start: 2019-06-11 | End: 2020-02-11 | Stop reason: SDUPTHER

## 2019-06-11 RX ORDER — DOXYCYCLINE HYCLATE 100 MG
100 TABLET ORAL 2 TIMES DAILY
Qty: 20 TAB | Refills: 0 | Status: SHIPPED | OUTPATIENT
Start: 2019-06-11 | End: 2019-07-08

## 2019-06-11 NOTE — PROGRESS NOTES
Subjective:     Chief Complaint   Patient presents with   • Cough     x 2 months, productive green mucus       Jaime Lee is a 68 y.o. male here today for evaluation and management of:    Bronchitis  Illness: 14 days of illness including: cough , with green sputum.  Clear rhinorrhea.    Symptoms negative for fever, nasal congestion, sore throat,   Treatments tried: MJ   Since onset, symptoms are worse   Similarly ill exposures: yes  Medical history negative for asthma  He  reports that he quit smoking about 18 years ago. His smoking use included Cigarettes. He has a 32.00 pack-year smoking history. He has never used smokeless tobacco.         Allergies   Allergen Reactions   • Bloodless    • Pcn [Penicillins] Anaphylaxis       Current medicines (including changes today)  Current Outpatient Prescriptions   Medication Sig Dispense Refill   • doxycycline (VIBRAMYCIN) 100 MG Tab Take 1 Tab by mouth 2 times a day. 20 Tab 0   • metoprolol SR (TOPROL XL) 100 MG TABLET SR 24 HR Take 1 Tab by mouth every day. 180 Tab 4   • furosemide (LASIX) 40 MG Tab Take 1 Tab by mouth every day. 180 Tab 4   • potassium chloride SA (KDUR) 20 MEQ Tab CR Take 1 Tab by mouth every day. 180 Tab 4   • rivaroxaban (XARELTO) 20 MG Tab tablet Take 1 Tab by mouth with dinner. 180 Tab 4   • spironolactone (ALDACTONE) 25 MG Tab Take 0.5 Tabs by mouth every day. 90 Tab 4   • lovastatin (MEVACOR) 10 MG tablet Take 1 Tab by mouth every evening. 180 Tab 4   • Magnesium 500 MG Cap Take 1 Cap by mouth every day. 180 Cap 4   • losartan (COZAAR) 25 MG Tab Take 0.5 Tabs by mouth every day. 90 Tab 4   • metFORMIN (GLUCOPHAGE) 500 MG Tab TAKE 1 TABLET BY MOUTH TWICE A DAY WITH MEALS 180 Tab 0     No current facility-administered medications for this visit.        He  has a past medical history of Acute anterior myocardial infarction (HCC) (12/28/2000); AICD (automatic cardioverter/defibrillator) present (2003/2008/2015); Allergy; CAD (coronary  artery disease) (12/28/2000); Congestive heart failure (HCC) (12/28/2000); Dental disorder; GERD (gastroesophageal reflux disease); Ischemic cardiomyopathy (08/2017); Left bundle branch block ( ); Pneumonia (2014); Pulmonary embolism (HCC) (March 2014); and Type 2 diabetes mellitus without complication ([facility]) ( ).    Patient Active Problem List    Diagnosis Date Noted   • Chronic anticoagulation 02/01/2018     Priority: High   • Chronic deep vein thrombosis (DVT) of femoral vein of left lower extremity ([facility]) 01/04/2018     Priority: High   • Hyperlipidemia 01/18/2017     Priority: High   • S/P CABG x 4 06/22/2016     Priority: High   • AICD (automatic cardioverter/defibrillator) present 03/11/2014     Priority: High   • Ischemic cardiomyopathy 03/11/2014     Priority: High   • History of pulmonary embolus (PE) 02/27/2014     Priority: High   • Coronary artery disease involving native coronary artery of native heart without angina pectoris 12/28/2000     Priority: High   • Type 2 diabetes mellitus with hyperglycemia, without long-term current use of insulin ([facility]) 06/22/2016     Priority: Medium   • Chronic systolic congestive heart failure, NYHA class 2 (CMS-[facility]) 06/22/2016     Priority: Medium   • CKD (chronic kidney disease) stage 3, GFR 30-59 ml/min ([facility]) 02/24/2014     Priority: Medium   • Thrombocytopenia ([facility]) 02/23/2014     Priority: Medium   • Ventricular tachycardia ([facility]) 04/25/2019   • Bronchitis 04/24/2019   • RBBB 02/04/2019   • Macrocytosis without anemia 04/24/2018   • Basal cell carcinoma of skin of left ear 02/01/2018   • Basal cell carcinoma of skin of face 11/28/2017   • Old anterior myocardial infarction 06/22/2016       ROS   No fever or chills.  No nausea or vomiting.  No chest pain or palpitations.  No cough or SOB.  No pain with urination or hematuria.  No black or bloody stools.       Objective:     /58 (BP Location: Left arm, Patient Position: Sitting, BP Cuff Size: Adult)   Pulse 79    Temp 36.9 °C (98.5 °F) (Temporal)   Resp 20   Ht 1.829 m (6')   Wt 79.8 kg (176 lb)   SpO2 94%  Body mass index is 23.87 kg/m².   Physical Exam:  Well developed, well nourished.  Alert, oriented in no acute distress.  Eye contact is good, speech goal directed, affect calm  Eyes: conjunctiva non-injected, sclera non-icteric.  Ears: Pinna normal. TM pearly gray.   Nose: Nares are patent. Erythematous, swollen mucosa with yellow discharge  Mouth: Oral mucous membranes pink and moist with no lesions.  Moderate diffuse erythema the posterior pharynx  Neck Supple.  No adenopathy or masses in the neck or supraclavicular regions. No thyromegaly  Lungs: Bilateral crackles and rhonchi in the lower bases left greater than right.  Good excursion  CV: regular rate and rhythm. No murmur            Assessment and Plan:   The following treatment plan was discussed    1. Bronchitis  Doxycycline as directed.  Chest x-ray to further assess given his cardiac history.  Patient to call if not improving.  - doxycycline (VIBRAMYCIN) 100 MG Tab; Take 1 Tab by mouth 2 times a day.  Dispense: 20 Tab; Refill: 0  - DX-CHEST-2 VIEWS; Future    Any change or worsening of signs or symptoms, patient encouraged to follow-up or report to the emergency room for further evaluation. Patient understands and agrees.    Followup: Return if symptoms worsen or fail to improve.

## 2019-06-11 NOTE — PATIENT INSTRUCTIONS

## 2019-07-03 ENCOUNTER — PATIENT MESSAGE (OUTPATIENT)
Dept: MEDICAL GROUP | Facility: LAB | Age: 69
End: 2019-07-03

## 2019-07-08 RX ORDER — DOXYCYCLINE HYCLATE 100 MG
100 TABLET ORAL 2 TIMES DAILY
Qty: 28 TAB | Refills: 0 | Status: SHIPPED | OUTPATIENT
Start: 2019-07-08 | End: 2020-02-10

## 2019-07-15 DIAGNOSIS — E11.65 TYPE 2 DIABETES MELLITUS WITH HYPERGLYCEMIA, WITHOUT LONG-TERM CURRENT USE OF INSULIN (HCC): ICD-10-CM

## 2019-07-15 NOTE — TELEPHONE ENCOUNTER
Was the patient seen in the last year in this department? Yes 6/11/19    Does patient have an active prescription for medications requested? No     Received Request Via: Pharmacy

## 2019-07-16 DIAGNOSIS — E11.65 TYPE 2 DIABETES MELLITUS WITH HYPERGLYCEMIA, WITHOUT LONG-TERM CURRENT USE OF INSULIN (HCC): ICD-10-CM

## 2019-08-01 ENCOUNTER — TELEPHONE (OUTPATIENT)
Dept: VASCULAR LAB | Facility: MEDICAL CENTER | Age: 69
End: 2019-08-01

## 2019-08-01 DIAGNOSIS — I82.512 CHRONIC DEEP VEIN THROMBOSIS (DVT) OF FEMORAL VEIN OF LEFT LOWER EXTREMITY (HCC): ICD-10-CM

## 2019-08-05 ENCOUNTER — NON-PROVIDER VISIT (OUTPATIENT)
Dept: CARDIOLOGY | Facility: MEDICAL CENTER | Age: 69
End: 2019-08-05
Payer: MEDICARE

## 2019-08-05 ENCOUNTER — HOSPITAL ENCOUNTER (OUTPATIENT)
Dept: LAB | Facility: MEDICAL CENTER | Age: 69
End: 2019-08-05
Attending: NURSE PRACTITIONER
Payer: MEDICARE

## 2019-08-05 VITALS
BODY MASS INDEX: 22.93 KG/M2 | WEIGHT: 173 LBS | DIASTOLIC BLOOD PRESSURE: 60 MMHG | SYSTOLIC BLOOD PRESSURE: 100 MMHG | HEART RATE: 76 BPM | HEIGHT: 73 IN

## 2019-08-05 DIAGNOSIS — I25.10 CORONARY ARTERY DISEASE INVOLVING NATIVE CORONARY ARTERY OF NATIVE HEART WITHOUT ANGINA PECTORIS: ICD-10-CM

## 2019-08-05 DIAGNOSIS — I25.5 ISCHEMIC CARDIOMYOPATHY: ICD-10-CM

## 2019-08-05 DIAGNOSIS — I47.20 VENTRICULAR TACHYCARDIA (HCC): ICD-10-CM

## 2019-08-05 DIAGNOSIS — Z79.01 CHRONIC ANTICOAGULATION: ICD-10-CM

## 2019-08-05 DIAGNOSIS — Z95.810 AICD (AUTOMATIC CARDIOVERTER/DEFIBRILLATOR) PRESENT: ICD-10-CM

## 2019-08-05 LAB
ANION GAP SERPL CALC-SCNC: 8 MMOL/L (ref 0–11.9)
BUN SERPL-MCNC: 14 MG/DL (ref 8–22)
CALCIUM SERPL-MCNC: 9.4 MG/DL (ref 8.5–10.5)
CHLORIDE SERPL-SCNC: 103 MMOL/L (ref 96–112)
CO2 SERPL-SCNC: 28 MMOL/L (ref 20–33)
CREAT SERPL-MCNC: 1.27 MG/DL (ref 0.5–1.4)
GLUCOSE SERPL-MCNC: 161 MG/DL (ref 65–99)
POTASSIUM SERPL-SCNC: 4.6 MMOL/L (ref 3.6–5.5)
SODIUM SERPL-SCNC: 139 MMOL/L (ref 135–145)

## 2019-08-05 PROCEDURE — 80048 BASIC METABOLIC PNL TOTAL CA: CPT

## 2019-08-05 PROCEDURE — 93282 PRGRMG EVAL IMPLANTABLE DFB: CPT | Performed by: NURSE PRACTITIONER

## 2019-08-05 PROCEDURE — 36415 COLL VENOUS BLD VENIPUNCTURE: CPT

## 2019-08-05 NOTE — LETTER
[facility]   [address]   [address]  [place] [address]  Phone: [phone]  Fax: [phone]              Jaime Lee  1950    Encounter Date: 8/5/2019    CHRIS Rodríguez          PROGRESS NOTE:  Device is working normally.  1 20J shock on 3/27/2019, successful; 1 VT episode on 5/29/2019 lasting 2+ minutes, no therapy.  Normal sensing and capture of RV lead; stable impedances. Battery charge longevity is 7.9 years.  No changes are made today.    He is due to see Dr. Lau in December 2019.    Echocardiogram from January 2019 showed LVEF 20-25%.    FU in 6 months for next AICD check with me.    Collaborating MD: Yuli Jean Baptiste

## 2019-08-05 NOTE — PROGRESS NOTES
Device is working normally.  1 20J shock on 3/27/2019, successful; 1 VT episode on 5/29/2019 lasting 2+ minutes, no therapy.  Normal sensing and capture of RV lead; stable impedances. Battery charge longevity is 7.9 years.  No changes are made today.    He is due to see Dr. Lau in December 2019.    Echocardiogram from January 2019 showed LVEF 20-25%.    FU in 6 months for next AICD check with me.    Collaborating MD: Yuli

## 2019-08-19 DIAGNOSIS — E11.65 TYPE 2 DIABETES MELLITUS WITH HYPERGLYCEMIA, WITHOUT LONG-TERM CURRENT USE OF INSULIN (HCC): ICD-10-CM

## 2019-08-19 NOTE — TELEPHONE ENCOUNTER
Was the patient seen in the last year in this department? Yes 6/11/2019    Does patient have an active prescription for medications requested? No     Received Request Via: Pharmacy

## 2019-08-26 ENCOUNTER — ANTICOAGULATION VISIT (OUTPATIENT)
Dept: MEDICAL GROUP | Facility: MEDICAL CENTER | Age: 69
End: 2019-08-26
Payer: MEDICARE

## 2019-08-26 VITALS
HEART RATE: 74 BPM | DIASTOLIC BLOOD PRESSURE: 65 MMHG | SYSTOLIC BLOOD PRESSURE: 97 MMHG | HEIGHT: 73 IN | BODY MASS INDEX: 22.83 KG/M2

## 2019-08-26 DIAGNOSIS — I82.512 CHRONIC DEEP VEIN THROMBOSIS (DVT) OF FEMORAL VEIN OF LEFT LOWER EXTREMITY (HCC): ICD-10-CM

## 2019-08-26 DIAGNOSIS — Z79.01 CHRONIC ANTICOAGULATION: ICD-10-CM

## 2019-08-26 PROCEDURE — 99211 OFF/OP EST MAY X REQ PHY/QHP: CPT | Performed by: INTERNAL MEDICINE

## 2019-08-26 NOTE — PROGRESS NOTES
Target end date:indefinite     Indication: recurrent DVT     Drug: Xarelto 20 mg daily     CHADsVASC = N/A    Health Status Since Last Assessment   Patient denies any new relevant medical problems, ED visits or hospitalizations   Patient denies any embolic events (stroke/tia/systemic embolism)    Adherence with DOAC Therapy   Pt has not missed any doses in the average week    Bleeding Risk Assessment     Denies Epistaxis   Pt denies any excessive or unusual bleeding/hematomas.  Pt denies any GI bleeds or hematemesis.  Pt denies any concerning daily headache or sub dural hematoma symptoms.     Pt denies any hematuria or abnormal vaginal bleeding.   Latest Hemoglobin 15 on April 2019   ETOH overuse none     Creatinine Clearance/Renal Function     Latest ClCr > 60     Drug Interactions   Platelets: 220   ASA/other antiplatelets none   NSAID none   Other drug interactions none   Verified no anticonvulsant or azole therapy, education provided for future use.     Examination   Blood Pressure wnl   Symptomatic hypotension no   Significant gait impairment/imbalance/high risk for falls? no    Final Assessment and Recommendations:   Patient appears stable from the anticoagulation standpoint.     Benefits of continued DOAC therapy outweigh risks for this patient   Recommend pt continue with current DOAC therapy      Other Actions: cmp/ cbc hemogram ordered prior to next visit    Follow up:   Will follow up with patient 6  months.        Brenda Banda, EdilmaD

## 2019-09-03 ENCOUNTER — IMMUNIZATION (OUTPATIENT)
Dept: SOCIAL WORK | Facility: CLINIC | Age: 69
End: 2019-09-03
Payer: MEDICARE

## 2019-09-03 DIAGNOSIS — Z23 NEED FOR VACCINATION: ICD-10-CM

## 2019-09-03 PROCEDURE — G0008 ADMIN INFLUENZA VIRUS VAC: HCPCS | Performed by: REGISTERED NURSE

## 2019-09-03 PROCEDURE — 90662 IIV NO PRSV INCREASED AG IM: CPT | Performed by: REGISTERED NURSE

## 2019-12-02 ENCOUNTER — TELEPHONE (OUTPATIENT)
Dept: CARDIOLOGY | Facility: MEDICAL CENTER | Age: 69
End: 2019-12-02

## 2019-12-02 NOTE — TELEPHONE ENCOUNTER
Called and left message on 12/02/2019 for patient to remind him that he has an upcoming appointment with Dr. Silvia Lau on 12/12/2019 at 8:40AM and he needs to do his labs before his appointment.

## 2019-12-02 NOTE — TELEPHONE ENCOUNTER
Called and left message on 12/02/2019 for patient to remind him that he has an upcoming appointment with Dr. Silvia Lau on 12/13/2019 at 8:40AM and he needs to do his labs before his appointment.

## 2019-12-17 DIAGNOSIS — Z79.01 CHRONIC ANTICOAGULATION: ICD-10-CM

## 2020-02-10 ENCOUNTER — NON-PROVIDER VISIT (OUTPATIENT)
Dept: CARDIOLOGY | Facility: MEDICAL CENTER | Age: 70
End: 2020-02-10
Payer: MEDICARE

## 2020-02-10 ENCOUNTER — OFFICE VISIT (OUTPATIENT)
Dept: CARDIOLOGY | Facility: MEDICAL CENTER | Age: 70
End: 2020-02-10
Payer: MEDICARE

## 2020-02-10 VITALS
HEIGHT: 73 IN | HEART RATE: 82 BPM | SYSTOLIC BLOOD PRESSURE: 122 MMHG | DIASTOLIC BLOOD PRESSURE: 78 MMHG | BODY MASS INDEX: 22.93 KG/M2 | WEIGHT: 173 LBS | OXYGEN SATURATION: 95 %

## 2020-02-10 VITALS
DIASTOLIC BLOOD PRESSURE: 78 MMHG | WEIGHT: 173 LBS | HEART RATE: 82 BPM | SYSTOLIC BLOOD PRESSURE: 122 MMHG | BODY MASS INDEX: 22.93 KG/M2 | HEIGHT: 73 IN | OXYGEN SATURATION: 95 %

## 2020-02-10 DIAGNOSIS — E11.65 TYPE 2 DIABETES MELLITUS WITH HYPERGLYCEMIA, WITHOUT LONG-TERM CURRENT USE OF INSULIN (HCC): ICD-10-CM

## 2020-02-10 DIAGNOSIS — Z79.01 CHRONIC ANTICOAGULATION: ICD-10-CM

## 2020-02-10 DIAGNOSIS — Z95.810 AICD (AUTOMATIC CARDIOVERTER/DEFIBRILLATOR) PRESENT: ICD-10-CM

## 2020-02-10 DIAGNOSIS — Z79.899 HIGH RISK MEDICATION USE: ICD-10-CM

## 2020-02-10 DIAGNOSIS — N18.30 CKD (CHRONIC KIDNEY DISEASE) STAGE 3, GFR 30-59 ML/MIN: ICD-10-CM

## 2020-02-10 DIAGNOSIS — I25.10 CORONARY ARTERY DISEASE INVOLVING NATIVE CORONARY ARTERY OF NATIVE HEART WITHOUT ANGINA PECTORIS: ICD-10-CM

## 2020-02-10 DIAGNOSIS — I25.5 ISCHEMIC CARDIOMYOPATHY: ICD-10-CM

## 2020-02-10 DIAGNOSIS — I25.119 CORONARY ARTERY DISEASE INVOLVING NATIVE CORONARY ARTERY OF NATIVE HEART WITH ANGINA PECTORIS (HCC): ICD-10-CM

## 2020-02-10 DIAGNOSIS — Z86.711 HISTORY OF PULMONARY EMBOLUS (PE): ICD-10-CM

## 2020-02-10 DIAGNOSIS — I44.7 LEFT BUNDLE BRANCH BLOCK: ICD-10-CM

## 2020-02-10 DIAGNOSIS — I25.2 OLD ANTERIOR MYOCARDIAL INFARCTION: ICD-10-CM

## 2020-02-10 DIAGNOSIS — I50.22 CHRONIC SYSTOLIC CONGESTIVE HEART FAILURE, NYHA CLASS 2 (HCC): ICD-10-CM

## 2020-02-10 DIAGNOSIS — E78.2 MIXED HYPERLIPIDEMIA: ICD-10-CM

## 2020-02-10 DIAGNOSIS — Z95.1 S/P CABG X 4: ICD-10-CM

## 2020-02-10 DIAGNOSIS — I45.10 RBBB: ICD-10-CM

## 2020-02-10 DIAGNOSIS — I47.20 VENTRICULAR TACHYCARDIA (HCC): ICD-10-CM

## 2020-02-10 DIAGNOSIS — I82.412 ACUTE DEEP VEIN THROMBOSIS (DVT) OF FEMORAL VEIN OF LEFT LOWER EXTREMITY (HCC): ICD-10-CM

## 2020-02-10 PROCEDURE — 93282 PRGRMG EVAL IMPLANTABLE DFB: CPT | Performed by: NURSE PRACTITIONER

## 2020-02-10 PROCEDURE — 99214 OFFICE O/P EST MOD 30 MIN: CPT | Mod: 25 | Performed by: INTERNAL MEDICINE

## 2020-02-10 ASSESSMENT — ENCOUNTER SYMPTOMS: VOMITING: 1

## 2020-02-10 NOTE — PROGRESS NOTES
"Subjective:   Chief Complaint:   Chief Complaint   Patient presents with   • Cardiomyopathy (Ischemic)       \"Prudencio\" Jaime Lee is a 69 y.o. male who returns for coronary artery disease, ischemic cardiomyopathy and ventricular tachycardia with ICD.     He had bypass, , I do not have the op report  Left heart catheterization in 2003 showed multivessel disease, no revascularization was pursued.  Last EF in October 2018 was 20-25%, same 2019.    PM check 2-  V paced 2.4, 3 brief 2 sec VT, no shocks.    Prior lower extremity DVTs and also prior PE, taking Xarelto.  Legs did swell after long trip from [place], despite compression socks.     He has had 2 shocks for VT T, July 19, 2018 and November 11, 2018.  He was getting ready to go on a Cruise, was not well hydrated, had had some ETOH, was shocked. Does not recall shock in July 2018.  Thinks he may have had another shock.  No more since last visit.    He has made up his mind about no transplant.    Saw Dr. Stephenson, no plans for amio or BiV upgrade.    No CP, has SOTO NHYC 2 but now having significant fatigue, no LE edema.   No syncope.    Lightheaded upon standing, has gotten better. We reduced losartan last time.  Cough, not clear if ACE-I but appears to be gone. Doing ok on losartan. We think it was ACE-I.    LDL cholesterol 86.    Blood pressure control.    Last potassium was 4.6.    Medications include spironolactone, Lasix, metoprolol, lisinopril and Xarelto.    He is on lovastatin.    Switching to losartan.    He is here with his wife.  She is concerned about his driving, he is well over 6 months since last shock, no legal restrictions.    He is able to live his life, he rides bikes, knows his limitations.  Knows if he is dehydrated he is going to be shocked.    They love to travel.  He is no longer swimming, flying a plane.    DATA REVIEWED by me:  ECG 1-2-18  Sinus, NSIVCD , old inferior and anterolateral MI    ECG " 1/1/2019  Sinus rhythm, old inferior MI, old lateral MI, nonspecific intraventricular conduction delay     PM check 2-  V paced 2.4, 3 brief 2 sec VT, no shocks.    Echo 1-30-19  EF 20-25%, DD II, reduced RV function    Echo 8/10/2017  EF 20%, LAD territory wall motion abnormalities, RVSP 30 mmHg    2-24-14 EF 15-20%    Device interrogation 2-5-19  1 NSVT, no shocks    Device interrogation 12/3/2018  20 J shock administered 11/11/2018 with successful cardioversion from VT, rare V pacing    Device interrogation 7/30/2018 120 J shock given 7/19/2018.    Defibrillator generator change 11/3/2015    Defibrillator generator change January 10, 2008    ICD placement 7/11/2003, Medtronic    Left heart catheterization 7/3/2003  Four-vessel disease, EF 30%, medical therapy recommended; patent LIMA to Diagnol, patent SVG to circ, occluded CV to D and to RVA, normal LVEDP.    Lower extremity Doppler study 4/2/2018  Nonocclusive chronic thrombus extending from the proximal to mid femoral vein, clot burden reduced compared to prior study 1/2/2018.      Most recent labs:     4-25-19 hgb 15, plt 220, na 134, k 4.4, cr 1.25, lfts ok, LDL 86, HDL 43, ,     12/3/2018 hemoglobin 15.1, platelets 167, sodium 136, potassium 4.6, creatinine 1.41 with GFR 50    4/18/2018 LFTs normal, hemoglobin A1c 6.8, LDL cholesterol 91, HDL 53, triglycerides 191, total cholesterol 182    Past Medical History:   Diagnosis Date   • Acute anterior myocardial infarction (HCC) 12/28/2000    PCI attempted by Dr. Malloy but unsuccessful   • AICD (automatic cardioverter/defibrillator) present 2003/2008/2015    Defibrillator is a Medtronic model NPGC9B4, serial #[id number], implanted Dr. Stephenson November 2015. The ventricular lead is a Medtronic model #6947-65, lead serial #[id number], implant 7/2/2003   • Allergy     within 2 years   • CAD (coronary artery disease) 12/28/2000    CABG x 4 (LIMA to LAD, rSVG to first diagonal, left circumflex and  PDA).2000: CABG x 4 (LIMA to LAD, rSVG to first diagonal, left circumflex and PDA).  2003: LIMA and circumflex grafts patent, diagonal and PDA grafts closed.   • Congestive heart failure (HCC) 12/28/2000   • Dental disorder     Upper partial   • GERD (gastroesophageal reflux disease)    • Ischemic cardiomyopathy 01/2019    Echocardiogram with normal size, LVEF 20-25%. Grade II diastolic dysfunction. Trace MR, mild TR. RVSP 25mmHg.   • Left bundle branch block     • Pneumonia 2014   • Pulmonary embolism (HCC) March 2014   • Type 2 diabetes mellitus without complication (HCC)      Diet controlled, followed by Dr. Walls     Past Surgical History:   Procedure Laterality Date   • RECOVERY  11/3/2015    Procedure: CATH LAB-STEPHENSON-ICD GENERATOR CHANGE 24783- EUNICE T82.111A-MEDTRONIC BLOODLESS;  Surgeon: Recoveryonly Surgery;  Location: [place] PRE-[place] PROC UNIT [facility] [place];  Service:    • AICD BATTERY CHANGE  November 2015    Generator replacement with Medtronic Evera S VR [id number] implanted by Dr. Marcus Stephenson.   • AICD IMPLANT  January 2008    Medtronic BillShrinkuoso VR [id number] implanted by Dr. Stephenson.   • OTHER CARDIAC SURGERY  12/2000    CABG x 4   • APPENDECTOMY  1969   • MULTIPLE CORONARY ARTERY BYPASS       Family History   Problem Relation Age of Onset   • Diabetes Mother    • Heart Disease Mother    • Diabetes Sister    • Alcohol/Drug Brother      Social History     Socioeconomic History   • Marital status:      Spouse name: Not on file   • Number of children: Not on file   • Years of education: Not on file   • Highest education level: Not on file   Occupational History   • Not on file   Social Needs   • Financial resource strain: Not on file   • Food insecurity:     Worry: Not on file     Inability: Not on file   • Transportation needs:     Medical: Not on file     Non-medical: Not on file   Tobacco Use   • Smoking status: Former Smoker     Packs/day: 1.00     Years: 32.00     Pack years: 32.00     Types: Cigarettes      Last attempt to quit: 2000     Years since quittin.1   • Smokeless tobacco: Never Used   Substance and Sexual Activity   • Alcohol use: Yes     Alcohol/week: 4.2 oz     Types: 7 Glasses of wine per week     Comment: 7 per week   • Drug use: Yes     Types: Marijuana     Comment: CBD, THC, edibals    • Sexual activity: Never   Lifestyle   • Physical activity:     Days per week: Not on file     Minutes per session: Not on file   • Stress: Not on file   Relationships   • Social connections:     Talks on phone: Not on file     Gets together: Not on file     Attends Sikh service: Not on file     Active member of club or organization: Not on file     Attends meetings of clubs or organizations: Not on file     Relationship status: Not on file   • Intimate partner violence:     Fear of current or ex partner: Not on file     Emotionally abused: Not on file     Physically abused: Not on file     Forced sexual activity: Not on file   Other Topics Concern   • Not on file   Social History Narrative   • Not on file     Allergies   Allergen Reactions   • Bloodless    • Pcn [Penicillins] Anaphylaxis   • Ace Inhibitors      Cough       Current Outpatient Medications   Medication Sig Dispense Refill   • metFORMIN (GLUCOPHAGE) 500 MG Tab Take 1 Tab by mouth 2 times a day, with meals. 100 Tab 2   • lovastatin (MEVACOR) 10 MG tablet Take 1 Tab by mouth every evening. 200 Tab 3   • metoprolol SR (TOPROL XL) 100 MG TABLET SR 24 HR Take 1 Tab by mouth every day. 180 Tab 4   • furosemide (LASIX) 40 MG Tab Take 1 Tab by mouth every day. 180 Tab 4   • potassium chloride SA (KDUR) 20 MEQ Tab CR Take 1 Tab by mouth every day. 180 Tab 4   • rivaroxaban (XARELTO) 20 MG Tab tablet Take 1 Tab by mouth with dinner. 180 Tab 4   • spironolactone (ALDACTONE) 25 MG Tab Take 0.5 Tabs by mouth every day. 90 Tab 4   • Magnesium 500 MG Cap Take 1 Cap by mouth every day. 180 Cap 4   • losartan (COZAAR) 25 MG Tab Take 0.5 Tabs by mouth every  "day. 90 Tab 4     No current facility-administered medications for this visit.        Review of Systems   Gastrointestinal: Positive for vomiting.     All others systems reviewed and negative.     Objective:     /78 (BP Location: Left arm, Patient Position: Sitting)   Pulse 82   Ht 1.854 m (6' 1\")   Wt 78.5 kg (173 lb)   SpO2 95%  Body mass index is 22.82 kg/m².    Physical Exam   General: No acute distress. Well nourished.  HEENT: EOM grossly intact, no scleral icterus, no pharyngeal erythema.   Neck:  No JVD at 90, no bruits, trachea midline  CVS: RRR. Normal S1, S2. No M/R/G. Trace LE edema.  1+ radial pulses, 1+ DP pulses, well healed incision, ICD pocket intact  Resp: CTAB. No wheezing or crackles/rhonchi. Normal respiratory effort.  Abdomen: Soft, NT, no olivia hepatomegaly.  MSK/Ext: No clubbing or cyanosis.  Skin: Warm and dry, no rashes. Dependent rubor LEs, varicose veins  Neurological: CN III-XII grossly intact. No focal deficits.   Psych: A&O x 3, appropriate affect, good judgement    Physical exam performed today and unchanged compared to 6-24-19      Assessment:     1. AICD (automatic cardioverter/defibrillator) present     2. Coronary artery disease involving native coronary artery of native heart without angina pectoris  Lipid Profile    Comp Metabolic Panel   3. Ischemic cardiomyopathy  Lipid Profile    Comp Metabolic Panel   4. Ventricular tachycardia (HCC)     5. Coronary artery disease involving native coronary artery of native heart with angina pectoris ([facility])     6. RBBB     7. S/P CABG x 4     8. Old anterior myocardial infarction     9. History of pulmonary embolus (PE)     10. Acute deep vein thrombosis (DVT) of femoral vein of left lower extremity ([facility])     11. Chronic anticoagulation     12. Type 2 diabetes mellitus with hyperglycemia, without long-term current use of insulin ([facility])     13. CKD (chronic kidney disease) stage 3, GFR 30-59 ml/min ([facility])     14. Chronic systolic " congestive heart failure, NYHA class 2 (HCC)     15. High risk medication use     16. Mixed hyperlipidemia     17. Left bundle branch block         Medical Decision Making:  Today's Assessment / Status / Plan:     -Extremely complex situation with low EF, VT, low cardiac outpt, CHF, CAD.  -No shocks since 7-2018, saw Dr. Stephenson, no plans for amiodarone, no plans for BiV  -He will stay on Xarelto for DVT.  -His goal is to see grandkids graduate, he does not want advanced HF options.  -He has made progress actually, feels well, optimistic about near future  -Not checking weight daily.  -CMP and lipids ordered today, batch with PCP  -Not wanting vein specialist right now  -RTC 6 months    Written instructions given today:    -Fasting blood work to be batched with Dr. Stephenson's blood work.    Return in about 6 months (around 8/10/2020).    It is my pleasure to participate in the care of Mr. Lee.  Please do not hesitate to contact me with questions or concerns.    Silvia Lau MD, [facility]  Cardiologist [facility]    Please note that this dictation was created using voice recognition software. I have made every reasonable attempt to correct obvious errors, but it is possible there are errors of grammar and possibly content that I did not discover before finalizing the note.

## 2020-02-10 NOTE — PROGRESS NOTES
Device is working normally.  No device therapy.  3 brief NSVT episodes, all 2 seconds.  Normal sensing and capture of RV lead; stable impedances. Battery longevity is 7.2 years.  No changes are made today.    He does see Dr. Lau today too.    FU in 6 months for next AICD check with me.    Collaborating MD: Sid

## 2020-02-10 NOTE — LETTER
"     [facility]   [address]   [address]  [place] [address]  Phone: [phone]  Fax: [phone]              Jaime Lee  1950    Encounter Date: 2/10/2020    Silvia Lau M.D.          PROGRESS NOTE:  Subjective:   Chief Complaint:   Chief Complaint   Patient presents with   • Cardiomyopathy (Ischemic)       \"Prudencio\" Jaime Lee is a 69 y.o. male who returns for coronary artery disease, ischemic cardiomyopathy and ventricular tachycardia with ICD.     He had bypass, , I do not have the op report  Left heart catheterization in 2003 showed multivessel disease, no revascularization was pursued.  Last EF in October 2018 was 20-25%, same 2019.    PM check 2-  V paced 2.4, 3 brief 2 sec VT, no shocks.    Prior lower extremity DVTs and also prior PE, taking Xarelto.  Legs did swell after long trip from [place], despite compression socks.     He has had 2 shocks for VT T, July 19, 2018 and November 11, 2018.  He was getting ready to go on a Cruise, was not well hydrated, had had some ETOH, was shocked. Does not recall shock in July 2018.  Thinks he may have had another shock.  No more since last visit.    He has made up his mind about no transplant.    Saw Dr. Stephenson, no plans for amio or BiV upgrade.    No CP, has SOTO NHYC 2 but now having significant fatigue, no LE edema.   No syncope.    Lightheaded upon standing, has gotten better. We reduced losartan last time.  Cough, not clear if ACE-I but appears to be gone. Doing ok on losartan. We think it was ACE-I.    LDL cholesterol 86.    Blood pressure control.    Last potassium was 4.6.    Medications include spironolactone, Lasix, metoprolol, lisinopril and Xarelto.    He is on lovastatin.    Switching to losartan.    He is here with his wife.  She is concerned about his driving, he is well over 6 months since last shock, no legal restrictions.    He " is able to live his life, he rides bikes, knows his limitations.  Knows if he is dehydrated he is going to be shocked.    They love to travel.  He is no longer swimming, flying a plane.    DATA REVIEWED by me:  ECG 1-2-18  Sinus, NSIVCD , old inferior and anterolateral MI    ECG 1/1/2019  Sinus rhythm, old inferior MI, old lateral MI, nonspecific intraventricular conduction delay     PM check 2-  V paced 2.4, 3 brief 2 sec VT, no shocks.    Echo 1-30-19  EF 20-25%, DD II, reduced RV function    Echo 8/10/2017  EF 20%, LAD territory wall motion abnormalities, RVSP 30 mmHg    2-24-14 EF 15-20%    Device interrogation 2-5-19  1 NSVT, no shocks    Device interrogation 12/3/2018  20 J shock administered 11/11/2018 with successful cardioversion from VT, rare V pacing    Device interrogation 7/30/2018 120 J shock given 7/19/2018.    Defibrillator generator change 11/3/2015    Defibrillator generator change January 10, 2008    ICD placement 7/11/2003, Medtronic    Left heart catheterization 7/3/2003  Four-vessel disease, EF 30%, medical therapy recommended; patent LIMA to Diagnol, patent SVG to circ, occluded CV to D and to RVA, normal LVEDP.    Lower extremity Doppler study 4/2/2018  Nonocclusive chronic thrombus extending from the proximal to mid femoral vein, clot burden reduced compared to prior study 1/2/2018.      Most recent labs:     4-25-19 hgb 15, plt 220, na 134, k 4.4, cr 1.25, lfts ok, LDL 86, HDL 43, ,     12/3/2018 hemoglobin 15.1, platelets 167, sodium 136, potassium 4.6, creatinine 1.41 with GFR 50    4/18/2018 LFTs normal, hemoglobin A1c 6.8, LDL cholesterol 91, HDL 53, triglycerides 191, total cholesterol 182    Past Medical History:   Diagnosis Date   • Acute anterior myocardial infarction (HCC) 12/28/2000    PCI attempted by Dr. Malloy but unsuccessful   • AICD (automatic cardioverter/defibrillator) present 2003/2008/2015    Defibrillator is a Medtronic model LGLP8T6, serial  #[id number], implanted Dr. Stephenson November 2015. The ventricular lead is a Medtronic model #6947-65, lead serial #[id number], implant 7/2/2003   • Allergy     within 2 years   • CAD (coronary artery disease) 12/28/2000    CABG x 4 (LIMA to LAD, rSVG to first diagonal, left circumflex and PDA).2000: CABG x 4 (LIMA to LAD, rSVG to first diagonal, left circumflex and PDA).  2003: LIMA and circumflex grafts patent, diagonal and PDA grafts closed.   • Congestive heart failure (HCC) 12/28/2000   • Dental disorder     Upper partial   • GERD (gastroesophageal reflux disease)    • Ischemic cardiomyopathy 01/2019    Echocardiogram with normal size, LVEF 20-25%. Grade II diastolic dysfunction. Trace MR, mild TR. RVSP 25mmHg.   • Left bundle branch block     • Pneumonia 2014   • Pulmonary embolism (HCC) March 2014   • Type 2 diabetes mellitus without complication ([facility])      Diet controlled, followed by Dr. Walls     Past Surgical History:   Procedure Laterality Date   • RECOVERY  11/3/2015    Procedure: CATH LAB-MACRO A-ICD GENERATOR CHANGE 50962- EUNICE T82.111A-MEDTRONIC BLOODLESS;  Surgeon: Recoveryonly Surgery;  Location: SURGERY PRE-POST PROC UNIT [facility];  Service:    • AICD BATTERY CHANGE  November 2015    Generator replacement with Medtronic Evera S VR [id number] implanted by Dr. Marcus Stephenson.   • AICD IMPLANT  January 2008    Medtronic Virtuoso VR [id number] implanted by Dr. Stephenson.   • OTHER CARDIAC SURGERY  12/2000    CABG x 4   • APPENDECTOMY  1969   • MULTIPLE CORONARY ARTERY BYPASS       Family History   Problem Relation Age of Onset   • Diabetes Mother    • Heart Disease Mother    • Diabetes Sister    • Alcohol/Drug Brother      Social History     Socioeconomic History   • Marital status:      Spouse name: Not on file   • Number of children: Not on file   • Years of education: Not on file   • Highest education level: Not on file   Occupational History   • Not on file   Social Needs   • Financial resource strain: Not on  file   • Food insecurity:     Worry: Not on file     Inability: Not on file   • Transportation needs:     Medical: Not on file     Non-medical: Not on file   Tobacco Use   • Smoking status: Former Smoker     Packs/day: 1.00     Years: 32.00     Pack years: 32.00     Types: Cigarettes     Last attempt to quit: 2000     Years since quittin.1   • Smokeless tobacco: Never Used   Substance and Sexual Activity   • Alcohol use: Yes     Alcohol/week: 4.2 oz     Types: 7 Glasses of wine per week     Comment: 7 per week   • Drug use: Yes     Types: Marijuana     Comment: CBD, THC, edibals    • Sexual activity: Never   Lifestyle   • Physical activity:     Days per week: Not on file     Minutes per session: Not on file   • Stress: Not on file   Relationships   • Social connections:     Talks on phone: Not on file     Gets together: Not on file     Attends [employer] service: Not on file     Active member of club or organization: Not on file     Attends meetings of clubs or organizations: Not on file     Relationship status: Not on file   • Intimate partner violence:     Fear of current or ex partner: Not on file     Emotionally abused: Not on file     Physically abused: Not on file     Forced sexual activity: Not on file   Other Topics Concern   • Not on file   Social History Narrative   • Not on file     Allergies   Allergen Reactions   • Bloodless    • Pcn [Penicillins] Anaphylaxis   • Ace Inhibitors      Cough       Current Outpatient Medications   Medication Sig Dispense Refill   • metFORMIN (GLUCOPHAGE) 500 MG Tab Take 1 Tab by mouth 2 times a day, with meals. 100 Tab 2   • lovastatin (MEVACOR) 10 MG tablet Take 1 Tab by mouth every evening. 200 Tab 3   • metoprolol SR (TOPROL XL) 100 MG TABLET SR 24 HR Take 1 Tab by mouth every day. 180 Tab 4   • furosemide (LASIX) 40 MG Tab Take 1 Tab by mouth every day. 180 Tab 4   • potassium chloride SA (KDUR) 20 MEQ Tab CR Take 1 Tab by mouth every day. 180 Tab 4   •  "rivaroxaban (XARELTO) 20 MG Tab tablet Take 1 Tab by mouth with dinner. 180 Tab 4   • spironolactone (ALDACTONE) 25 MG Tab Take 0.5 Tabs by mouth every day. 90 Tab 4   • Magnesium 500 MG Cap Take 1 Cap by mouth every day. 180 Cap 4   • losartan (COZAAR) 25 MG Tab Take 0.5 Tabs by mouth every day. 90 Tab 4     No current facility-administered medications for this visit.        Review of Systems   Gastrointestinal: Positive for vomiting.     All others systems reviewed and negative.     Objective:     /78 (BP Location: Left arm, Patient Position: Sitting)   Pulse 82   Ht 1.854 m (6' 1\")   Wt 78.5 kg (173 lb)   SpO2 95%  Body mass index is 22.82 kg/m².    Physical Exam   General: No acute distress. Well nourished.  HEENT: EOM grossly intact, no scleral icterus, no pharyngeal erythema.   Neck:  No JVD at 90, no bruits, trachea midline  CVS: RRR. Normal S1, S2. No M/R/G. Trace LE edema.  1+ radial pulses, 1+ DP pulses, well healed incision, ICD pocket intact  Resp: CTAB. No wheezing or crackles/rhonchi. Normal respiratory effort.  Abdomen: Soft, NT, no olivia hepatomegaly.  MSK/Ext: No clubbing or cyanosis.  Skin: Warm and dry, no rashes. Dependent rubor LEs, varicose veins  Neurological: CN III-XII grossly intact. No focal deficits.   Psych: A&O x 3, appropriate affect, good judgement    Physical exam performed today and unchanged compared to 6-24-19      Assessment:     1. AICD (automatic cardioverter/defibrillator) present     2. Coronary artery disease involving native coronary artery of native heart without angina pectoris  Lipid Profile    Comp Metabolic Panel   3. Ischemic cardiomyopathy  Lipid Profile    Comp Metabolic Panel   4. Ventricular tachycardia (HCC)     5. Coronary artery disease involving native coronary artery of native heart with angina pectoris (HCC)     6. RBBB     7. S/P CABG x 4     8. Old anterior myocardial infarction     9. History of pulmonary embolus (PE)     10. Acute deep vein " thrombosis (DVT) of femoral vein of left lower extremity (CMS-[facility])     11. Chronic anticoagulation     12. Type 2 diabetes mellitus with hyperglycemia, without long-term current use of insulin ([facility])     13. CKD (chronic kidney disease) stage 3, GFR 30-59 ml/min ([facility])     14. Chronic systolic congestive heart failure, NYHA class 2 ([facility])     15. High risk medication use     16. Mixed hyperlipidemia     17. Left bundle branch block         Medical Decision Making:  Today's Assessment / Status / Plan:     -Extremely complex situation with low EF, VT, low cardiac outpt, CHF, CAD.  -No shocks since 7-2018, saw Dr. Stephenson, no plans for amiodarone, no plans for BiV  -He will stay on Xarelto for DVT.  -His goal is to see grandkids graduate, he does not want advanced HF options.  -He has made progress actually, feels well, optimistic about near future  -Not checking weight daily.  -CMP and lipids ordered today, batch with PCP  -Not wanting vein specialist right now  -RTC 6 months    Written instructions given today:    -Fasting blood work to be batched with Dr. Stephenson's blood work.    Return in about 6 months (around 8/10/2020).    It is my pleasure to participate in the care of Mr. Lee.  Please do not hesitate to contact me with questions or concerns.    Silvia Lau MD, [facility]  Cardiologist [facility]    Please note that this dictation was created using voice recognition software. I have made every reasonable attempt to correct obvious errors, but it is possible there are errors of grammar and possibly content that I did not discover before finalizing the note.      Angelica Stephenson M.D.  [id number] [address]  VIA In Basket

## 2020-02-11 ENCOUNTER — OFFICE VISIT (OUTPATIENT)
Dept: MEDICAL GROUP | Facility: LAB | Age: 70
End: 2020-02-11
Payer: MEDICARE

## 2020-02-11 VITALS
WEIGHT: 174 LBS | RESPIRATION RATE: 16 BRPM | TEMPERATURE: 98.2 F | DIASTOLIC BLOOD PRESSURE: 80 MMHG | HEIGHT: 71 IN | OXYGEN SATURATION: 92 % | BODY MASS INDEX: 24.36 KG/M2 | HEART RATE: 83 BPM | SYSTOLIC BLOOD PRESSURE: 116 MMHG

## 2020-02-11 DIAGNOSIS — Z79.01 CHRONIC ANTICOAGULATION: ICD-10-CM

## 2020-02-11 DIAGNOSIS — N18.30 CKD (CHRONIC KIDNEY DISEASE) STAGE 3, GFR 30-59 ML/MIN: ICD-10-CM

## 2020-02-11 DIAGNOSIS — I50.22 CHRONIC SYSTOLIC CONGESTIVE HEART FAILURE, NYHA CLASS 2 (HCC): ICD-10-CM

## 2020-02-11 DIAGNOSIS — I82.512 CHRONIC DEEP VEIN THROMBOSIS (DVT) OF FEMORAL VEIN OF LEFT LOWER EXTREMITY (HCC): ICD-10-CM

## 2020-02-11 DIAGNOSIS — I25.5 ISCHEMIC CARDIOMYOPATHY: ICD-10-CM

## 2020-02-11 DIAGNOSIS — Z95.810 AICD (AUTOMATIC CARDIOVERTER/DEFIBRILLATOR) PRESENT: ICD-10-CM

## 2020-02-11 DIAGNOSIS — I25.119 CORONARY ARTERY DISEASE INVOLVING NATIVE CORONARY ARTERY OF NATIVE HEART WITH ANGINA PECTORIS (HCC): ICD-10-CM

## 2020-02-11 DIAGNOSIS — E78.5 DYSLIPIDEMIA: ICD-10-CM

## 2020-02-11 DIAGNOSIS — Z23 NEED FOR VACCINATION: ICD-10-CM

## 2020-02-11 DIAGNOSIS — I73.9 VASCULAR DISORDER OF EXTREMITY (HCC): ICD-10-CM

## 2020-02-11 DIAGNOSIS — I25.10 CORONARY ARTERY DISEASE INVOLVING NATIVE CORONARY ARTERY OF NATIVE HEART WITHOUT ANGINA PECTORIS: ICD-10-CM

## 2020-02-11 DIAGNOSIS — I47.20 VENTRICULAR TACHYCARDIA (HCC): ICD-10-CM

## 2020-02-11 DIAGNOSIS — Z00.00 MEDICARE ANNUAL WELLNESS VISIT, SUBSEQUENT: ICD-10-CM

## 2020-02-11 DIAGNOSIS — E11.65 TYPE 2 DIABETES MELLITUS WITH HYPERGLYCEMIA, WITHOUT LONG-TERM CURRENT USE OF INSULIN (HCC): ICD-10-CM

## 2020-02-11 DIAGNOSIS — Z95.1 S/P CABG X 4: ICD-10-CM

## 2020-02-11 PROBLEM — J40 BRONCHITIS: Status: RESOLVED | Noted: 2019-04-24 | Resolved: 2020-02-11

## 2020-02-11 LAB
HBA1C MFR BLD: 6.1 % (ref 0–5.6)
INT CON NEG: NEGATIVE
INT CON POS: POSITIVE

## 2020-02-11 PROCEDURE — G0010 ADMIN HEPATITIS B VACCINE: HCPCS | Performed by: FAMILY MEDICINE

## 2020-02-11 PROCEDURE — 83036 HEMOGLOBIN GLYCOSYLATED A1C: CPT | Performed by: FAMILY MEDICINE

## 2020-02-11 PROCEDURE — 90715 TDAP VACCINE 7 YRS/> IM: CPT | Performed by: FAMILY MEDICINE

## 2020-02-11 PROCEDURE — 90472 IMMUNIZATION ADMIN EACH ADD: CPT | Performed by: FAMILY MEDICINE

## 2020-02-11 PROCEDURE — 90746 HEPB VACCINE 3 DOSE ADULT IM: CPT | Performed by: FAMILY MEDICINE

## 2020-02-11 PROCEDURE — 8041 PR SCP AHA: Performed by: FAMILY MEDICINE

## 2020-02-11 PROCEDURE — G0439 PPPS, SUBSEQ VISIT: HCPCS | Performed by: FAMILY MEDICINE

## 2020-02-11 RX ORDER — LOVASTATIN 10 MG/1
10 TABLET ORAL EVERY EVENING
Qty: 100 TAB | Refills: 3 | Status: SHIPPED | OUTPATIENT
Start: 2020-02-11 | End: 2021-02-16 | Stop reason: SDUPTHER

## 2020-02-11 RX ORDER — LOSARTAN POTASSIUM 25 MG/1
12.5 TABLET ORAL DAILY
Qty: 50 TAB | Refills: 3 | Status: SHIPPED | OUTPATIENT
Start: 2020-02-11 | End: 2021-02-16 | Stop reason: SDUPTHER

## 2020-02-11 RX ORDER — SPIRONOLACTONE 25 MG/1
12.5 TABLET ORAL
Qty: 50 TAB | Refills: 3 | Status: SHIPPED | OUTPATIENT
Start: 2020-02-11 | End: 2021-02-16 | Stop reason: SDUPTHER

## 2020-02-11 RX ORDER — FUROSEMIDE 40 MG/1
40 TABLET ORAL DAILY
Qty: 100 TAB | Refills: 3 | Status: SHIPPED | OUTPATIENT
Start: 2020-02-11 | End: 2021-02-16 | Stop reason: SDUPTHER

## 2020-02-11 RX ORDER — METOPROLOL SUCCINATE 100 MG/1
100 TABLET, EXTENDED RELEASE ORAL
Qty: 400 TAB | Refills: 3 | Status: SHIPPED | OUTPATIENT
Start: 2020-02-11 | End: 2021-02-16 | Stop reason: SDUPTHER

## 2020-02-11 RX ORDER — DOXYCYCLINE HYCLATE 100 MG
100 TABLET ORAL 2 TIMES DAILY
Qty: 14 TAB | Refills: 1 | Status: SHIPPED | OUTPATIENT
Start: 2020-02-11 | End: 2020-08-10

## 2020-02-11 RX ORDER — POTASSIUM CHLORIDE 20 MEQ/1
20 TABLET, EXTENDED RELEASE ORAL DAILY
Qty: 100 TAB | Refills: 3 | Status: SHIPPED | OUTPATIENT
Start: 2020-02-11 | End: 2021-02-16 | Stop reason: SDUPTHER

## 2020-02-11 ASSESSMENT — ENCOUNTER SYMPTOMS: GENERAL WELL-BEING: GOOD

## 2020-02-11 ASSESSMENT — ACTIVITIES OF DAILY LIVING (ADL): BATHING_REQUIRES_ASSISTANCE: 0

## 2020-02-11 ASSESSMENT — PATIENT HEALTH QUESTIONNAIRE - PHQ9: CLINICAL INTERPRETATION OF PHQ2 SCORE: 0

## 2020-02-11 NOTE — PROGRESS NOTES
Chief Complaint   Patient presents with   • Annual Exam         HPI:  Prudencio is a 69 y.o. here for Medicare Annual Wellness Visit      Patient Active Problem List    Diagnosis Date Noted   • Chronic anticoagulation 02/01/2018     Priority: High   • Chronic deep vein thrombosis (DVT) of femoral vein of left lower extremity ([facility]) 01/04/2018     Priority: High   • Hyperlipidemia 01/18/2017     Priority: High   • S/P CABG x 4 06/22/2016     Priority: High   • AICD (automatic cardioverter/defibrillator) present 03/11/2014     Priority: High   • Ischemic cardiomyopathy 03/11/2014     Priority: High   • History of pulmonary embolus (PE) 02/27/2014     Priority: High   • Coronary artery disease involving native coronary artery of native heart without angina pectoris 12/28/2000     Priority: High   • Type 2 diabetes mellitus with hyperglycemia, without long-term current use of insulin ([facility]) 06/22/2016     Priority: Medium   • Chronic systolic congestive heart failure, NYHA class 2 ([facility]) 06/22/2016     Priority: Medium   • CKD (chronic kidney disease) stage 3, GFR 30-59 ml/min ([facility]) 02/24/2014     Priority: Medium   • Thrombocytopenia ([facility]) 02/23/2014     Priority: Medium   • Dyslipidemia 02/11/2020   • Vascular disorder of extremity ([facility]) 02/11/2020   • Ventricular tachycardia ([facility]) 04/25/2019   • RBBB 02/04/2019   • Macrocytosis without anemia 04/24/2018   • Basal cell carcinoma of skin of left ear 02/01/2018   • Basal cell carcinoma of skin of face 11/28/2017   • Old anterior myocardial infarction 06/22/2016       Current Outpatient Medications   Medication Sig Dispense Refill   • rivaroxaban (XARELTO) 20 MG Tab tablet Take 1 Tab by mouth with dinner. 100 Tab 3   • furosemide (LASIX) 40 MG Tab Take 1 Tab by mouth every day. 100 Tab 3   • lovastatin (MEVACOR) 10 MG tablet Take 1 Tab by mouth every evening. 100 Tab 3   • potassium chloride SA (KDUR) 20 MEQ Tab CR Take 1 Tab by mouth every day. 100 Tab 3   • metoprolol SR  (TOPROL XL) 100 MG TABLET SR 24 HR Take 1 Tab by mouth every day. 400 Tab 3   • spironolactone (ALDACTONE) 25 MG Tab Take 0.5 Tabs by mouth every day. 50 Tab 3   • metFORMIN (GLUCOPHAGE) 500 MG Tab Take 1 Tab by mouth 2 times a day, with meals. 200 Tab 3   • losartan (COZAAR) 25 MG Tab Take 0.5 Tabs by mouth every day. 50 Tab 3   • doxycycline (VIBRAMYCIN) 100 MG Tab Take 1 Tab by mouth 2 times a day. 14 Tab 1   • Magnesium 500 MG Cap Take 1 Cap by mouth every day. 180 Cap 4     No current facility-administered medications for this visit.         Patient is taking medications as noted in medication list.  Current supplements as per medication list.     Allergies: Bloodless; Pcn [penicillins]; and Ace inhibitors    Current social contact/activities: travels with wife    Is patient current with immunizations? No, due for SHINGRIX (Shingles). Patient is interested in receiving NONE today. Tdap and Hep B    He  reports that he quit smoking about 19 years ago. His smoking use included cigarettes. He has a 32.00 pack-year smoking history. He has never used smokeless tobacco. He reports current alcohol use of about 4.2 oz of alcohol per week. He reports current drug use. Drug: Marijuana.  Counseling given: Not Answered        DPA/Advanced directive: Patient has Advanced Directive on file.     ROS:    Gait: Uses no assistive device   Ostomy: No   Other tubes: No   Amputations: No   Chronic oxygen use No   Last eye exam 2019   Wears hearing aids: No   : Denies any urinary leakage during the last 6 months      Screening:        Depression Screening    Little interest or pleasure in doing things?  0 - not at all  Feeling down, depressed, or hopeless? 0 - not at all  Trouble falling or staying asleep, or sleeping too much?     Feeling tired or having little energy?     Poor appetite or overeating?     Feeling bad about yourself - or that you are a failure or have let yourself or your family down?    Trouble concentrating on  things, such as reading the newspaper or watching television?    Moving or speaking so slowly that other people could have noticed.  Or the opposite - being so fidgety or restless that you have been moving around a lot more than usual?     Thoughts that you would be better off dead, or of hurting yourself?     Patient Health Questionnaire Score:        If depressive symptoms identified deferred to follow up visit unless specifically addressed in assessment and plan.    Interpretation of PHQ-9 Total Score   Score Severity   1-4 No Depression   5-9 Mild Depression   10-14 Moderate Depression   15-19 Moderately Severe Depression   20-27 Severe Depression      Screening for Cognitive Impairment    Three Minute Recall (village, kitchen, baby)  3/3    Draw clock face with all 12 numbers and set the hands to show 10 past 10.  Yes    If cognitive concerns identified, deferred for follow up unless specifically addressed in assessment and plan.    Fall Risk Assessment    Has the patient had two or more falls in the last year or any fall with injury in the last year?  No  If fall risk identified, deferred for follow up unless specifically addressed in assessment and plan.      Safety Assessment    Throw rugs on floor.  No  Handrails on all stairs.  Yes  Good lighting in all hallways.  Yes  Difficulty hearing.  No  Patient counseled about all safety risks that were identified.    Functional Assessment ADLs    Are there any barriers preventing you from cooking for yourself or meeting nutritional needs?  No.    Are there any barriers preventing you from driving safely or obtaining transportation?  No.    Are there any barriers preventing you from using a telephone or calling for help?  No.    Are there any barriers preventing you from shopping?  No.    Are there any barriers preventing you from taking care of your own finances?  No.    Are there any barriers preventing you from managing your medications?    No.    Are there any  barriers preventing you from showering, bathing or dressing yourself?  No.    Are you currently engaging in any exercise or physical activity?  Yes.  Stationary bike, strength training  What is your perception of your health?  Good.    Health Maintenance Summary                IMM DTaP/Tdap/Td Vaccine Overdue 8/23/1961      Done 10/16/2011 Imm Admin: TD Vaccine     Patient has more history with this topic...    IMM HEP B VACCINE Overdue 8/23/1969     A1C SCREENING Overdue 10/24/2019      Done 4/24/2019 POCT A1C     Patient has more history with this topic...    IMM ZOSTER VACCINES Postponed 7/11/2020 Originally 8/23/2000. System: vaccine not available, other system reasons    Annual Wellness Visit Next Due 4/24/2020      Done 4/24/2019 Visit Dx: Medicare annual wellness visit, subsequent     Patient has more history with this topic...    DIABETES MONOFILAMENT / LE EXAM Next Due 4/24/2020      Done 4/24/2019 AMB DIABETIC MONOFILAMENT LOWER EXTREMITY EXAM     Patient has more history with this topic...    RETINAL SCREENING Next Due 4/25/2020      Done 4/25/2019 POCT RETINAL EYE EXAM     Patient has more history with this topic...    FASTING LIPID PROFILE Next Due 4/25/2020      Done 4/25/2019 LIPID PROFILE     Patient has more history with this topic...    URINE ACR / MICROALBUMIN Next Due 4/25/2020      Done 4/25/2019 MICROALBUMIN CREAT RATIO URINE     Patient has more history with this topic...    SERUM CREATININE Next Due 8/5/2020      Done 8/5/2019 BASIC METABOLIC PANEL     Patient has more history with this topic...    COLONOSCOPY Next Due 4/22/2024      Patient Declined 4/22/2014           Patient Care Team:  Angelica Stephenson M.D. as PCP - General (Family Medicine)  [facility]  Bianka Ahumada M.D. as Consulting Physician (Dermatology)  CHRIS Rodríguez as Consulting Physician (Cardiology)      Social History     Tobacco Use   • Smoking status: Former Smoker     Packs/day: 1.00     Years:  "32.00     Pack years: 32.00     Types: Cigarettes     Last attempt to quit: 2000     Years since quittin.1   • Smokeless tobacco: Never Used   Substance Use Topics   • Alcohol use: Yes     Alcohol/week: 4.2 oz     Types: 7 Glasses of wine per week     Comment: 7 per week   • Drug use: Yes     Types: Marijuana     Comment: CBD, THC, edibals      Family History   Problem Relation Age of Onset   • Diabetes Mother    • Heart Disease Mother    • Diabetes Sister    • Alcohol/Drug Brother      He  has a past medical history of Acute anterior myocardial infarction (HCC) (2000), AICD (automatic cardioverter/defibrillator) present (/), Allergy, CAD (coronary artery disease) (2000), Congestive heart failure (HCC) (2000), Dental disorder, GERD (gastroesophageal reflux disease), Ischemic cardiomyopathy (2019), Left bundle branch block ( ), Pneumonia (), Pulmonary embolism (HCC) (2014), and Type 2 diabetes mellitus without complication ([facility]) ( ).   Past Surgical History:   Procedure Laterality Date   • RECOVERY  11/3/2015    Procedure: CATH LAB-MARCO A-ICD GENERATOR CHANGE 97642- EUNICE T82.111A-MEDTRONIC BLOODLESS;  Surgeon: Recoveryonly Surgery;  [place] [facility] [place]:    • AICD BATTERY CHANGE  2015    Generator replacement with Medtronic Evera S VR [id number] implanted by Dr. Marcus Stephenson.   • AICD IMPLANT  2008    Medtronic Virtuoso VR [id number] implanted by Dr. Stephenson.   • OTHER CARDIAC SURGERY  2000    CABG x 4   • APPENDECTOMY     • MULTIPLE CORONARY ARTERY BYPASS           Exam:     /80 (BP Location: Right arm, Patient Position: Sitting, BP Cuff Size: Adult)   Pulse 83   Temp 36.8 °C (98.2 °F) (Temporal)   Resp 16   Ht 1.81 m (5' 11.26\")   Wt 78.9 kg (174 lb)   SpO2 92%  Body mass index is 24.09 kg/m².    Hearing good.    Dentition good  Alert, oriented in no acute distress.  Eye contact is good, speech goal " directed, affect calm  Constitutional: Alert, no distress.  Skin: Warm, dry, good turgor, no rashes in visible areas.  Eye: Equal, round and reactive, conjunctiva clear, lids normal.  ENMT: Lips without lesions, good dentition, oropharynx clear.  Neck: Trachea midline, no masses, no thyromegaly. No cervical or supraclavicular lymphadenopathy  Respiratory: Unlabored respiratory effort, lungs clear to auscultation, no wheezes, no ronchi.  Cardiovascular: Normal S1, S2, RRR, no murmur, no edema.  Abdomen: Soft, non-tender, no masses, no hepatosplenomegaly.  Psych: Alert and oriented x3, normal affect and mood.  Extremity - left foot with vascular stasis and decreased capillary refill.  Decreased pedal pulse on the left      Assessment and Plan. The following treatment and monitoring plan is recommended:    1. Medicare annual wellness visit, subsequent  Routine anticipatory guidance.      2. Type 2 diabetes mellitus with hyperglycemia, without long-term current use of insulin (HCC)  Good control.  Continue Metformin  - POCT Hemoglobin A1C  - metFORMIN (GLUCOPHAGE) 500 MG Tab; Take 1 Tab by mouth 2 times a day, with meals.  Dispense: 200 Tab; Refill: 3  - MICROALBUMIN CREAT RATIO URINE; Future  - Hepatitis B Vaccine Adult IM    3. Ventricular tachycardia (HCC)  AICD in place    4. S/P CABG x 4  Continue lovastatin and metoprolol  Follow up with cardiology    5. Coronary artery disease involving native coronary artery of native heart without angina pectoris  Continue medical treatment and cardiology follow up    6. CKD (chronic kidney disease) stage 3, GFR 30-59 ml/min (HCC)  Avoid high dose NSAIDs  Check labs  - CBC WITH DIFFERENTIAL; Future  - VITAMIN D,25 HYDROXY; Future    7. Chronic systolic congestive heart failure, NYHA class 2 (HCC)  Continue medical treatment and cardiology follow up    8. Chronic deep vein thrombosis (DVT) of femoral vein of left lower extremity (HCC)  Continue Xarelto  - rivaroxaban (XARELTO)  20 MG Tab tablet; Take 1 Tab by mouth with dinner.  Dispense: 100 Tab; Refill: 3    9. Chronic anticoagulation  Continue Xarelto    10. AICD (automatic cardioverter/defibrillator) present  No discharge in 2 years.  Continue cardiology follow up    11. Ischemic cardiomyopathy  Continue medical treatment and cardiology follow up  - furosemide (LASIX) 40 MG Tab; Take 1 Tab by mouth every day.  Dispense: 100 Tab; Refill: 3  - lovastatin (MEVACOR) 10 MG tablet; Take 1 Tab by mouth every evening.  Dispense: 100 Tab; Refill: 3  - potassium chloride SA (KDUR) 20 MEQ Tab CR; Take 1 Tab by mouth every day.  Dispense: 100 Tab; Refill: 3  - metoprolol SR (TOPROL XL) 100 MG TABLET SR 24 HR; Take 1 Tab by mouth every day.  Dispense: 400 Tab; Refill: 3  - spironolactone (ALDACTONE) 25 MG Tab; Take 0.5 Tabs by mouth every day.  Dispense: 50 Tab; Refill: 3    12. Coronary artery disease involving native coronary artery of native heart with angina pectoris (HCC)  Continue medical treatment and cardiology follow up  - furosemide (LASIX) 40 MG Tab; Take 1 Tab by mouth every day.  Dispense: 100 Tab; Refill: 3  - lovastatin (MEVACOR) 10 MG tablet; Take 1 Tab by mouth every evening.  Dispense: 100 Tab; Refill: 3  - potassium chloride SA (KDUR) 20 MEQ Tab CR; Take 1 Tab by mouth every day.  Dispense: 100 Tab; Refill: 3  - metoprolol SR (TOPROL XL) 100 MG TABLET SR 24 HR; Take 1 Tab by mouth every day.  Dispense: 400 Tab; Refill: 3  - losartan (COZAAR) 25 MG Tab; Take 0.5 Tabs by mouth every day.  Dispense: 50 Tab; Refill: 3    13. Dyslipidemia  Continue lovastatin  - TSH; Future  - lovastatin (MEVACOR) 10 MG tablet; Take 1 Tab by mouth every evening.  Dispense: 100 Tab; Refill: 3    14. Vascular disorder of extremity (HCC)  Refer to vascular surgery  - REFERRAL TO VASCULAR SURGERY    15. Need for vaccination    - Tdap Vaccine =>6YO IM  - Hepatitis B Vaccine Adult IM        Services suggested: No services needed at this time  Health Care  Screening recommendations as per orders if indicated.  Referrals offered: PT/OT/Nutrition counseling/Behavioral Health/Smoking cessation as per orders if indicated.    Discussion today about general wellness and lifestyle habits:    · Prevent falls and reduce trip hazards; Cautioned about securing or removing rugs.  · Have a working fire alarm and carbon monoxide detector;   · Engage in regular physical activity and social activities       Follow-up: Return in about 6 months (around 8/11/2020).

## 2020-02-11 NOTE — PROGRESS NOTES
Subjective:     Jaime Lee is a 69 y.o. male here today for *** and Annual Health Assessment.    No problem-specific Assessment & Plan notes found for this encounter.   ***    Health Maintenance Summary                IMM DTaP/Tdap/Td Vaccine Overdue 8/23/1961      Done 10/16/2011 Imm Admin: TD Vaccine     Patient has more history with this topic...    IMM HEP B VACCINE Overdue 8/23/1969     IMM ZOSTER VACCINES Overdue 8/23/2000     A1C SCREENING Overdue 10/24/2019      Done 4/24/2019 POCT A1C     Patient has more history with this topic...    Annual Wellness Visit Next Due 4/24/2020      Done 4/24/2019 Visit Dx: Medicare annual wellness visit, subsequent     Patient has more history with this topic...    DIABETES MONOFILAMENT / LE EXAM Next Due 4/24/2020      Done 4/24/2019 AMB DIABETIC MONOFILAMENT LOWER EXTREMITY EXAM     Patient has more history with this topic...    RETINAL SCREENING Next Due 4/25/2020      Done 4/25/2019 POCT RETINAL EYE EXAM     Patient has more history with this topic...    FASTING LIPID PROFILE Next Due 4/25/2020      Done 4/25/2019 LIPID PROFILE     Patient has more history with this topic...    URINE ACR / MICROALBUMIN Next Due 4/25/2020      Done 4/25/2019 MICROALBUMIN CREAT RATIO URINE     Patient has more history with this topic...    SERUM CREATININE Next Due 8/5/2020      Done 8/5/2019 BASIC METABOLIC PANEL     Patient has more history with this topic...    COLONOSCOPY Next Due 4/22/2024      Patient Declined 4/22/2014        ***    Annual Health Assessment Questions: ***    1.  Are you currently engaging in any exercise or physical activity? {YES / NO:28139}    2.  How would you describe your mood or emotional well-being today? {ahamood:93451}    3.  Have you had any falls in the last year? {YES / NO:02397}    4.  Have you noticed any problems with your balance or had difficulty walking? {YES / NO:11443}    5.  In the last six months have you experienced any leakage  of urine? {YES / NO:19742}    6. DPA/Advanced Directive: {[place] ADVANCED DIRECTIVE:85264}    Current medicines (including changes today)  Current Outpatient Medications   Medication Sig Dispense Refill   • metFORMIN (GLUCOPHAGE) 500 MG Tab Take 1 Tab by mouth 2 times a day, with meals. 100 Tab 2   • lovastatin (MEVACOR) 10 MG tablet Take 1 Tab by mouth every evening. 200 Tab 3   • metoprolol SR (TOPROL XL) 100 MG TABLET SR 24 HR Take 1 Tab by mouth every day. 180 Tab 4   • furosemide (LASIX) 40 MG Tab Take 1 Tab by mouth every day. 180 Tab 4   • potassium chloride SA (KDUR) 20 MEQ Tab CR Take 1 Tab by mouth every day. 180 Tab 4   • rivaroxaban (XARELTO) 20 MG Tab tablet Take 1 Tab by mouth with dinner. 180 Tab 4   • spironolactone (ALDACTONE) 25 MG Tab Take 0.5 Tabs by mouth every day. 90 Tab 4   • Magnesium 500 MG Cap Take 1 Cap by mouth every day. 180 Cap 4   • losartan (COZAAR) 25 MG Tab Take 0.5 Tabs by mouth every day. 90 Tab 4     No current facility-administered medications for this visit.        He  has a past medical history of Acute anterior myocardial infarction (HCC) (12/28/2000), AICD (automatic cardioverter/defibrillator) present (2003/2008/2015), Allergy, CAD (coronary artery disease) (12/28/2000), Congestive heart failure (HCC) (12/28/2000), Dental disorder, GERD (gastroesophageal reflux disease), Ischemic cardiomyopathy (01/2019), Left bundle branch block ( ), Pneumonia (2014), Pulmonary embolism (HCC) (March 2014), and Type 2 diabetes mellitus without complication ([facility]) ( ).    Bloodless; Pcn [penicillins]; and Ace inhibitors    He  reports that he quit smoking about 19 years ago. His smoking use included cigarettes. He has a 32.00 pack-year smoking history. He has never used smokeless tobacco. He reports current alcohol use of about 4.2 oz of alcohol per week. He reports current drug use. Drug: Marijuana.  Counseling given: Not Answered      ROS ***  No chest pain, no shortness of breath, no  abdominal pain.     Objective:     Physical Exam:  There were no vitals taken for this visit. There is no height or weight on file to calculate BMI. ***  Constitutional: Alert, no distress.  Skin: Warm, dry, good turgor, no rashes in visible areas.  Eye: Equal, round and reactive, conjunctiva clear, lids normal.  ENMT: Lips without lesions, good dentition, oropharynx clear.  Neck: Trachea midline, no masses, no thyromegaly. No cervical or supraclavicular lymphadenopathy.  Respiratory: Unlabored respiratory effort, lungs clear to auscultation, no wheezes, no rhonchi.  Cardiovascular: Normal S1, S2, no murmur, no edema.  Abdomen: Soft, non-tender, no masses, no hepatosplenomegaly.  Psych: Alert and oriented x3, normal affect and mood.    Assessment and Plan:     There are no diagnoses linked to this encounter.    Discussion today about general wellness and lifestyle habits:    · Engage in regular physical activity and social activities.  · Prevent falls and reduce trip hazards; using ambulatory aides, hearing and vision testing if appropriate.  · Steps to improve urinary incontinence.  · Advanced care planning.    Follow-Up: No follow-ups on file.         PLEASE NOTE: This dictation was created using voice recognition software. I have made every reasonable attempt to correct obvious errors, but I expect that there are errors of grammar and possibly content that I did not discover before finalizing the note.

## 2020-03-02 ENCOUNTER — HOSPITAL ENCOUNTER (EMERGENCY)
Facility: MEDICAL CENTER | Age: 70
End: 2020-03-02
Attending: EMERGENCY MEDICINE
Payer: MEDICARE

## 2020-03-02 VITALS
BODY MASS INDEX: 25.03 KG/M2 | TEMPERATURE: 96.8 F | HEART RATE: 85 BPM | DIASTOLIC BLOOD PRESSURE: 76 MMHG | HEIGHT: 71 IN | OXYGEN SATURATION: 96 % | WEIGHT: 178.79 LBS | RESPIRATION RATE: 18 BRPM | SYSTOLIC BLOOD PRESSURE: 114 MMHG

## 2020-03-02 DIAGNOSIS — L03.113 CELLULITIS OF RIGHT UPPER EXTREMITY: ICD-10-CM

## 2020-03-02 PROCEDURE — A9270 NON-COVERED ITEM OR SERVICE: HCPCS | Performed by: EMERGENCY MEDICINE

## 2020-03-02 PROCEDURE — 99284 EMERGENCY DEPT VISIT MOD MDM: CPT

## 2020-03-02 PROCEDURE — 700102 HCHG RX REV CODE 250 W/ 637 OVERRIDE(OP): Performed by: EMERGENCY MEDICINE

## 2020-03-02 RX ORDER — DOXYCYCLINE 100 MG/1
100 CAPSULE ORAL 2 TIMES DAILY
Qty: 20 CAP | Refills: 0 | Status: SHIPPED | OUTPATIENT
Start: 2020-03-02 | End: 2020-12-14

## 2020-03-02 RX ORDER — DOXYCYCLINE 100 MG/1
100 TABLET ORAL ONCE
Status: COMPLETED | OUTPATIENT
Start: 2020-03-02 | End: 2020-03-02

## 2020-03-02 RX ADMIN — DOXYCYCLINE 100 MG: 100 TABLET, FILM COATED ORAL at 11:36

## 2020-03-02 ASSESSMENT — FIBROSIS 4 INDEX: FIB4 SCORE: 1.34

## 2020-03-02 NOTE — ED PROVIDER NOTES
ED Provider Note    CHIEF COMPLAINT   Chief Complaint   Patient presents with   • Arm Pain     R arm scratch while on cruise in [place]  Now R arm is red       HPI   Jaime Lee is a 69 y.o. male who presents to the emergency department the chief complaint of redness to the right upper extremity.  The patient was on a cruise and he sustained a scratch over the right bicep.  This seem to be healing well but over the course the last couple days he is noticed some worsening redness spreading down into the right antecubital fossa and up more proximally on the right upper extremity.  He has a mild amount of some associated pain.  No fevers.    REVIEW OF SYSTEMS   See HPI for further details. All other systems are negative.     PAST MEDICAL HISTORY   Past Medical History:   Diagnosis Date   • Acute anterior myocardial infarction (HCC) 12/28/2000    PCI attempted by Dr. Malloy but unsuccessful   • AICD (automatic cardioverter/defibrillator) present 2003/2008/2015    Defibrillator is a Medtronic model UGLT0D3, serial #[id number], implanted Dr. Stephenson November 2015. The ventricular lead is a Medtronic model #6947-65, lead serial #[id number], implant 7/2/2003   • Allergy     within 2 years   • CAD (coronary artery disease) 12/28/2000    CABG x 4 (LIMA to LAD, rSVG to first diagonal, left circumflex and PDA).2000: CABG x 4 (LIMA to LAD, rSVG to first diagonal, left circumflex and PDA).  2003: LIMA and circumflex grafts patent, diagonal and PDA grafts closed.   • Congestive heart failure (HCC) 12/28/2000   • Dental disorder     Upper partial   • GERD (gastroesophageal reflux disease)    • Ischemic cardiomyopathy 01/2019    Echocardiogram with normal size, LVEF 20-25%. Grade II diastolic dysfunction. Trace MR, mild TR. RVSP 25mmHg.   • Left bundle branch block     • Pneumonia 2014   • Pulmonary embolism ([facility]) March 2014   • Type 2 diabetes mellitus without complication ([facility])      Diet controlled, followed by   Titi       FAMILY HISTORY  Family History   Problem Relation Age of Onset   • Diabetes Mother    • Heart Disease Mother    • Diabetes Sister    • Alcohol/Drug Brother        SOCIAL HISTORY  Social History     Socioeconomic History   • Marital status:      Spouse name: Not on file   • Number of children: Not on file   • Years of education: Not on file   • Highest education level: Not on file   Occupational History   • Not on file   Social Needs   • Financial resource strain: Not on file   • Food insecurity     Worry: Not on file     Inability: Not on file   • Transportation needs     Medical: Not on file     Non-medical: Not on file   Tobacco Use   • Smoking status: Former Smoker     Packs/day: 1.00     Years: 32.00     Pack years: 32.00     Types: Cigarettes     Last attempt to quit: 2000     Years since quittin.1   • Smokeless tobacco: Never Used   Substance and Sexual Activity   • Alcohol use: Yes     Alcohol/week: 4.2 oz     Types: 7 Glasses of wine per week     Comment: 7 per week   • Drug use: Yes     Types: Marijuana     Comment: CBD, THC, edibals    • Sexual activity: Never   Lifestyle   • Physical activity     Days per week: Not on file     Minutes per session: Not on file   • Stress: Not on file   Relationships   • Social connections     Talks on phone: Not on file     Gets together: Not on file     Attends Jew service: Not on file     Active member of club or organization: Not on file     Attends meetings of clubs or organizations: Not on file     Relationship status: Not on file   • Intimate partner violence     Fear of current or ex partner: Not on file     Emotionally abused: Not on file     Physically abused: Not on file     Forced sexual activity: Not on file   Other Topics Concern   • Not on file   Social History Narrative   • Not on file        SURGICAL HISTORY  Past Surgical History:   Procedure Laterality Date   • RECOVERY  11/3/2015    Procedure: CATH LAB-SMITH-ICD  "GENERATOR CHANGE 66535- EUNICE T82.111A-MEDTRONIC BLOODLESS;  Surgeon: Recoveryonly Surgery;  Location: SURGERY PRE-POST PROC UNIT [facility];  Service:    • AICD BATTERY CHANGE  November 2015    Generator replacement with Medtronic Evera S VR [id number] implanted by Dr. Marcus Stephenson.   • AICD IMPLANT  January 2008    Medtronic Virtuoso VR [id number] implanted by Dr. Stephenson.   • OTHER CARDIAC SURGERY  12/2000    CABG x 4   • APPENDECTOMY  1969   • MULTIPLE CORONARY ARTERY BYPASS         CURRENT MEDICATIONS   Home Medications    **Home medications have not yet been reviewed for this encounter**         ALLERGIES   Allergies   Allergen Reactions   • Bloodless    • Pcn [Penicillins] Anaphylaxis   • Ace Inhibitors      Cough       PHYSICAL EXAM  VITAL SIGNS: /79   Pulse (!) 51   Temp 36 °C (96.8 °F) (Temporal)   Resp 18   Ht 1.803 m (5' 11\")   Wt 81.1 kg (178 lb 12.7 oz)   SpO2 93%   BMI 24.94 kg/m²   Constitutional: Well developed, Well nourished, No acute distress, Non-toxic appearance.   Cardiovascular: Normal heart rate, Normal rhythm, No murmurs, No rubs, No gallops.   Thorax & Lungs: Normal breath sounds, No respiratory distress, No wheezing, No chest tenderness.   Skin: There is a superficial abrasion on the anterior aspect of the right upper arm.  There is erythema extending to the right antecubital fossa with minimal redness more proximally.  He does not have any drainable fluid collection.  No abscess.  Extremities: Intact distal pulses, No edema, No tenderness, No cyanosis, No clubbing.       COURSE & MEDICAL DECISION MAKING  Pertinent Labs & Imaging studies reviewed. (See chart for details)    Patient presents today after sustaining an abrasion to the right upper extremity.  He now seems to be developing a mild cellulitis.  He had some doxycycline with him on the vacation but that he did not start taking for this because he did not notice the redness until today.  He thinks he only has 4 5 days worth of this " medication.  I do feel the doxycycline is a reasonable choice for this and therefore the patient will be treated with doxycycline, 10-day course.  Patient is given a new prescription.  Discharged home in stable condition.  Follow-up with primary care.    The patient will return for new or worsening symptoms and is stable at the time of discharge.    The patient is referred to a primary physician for blood pressure management, diabetic screening, and for all other preventative health concerns.    DISPOSITION:  Patient will be discharged home in stable condition.    FOLLOW UP:  Angelica Stephenson M.D.  [address] [id number]  [facility] [id number] [phone]    Schedule an appointment as soon as possible for a visit       [facility], Emergency Dept  [address]  [facility] [id number] [phone]    If symptoms worsen      OUTPATIENT MEDICATIONS:  New Prescriptions    DOXYCYCLINE (MONODOX) 100 MG CAPSULE    Take 1 Cap by mouth 2 times a day for 10 days.         FINAL IMPRESSION  1. Cellulitis of right upper extremity                  Electronically signed by: Gagandeep Anna M.D., 3/2/2020 11:26 AM

## 2020-03-03 ENCOUNTER — PATIENT OUTREACH (OUTPATIENT)
Dept: HEALTH INFORMATION MANAGEMENT | Facility: OTHER | Age: 70
End: 2020-03-03

## 2020-03-03 ENCOUNTER — TELEPHONE (OUTPATIENT)
Dept: HEALTH INFORMATION MANAGEMENT | Facility: OTHER | Age: 70
End: 2020-03-03

## 2020-03-04 NOTE — TELEPHONE ENCOUNTER
Quick question: Patient is taking doxycycline 100 mg and is wondering if he should hold off on completing his lab work until he has taken his course of antibiotics.    Please advise.

## 2020-03-04 NOTE — PROGRESS NOTES
1. HealthConnect Verified: yes    2. Verify PCP: yes    3. Review and add  to Care Team: yes    4. WebIZ Checked & Epic Updated: Epic matches WebIZ  WebIZ Recommendations:     Is patient due for Tdap? NO  Is patient due for Shingles? NO    5. Reviewed/Updated the following with patient:       •   Communication Preference Obtained? YES  • MyChart Activation: already active       •   E-Mail Address Obtained? YES       •   Appointment Day and Time Preferences? YES       •   Preferred Pharmacy? YES       •   Preferred Lab? YES    6. Care Gap Scheduling (Attempt to Schedule EACH Overdue Care Gap!)    Patient does not have any care gaps. I was able to schedule his follow up visit with PCP as well as lab work.  I am asking doctor Sachin if she would like him to wait on getting his lab work done after his antibiotic course.

## 2020-03-10 ENCOUNTER — OFFICE VISIT (OUTPATIENT)
Dept: MEDICAL GROUP | Facility: LAB | Age: 70
End: 2020-03-10
Payer: MEDICARE

## 2020-03-10 VITALS
HEIGHT: 71 IN | TEMPERATURE: 98.3 F | RESPIRATION RATE: 14 BRPM | DIASTOLIC BLOOD PRESSURE: 62 MMHG | WEIGHT: 172 LBS | HEART RATE: 80 BPM | SYSTOLIC BLOOD PRESSURE: 116 MMHG | BODY MASS INDEX: 24.08 KG/M2 | OXYGEN SATURATION: 94 %

## 2020-03-10 DIAGNOSIS — L03.113 CELLULITIS OF RIGHT UPPER EXTREMITY: ICD-10-CM

## 2020-03-10 PROCEDURE — 99213 OFFICE O/P EST LOW 20 MIN: CPT | Performed by: FAMILY MEDICINE

## 2020-03-10 ASSESSMENT — FIBROSIS 4 INDEX: FIB4 SCORE: 1.34

## 2020-03-12 NOTE — PROGRESS NOTES
Subjective:     Chief Complaint   Patient presents with   • Hospital Follow-up       Jaime Lee is a 69 y.o. male here today for evaluation and management of:    1. Cellulitis of right upper extremity  Patient developed cellulitis on his right upper arm while on a cruise to [place].  He was seen in the emergency room 3/2/2020 and was started on doxycycline.  The patient reports that it significantly better.  He has not had any fever or chills.  No cough or sore throat    Allergies   Allergen Reactions   • Bloodless    • Pcn [Penicillins] Anaphylaxis   • Ace Inhibitors      Cough       Current medicines (including changes today)  Current Outpatient Medications   Medication Sig Dispense Refill   • doxycycline (MONODOX) 100 MG capsule Take 1 Cap by mouth 2 times a day for 10 days. 20 Cap 0   • rivaroxaban (XARELTO) 20 MG Tab tablet Take 1 Tab by mouth with dinner. 100 Tab 3   • furosemide (LASIX) 40 MG Tab Take 1 Tab by mouth every day. 100 Tab 3   • lovastatin (MEVACOR) 10 MG tablet Take 1 Tab by mouth every evening. 100 Tab 3   • potassium chloride SA (KDUR) 20 MEQ Tab CR Take 1 Tab by mouth every day. 100 Tab 3   • metoprolol SR (TOPROL XL) 100 MG TABLET SR 24 HR Take 1 Tab by mouth every day. 400 Tab 3   • spironolactone (ALDACTONE) 25 MG Tab Take 0.5 Tabs by mouth every day. 50 Tab 3   • metFORMIN (GLUCOPHAGE) 500 MG Tab Take 1 Tab by mouth 2 times a day, with meals. 200 Tab 3   • losartan (COZAAR) 25 MG Tab Take 0.5 Tabs by mouth every day. 50 Tab 3   • doxycycline (VIBRAMYCIN) 100 MG Tab Take 1 Tab by mouth 2 times a day. 14 Tab 1   • Magnesium 500 MG Cap Take 1 Cap by mouth every day. 180 Cap 4     No current facility-administered medications for this visit.        He  has a past medical history of Acute anterior myocardial infarction (HCC) (12/28/2000), AICD (automatic cardioverter/defibrillator) present (2003/2008/2015), Allergy, CAD (coronary artery disease) (12/28/2000), Congestive heart  "failure ([facility]) (12/28/2000), Dental disorder, GERD (gastroesophageal reflux disease), Ischemic cardiomyopathy (01/2019), Left bundle branch block ( ), Pneumonia (2014), Pulmonary embolism ([facility]) (March 2014), and Type 2 diabetes mellitus without complication ([facility]) ( ).    Patient Active Problem List    Diagnosis Date Noted   • Chronic anticoagulation 02/01/2018     Priority: High   • Chronic deep vein thrombosis (DVT) of femoral vein of left lower extremity ([facility]) 01/04/2018     Priority: High   • Hyperlipidemia 01/18/2017     Priority: High   • S/P CABG x 4 06/22/2016     Priority: High   • AICD (automatic cardioverter/defibrillator) present 03/11/2014     Priority: High   • Ischemic cardiomyopathy 03/11/2014     Priority: High   • History of pulmonary embolus (PE) 02/27/2014     Priority: High   • Coronary artery disease involving native coronary artery of native heart without angina pectoris 12/28/2000     Priority: High   • Type 2 diabetes mellitus with hyperglycemia, without long-term current use of insulin ([facility]) 06/22/2016     Priority: Medium   • Chronic systolic congestive heart failure, NYHA class 2 ([facility]) 06/22/2016     Priority: Medium   • CKD (chronic kidney disease) stage 3, GFR 30-59 ml/min ([facility]) 02/24/2014     Priority: Medium   • Thrombocytopenia ([facility]) 02/23/2014     Priority: Medium   • Dyslipidemia 02/11/2020   • Vascular disorder of extremity ([facility]) 02/11/2020   • Ventricular tachycardia ([facility]) 04/25/2019   • RBBB 02/04/2019   • Macrocytosis without anemia 04/24/2018   • Basal cell carcinoma of skin of left ear 02/01/2018   • Basal cell carcinoma of skin of face 11/28/2017   • Old anterior myocardial infarction 06/22/2016       ROS   No fever or chills.  No nausea or vomiting.  No chest pain or palpitations.  No cough or SOB.  No pain with urination or hematuria.  No black or bloody stools.       Objective:     /62   Pulse 80   Temp 36.8 °C (98.3 °F)   Resp 14   Ht 1.81 m (5' 11.26\")   Wt 78 " kg (172 lb)   SpO2 94%  Body mass index is 23.81 kg/m².   Physical Exam:  Well developed, well nourished.  Alert, oriented in no acute distress.  Eye contact is good, speech goal directed, affect calm  Eyes: conjunctiva non-injected, sclera non-icteric.  Right upper arm with small laceration with surrounding erythema and induration.  No fluctuance or drainage.  No lymphatic streaking.        Assessment and Plan:   The following treatment plan was discussed    1. Cellulitis of right upper extremity  Significantly improved.  Continue doxycycline as directed.  Call for any extension of the redness or swelling.    Any change or worsening of signs or symptoms, patient encouraged to follow-up or report to the emergency room for further evaluation. Patient understands and agrees.    Followup: Return if symptoms worsen or fail to improve.

## 2020-05-27 ENCOUNTER — HOSPITAL ENCOUNTER (OUTPATIENT)
Dept: LAB | Facility: MEDICAL CENTER | Age: 70
End: 2020-05-27
Attending: FAMILY MEDICINE
Payer: MEDICARE

## 2020-05-27 ENCOUNTER — HOSPITAL ENCOUNTER (OUTPATIENT)
Dept: LAB | Facility: MEDICAL CENTER | Age: 70
End: 2020-05-27
Attending: INTERNAL MEDICINE
Payer: MEDICARE

## 2020-05-27 DIAGNOSIS — N18.30 CKD (CHRONIC KIDNEY DISEASE) STAGE 3, GFR 30-59 ML/MIN: ICD-10-CM

## 2020-05-27 DIAGNOSIS — E11.65 TYPE 2 DIABETES MELLITUS WITH HYPERGLYCEMIA, WITHOUT LONG-TERM CURRENT USE OF INSULIN (HCC): ICD-10-CM

## 2020-05-27 DIAGNOSIS — I25.5 ISCHEMIC CARDIOMYOPATHY: ICD-10-CM

## 2020-05-27 DIAGNOSIS — I25.10 CORONARY ARTERY DISEASE INVOLVING NATIVE CORONARY ARTERY OF NATIVE HEART WITHOUT ANGINA PECTORIS: ICD-10-CM

## 2020-05-27 DIAGNOSIS — E78.5 DYSLIPIDEMIA: ICD-10-CM

## 2020-05-27 LAB
25(OH)D3 SERPL-MCNC: 25 NG/ML (ref 30–100)
ALBUMIN SERPL BCP-MCNC: 4.6 G/DL (ref 3.2–4.9)
ALBUMIN/GLOB SERPL: 1.6 G/DL
ALP SERPL-CCNC: 59 U/L (ref 30–99)
ALT SERPL-CCNC: 15 U/L (ref 2–50)
ANION GAP SERPL CALC-SCNC: 13 MMOL/L (ref 7–16)
AST SERPL-CCNC: 15 U/L (ref 12–45)
BASOPHILS # BLD AUTO: 1.1 % (ref 0–1.8)
BASOPHILS # BLD: 0.08 K/UL (ref 0–0.12)
BILIRUB SERPL-MCNC: 0.7 MG/DL (ref 0.1–1.5)
BUN SERPL-MCNC: 19 MG/DL (ref 8–22)
CALCIUM SERPL-MCNC: 9.8 MG/DL (ref 8.5–10.5)
CHLORIDE SERPL-SCNC: 98 MMOL/L (ref 96–112)
CHOLEST SERPL-MCNC: 185 MG/DL (ref 100–199)
CO2 SERPL-SCNC: 27 MMOL/L (ref 20–33)
CREAT SERPL-MCNC: 1.26 MG/DL (ref 0.5–1.4)
CREAT UR-MCNC: 41.64 MG/DL
EOSINOPHIL # BLD AUTO: 0.14 K/UL (ref 0–0.51)
EOSINOPHIL NFR BLD: 1.8 % (ref 0–6.9)
ERYTHROCYTE [DISTWIDTH] IN BLOOD BY AUTOMATED COUNT: 44.1 FL (ref 35.9–50)
FASTING STATUS PATIENT QL REPORTED: NORMAL
GLOBULIN SER CALC-MCNC: 2.8 G/DL (ref 1.9–3.5)
GLUCOSE SERPL-MCNC: 175 MG/DL (ref 65–99)
HCT VFR BLD AUTO: 50.4 % (ref 42–52)
HDLC SERPL-MCNC: 60 MG/DL
HGB BLD-MCNC: 16.4 G/DL (ref 14–18)
IMM GRANULOCYTES # BLD AUTO: 0.03 K/UL (ref 0–0.11)
IMM GRANULOCYTES NFR BLD AUTO: 0.4 % (ref 0–0.9)
LDLC SERPL CALC-MCNC: 98 MG/DL
LYMPHOCYTES # BLD AUTO: 1.36 K/UL (ref 1–4.8)
LYMPHOCYTES NFR BLD: 17.9 % (ref 22–41)
MCH RBC QN AUTO: 31.1 PG (ref 27–33)
MCHC RBC AUTO-ENTMCNC: 32.5 G/DL (ref 33.7–35.3)
MCV RBC AUTO: 95.6 FL (ref 81.4–97.8)
MICROALBUMIN UR-MCNC: <1.2 MG/DL
MICROALBUMIN/CREAT UR: NORMAL MG/G (ref 0–30)
MONOCYTES # BLD AUTO: 0.74 K/UL (ref 0–0.85)
MONOCYTES NFR BLD AUTO: 9.7 % (ref 0–13.4)
NEUTROPHILS # BLD AUTO: 5.26 K/UL (ref 1.82–7.42)
NEUTROPHILS NFR BLD: 69.1 % (ref 44–72)
NRBC # BLD AUTO: 0 K/UL
NRBC BLD-RTO: 0 /100 WBC
PLATELET # BLD AUTO: 187 K/UL (ref 164–446)
PMV BLD AUTO: 12.2 FL (ref 9–12.9)
POTASSIUM SERPL-SCNC: 4.2 MMOL/L (ref 3.6–5.5)
PROT SERPL-MCNC: 7.4 G/DL (ref 6–8.2)
RBC # BLD AUTO: 5.27 M/UL (ref 4.7–6.1)
SODIUM SERPL-SCNC: 138 MMOL/L (ref 135–145)
TRIGL SERPL-MCNC: 137 MG/DL (ref 0–149)
TSH SERPL DL<=0.005 MIU/L-ACNC: 3.62 UIU/ML (ref 0.38–5.33)
WBC # BLD AUTO: 7.6 K/UL (ref 4.8–10.8)

## 2020-05-27 PROCEDURE — 80061 LIPID PANEL: CPT

## 2020-05-27 PROCEDURE — 82570 ASSAY OF URINE CREATININE: CPT

## 2020-05-27 PROCEDURE — 82043 UR ALBUMIN QUANTITATIVE: CPT

## 2020-05-27 PROCEDURE — 36415 COLL VENOUS BLD VENIPUNCTURE: CPT

## 2020-05-27 PROCEDURE — 85025 COMPLETE CBC W/AUTO DIFF WBC: CPT

## 2020-05-27 PROCEDURE — 80053 COMPREHEN METABOLIC PANEL: CPT

## 2020-05-27 PROCEDURE — 82306 VITAMIN D 25 HYDROXY: CPT

## 2020-05-27 PROCEDURE — 84443 ASSAY THYROID STIM HORMONE: CPT

## 2020-07-21 ENCOUNTER — OFFICE VISIT (OUTPATIENT)
Dept: MEDICAL GROUP | Facility: LAB | Age: 70
End: 2020-07-21
Payer: MEDICARE

## 2020-07-21 VITALS
HEART RATE: 77 BPM | BODY MASS INDEX: 23.1 KG/M2 | HEIGHT: 71 IN | WEIGHT: 165 LBS | DIASTOLIC BLOOD PRESSURE: 54 MMHG | OXYGEN SATURATION: 94 % | RESPIRATION RATE: 16 BRPM | SYSTOLIC BLOOD PRESSURE: 90 MMHG | TEMPERATURE: 98.1 F

## 2020-07-21 DIAGNOSIS — I50.22 CHRONIC SYSTOLIC CONGESTIVE HEART FAILURE, NYHA CLASS 2 (HCC): ICD-10-CM

## 2020-07-21 DIAGNOSIS — Z23 NEED FOR VACCINATION: ICD-10-CM

## 2020-07-21 DIAGNOSIS — N18.30 CKD (CHRONIC KIDNEY DISEASE) STAGE 3, GFR 30-59 ML/MIN: ICD-10-CM

## 2020-07-21 DIAGNOSIS — E11.65 TYPE 2 DIABETES MELLITUS WITH HYPERGLYCEMIA, WITHOUT LONG-TERM CURRENT USE OF INSULIN (HCC): ICD-10-CM

## 2020-07-21 DIAGNOSIS — R63.4 WEIGHT LOSS: ICD-10-CM

## 2020-07-21 LAB
HBA1C MFR BLD: 6.4 % (ref 0–5.6)
INT CON NEG: NEGATIVE
INT CON POS: POSITIVE

## 2020-07-21 PROCEDURE — 90472 IMMUNIZATION ADMIN EACH ADD: CPT | Performed by: FAMILY MEDICINE

## 2020-07-21 PROCEDURE — 83036 HEMOGLOBIN GLYCOSYLATED A1C: CPT | Performed by: FAMILY MEDICINE

## 2020-07-21 PROCEDURE — 90750 HZV VACC RECOMBINANT IM: CPT | Performed by: FAMILY MEDICINE

## 2020-07-21 PROCEDURE — 99214 OFFICE O/P EST MOD 30 MIN: CPT | Mod: 25 | Performed by: FAMILY MEDICINE

## 2020-07-21 PROCEDURE — 90746 HEPB VACCINE 3 DOSE ADULT IM: CPT | Performed by: FAMILY MEDICINE

## 2020-07-21 PROCEDURE — G0010 ADMIN HEPATITIS B VACCINE: HCPCS | Performed by: FAMILY MEDICINE

## 2020-07-21 ASSESSMENT — FIBROSIS 4 INDEX: FIB4 SCORE: 1.43

## 2020-07-23 PROBLEM — R63.4 WEIGHT LOSS: Status: ACTIVE | Noted: 2020-07-23

## 2020-07-23 NOTE — PROGRESS NOTES
Subjective:     Chief Complaint   Patient presents with   • Lab Results   • Immunizations     shingrix and hep B       Jaime Lee is a 69 y.o. male here today for evaluation and management of:    Type 2 diabetes mellitus with hyperglycemia, without long-term current use of insulin ([facility])  Stable. Currently taking metformin 500 mg twice daily as directed.  Denies side effects or hypoglycemic events.  He is monitoring blood sugar regularly at home.  Reports diet is the same.  He is eating less, only 2 meals a day  He is exercising regularly.  Last eye exam: he will schedule  Denies polyuria, polydipsia, blurred vision, or neuropathies.         Allergies   Allergen Reactions   • Bloodless    • Pcn [Penicillins] Anaphylaxis   • Ace Inhibitors      Cough       Current medicines (including changes today)  Current Outpatient Medications   Medication Sig Dispense Refill   • rivaroxaban (XARELTO) 20 MG Tab tablet Take 1 Tab by mouth with dinner. 100 Tab 3   • furosemide (LASIX) 40 MG Tab Take 1 Tab by mouth every day. 100 Tab 3   • lovastatin (MEVACOR) 10 MG tablet Take 1 Tab by mouth every evening. 100 Tab 3   • potassium chloride SA (KDUR) 20 MEQ Tab CR Take 1 Tab by mouth every day. 100 Tab 3   • metoprolol SR (TOPROL XL) 100 MG TABLET SR 24 HR Take 1 Tab by mouth every day. 400 Tab 3   • spironolactone (ALDACTONE) 25 MG Tab Take 0.5 Tabs by mouth every day. 50 Tab 3   • metFORMIN (GLUCOPHAGE) 500 MG Tab Take 1 Tab by mouth 2 times a day, with meals. 200 Tab 3   • losartan (COZAAR) 25 MG Tab Take 0.5 Tabs by mouth every day. 50 Tab 3   • doxycycline (VIBRAMYCIN) 100 MG Tab Take 1 Tab by mouth 2 times a day. 14 Tab 1   • Magnesium 500 MG Cap Take 1 Cap by mouth every day. 180 Cap 4     No current facility-administered medications for this visit.        He  has a past medical history of Acute anterior myocardial infarction (HCC) (12/28/2000), AICD (automatic cardioverter/defibrillator) present  (2003/2008/2015), Allergy, CAD (coronary artery disease) (12/28/2000), Congestive heart failure (HCC) (12/28/2000), Dental disorder, GERD (gastroesophageal reflux disease), Ischemic cardiomyopathy (01/2019), Left bundle branch block ( ), Pneumonia (2014), Pulmonary embolism (HCC) (March 2014), and Type 2 diabetes mellitus without complication ([facility]) ( ).    Patient Active Problem List    Diagnosis Date Noted   • Chronic anticoagulation 02/01/2018     Priority: High   • Chronic deep vein thrombosis (DVT) of femoral vein of left lower extremity ([facility]) 01/04/2018     Priority: High   • Hyperlipidemia 01/18/2017     Priority: High   • S/P CABG x 4 06/22/2016     Priority: High   • AICD (automatic cardioverter/defibrillator) present 03/11/2014     Priority: High   • Ischemic cardiomyopathy 03/11/2014     Priority: High   • History of pulmonary embolus (PE) 02/27/2014     Priority: High   • Coronary artery disease involving native coronary artery of native heart without angina pectoris 12/28/2000     Priority: High   • Type 2 diabetes mellitus with hyperglycemia, without long-term current use of insulin ([facility]) 06/22/2016     Priority: Medium   • Chronic systolic congestive heart failure, NYHA class 2 ([facility]) 06/22/2016     Priority: Medium   • CKD (chronic kidney disease) stage 3, GFR 30-59 ml/min ([facility]) 02/24/2014     Priority: Medium   • Thrombocytopenia ([facility]) 02/23/2014     Priority: Medium   • Weight loss 07/23/2020   • Dyslipidemia 02/11/2020   • Vascular disorder of extremity ([facility]) 02/11/2020   • Ventricular tachycardia ([facility]) 04/25/2019   • RBBB 02/04/2019   • Macrocytosis without anemia 04/24/2018   • Basal cell carcinoma of skin of left ear 02/01/2018   • Basal cell carcinoma of skin of face 11/28/2017   • Old anterior myocardial infarction 06/22/2016       ROS   No fever or chills.  No nausea or vomiting.  No chest pain or palpitations.  No cough or SOB.  No pain with urination or hematuria.  No black or bloody  "stools.       Objective:     BP (!) 90/54 (BP Location: Right arm, Patient Position: Sitting, BP Cuff Size: Adult)   Pulse 77   Temp 36.7 °C (98.1 °F) (Temporal)   Resp 16   Ht 1.81 m (5' 11.26\")   Wt 74.8 kg (165 lb)   SpO2 94%  Body mass index is 22.85 kg/m².   Physical Exam:  Well developed, well nourished.  Alert, oriented in no acute distress.  Eye contact is good, speech goal directed, affect calm  Eyes: conjunctiva non-injected, sclera non-icteric.  Neck Supple.  No adenopathy or masses in the neck or supraclavicular regions. No thyromegaly  Lungs: clear to auscultation bilaterally with good excursion. No wheezes or rhonchi  CV: regular rate and rhythm. No murmur        Assessment and Plan:   The following treatment plan was discussed    1. Type 2 diabetes mellitus with hyperglycemia, without long-term current use of insulin (HCC)  This is a chronic medical condition that is currently stable  Continue Metformin 500 mg BID  - POCT Hemoglobin A1C    2. Need for vaccination  Updated  - Shingrix Vaccine  - Hepatitis B Vaccine Adult 20+    3. Weight loss  Discussed increasing caloric intake.  We discussed that with his heart disease and kidney disease he needs to be getting enough calories as he has to work harder.  Dietary counseling done    4. Chronic systolic congestive heart failure, NYHA class 2 ([facility])  This is a chronic medical condition that is currently stable  Follow-up with cardiology as scheduled    5. CKD (chronic kidney disease) stage 3, GFR 30-59 ml/min ([facility])  This is a chronic medical condition that is currently stable  Follow-up with cardiology as scheduled    Any change or worsening of signs or symptoms, patient encouraged to follow-up or report to the emergency room for further evaluation. Patient understands and agrees.    Followup: Return in about 3 months (around 10/21/2020).           "

## 2020-07-23 NOTE — ASSESSMENT & PLAN NOTE
Stable. Currently taking metformin 500 mg twice daily as directed.  Denies side effects or hypoglycemic events.  He is monitoring blood sugar regularly at home.  Reports diet is the same.  He is eating less, only 2 meals a day  He is exercising regularly.  Last eye exam: he will schedule  Denies polyuria, polydipsia, blurred vision, or neuropathies.

## 2020-08-10 ENCOUNTER — NON-PROVIDER VISIT (OUTPATIENT)
Dept: CARDIOLOGY | Facility: MEDICAL CENTER | Age: 70
End: 2020-08-10
Payer: MEDICARE

## 2020-08-10 ENCOUNTER — OFFICE VISIT (OUTPATIENT)
Dept: CARDIOLOGY | Facility: MEDICAL CENTER | Age: 70
End: 2020-08-10
Payer: MEDICARE

## 2020-08-10 VITALS
DIASTOLIC BLOOD PRESSURE: 60 MMHG | HEART RATE: 86 BPM | OXYGEN SATURATION: 95 % | SYSTOLIC BLOOD PRESSURE: 100 MMHG | HEIGHT: 72 IN | BODY MASS INDEX: 22.08 KG/M2 | WEIGHT: 163 LBS

## 2020-08-10 VITALS
DIASTOLIC BLOOD PRESSURE: 60 MMHG | SYSTOLIC BLOOD PRESSURE: 100 MMHG | WEIGHT: 163 LBS | HEART RATE: 86 BPM | BODY MASS INDEX: 22.08 KG/M2 | HEIGHT: 72 IN | OXYGEN SATURATION: 95 %

## 2020-08-10 DIAGNOSIS — I25.5 ISCHEMIC CARDIOMYOPATHY: ICD-10-CM

## 2020-08-10 DIAGNOSIS — I25.10 CORONARY ARTERY DISEASE INVOLVING NATIVE CORONARY ARTERY OF NATIVE HEART WITHOUT ANGINA PECTORIS: ICD-10-CM

## 2020-08-10 DIAGNOSIS — I50.22 CHRONIC SYSTOLIC CONGESTIVE HEART FAILURE, NYHA CLASS 2 (HCC): ICD-10-CM

## 2020-08-10 DIAGNOSIS — Z95.1 S/P CABG X 4: ICD-10-CM

## 2020-08-10 DIAGNOSIS — E11.65 TYPE 2 DIABETES MELLITUS WITH HYPERGLYCEMIA, WITHOUT LONG-TERM CURRENT USE OF INSULIN (HCC): ICD-10-CM

## 2020-08-10 DIAGNOSIS — Z95.810 AICD (AUTOMATIC CARDIOVERTER/DEFIBRILLATOR) PRESENT: ICD-10-CM

## 2020-08-10 DIAGNOSIS — E78.2 MIXED HYPERLIPIDEMIA: ICD-10-CM

## 2020-08-10 DIAGNOSIS — I47.20 VENTRICULAR TACHYCARDIA (HCC): ICD-10-CM

## 2020-08-10 DIAGNOSIS — Z79.01 CHRONIC ANTICOAGULATION: ICD-10-CM

## 2020-08-10 DIAGNOSIS — I82.512 CHRONIC DEEP VEIN THROMBOSIS (DVT) OF FEMORAL VEIN OF LEFT LOWER EXTREMITY (HCC): ICD-10-CM

## 2020-08-10 PROCEDURE — 93282 PRGRMG EVAL IMPLANTABLE DFB: CPT | Performed by: NURSE PRACTITIONER

## 2020-08-10 PROCEDURE — 99214 OFFICE O/P EST MOD 30 MIN: CPT | Mod: 25 | Performed by: NURSE PRACTITIONER

## 2020-08-10 ASSESSMENT — ENCOUNTER SYMPTOMS
CHILLS: 0
MYALGIAS: 0
WEIGHT LOSS: 1
DEPRESSION: 0
FEVER: 0
INSOMNIA: 0
HEADACHES: 0
PALPITATIONS: 0
NAUSEA: 0
ABDOMINAL PAIN: 0
PND: 0
ORTHOPNEA: 0
LOSS OF CONSCIOUSNESS: 0
SHORTNESS OF BREATH: 0
BRUISES/BLEEDS EASILY: 0
DIZZINESS: 1

## 2020-08-10 ASSESSMENT — FIBROSIS 4 INDEX
FIB4 SCORE: 1.43
FIB4 SCORE: 1.43

## 2020-08-10 NOTE — PROGRESS NOTES
Chief Complaint   Patient presents with   • Follow-Up   • AICD Check/Dysfunction   • Coronary Artery Disease   • Cardiomyopathy (Ischemic)   • Ventricular Tachycardia   • CHF (Acute)   • Anticoagulation   • Hyperlipidemia       Subjective:   Jaime Lee is a 69 y.o. male who presents today for six month follow-up for AICD check, CAD/ischemic cardiomyopathy, and hyperlipidemia.    Jaime is a 69 year old male with history of CAD/ischemic cardiomyopathy status post remote CABG x 4, with LVEF 20-25% with AICD since 2003, and last generator replacement in 2015; he also has hyperlipidemia and diabetes. He also has a history of left LE DVT, and is on Xarelto.     Since the last visit, he has been doing well: no chest pain, pressure or discomfort; no palpitations; no shortness of breath, orthopnea or PND. No device therapy. Mild continued lightheadedness but no syncope; no LE edema. BP continues to be on the lower side. He is leaving to go to [place] for a trip, and knows to be careful given Covid-19 situation. He is wife is a little concerned that he is losing weight; he does admit to not eating as much.    Past Medical History:   Diagnosis Date   • Acute anterior myocardial infarction (HCC) 12/28/2000    PCI attempted by Dr. Malloy but unsuccessful   • AICD (automatic cardioverter/defibrillator) present 2003/2008/2015    Defibrillator is a Medtronic model FMHP4D0, serial #[id number], implanted Dr. Stephenson November 2015. The ventricular lead is a Medtronic model #6947-65, lead serial #[id number], implant 7/2/2003   • Allergy     within 2 years   • CAD (coronary artery disease) 12/28/2000    CABG x 4 (LIMA to LAD, rSVG to first diagonal, left circumflex and PDA).2000: CABG x 4 (LIMA to LAD, rSVG to first diagonal, left circumflex and PDA).  2003: LIMA and circumflex grafts patent, diagonal and PDA grafts closed.   • Congestive heart failure (HCC) 12/28/2000   • Dental disorder     Upper partial   • GERD  (gastroesophageal reflux disease)    • Ischemic cardiomyopathy 2019    Echocardiogram with normal size, LVEF 20-25%. Grade II diastolic dysfunction. Trace MR, mild TR. RVSP 25mmHg.   • Left bundle branch block     • Pneumonia    • Pulmonary embolism (HCC) 2014   • Type 2 diabetes mellitus without complication (HCC)      Diet controlled, followed by Dr. Walls     Past Surgical History:   Procedure Laterality Date   • RECOVERY  11/3/2015    Procedure: CATH LAB-[facility]PHENSON-ICD GENERATOR CHANGE 20216- EUNICE T82.111A-MEDTRONIC BLOODLESS;  Surgeon: Recoveryonly Surgery;  Location: SURGERY PRE-POST PROC UNIT [facility];  Service:    • AICD BATTERY CHANGE  2015    Generator replacement with Medtronic Evera S VR [id number] implanted by Dr. Marcus Stephenson.   • AICD IMPLANT  2008    Medtronic Virtuoso VR [id number] implanted by Dr. Stephenson.   • OTHER CARDIAC SURGERY  2000    CABG x 4   • APPENDECTOMY     • MULTIPLE CORONARY ARTERY BYPASS       Family History   Problem Relation Age of Onset   • Diabetes Mother    • Heart Disease Mother    • Diabetes Sister    • Alcohol/Drug Brother      Social History     Socioeconomic History   • Marital status:      Spouse name: Not on file   • Number of children: Not on file   • Years of education: Not on file   • Highest education level: Not on file   Occupational History   • Not on file   Social Needs   • Financial resource strain: Not on file   • Food insecurity     Worry: Not on file     Inability: Not on file   • Transportation needs     Medical: Not on file     Non-medical: Not on file   Tobacco Use   • Smoking status: Former Smoker     Packs/day: 1.00     Years: 32.00     Pack years: 32.00     Types: Cigarettes     Quit date: 2000     Years since quittin.6   • Smokeless tobacco: Never Used   Substance and Sexual Activity   • Alcohol use: Yes     Alcohol/week: 4.2 oz     Types: 7 Glasses of wine per week     Comment: 7 per week   • Drug use: Yes      Types: Marijuana     Comment: CBD, THC, edibals    • Sexual activity: Never   Lifestyle   • Physical activity     Days per week: Not on file     Minutes per session: Not on file   • Stress: Not on file   Relationships   • Social connections     Talks on phone: Not on file     Gets together: Not on file     Attends [employer] service: Not on file     Active member of club or organization: Not on file     Attends meetings of clubs or organizations: Not on file     Relationship status: Not on file   • Intimate partner violence     Fear of current or ex partner: Not on file     Emotionally abused: Not on file     Physically abused: Not on file     Forced sexual activity: Not on file   Other Topics Concern   • Not on file   Social History Narrative   • Not on file     Allergies   Allergen Reactions   • Bloodless    • Pcn [Penicillins] Anaphylaxis   • Ace Inhibitors      Cough     Outpatient Encounter Medications as of 8/10/2020   Medication Sig Dispense Refill   • rivaroxaban (XARELTO) 20 MG Tab tablet Take 1 Tab by mouth with dinner. 100 Tab 3   • furosemide (LASIX) 40 MG Tab Take 1 Tab by mouth every day. 100 Tab 3   • lovastatin (MEVACOR) 10 MG tablet Take 1 Tab by mouth every evening. 100 Tab 3   • potassium chloride SA (KDUR) 20 MEQ Tab CR Take 1 Tab by mouth every day. 100 Tab 3   • metoprolol SR (TOPROL XL) 100 MG TABLET SR 24 HR Take 1 Tab by mouth every day. 400 Tab 3   • spironolactone (ALDACTONE) 25 MG Tab Take 0.5 Tabs by mouth every day. 50 Tab 3   • metFORMIN (GLUCOPHAGE) 500 MG Tab Take 1 Tab by mouth 2 times a day, with meals. 200 Tab 3   • losartan (COZAAR) 25 MG Tab Take 0.5 Tabs by mouth every day. 50 Tab 3   • Magnesium 500 MG Cap Take 1 Cap by mouth every day. 180 Cap 4   • [DISCONTINUED] doxycycline (VIBRAMYCIN) 100 MG Tab Take 1 Tab by mouth 2 times a day. (Patient not taking: Reported on 8/10/2020) 14 Tab 1     No facility-administered encounter medications on file as of 8/10/2020.      Review of  "Systems   Constitutional: Positive for weight loss. Negative for chills and fever.   HENT: Negative for congestion.    Respiratory: Negative for shortness of breath.    Cardiovascular: Negative for chest pain, palpitations, orthopnea, leg swelling and PND.   Gastrointestinal: Negative for abdominal pain and nausea.   Musculoskeletal: Negative for myalgias.   Skin: Negative for rash.   Neurological: Positive for dizziness. Negative for loss of consciousness and headaches.   Endo/Heme/Allergies: Does not bruise/bleed easily.   Psychiatric/Behavioral: Negative for depression. The patient does not have insomnia.         Objective:   /60 (BP Location: Left arm, Patient Position: Sitting, BP Cuff Size: Adult)   Pulse 86   Ht 1.816 m (5' 11.5\")   Wt 73.9 kg (163 lb)   SpO2 95%   BMI 22.42 kg/m²     Physical Exam   Constitutional: He is oriented to person, place, and time. He appears well-developed and well-nourished.   HENT:   Head: Normocephalic.   Eyes: EOM are normal.   Neck: Normal range of motion. Neck supple. No JVD present.   Cardiovascular: Normal rate, regular rhythm and normal heart sounds.   Pulmonary/Chest: Effort normal and breath sounds normal. No respiratory distress. He has no wheezes. He has no rales.   AICD in left chest wall   Abdominal: Soft. Bowel sounds are normal. He exhibits no distension. There is no abdominal tenderness.   Musculoskeletal: Normal range of motion.         General: No edema.   Neurological: He is alert and oriented to person, place, and time.   Skin: Skin is warm and dry. No rash noted.   Psychiatric: He has a normal mood and affect.     AICD interrogation shows no device therapy. 5 brief NSVT episodes, all 1-5 seconds each.    CONCLUSIONS OF ECHOCARDIOGRAM OF 1/30/2019:  Prior echo 08/09/17, no significant change.  Severely reduced left ventricular systolic function.  Left ventricular ejection fraction is visually estimated to be 20-25%.  Global hypokinesis with regional " variation, see report.  Grade II diastolic dysfunction.  Reduced right ventricular systolic function.  Pacer/ICD wire seen in right ventricle.  No significant valve abnormality.  Estimated right ventricular systolic pressure  is 25 mmHg.  Inferior vena cava is not well visualized.    Lab Results   Component Value Date/Time    CHOLSTRLTOT 185 05/27/2020 06:09 AM    LDL 98 05/27/2020 06:09 AM    HDL 60 05/27/2020 06:09 AM    TRIGLYCERIDE 137 05/27/2020 06:09 AM       Lab Results   Component Value Date/Time    SODIUM 138 05/27/2020 06:09 AM    POTASSIUM 4.2 05/27/2020 06:09 AM    CHLORIDE 98 05/27/2020 06:09 AM    CO2 27 05/27/2020 06:09 AM    GLUCOSE 175 (H) 05/27/2020 06:09 AM    BUN 19 05/27/2020 06:09 AM    CREATININE 1.26 05/27/2020 06:09 AM    CREATININE 1.0 01/08/2008 07:45 AM     Lab Results   Component Value Date/Time    ALKPHOSPHAT 59 05/27/2020 06:09 AM    ASTSGOT 15 05/27/2020 06:09 AM    ALTSGPT 15 05/27/2020 06:09 AM    TBILIRUBIN 0.7 05/27/2020 06:09 AM        Assessment:     1. AICD (automatic cardioverter/defibrillator) present     2. Coronary artery disease involving native coronary artery of native heart without angina pectoris     3. Ischemic cardiomyopathy     4. S/P CABG x 4     5. Chronic systolic congestive heart failure, NYHA class 2 ([facility])     6. Chronic deep vein thrombosis (DVT) of femoral vein of left lower extremity ([facility])     7. Chronic anticoagulation     8. Mixed hyperlipidemia     9. Type 2 diabetes mellitus with hyperglycemia, without long-term current use of insulin ([facility])         Medical Decision Making:  Today's Assessment / Status / Plan:     1. CAD/ischemic cardiomyopathy S/P) CABG x 4, with LVEF 20-25% with AICD. No device therapy, and only 5 brief NSVT episodes. He remains on Xarelto, BB, ARB and Lasix/Aldactone/KCl, and statin.    2. History of CHF, currently euvolemic on BB, ARB and diuretics. Will repeat echo in February 2020.    3. History of DVT, anticoagulated with Xarelto.  No bleeding problems.    4. Hyperlipidemia, treated with low dose Mevacor. Ideally should be on stronger statin.    5. Diabetes mellitus, treated, stable.  Followed by PCP.    6. Weight loss, weight is down 9 pounds since March 2020; will monitor accordingly.    Same medications for now. Urged to be careful when traveling: wear a mask, social distance and good hand hygiene, and avoid large crowds. FU in 6 months for next AICD interrogation; FU sooner if clinical condition changes.    Collaborating MD: Alli

## 2020-09-25 NOTE — PROGRESS NOTES
Mbr called, HIPAA verified. Requested flu shot apt, advised out of stock and he can complete at pharmacy or flu shot events. He stated he would go to pharmacy. He also had a scheduling question for Cardiology, mbr transferred to [facility] in Specialty scheduling.

## 2020-10-14 ENCOUNTER — OFFICE VISIT (OUTPATIENT)
Dept: MEDICAL GROUP | Facility: LAB | Age: 70
End: 2020-10-14
Payer: MEDICARE

## 2020-10-14 VITALS
WEIGHT: 162 LBS | OXYGEN SATURATION: 97 % | HEIGHT: 71 IN | RESPIRATION RATE: 16 BRPM | SYSTOLIC BLOOD PRESSURE: 100 MMHG | TEMPERATURE: 98.2 F | HEART RATE: 77 BPM | BODY MASS INDEX: 22.68 KG/M2 | DIASTOLIC BLOOD PRESSURE: 64 MMHG

## 2020-10-14 DIAGNOSIS — Z23 NEED FOR VACCINATION: ICD-10-CM

## 2020-10-14 DIAGNOSIS — E11.65 TYPE 2 DIABETES MELLITUS WITH HYPERGLYCEMIA, WITHOUT LONG-TERM CURRENT USE OF INSULIN (HCC): ICD-10-CM

## 2020-10-14 LAB
HBA1C MFR BLD: 6.4 % (ref 0–5.6)
INT CON NEG: ABNORMAL
INT CON POS: ABNORMAL

## 2020-10-14 PROCEDURE — 90746 HEPB VACCINE 3 DOSE ADULT IM: CPT | Performed by: FAMILY MEDICINE

## 2020-10-14 PROCEDURE — 90472 IMMUNIZATION ADMIN EACH ADD: CPT | Performed by: FAMILY MEDICINE

## 2020-10-14 PROCEDURE — 90750 HZV VACC RECOMBINANT IM: CPT | Performed by: FAMILY MEDICINE

## 2020-10-14 PROCEDURE — 83036 HEMOGLOBIN GLYCOSYLATED A1C: CPT | Performed by: FAMILY MEDICINE

## 2020-10-14 PROCEDURE — G0010 ADMIN HEPATITIS B VACCINE: HCPCS | Performed by: FAMILY MEDICINE

## 2020-10-14 PROCEDURE — 99214 OFFICE O/P EST MOD 30 MIN: CPT | Mod: 25 | Performed by: FAMILY MEDICINE

## 2020-10-14 ASSESSMENT — FIBROSIS 4 INDEX: FIB4 SCORE: 1.45

## 2020-10-15 NOTE — ASSESSMENT & PLAN NOTE
Stable. Currently taking Metformin 500 mg twice a day as directed.  Denies side effects or hypoglycemic events.  He is monitoring blood sugar regularly at home.  Reports diet is stable  He is exercising regularly.  Last eye exam: in the last year - we will get records  Denies polyuria, polydipsia, blurred vision, or neuropathies.

## 2020-10-15 NOTE — PROGRESS NOTES
Subjective:     Chief Complaint   Patient presents with   • Immunizations   • Referral Needed     retinal exam       Jaime Lee is a 70 y.o. male here today for evaluation and management of:    Type 2 diabetes mellitus with hyperglycemia, without long-term current use of insulin ([facility])  Stable. Currently taking Metformin 500 mg twice a day as directed.  Denies side effects or hypoglycemic events.  He is monitoring blood sugar regularly at home.  Reports diet is stable  He is exercising regularly.  Last eye exam: in the last year - we will get records  Denies polyuria, polydipsia, blurred vision, or neuropathies.         Allergies   Allergen Reactions   • Bloodless    • Pcn [Penicillins] Anaphylaxis   • Ace Inhibitors      Cough       Current medicines (including changes today)  Current Outpatient Medications   Medication Sig Dispense Refill   • rivaroxaban (XARELTO) 20 MG Tab tablet Take 1 Tab by mouth with dinner. 100 Tab 3   • furosemide (LASIX) 40 MG Tab Take 1 Tab by mouth every day. 100 Tab 3   • lovastatin (MEVACOR) 10 MG tablet Take 1 Tab by mouth every evening. 100 Tab 3   • potassium chloride SA (KDUR) 20 MEQ Tab CR Take 1 Tab by mouth every day. 100 Tab 3   • metoprolol SR (TOPROL XL) 100 MG TABLET SR 24 HR Take 1 Tab by mouth every day. 400 Tab 3   • spironolactone (ALDACTONE) 25 MG Tab Take 0.5 Tabs by mouth every day. 50 Tab 3   • metFORMIN (GLUCOPHAGE) 500 MG Tab Take 1 Tab by mouth 2 times a day, with meals. 200 Tab 3   • losartan (COZAAR) 25 MG Tab Take 0.5 Tabs by mouth every day. 50 Tab 3   • Magnesium 500 MG Cap Take 1 Cap by mouth every day. 180 Cap 4     No current facility-administered medications for this visit.        He  has a past medical history of Acute anterior myocardial infarction (HCC) (12/28/2000), AICD (automatic cardioverter/defibrillator) present (2003/2008/2015), Allergy, CAD (coronary artery disease) (12/28/2000), Congestive heart failure (HCC) (12/28/2000),  Dental disorder, GERD (gastroesophageal reflux disease), Ischemic cardiomyopathy (01/2019), Left bundle branch block ( ), Pneumonia (2014), Pulmonary embolism ([facility]) (March 2014), and Type 2 diabetes mellitus without complication ([facility]) ( ).    Patient Active Problem List    Diagnosis Date Noted   • Ventricular tachycardia ([facility]) 04/25/2019     Priority: High   • Chronic anticoagulation 02/01/2018     Priority: High   • Chronic deep vein thrombosis (DVT) of femoral vein of left lower extremity ([facility]) 01/04/2018     Priority: High   • Hyperlipidemia 01/18/2017     Priority: High   • S/P CABG x 4 06/22/2016     Priority: High   • AICD (automatic cardioverter/defibrillator) present 03/11/2014     Priority: High   • Ischemic cardiomyopathy 03/11/2014     Priority: High   • History of pulmonary embolus (PE) 02/27/2014     Priority: High   • Coronary artery disease involving native coronary artery of native heart without angina pectoris 12/28/2000     Priority: High   • RBBB 02/04/2019     Priority: Medium   • Type 2 diabetes mellitus with hyperglycemia, without long-term current use of insulin ([facility]) 06/22/2016     Priority: Medium   • Old anterior myocardial infarction 06/22/2016     Priority: Medium   • Chronic systolic congestive heart failure, NYHA class 2 ([facility]) 06/22/2016     Priority: Medium   • CKD (chronic kidney disease) stage 3, GFR 30-59 ml/min ([facility]) 02/24/2014     Priority: Medium   • Thrombocytopenia ([facility]) 02/23/2014     Priority: Medium   • Weight loss 07/23/2020   • Vascular disorder of extremity ([facility]) 02/11/2020   • Macrocytosis without anemia 04/24/2018   • Basal cell carcinoma of skin of left ear 02/01/2018   • Basal cell carcinoma of skin of face 11/28/2017       ROS   No fever or chills.  No nausea or vomiting.  No chest pain or palpitations.  No cough or SOB.  No pain with urination or hematuria.  No black or bloody stools.       Objective:     /64 (BP Location: Right arm, Patient Position: Sitting,  "BP Cuff Size: Adult)   Pulse 77   Temp 36.8 °C (98.2 °F) (Temporal)   Resp 16   Ht 1.803 m (5' 11\")   Wt 73.5 kg (162 lb)   SpO2 97%  Body mass index is 22.59 kg/m².   Physical Exam:  Well developed, well nourished.  Alert, oriented in no acute distress.  Eye contact is good, speech goal directed, affect calm  Eyes: conjunctiva non-injected, sclera non-icteric.  Mouth: Oral mucous membranes pink and moist with no lesions.  Neck Supple.  No adenopathy or masses in the neck or supraclavicular regions. No thyromegaly  Lungs: clear to auscultation bilaterally with good excursion. No wheezes or rhonchi  CV: regular rate and rhythm. No murmur  Monofilament testing with a 10 gram force: sensation intact: decreased bilaterally  Visual Inspection: Feet without maceration, ulcers, fissures.  Pedal pulses: decreased on left        Assessment and Plan:   The following treatment plan was discussed    1. Type 2 diabetes mellitus with hyperglycemia, without long-term current use of insulin (HCC)  Good control.  Continue Metformin 500 mg twice daily.  - POCT Hemoglobin A1C  - Hepatitis B Vaccine Adult IM  - Diabetic Monofilament Lower Extremity Exam    2. Need for vaccination    - Hepatitis B Vaccine Adult IM  - Shingles Vaccine (Shingrix)    Any change or worsening of signs or symptoms, patient encouraged to follow-up or report to the emergency room for further evaluation. Patient understands and agrees.    Followup: Return in about 3 months (around 1/14/2021).           "

## 2020-12-14 RX ORDER — DOXYCYCLINE 100 MG/1
CAPSULE ORAL
Qty: 20 CAP | Refills: 0 | Status: SHIPPED | OUTPATIENT
Start: 2020-12-14 | End: 2021-02-16

## 2020-12-14 NOTE — TELEPHONE ENCOUNTER
Received request via: Pharmacy    Was the patient seen in the last year in this department? Yes  10/14/2020  Does the patient have an active prescription (recently filled or refills available) for medication(s) requested? No

## 2021-01-15 DIAGNOSIS — Z23 NEED FOR VACCINATION: ICD-10-CM

## 2021-01-26 DIAGNOSIS — E11.65 TYPE 2 DIABETES MELLITUS WITH HYPERGLYCEMIA, WITHOUT LONG-TERM CURRENT USE OF INSULIN (HCC): ICD-10-CM

## 2021-02-10 ENCOUNTER — IMMUNIZATION (OUTPATIENT)
Dept: FAMILY PLANNING/WOMEN'S HEALTH CLINIC | Facility: IMMUNIZATION CENTER | Age: 71
End: 2021-02-10
Attending: INTERNAL MEDICINE
Payer: MEDICARE

## 2021-02-10 DIAGNOSIS — Z23 NEED FOR VACCINATION: ICD-10-CM

## 2021-02-10 DIAGNOSIS — Z23 ENCOUNTER FOR VACCINATION: Primary | ICD-10-CM

## 2021-02-10 PROCEDURE — 91300 PFIZER SARS-COV-2 VACCINE: CPT | Performed by: INTERNAL MEDICINE

## 2021-02-10 PROCEDURE — 0001A PFIZER SARS-COV-2 VACCINE: CPT | Performed by: INTERNAL MEDICINE

## 2021-02-16 ENCOUNTER — OFFICE VISIT (OUTPATIENT)
Dept: CARDIOLOGY | Facility: MEDICAL CENTER | Age: 71
End: 2021-02-16
Payer: MEDICARE

## 2021-02-16 ENCOUNTER — NON-PROVIDER VISIT (OUTPATIENT)
Dept: CARDIOLOGY | Facility: MEDICAL CENTER | Age: 71
End: 2021-02-16
Payer: MEDICARE

## 2021-02-16 VITALS
HEART RATE: 80 BPM | SYSTOLIC BLOOD PRESSURE: 110 MMHG | OXYGEN SATURATION: 95 % | BODY MASS INDEX: 22.57 KG/M2 | HEIGHT: 71 IN | DIASTOLIC BLOOD PRESSURE: 72 MMHG | WEIGHT: 161.2 LBS

## 2021-02-16 VITALS
WEIGHT: 161 LBS | OXYGEN SATURATION: 95 % | HEIGHT: 71 IN | BODY MASS INDEX: 22.54 KG/M2 | DIASTOLIC BLOOD PRESSURE: 72 MMHG | HEART RATE: 80 BPM | SYSTOLIC BLOOD PRESSURE: 110 MMHG

## 2021-02-16 DIAGNOSIS — E78.2 MIXED HYPERLIPIDEMIA: ICD-10-CM

## 2021-02-16 DIAGNOSIS — I50.22 CHRONIC SYSTOLIC CONGESTIVE HEART FAILURE, NYHA CLASS 2 (HCC): ICD-10-CM

## 2021-02-16 DIAGNOSIS — N18.32 STAGE 3B CHRONIC KIDNEY DISEASE: ICD-10-CM

## 2021-02-16 DIAGNOSIS — R42 DIZZINESS: ICD-10-CM

## 2021-02-16 DIAGNOSIS — Z95.1 S/P CABG X 4: ICD-10-CM

## 2021-02-16 DIAGNOSIS — Z79.01 CHRONIC ANTICOAGULATION: ICD-10-CM

## 2021-02-16 DIAGNOSIS — I47.20 VENTRICULAR TACHYCARDIA (HCC): ICD-10-CM

## 2021-02-16 DIAGNOSIS — I82.512 CHRONIC DEEP VEIN THROMBOSIS (DVT) OF FEMORAL VEIN OF LEFT LOWER EXTREMITY (HCC): ICD-10-CM

## 2021-02-16 DIAGNOSIS — E78.5 DYSLIPIDEMIA: ICD-10-CM

## 2021-02-16 DIAGNOSIS — I25.10 CORONARY ARTERY DISEASE INVOLVING NATIVE CORONARY ARTERY OF NATIVE HEART WITHOUT ANGINA PECTORIS: ICD-10-CM

## 2021-02-16 DIAGNOSIS — Z95.810 AICD (AUTOMATIC CARDIOVERTER/DEFIBRILLATOR) PRESENT: ICD-10-CM

## 2021-02-16 DIAGNOSIS — E11.65 TYPE 2 DIABETES MELLITUS WITH HYPERGLYCEMIA, WITHOUT LONG-TERM CURRENT USE OF INSULIN (HCC): ICD-10-CM

## 2021-02-16 DIAGNOSIS — Z86.711 HISTORY OF PULMONARY EMBOLUS (PE): ICD-10-CM

## 2021-02-16 DIAGNOSIS — I25.119 CORONARY ARTERY DISEASE INVOLVING NATIVE CORONARY ARTERY OF NATIVE HEART WITH ANGINA PECTORIS (HCC): ICD-10-CM

## 2021-02-16 DIAGNOSIS — I25.5 ISCHEMIC CARDIOMYOPATHY: ICD-10-CM

## 2021-02-16 DIAGNOSIS — R53.83 OTHER FATIGUE: ICD-10-CM

## 2021-02-16 PROCEDURE — 93000 ELECTROCARDIOGRAM COMPLETE: CPT | Mod: 59 | Performed by: INTERNAL MEDICINE

## 2021-02-16 PROCEDURE — 93282 PRGRMG EVAL IMPLANTABLE DFB: CPT | Performed by: NURSE PRACTITIONER

## 2021-02-16 PROCEDURE — 99214 OFFICE O/P EST MOD 30 MIN: CPT | Mod: 25 | Performed by: NURSE PRACTITIONER

## 2021-02-16 RX ORDER — POTASSIUM CHLORIDE 20 MEQ/1
20 TABLET, EXTENDED RELEASE ORAL DAILY
Qty: 100 TABLET | Refills: 3 | Status: SHIPPED | OUTPATIENT
Start: 2021-02-16 | End: 2021-12-20

## 2021-02-16 RX ORDER — METOPROLOL SUCCINATE 100 MG/1
100 TABLET, EXTENDED RELEASE ORAL
Qty: 100 TABLET | Refills: 3 | Status: SHIPPED | OUTPATIENT
Start: 2021-02-16 | End: 2022-02-15 | Stop reason: SDUPTHER

## 2021-02-16 RX ORDER — SPIRONOLACTONE 25 MG/1
12.5 TABLET ORAL
Qty: 50 TABLET | Refills: 3 | Status: SHIPPED | OUTPATIENT
Start: 2021-02-16 | End: 2022-02-15 | Stop reason: SDUPTHER

## 2021-02-16 RX ORDER — FUROSEMIDE 40 MG/1
40 TABLET ORAL DAILY
Qty: 100 TABLET | Refills: 3 | Status: SHIPPED | OUTPATIENT
Start: 2021-02-16 | End: 2022-02-15 | Stop reason: SDUPTHER

## 2021-02-16 RX ORDER — ZINC SULFATE 50(220)MG
220 CAPSULE ORAL DAILY
COMMUNITY
End: 2021-04-22

## 2021-02-16 RX ORDER — LOVASTATIN 10 MG/1
10 TABLET ORAL EVERY EVENING
Qty: 100 TABLET | Refills: 3 | Status: SHIPPED | OUTPATIENT
Start: 2021-02-16 | End: 2022-02-15 | Stop reason: SDUPTHER

## 2021-02-16 RX ORDER — LOSARTAN POTASSIUM 25 MG/1
12.5 TABLET ORAL DAILY
Qty: 50 TABLET | Refills: 3 | Status: SHIPPED | OUTPATIENT
Start: 2021-02-16 | End: 2022-02-15 | Stop reason: SDUPTHER

## 2021-02-16 RX ORDER — VITAMIN B COMPLEX
1000 TABLET ORAL DAILY
COMMUNITY
End: 2023-09-20

## 2021-02-16 ASSESSMENT — ENCOUNTER SYMPTOMS
HEADACHES: 0
DIZZINESS: 1
BRUISES/BLEEDS EASILY: 0
ABDOMINAL PAIN: 0
CHILLS: 0
PND: 0
FEVER: 0
MYALGIAS: 0
SHORTNESS OF BREATH: 0
PALPITATIONS: 0
DEPRESSION: 0
INSOMNIA: 0
NAUSEA: 0
WEIGHT LOSS: 0
LOSS OF CONSCIOUSNESS: 0
ORTHOPNEA: 0

## 2021-02-16 ASSESSMENT — FIBROSIS 4 INDEX
FIB4 SCORE: 1.45
FIB4 SCORE: 1.45

## 2021-02-16 NOTE — PROGRESS NOTES
Chief Complaint   Patient presents with   • Follow-Up   • AICD Check/Dysfunction   • Coronary Artery Disease   • Cardiomyopathy (Ischemic)   • CHF (Acute)   • Anticoagulation   • Hyperlipidemia       Subjective:   Prudencio Lee is a 70 y.o. male who presents today for six month follow-up for AICD check, CAD/ischemic cardiomyopathy, history of CHF, and hyperlipidemia.    Jaime is a 70 year old male with history of CAD/ischemic cardiomyopathy status post remote CABG x 4, with LVEF 20-25% with AICD since 2003, and last generator replacement in 2015; he also has hyperlipidemia and diabetes. He also has a history of left LE DVT/PE, and is on Xarelto.    He is here today for six month follow-up. He did have his first Covid-19 vaccine, and did have a lot of side effects, including ongoing dizziness but no actual syncope. No chest pain, pressure or discomfort; no palpitations; no shortness of breath, orthopnea or PND. No device therapy.  He did go to [place] last August 2020, and did fine.    Past Medical History:   Diagnosis Date   • Acute anterior myocardial infarction (HCC) 12/28/2000    PCI attempted by Dr. Malloy but unsuccessful   • AICD (automatic cardioverter/defibrillator) present 2003/2008/2015    Defibrillator is a Medtronic model ZCES1M9, serial #[id number], implanted Dr. Stephenson November 2015. The ventricular lead is a Medtronic model #6947-65, lead serial #[id number], implant 7/2/2003   • Allergy     within 2 years   • CAD (coronary artery disease) 12/28/2000    CABG x 4 (LIMA to LAD, rSVG to first diagonal, left circumflex and PDA).2000: CABG x 4 (LIMA to LAD, rSVG to first diagonal, left circumflex and PDA).  2003: LIMA and circumflex grafts patent, diagonal and PDA grafts closed.   • Congestive heart failure (HCC) 12/28/2000   • Dental disorder     Upper partial   • GERD (gastroesophageal reflux disease)    • Ischemic cardiomyopathy 01/2019    Echocardiogram with normal size, LVEF 20-25%. Grade  II diastolic dysfunction. Trace MR, mild TR. RVSP 25mmHg.   • Left bundle branch block     • Pneumonia    • Pulmonary embolism (HCC) 2014   • Type 2 diabetes mellitus without complication (HCC)      Diet controlled, followed by Dr. Walls     Past Surgical History:   Procedure Laterality Date   • RECOVERY  11/3/2015    Procedure: CATH LAB-STEPHENSON-ICD GENERATOR CHANGE 97531- EUNICE T82.111A-MEDTRONIC BLOODLESS;  Surgeon: Recoveryonly Surgery;  Location: SURGERY PRE-POST PROC UNIT [facility] [place];  Service:    • AICD BATTERY CHANGE  2015    Generator replacement with Medtronic Evera S VR [id number] implanted by Dr. Marcus Stephenson.   • AICD IMPLANT  2008    Medtronic Virtuoso VR [id number] implanted by Dr. Stephenson.   • OTHER CARDIAC SURGERY  2000    CABG x 4   • APPENDECTOMY     • MULTIPLE CORONARY ARTERY BYPASS       Family History   Problem Relation Age of Onset   • Diabetes Mother    • Heart Disease Mother    • Diabetes Sister    • Alcohol/Drug Brother      Social History     Socioeconomic History   • Marital status:      Spouse name: Not on file   • Number of children: Not on file   • Years of education: Not on file   • Highest education level: Not on file   Occupational History   • Not on file   Tobacco Use   • Smoking status: Former Smoker     Packs/day: 1.00     Years: 32.00     Pack years: 32.00     Types: Cigarettes     Quit date: 2000     Years since quittin.1   • Smokeless tobacco: Never Used   Substance and Sexual Activity   • Alcohol use: Yes     Alcohol/week: 0.6 oz     Types: 1 Glasses of wine per week     Comment: occasionally   • Drug use: Yes     Types: Marijuana, Oral     Comment: CBD, THC, edibals    • Sexual activity: Never   Other Topics Concern   • Not on file   Social History Narrative   • Not on file     Social Determinants of Health     Financial Resource Strain:    • Difficulty of Paying Living Expenses:    Food Insecurity:    • Worried About Running Out of Food in  the Last Year:    • Ran Out of Food in the Last Year:    Transportation Needs:    • Lack of Transportation (Medical):    • Lack of Transportation (Non-Medical):    Physical Activity:    • Days of Exercise per Week:    • Minutes of Exercise per Session:    Stress:    • Feeling of Stress :    Social Connections:    • Frequency of Communication with Friends and Family:    • Frequency of Social Gatherings with Friends and Family:    • Attends [employer] Services:    • Active Member of Clubs or Organizations:    • Attends Club or Organization Meetings:    • Marital Status:    Intimate Partner Violence:    • Fear of Current or Ex-Partner:    • Emotionally Abused:    • Physically Abused:    • Sexually Abused:      Allergies   Allergen Reactions   • Bloodless    • Pcn [Penicillins] Anaphylaxis   • Ace Inhibitors      Cough     Outpatient Encounter Medications as of 2/16/2021   Medication Sig Dispense Refill   • zinc sulfate (ZINCATE) 220 (50 Zn) MG Cap Take 220 mg by mouth every day.     • vitamin D (CHOLECALCIFEROL) 1000 Unit (25 mcg) Tab Take 1,000 Units by mouth every day.     • metFORMIN (GLUCOPHAGE) 500 MG Tab TAKE 1 TABLET BY MOUTH TWICE A DAY WITH MEALS 200 Tab 3   • [DISCONTINUED] rivaroxaban (XARELTO) 20 MG Tab tablet Take 1 Tab by mouth with dinner. 100 Tab 3   • [DISCONTINUED] furosemide (LASIX) 40 MG Tab Take 1 Tab by mouth every day. 100 Tab 3   • [DISCONTINUED] lovastatin (MEVACOR) 10 MG tablet Take 1 Tab by mouth every evening. 100 Tab 3   • [DISCONTINUED] potassium chloride SA (KDUR) 20 MEQ Tab CR Take 1 Tab by mouth every day. 100 Tab 3   • [DISCONTINUED] metoprolol SR (TOPROL XL) 100 MG TABLET SR 24 HR Take 1 Tab by mouth every day. 400 Tab 3   • [DISCONTINUED] spironolactone (ALDACTONE) 25 MG Tab Take 0.5 Tabs by mouth every day. 50 Tab 3   • [DISCONTINUED] losartan (COZAAR) 25 MG Tab Take 0.5 Tabs by mouth every day. 50 Tab 3   • Magnesium 500 MG Cap Take 1 Cap by mouth every day. 180 Cap 4   •  "[DISCONTINUED] doxycycline (MONODOX) 100 MG capsule TAKE 1 CAPSULE BY MOUTH TWICE A DAY FOR 10 DAYS (Patient not taking: Reported on 2/16/2021) 20 Cap 0     No facility-administered encounter medications on file as of 2/16/2021.     Review of Systems   Constitutional: Negative for chills, fever and weight loss.   HENT: Negative for congestion.    Respiratory: Negative for shortness of breath.    Cardiovascular: Negative for chest pain, palpitations, orthopnea, leg swelling and PND.   Gastrointestinal: Negative for abdominal pain and nausea.   Musculoskeletal: Negative for myalgias.   Skin: Negative for rash.   Neurological: Positive for dizziness. Negative for loss of consciousness and headaches.   Endo/Heme/Allergies: Does not bruise/bleed easily.   Psychiatric/Behavioral: Negative for depression. The patient does not have insomnia.         Objective:   /72 (BP Location: Left arm, Patient Position: Sitting, BP Cuff Size: Adult)   Pulse 80   Ht 1.803 m (5' 11\")   Wt 73 kg (161 lb)   SpO2 95%   BMI 22.45 kg/m²     Physical Exam   Constitutional: He is oriented to person, place, and time. He appears well-developed and well-nourished.   HENT:   Head: Normocephalic.   Eyes: EOM are normal.   Neck: No JVD present.   Cardiovascular: Normal rate, regular rhythm and normal heart sounds.   Pulmonary/Chest: Effort normal and breath sounds normal. No respiratory distress. He has no wheezes. He has no rales.   AICD in left chest wall   Abdominal: Soft. Bowel sounds are normal. He exhibits no distension. There is no abdominal tenderness.   Musculoskeletal:         General: No edema. Normal range of motion.      Cervical back: Normal range of motion and neck supple.   Neurological: He is alert and oriented to person, place, and time.   Skin: Skin is warm and dry. No rash noted.   Psychiatric: He has a normal mood and affect.     AICD interrogation shows normal function. 5 NSVT episodes, all 1-2 seconds each. No changes " are made today.    EKG ordered and interpreted by me today reveals sinus rhythm at 76bpm with RBBB (known); previous inferior, anterior and lateral infarct seen.    CONCLUSIONS OF ECHOCARDIOGRAM OF 1/30/2019:  Prior echo 08/09/17, no significant change.  Severely reduced left ventricular systolic function.  Left ventricular ejection fraction is visually estimated to be 20-25%.  Global hypokinesis with regional variation, see report.  Grade II diastolic dysfunction.  Reduced right ventricular systolic function.  Pacer/ICD wire seen in right ventricle.  No significant valve abnormality.  Estimated right ventricular systolic pressure  is 25 mmHg.  Inferior vena cava is not well visualized.    Lab Results   Component Value Date/Time    CHOLSTRLTOT 185 05/27/2020 06:09 AM    LDL 98 05/27/2020 06:09 AM    HDL 60 05/27/2020 06:09 AM    TRIGLYCERIDE 137 05/27/2020 06:09 AM       Lab Results   Component Value Date/Time    SODIUM 138 05/27/2020 06:09 AM    POTASSIUM 4.2 05/27/2020 06:09 AM    CHLORIDE 98 05/27/2020 06:09 AM    CO2 27 05/27/2020 06:09 AM    GLUCOSE 175 (H) 05/27/2020 06:09 AM    BUN 19 05/27/2020 06:09 AM    CREATININE 1.26 05/27/2020 06:09 AM    CREATININE 1.0 01/08/2008 07:45 AM     Lab Results   Component Value Date/Time    ALKPHOSPHAT 59 05/27/2020 06:09 AM    ASTSGOT 15 05/27/2020 06:09 AM    ALTSGPT 15 05/27/2020 06:09 AM    TBILIRUBIN 0.7 05/27/2020 06:09 AM          Assessment:     1. AICD (automatic cardioverter/defibrillator) present     2. Coronary artery disease involving native coronary artery of native heart without angina pectoris  EKG   3. Ischemic cardiomyopathy  EKG   4. Chronic systolic congestive heart failure, NYHA class 2 (HCC)     5. History of pulmonary embolus (PE)     6. Chronic deep vein thrombosis (DVT) of femoral vein of left lower extremity (HCC)     7. Chronic anticoagulation     8. Mixed hyperlipidemia     9. Type 2 diabetes mellitus with hyperglycemia, without long-term current  use of insulin (HCC)     10. Stage 3b chronic kidney disease         Medical Decision Making:  Today's Assessment / Status / Plan:     1. CAD/ischemic cardiomyopathy, with LVEF 20-25%, status post CABG x 4, with AICD. He remains on Xarelto, BB, ARB, Lasix/Aldactone and statin.    2. History of CHF, currently stable and euvolemic. On BB, ARB and Lasix/Aldactone. To repeat echocardiogram.    3. History of PE/DVT, anticoagulated with Xarelto. No bleeding problems.    4. Hyperlipidemia, treated with Mevacor. To check fasting CMP and lipid panel.    5. Diabetes mellitus, treated with Metformin. To check fasting HgbA1c.    6. Chronic kidney disease, to check CMP.    7. Dizziness, ongoing. To check echo and carotid, along with TSH.    Same medications for now, which are renewed. Echocardiogram and carotid US, along with labs. FU in 6 months for next AICD check.

## 2021-02-17 ENCOUNTER — HOSPITAL ENCOUNTER (OUTPATIENT)
Dept: CARDIOLOGY | Facility: MEDICAL CENTER | Age: 71
End: 2021-02-17
Attending: NURSE PRACTITIONER
Payer: MEDICARE

## 2021-02-17 ENCOUNTER — HOSPITAL ENCOUNTER (OUTPATIENT)
Dept: RADIOLOGY | Facility: MEDICAL CENTER | Age: 71
End: 2021-02-17
Attending: NURSE PRACTITIONER
Payer: MEDICARE

## 2021-02-17 DIAGNOSIS — Z95.810 AICD (AUTOMATIC CARDIOVERTER/DEFIBRILLATOR) PRESENT: ICD-10-CM

## 2021-02-17 DIAGNOSIS — R42 DIZZINESS: ICD-10-CM

## 2021-02-17 DIAGNOSIS — I50.22 CHRONIC SYSTOLIC CONGESTIVE HEART FAILURE, NYHA CLASS 2 (HCC): ICD-10-CM

## 2021-02-17 DIAGNOSIS — I47.20 VENTRICULAR TACHYCARDIA (HCC): ICD-10-CM

## 2021-02-17 DIAGNOSIS — I25.10 CORONARY ARTERY DISEASE INVOLVING NATIVE CORONARY ARTERY OF NATIVE HEART WITHOUT ANGINA PECTORIS: ICD-10-CM

## 2021-02-17 DIAGNOSIS — Z95.1 S/P CABG X 4: ICD-10-CM

## 2021-02-17 LAB
EKG IMPRESSION: NORMAL
LV EJECT FRACT  99904: 20
LV EJECT FRACT MOD 2C 99903: 6.42
LV EJECT FRACT MOD 4C 99902: 19.54
LV EJECT FRACT MOD BP 99901: 14.75

## 2021-02-17 PROCEDURE — 93880 EXTRACRANIAL BILAT STUDY: CPT | Mod: 26 | Performed by: INTERNAL MEDICINE

## 2021-02-17 PROCEDURE — 93306 TTE W/DOPPLER COMPLETE: CPT | Mod: 26 | Performed by: INTERNAL MEDICINE

## 2021-02-17 PROCEDURE — 93306 TTE W/DOPPLER COMPLETE: CPT

## 2021-02-17 PROCEDURE — 93880 EXTRACRANIAL BILAT STUDY: CPT

## 2021-03-03 ENCOUNTER — IMMUNIZATION (OUTPATIENT)
Dept: FAMILY PLANNING/WOMEN'S HEALTH CLINIC | Facility: IMMUNIZATION CENTER | Age: 71
End: 2021-03-03
Attending: INTERNAL MEDICINE
Payer: MEDICARE

## 2021-03-03 DIAGNOSIS — Z23 ENCOUNTER FOR VACCINATION: Primary | ICD-10-CM

## 2021-03-03 PROCEDURE — 91300 PFIZER SARS-COV-2 VACCINE: CPT | Performed by: INTERNAL MEDICINE

## 2021-03-03 PROCEDURE — 0002A PFIZER SARS-COV-2 VACCINE: CPT | Performed by: INTERNAL MEDICINE

## 2021-03-05 ENCOUNTER — HOSPITAL ENCOUNTER (OUTPATIENT)
Dept: LAB | Facility: MEDICAL CENTER | Age: 71
End: 2021-03-05
Attending: NURSE PRACTITIONER
Payer: MEDICARE

## 2021-03-05 DIAGNOSIS — E78.2 MIXED HYPERLIPIDEMIA: ICD-10-CM

## 2021-03-05 DIAGNOSIS — E11.65 TYPE 2 DIABETES MELLITUS WITH HYPERGLYCEMIA, WITHOUT LONG-TERM CURRENT USE OF INSULIN (HCC): ICD-10-CM

## 2021-03-05 DIAGNOSIS — R53.83 OTHER FATIGUE: ICD-10-CM

## 2021-03-05 LAB
ALBUMIN SERPL BCP-MCNC: 4.3 G/DL (ref 3.2–4.9)
ALBUMIN/GLOB SERPL: 1.4 G/DL
ALP SERPL-CCNC: 61 U/L (ref 30–99)
ALT SERPL-CCNC: 8 U/L (ref 2–50)
ANION GAP SERPL CALC-SCNC: 11 MMOL/L (ref 7–16)
AST SERPL-CCNC: 14 U/L (ref 12–45)
BILIRUB SERPL-MCNC: 1 MG/DL (ref 0.1–1.5)
BUN SERPL-MCNC: 18 MG/DL (ref 8–22)
CALCIUM SERPL-MCNC: 9.7 MG/DL (ref 8.4–10.2)
CHLORIDE SERPL-SCNC: 102 MMOL/L (ref 96–112)
CHOLEST SERPL-MCNC: 166 MG/DL (ref 100–199)
CO2 SERPL-SCNC: 27 MMOL/L (ref 20–33)
CREAT SERPL-MCNC: 1.29 MG/DL (ref 0.5–1.4)
EST. AVERAGE GLUCOSE BLD GHB EST-MCNC: 146 MG/DL
FASTING STATUS PATIENT QL REPORTED: NORMAL
GLOBULIN SER CALC-MCNC: 3 G/DL (ref 1.9–3.5)
GLUCOSE SERPL-MCNC: 148 MG/DL (ref 65–99)
HBA1C MFR BLD: 6.7 % (ref 4–5.6)
HDLC SERPL-MCNC: 57 MG/DL
LDLC SERPL CALC-MCNC: 85 MG/DL
POTASSIUM SERPL-SCNC: 4.6 MMOL/L (ref 3.6–5.5)
PROT SERPL-MCNC: 7.3 G/DL (ref 6–8.2)
SODIUM SERPL-SCNC: 140 MMOL/L (ref 135–145)
TRIGL SERPL-MCNC: 120 MG/DL (ref 0–149)
TSH SERPL DL<=0.005 MIU/L-ACNC: 2.31 UIU/ML (ref 0.38–5.33)

## 2021-03-05 PROCEDURE — 83036 HEMOGLOBIN GLYCOSYLATED A1C: CPT

## 2021-03-05 PROCEDURE — 80053 COMPREHEN METABOLIC PANEL: CPT

## 2021-03-05 PROCEDURE — 84443 ASSAY THYROID STIM HORMONE: CPT

## 2021-03-05 PROCEDURE — 36415 COLL VENOUS BLD VENIPUNCTURE: CPT

## 2021-03-05 PROCEDURE — 80061 LIPID PANEL: CPT

## 2021-03-08 ENCOUNTER — TELEPHONE (OUTPATIENT)
Dept: CARDIOLOGY | Facility: MEDICAL CENTER | Age: 71
End: 2021-03-08

## 2021-03-08 NOTE — TELEPHONE ENCOUNTER
Pt. Notified via My Chart.       Message from Doris Schaeffer L.P.N. sent at 3/5/2021  2:38 PM PST -----    ----- Message -----  From: CHRIS Rodríguez  Sent: 3/5/2021  11:44 AM PST  To: Bianka Huddleston R.N.    Labs are all stable.  Continue same meds for now.  Thanks, AB

## 2021-04-02 ENCOUNTER — PATIENT MESSAGE (OUTPATIENT)
Dept: HEALTH INFORMATION MANAGEMENT | Facility: OTHER | Age: 71
End: 2021-04-02

## 2021-04-05 ENCOUNTER — TELEPHONE (OUTPATIENT)
Dept: MEDICAL GROUP | Facility: LAB | Age: 71
End: 2021-04-05

## 2021-04-05 NOTE — TELEPHONE ENCOUNTER
ESTABLISHED PATIENT PRE-VISIT PLANNING     Patient was NOT contacted to complete PVP.     Note: Patient will not be contacted if there is no indication to call.     1.  Reviewed notes from the last few office visits within the medical group: Yes    2.  If any orders were placed at last visit or intended to be done for this visit (i.e. 6 mos follow-up), do we have Results/Consult Notes?         •  Labs - Labs ordered, completed on 3/5/2021 and results are in chart.  Note: If patient appointment is for lab review and patient did not complete labs, check with provider if OK to reschedule patient until labs completed.       •  Imaging - Imaging ordered, completed and results are in chart.       •  Referrals - No referrals were ordered at last office visit.    3. Is this appointment scheduled as a Hospital Follow-Up? No    4.  Immunizations were updated in Epic using Reconcile Outside Information activity? Yes    5.  Patient is due for the following Health Maintenance Topics:   Health Maintenance Due   Topic Date Due   • RETINAL SCREENING  04/25/2020   • Annual Wellness Visit  02/11/2021         6.  AHA (Pulse8) form printed for Provider? Yes, Printed.

## 2021-04-07 ENCOUNTER — OFFICE VISIT (OUTPATIENT)
Dept: MEDICAL GROUP | Facility: LAB | Age: 71
End: 2021-04-07
Payer: MEDICARE

## 2021-04-07 VITALS
SYSTOLIC BLOOD PRESSURE: 110 MMHG | WEIGHT: 160 LBS | OXYGEN SATURATION: 97 % | BODY MASS INDEX: 22.4 KG/M2 | TEMPERATURE: 97.7 F | HEIGHT: 71 IN | RESPIRATION RATE: 16 BRPM | DIASTOLIC BLOOD PRESSURE: 60 MMHG | HEART RATE: 54 BPM

## 2021-04-07 DIAGNOSIS — I73.9 VASCULAR DISORDER OF EXTREMITY (HCC): ICD-10-CM

## 2021-04-07 DIAGNOSIS — I25.10 CORONARY ARTERY DISEASE INVOLVING NATIVE CORONARY ARTERY OF NATIVE HEART WITHOUT ANGINA PECTORIS: ICD-10-CM

## 2021-04-07 DIAGNOSIS — Z00.00 MEDICARE ANNUAL WELLNESS VISIT, SUBSEQUENT: ICD-10-CM

## 2021-04-07 DIAGNOSIS — I82.512 CHRONIC DEEP VEIN THROMBOSIS (DVT) OF FEMORAL VEIN OF LEFT LOWER EXTREMITY (HCC): ICD-10-CM

## 2021-04-07 DIAGNOSIS — E11.65 TYPE 2 DIABETES MELLITUS WITH HYPERGLYCEMIA, WITHOUT LONG-TERM CURRENT USE OF INSULIN (HCC): ICD-10-CM

## 2021-04-07 DIAGNOSIS — L81.9 CHANGING PIGMENTED SKIN LESION: ICD-10-CM

## 2021-04-07 DIAGNOSIS — Z95.810 AICD (AUTOMATIC CARDIOVERTER/DEFIBRILLATOR) PRESENT: ICD-10-CM

## 2021-04-07 DIAGNOSIS — D69.6 THROMBOCYTOPENIA (HCC): ICD-10-CM

## 2021-04-07 DIAGNOSIS — I47.20 VENTRICULAR TACHYCARDIA (HCC): ICD-10-CM

## 2021-04-07 DIAGNOSIS — N18.32 STAGE 3B CHRONIC KIDNEY DISEASE: ICD-10-CM

## 2021-04-07 DIAGNOSIS — I25.5 ISCHEMIC CARDIOMYOPATHY: ICD-10-CM

## 2021-04-07 DIAGNOSIS — E78.2 MIXED HYPERLIPIDEMIA: ICD-10-CM

## 2021-04-07 DIAGNOSIS — I50.22 CHRONIC SYSTOLIC CONGESTIVE HEART FAILURE, NYHA CLASS 2 (HCC): ICD-10-CM

## 2021-04-07 DIAGNOSIS — Z79.01 CHRONIC ANTICOAGULATION: ICD-10-CM

## 2021-04-07 PROBLEM — R63.4 WEIGHT LOSS: Status: RESOLVED | Noted: 2020-07-23 | Resolved: 2021-04-07

## 2021-04-07 PROCEDURE — G0439 PPPS, SUBSEQ VISIT: HCPCS | Performed by: FAMILY MEDICINE

## 2021-04-07 ASSESSMENT — ACTIVITIES OF DAILY LIVING (ADL): BATHING_REQUIRES_ASSISTANCE: 0

## 2021-04-07 ASSESSMENT — PATIENT HEALTH QUESTIONNAIRE - PHQ9: CLINICAL INTERPRETATION OF PHQ2 SCORE: 0

## 2021-04-07 ASSESSMENT — FIBROSIS 4 INDEX: FIB4 SCORE: 1.85

## 2021-04-07 ASSESSMENT — ENCOUNTER SYMPTOMS: GENERAL WELL-BEING: GOOD

## 2021-04-07 NOTE — PROGRESS NOTES
Chief Complaint   Patient presents with   • Annual Exam     AWV   • Medication Refill     Metformin 90 day supply         HPI:  Prudencio is a 70 y.o. here for Medicare Annual Wellness Visit        Patient Active Problem List    Diagnosis Date Noted   • Ventricular tachycardia ([facility]) 04/25/2019   • Chronic anticoagulation 02/01/2018   • Chronic deep vein thrombosis (DVT) of femoral vein of left lower extremity ([facility]) 01/04/2018   • Hyperlipidemia 01/18/2017   • S/P CABG x 4 06/22/2016   • AICD (automatic cardioverter/defibrillator) present 03/11/2014   • Ischemic cardiomyopathy 03/11/2014   • History of pulmonary embolus (PE) 02/27/2014   • Coronary artery disease involving native coronary artery of native heart without angina pectoris 12/28/2000   • RBBB 02/04/2019   • Type 2 diabetes mellitus with hyperglycemia, without long-term current use of insulin ([facility]) 06/22/2016   • Old anterior myocardial infarction 06/22/2016   • Chronic systolic congestive heart failure, NYHA class 2 ([facility]) 06/22/2016   • CKD (chronic kidney disease) stage 3, GFR 30-59 ml/min ([facility]) 02/24/2014   • Thrombocytopenia ([facility]) 02/23/2014   • Vascular disorder of extremity ([facility]) 02/11/2020   • Macrocytosis without anemia 04/24/2018   • Basal cell carcinoma of skin of left ear 02/01/2018   • Basal cell carcinoma of skin of face 11/28/2017       Current Outpatient Medications   Medication Sig Dispense Refill   • metFORMIN (GLUCOPHAGE) 500 MG Tab TAKE 1 TABLET BY MOUTH TWICE A DAY WITH MEALS 200 tablet 3   • zinc sulfate (ZINCATE) 220 (50 Zn) MG Cap Take 220 mg by mouth every day.     • vitamin D (CHOLECALCIFEROL) 1000 Unit (25 mcg) Tab Take 1,000 Units by mouth every day.     • lovastatin (MEVACOR) 10 MG tablet Take 1 tablet by mouth every evening. 100 tablet 3   • metoprolol SR (TOPROL XL) 100 MG TABLET SR 24 HR Take 1 tablet by mouth every day. 100 tablet 3   • losartan (COZAAR) 25 MG Tab Take 0.5 Tablets by mouth every day. 50 tablet 3   •  spironolactone (ALDACTONE) 25 MG Tab Take 0.5 Tablets by mouth every day. 50 tablet 3   • potassium chloride SA (KDUR) 20 MEQ Tab CR Take 1 tablet by mouth every day. 100 tablet 3   • rivaroxaban (XARELTO) 20 MG Tab tablet Take 1 tablet by mouth with dinner. 100 tablet 3   • furosemide (LASIX) 40 MG Tab Take 1 tablet by mouth every day. 100 tablet 3   • Magnesium 500 MG Cap Take 1 Cap by mouth every day. 180 Cap 4     No current facility-administered medications for this visit.        Patient is taking medications as noted in medication list.  Current supplements as per medication list.     Allergies: Bloodless, Pcn [penicillins], and Ace inhibitors    Current social contact/activities: family visits     Is patient current with immunizations? Yes.    He  reports that he quit smoking about 20 years ago. His smoking use included cigarettes. He has a 32.00 pack-year smoking history. He has never used smokeless tobacco. He reports current alcohol use of about 0.6 oz of alcohol per week. He reports current drug use. Drugs: Marijuana and Oral.  Counseling given: Not Answered        DPA/Advanced directive: Patient has Advanced Directive, but it is not on file. Instructed to bring in a copy to scan into their chart.    ROS:    Gait: Uses no assistive device   Ostomy: No   Other tubes: No   Amputations: No   Chronic oxygen use No   Last eye exam has not seen an eye doctor   Wears hearing aids: No   : Denies any urinary leakage during the last 6 months      Screening:        Depression Screening    Little interest or pleasure in doing things?  0 - not at all  Feeling down, depressed, or hopeless? 0 - not at all  Patient Health Questionnaire Score: 0    If depressive symptoms identified deferred to follow up visit unless specifically addressed in assessment and plan.    Interpretation of PHQ-9 Total Score   Score Severity   1-4 No Depression   5-9 Mild Depression   10-14 Moderate Depression   15-19 Moderately Severe  Depression   20-27 Severe Depression    Screening for Cognitive Impairment    Three Minute Recall (captain, garden, picture)  3/3    [place]ail clock face with all 12 numbers and set the hands to show 5 past 8.  Yes    If cognitive concerns identified, deferred for follow up unless specifically addressed in assessment and plan.    Fall Risk Assessment    Has the patient had two or more falls in the last year or any fall with injury in the last year?  No  If fall risk identified, deferred for follow up unless specifically addressed in assessment and plan.    Safety Assessment    Throw rugs on floor.  No  Handrails on all stairs.  Yes  Good lighting in all hallways.  Yes  Difficulty hearing.  No  Patient counseled about all safety risks that were identified.    Functional Assessment ADLs    Are there any barriers preventing you from cooking for yourself or meeting nutritional needs?  No.    Are there any barriers preventing you from driving safely or obtaining transportation?  No.    Are there any barriers preventing you from using a telephone or calling for help?  No.    Are there any barriers preventing you from shopping?  No.    Are there any barriers preventing you from taking care of your own finances?  No.    Are there any barriers preventing you from managing your medications?  No.    Are there any barriers preventing you from showering, bathing or dressing yourself?  No.    Are you currently engaging in any exercise or physical activity?  Yes.     What is your perception of your health?  Good.    Health Maintenance Summary                RETINAL SCREENING Overdue 4/25/2020      Done 4/25/2019 POCT RETINAL EYE EXAM     Patient has more history with this topic...    URINE ACR / MICROALBUMIN Next Due 5/27/2021      Done 5/27/2020 MICROALBUMIN CREAT RATIO URINE     Patient has more history with this topic...    A1C SCREENING Next Due 9/5/2021      Done 3/5/2021 HEMOGLOBIN A1C     Patient has more history with this  topic...    DIABETES MONOFILAMENT / LE EXAM Next Due 10/14/2021      Done 10/14/2020 AMB DIABETIC MONOFILAMENT LOWER EXTREMITY EXAM     Patient has more history with this topic...    FASTING LIPID PROFILE Next Due 3/5/2022      Done 3/5/2021 LIPID PROFILE     Patient has more history with this topic...    SERUM CREATININE Next Due 3/5/2022      Done 3/5/2021 COMP METABOLIC PANEL     Patient has more history with this topic...    Annual Wellness Visit Next Due 2022      Done 2021 SUBSEQUENT ANNUAL WELLNESS VISIT-INCLUDES PPPS ()     Patient has more history with this topic...    COLONOSCOPY Next Due 2024      Patient Declined 2014     IMM DTaP/Tdap/Td Vaccine Next Due 2030      Done 2020 Imm Admin: Tdap Vaccine     Patient has more history with this topic...          Patient Care Team:  Angelica Stephenson M.D. as PCP - General (Family Medicine)  [facility]  Bianka Ahumada M.D. as Consulting Physician (Dermatology)  CHRIS Rodríguez as Consulting Physician (Cardiology)  Silvia Lau M.D. (Cardiology)    Social History     Tobacco Use   • Smoking status: Former Smoker     Packs/day: 1.00     Years: 32.00     Pack years: 32.00     Types: Cigarettes     Quit date: 2000     Years since quittin.3   • Smokeless tobacco: Never Used   Substance Use Topics   • Alcohol use: Yes     Alcohol/week: 0.6 oz     Types: 1 Glasses of wine per week     Comment: occasionally   • Drug use: Yes     Types: Marijuana, Oral     Comment: CBD, THC, edibals      Family History   Problem Relation Age of Onset   • Diabetes Mother    • Heart Disease Mother    • Diabetes Sister    • Alcohol/Drug Brother      He  has a past medical history of Acute anterior myocardial infarction (HCC) (2000), AICD (automatic cardioverter/defibrillator) present (), Allergy, CAD (coronary artery disease) (2000), Congestive heart failure (HCC) (2000), Dental  "disorder, GERD (gastroesophageal reflux disease), Ischemic cardiomyopathy (02/2021), Left bundle branch block ( ), Pneumonia (2014), Pulmonary embolism (HCC) (March 2014), and Type 2 diabetes mellitus without complication (HCC) ( ).   Past Surgical History:   Procedure Laterality Date   • RECOVERY  11/3/2015    Procedure: CATH LAB-STEPHENSON-ICD GENERATOR CHANGE 45324- EUNICE T82.111A-MEDTRONIC BLOODLESS;  Surgeon: Recoveryonly Surgery;  Location: SURGERY PRE-POST PROC UNIT [facility];  Service:    • AICD BATTERY CHANGE  November 2015    Generator replacement with Medtronic Evera S VR [id number] implanted by Dr. Marcus Stephenson.   • AICD IMPLANT  January 2008    Medtronic Virtuoso VR [id number] implanted by Dr. Stephenson.   • OTHER CARDIAC SURGERY  12/2000    CABG x 4   • APPENDECTOMY  1969   • MULTIPLE CORONARY ARTERY BYPASS             Exam:     /60 (BP Location: Right arm, Patient Position: Sitting, BP Cuff Size: Adult)   Pulse (!) 54   Temp 36.5 °C (97.7 °F) (Temporal)   Resp 16   Ht 1.803 m (5' 11\")   Wt 72.6 kg (160 lb)   SpO2 97%  Body mass index is 22.32 kg/m².    Hearing excellent.    Alert, oriented in no acute distress.  Eye contact is good, speech goal directed, affect calm  Constitutional: Alert, no distress.  Skin: Warm, dry, good turgor, no rashes in visible areas.  Eye: Equal, round and reactive, conjunctiva clear, lids normal.  ENMT: Pinnae are normal.  TMs are clear bilaterally  Neck: Trachea midline, no masses, no thyromegaly. No cervical or supraclavicular lymphadenopathy  Respiratory: Unlabored respiratory effort, lungs clear to auscultation, no wheezes, no ronchi.  Cardiovascular: Normal S1, S2, RRR, no murmur, no edema.  Abdomen: Soft, non-tender, no masses, no hepatosplenomegaly.  Psych: Alert and oriented x3, normal affect and mood.        Assessment and Plan. The following treatment and monitoring plan is recommended:    1. Medicare annual wellness visit, subsequent  Routine anticipatory guidance.  No " special services needed at this time  - Subsequent Annual Wellness Visit - Includes PPPS ()    2. Ventricular tachycardia (HCC)  This is a chronic problem that is currently stable.  Follow-up with cardiology as scheduled  - Subsequent Annual Wellness Visit - Includes PPPS ()    3. Vascular disorder of extremity (HCC)  This is a chronic problem that is currently stable.  Follow-up with cardiology as scheduled  - Subsequent Annual Wellness Visit - Includes PPPS ()    4. Type 2 diabetes mellitus with hyperglycemia, without long-term current use of insulin (HCC)  This is a chronic problem that is currently stable.    - Subsequent Annual Wellness Visit - Includes PPPS ()  - REFERRAL TO OPTOMETRY  - metFORMIN (GLUCOPHAGE) 500 MG Tab; TAKE 1 TABLET BY MOUTH TWICE A DAY WITH MEALS  Dispense: 200 tablet; Refill: 3    5. Thrombocytopenia (HCC)  This is a chronic problem that is currently stable.  Continue to monitor  - Subsequent Annual Wellness Visit - Includes PPPS ()    6. Ischemic cardiomyopathy  This is a chronic problem that is currently stable.  Follow-up with cardiology as scheduled  - Subsequent Annual Wellness Visit - Includes PPPS ()    7. Coronary artery disease involving native coronary artery of native heart without angina pectoris  This is a chronic problem that is currently stable.  Follow-up with cardiology as scheduled  - Subsequent Annual Wellness Visit - Includes PPPS ()    8. Stage 3b chronic kidney disease  This is a chronic problem that is currently stable.  Avoid high dose NSAIDs  - Subsequent Annual Wellness Visit - Includes PPPS ()    9. Chronic systolic congestive heart failure, NYHA class 2 (HCC)  This is a chronic problem that is currently stable.  Follow-up with cardiology as scheduled  - Subsequent Annual Wellness Visit - Includes PPPS ()    10. Chronic deep vein thrombosis (DVT) of femoral vein of left lower extremity (HCC)  This is a chronic problem  that is currently stable.  Follow-up with cardiology as scheduled  - Subsequent Annual Wellness Visit - Includes PPPS ()    11. AICD (automatic cardioverter/defibrillator) present  This is a chronic problem that is currently stable.  Follow-up with cardiology as scheduled    12. Chronic anticoagulation  This is a chronic problem that is currently stable.  Follow-up with cardiology as scheduled    13. Mixed hyperlipidemia  This is a chronic problem that is currently stable.  Follow-up with cardiology as scheduled    14. Changing pigmented skin lesion  Refer to dermatology for further evaluation and treatment  - REFERRAL TO DERMATOLOGY      Services suggested: No services needed at this time  Health Care Screening recommendations as per orders if indicated.  Referrals offered: PT/OT/Nutrition counseling/Behavioral Health/Smoking cessation as per orders if indicated.    Discussion today about general wellness and lifestyle habits:    · Prevent falls and reduce trip hazards; Cautioned about securing or removing rugs.  · Have a working fire alarm and carbon monoxide detector;   · Engage in regular physical activity and social activities       Follow-up: Return in about 6 months (around 10/7/2021).

## 2021-04-14 ENCOUNTER — APPOINTMENT (OUTPATIENT)
Dept: RADIOLOGY | Facility: IMAGING CENTER | Age: 71
End: 2021-04-14
Attending: PHYSICIAN ASSISTANT
Payer: MEDICARE

## 2021-04-14 ENCOUNTER — HOSPITAL ENCOUNTER (OUTPATIENT)
Dept: LAB | Facility: MEDICAL CENTER | Age: 71
End: 2021-04-14
Attending: PHYSICIAN ASSISTANT
Payer: MEDICARE

## 2021-04-14 ENCOUNTER — OFFICE VISIT (OUTPATIENT)
Dept: URGENT CARE | Facility: CLINIC | Age: 71
End: 2021-04-14
Payer: MEDICARE

## 2021-04-14 VITALS
OXYGEN SATURATION: 98 % | TEMPERATURE: 98.5 F | WEIGHT: 157 LBS | BODY MASS INDEX: 21.98 KG/M2 | SYSTOLIC BLOOD PRESSURE: 112 MMHG | DIASTOLIC BLOOD PRESSURE: 78 MMHG | RESPIRATION RATE: 18 BRPM | HEIGHT: 71 IN | HEART RATE: 91 BPM

## 2021-04-14 DIAGNOSIS — M10.9 ACUTE GOUT INVOLVING TOE OF LEFT FOOT, UNSPECIFIED CAUSE: Primary | ICD-10-CM

## 2021-04-14 DIAGNOSIS — R22.42 LOCALIZED SWELLING OF LEFT FOOT: ICD-10-CM

## 2021-04-14 DIAGNOSIS — Z79.01 CHRONIC ANTICOAGULATION: ICD-10-CM

## 2021-04-14 LAB
BASOPHILS # BLD AUTO: 0.7 % (ref 0–1.8)
BASOPHILS # BLD: 0.06 K/UL (ref 0–0.12)
D DIMER PPP IA.FEU-MCNC: 0.82 UG/ML (FEU) (ref 0–0.5)
EOSINOPHIL # BLD AUTO: 0.07 K/UL (ref 0–0.51)
EOSINOPHIL NFR BLD: 0.8 % (ref 0–6.9)
ERYTHROCYTE [DISTWIDTH] IN BLOOD BY AUTOMATED COUNT: 44.7 FL (ref 35.9–50)
HCT VFR BLD AUTO: 45.5 % (ref 42–52)
HGB BLD-MCNC: 15 G/DL (ref 14–18)
IMM GRANULOCYTES # BLD AUTO: 0.04 K/UL (ref 0–0.11)
IMM GRANULOCYTES NFR BLD AUTO: 0.5 % (ref 0–0.9)
LYMPHOCYTES # BLD AUTO: 1.01 K/UL (ref 1–4.8)
LYMPHOCYTES NFR BLD: 12.2 % (ref 22–41)
MCH RBC QN AUTO: 31.1 PG (ref 27–33)
MCHC RBC AUTO-ENTMCNC: 33 G/DL (ref 33.7–35.3)
MCV RBC AUTO: 94.4 FL (ref 81.4–97.8)
MONOCYTES # BLD AUTO: 0.66 K/UL (ref 0–0.85)
MONOCYTES NFR BLD AUTO: 8 % (ref 0–13.4)
NEUTROPHILS # BLD AUTO: 6.43 K/UL (ref 1.82–7.42)
NEUTROPHILS NFR BLD: 77.8 % (ref 44–72)
NRBC # BLD AUTO: 0 K/UL
NRBC BLD-RTO: 0 /100 WBC
PLATELET # BLD AUTO: 183 K/UL (ref 164–446)
PMV BLD AUTO: 10.8 FL (ref 9–12.9)
RBC # BLD AUTO: 4.82 M/UL (ref 4.7–6.1)
URATE SERPL-MCNC: 6.6 MG/DL (ref 2.5–8.3)
WBC # BLD AUTO: 8.3 K/UL (ref 4.8–10.8)

## 2021-04-14 PROCEDURE — 84550 ASSAY OF BLOOD/URIC ACID: CPT

## 2021-04-14 PROCEDURE — 85025 COMPLETE CBC W/AUTO DIFF WBC: CPT

## 2021-04-14 PROCEDURE — 99214 OFFICE O/P EST MOD 30 MIN: CPT | Performed by: PHYSICIAN ASSISTANT

## 2021-04-14 PROCEDURE — 85379 FIBRIN DEGRADATION QUANT: CPT

## 2021-04-14 PROCEDURE — 73630 X-RAY EXAM OF FOOT: CPT | Mod: TC,FY,LT | Performed by: PHYSICIAN ASSISTANT

## 2021-04-14 PROCEDURE — 36415 COLL VENOUS BLD VENIPUNCTURE: CPT

## 2021-04-14 RX ORDER — METHYLPREDNISOLONE 4 MG/1
4 TABLET ORAL DAILY
Qty: 21 EACH | Refills: 0 | Status: SHIPPED | OUTPATIENT
Start: 2021-04-14 | End: 2021-04-20

## 2021-04-14 ASSESSMENT — FIBROSIS 4 INDEX: FIB4 SCORE: 1.85

## 2021-04-14 NOTE — PROGRESS NOTES
Subjective:   Jaime Lee is a 70 y.o. male who presents for Foot Swelling (x 5 days, (L) foot redness, painful )        HPI     The patient presents to urgent care today for swelling to the left great toe.  This has been present for 5 days.  The area is red, painful.  He does not recall a recent injury.  There is no discharge.  He does not have any history of gout.  The pain is worse with palpation and movement.  No previous fractures or surgery.  Patient is chronically anticoagulated with Xarelto for DVT.    ROS    PMH:  has a past medical history of Acute anterior myocardial infarction (HCC) (12/28/2000), AICD (automatic cardioverter/defibrillator) present (2003/2008/2015), Allergy, CAD (coronary artery disease) (12/28/2000), Congestive heart failure (HCC) (12/28/2000), Dental disorder, GERD (gastroesophageal reflux disease), Ischemic cardiomyopathy (02/2021), Left bundle branch block ( ), Pneumonia (2014), Pulmonary embolism (HCC) (March 2014), and Type 2 diabetes mellitus without complication ([facility]) ( ).  MEDS:   Current Outpatient Medications:   •  methylPREDNISolone (MEDROL DOSEPAK) 4 MG Tablet Therapy Pack, Take 1 tablet by mouth every day for 6 days. Take as tapered-dosage schedule, Disp: 21 Each, Rfl: 0  •  metFORMIN (GLUCOPHAGE) 500 MG Tab, TAKE 1 TABLET BY MOUTH TWICE A DAY WITH MEALS, Disp: 200 tablet, Rfl: 3  •  zinc sulfate (ZINCATE) 220 (50 Zn) MG Cap, Take 220 mg by mouth every day., Disp: , Rfl:   •  vitamin D (CHOLECALCIFEROL) 1000 Unit (25 mcg) Tab, Take 1,000 Units by mouth every day., Disp: , Rfl:   •  lovastatin (MEVACOR) 10 MG tablet, Take 1 tablet by mouth every evening., Disp: 100 tablet, Rfl: 3  •  metoprolol SR (TOPROL XL) 100 MG TABLET SR 24 HR, Take 1 tablet by mouth every day., Disp: 100 tablet, Rfl: 3  •  losartan (COZAAR) 25 MG Tab, Take 0.5 Tablets by mouth every day., Disp: 50 tablet, Rfl: 3  •  spironolactone (ALDACTONE) 25 MG Tab, Take 0.5 Tablets by mouth every  "day., Disp: 50 tablet, Rfl: 3  •  potassium chloride SA (KDUR) 20 MEQ Tab CR, Take 1 tablet by mouth every day., Disp: 100 tablet, Rfl: 3  •  rivaroxaban (XARELTO) 20 MG Tab tablet, Take 1 tablet by mouth with dinner., Disp: 100 tablet, Rfl: 3  •  furosemide (LASIX) 40 MG Tab, Take 1 tablet by mouth every day., Disp: 100 tablet, Rfl: 3  •  Magnesium 500 MG Cap, Take 1 Cap by mouth every day., Disp: 180 Cap, Rfl: 4  ALLERGIES:   Allergies   Allergen Reactions   • Bloodless    • Pcn [Penicillins] Anaphylaxis   • Ace Inhibitors      Cough     SURGHX:   Past Surgical History:   Procedure Laterality Date   • RECOVERY  11/3/2015    Procedure: CATH LAB-STEPHENSON-ICD GENERATOR CHANGE 26847- EUNICE T82.111A-MEDTRONIC BLOODLESS;  Surgeon: Recoveryonly Surgery;  Location: SURGERY PRE-POST PROC UNIT [facility] [place];  Service:    • AICD BATTERY CHANGE  November 2015    Generator replacement with Medtronic Evera S VR [id number] implanted by Dr. Marcus Stephenson.   • AICD IMPLANT  January 2008    Medtronic Virtuoso VR [id number] implanted by Dr. Stephenson.   • OTHER CARDIAC SURGERY  12/2000    CABG x 4   • APPENDECTOMY  1969   • MULTIPLE CORONARY ARTERY BYPASS       SOCHX:  reports that he quit smoking about 20 years ago. His smoking use included cigarettes. He has a 32.00 pack-year smoking history. He has never used smokeless tobacco. He reports current alcohol use of about 0.6 oz of alcohol per week. He reports current drug use. Drugs: Marijuana and Oral.  Family History   Problem Relation Age of Onset   • Diabetes Mother    • Heart Disease Mother    • Diabetes Sister    • Alcohol/Drug Brother         Objective:   /78   Pulse 91   Temp 36.9 °C (98.5 °F) (Temporal)   Resp 18   Ht 1.803 m (5' 11\")   Wt 71.2 kg (157 lb)   SpO2 98%   BMI 21.90 kg/m²     Physical Exam  Vitals reviewed.   Constitutional:       General: He is not in acute distress.     Appearance: He is well-developed.   HENT:      Head: Normocephalic and atraumatic.      Right Ear: " External ear normal.      Left Ear: External ear normal.      Nose: Nose normal.      Mouth/Throat:      Mouth: Mucous membranes are moist.   Eyes:      Conjunctiva/sclera: Conjunctivae normal.      Pupils: Pupils are equal, round, and reactive to light.   Neck:      Trachea: No tracheal deviation.   Cardiovascular:      Rate and Rhythm: Normal rate and regular rhythm.   Pulmonary:      Effort: Pulmonary effort is normal.      Breath sounds: Normal breath sounds.   Musculoskeletal:      Cervical back: Normal range of motion and neck supple.        Legs:    Skin:     General: Skin is warm and dry.      Capillary Refill: Capillary refill takes less than 2 seconds.   Neurological:      General: No focal deficit present.      Mental Status: He is alert and oriented to person, place, and time.   Psychiatric:         Mood and Affect: Mood normal.         Behavior: Behavior normal.           Assessment/Plan:     1. Acute gout involving toe of left foot, unspecified cause  methylPREDNISolone (MEDROL DOSEPAK) 4 MG Tablet Therapy Pack    AMB REFERRAL BACK TO [facility] PCP   2. Localized swelling of left foot  DX-FOOT-COMPLETE 3+ LEFT    CBC WITH DIFFERENTIAL    URIC ACID    D-DIMER    methylPREDNISolone (MEDROL DOSEPAK) 4 MG Tablet Therapy Pack    AMB REFERRAL BACK TO [facility] PCP   3. Chronic anticoagulation       GFR on 3/5/21 + 60     XR: negative for  fracture, swelling adjacent the LEFT 1st metatarsal head with possible underlying bony resorption raising the possibility of inflammatory or infectious process.    CBC: No elevation of white blood cell count  D-dimer: Mildly elevated, patient chronically anticoagulated  Uric acid: 6.6    Supportive care reviewed.  No signs of infection.  Likely gout, differential diagnosis discussed.  Monitor glucose and blood pressure while on steroid.  We will avoid colchicine, NSAIDs due to previous history of CKD.  Gout handout provided.    Follow-up with primary care provider within 7-10  days, referral placed.  If symptoms worsen or persist patient can return to clinic for reevaluation. Side effects of medication discussed. Patient confirmed understanding of information.    Please note that this dictation was created using voice recognition software. I have made every reasonable attempt to correct obvious errors, but I expect that there are errors of grammar and possibly content that I did not discover before finalizing the note.

## 2021-04-16 ENCOUNTER — TELEPHONE (OUTPATIENT)
Dept: MEDICAL GROUP | Facility: LAB | Age: 71
End: 2021-04-16

## 2021-04-16 NOTE — TELEPHONE ENCOUNTER
ESTABLISHED PATIENT PRE-VISIT PLANNING     Patient was NOT contacted to complete PVP.     Note: Patient will not be contacted if there is no indication to call.     1.  Reviewed notes from the last few office visits within the medical group: Yes    2.  If any orders were placed at last visit or intended to be done for this visit (i.e. 6 mos follow-up), do we have Results/Consult Notes?         •  Labs - Labs ordered, completed on 4/14/2021 and results are in chart.  Note: If patient appointment is for lab review and patient did not complete labs, check with provider if OK to reschedule patient until labs completed.       •  Imaging - Imaging ordered, completed and results are in chart.       •  Referrals - Referral ordered, patient has NOT been seen.    3. Is this appointment scheduled as a Hospital Follow-Up? No    4.  Immunizations were updated in Epic using Reconcile Outside Information activity? Yes    5.  Patient is due for the following Health Maintenance Topics:   Health Maintenance Due   Topic Date Due   • RETINAL SCREENING  04/25/2020         6.  AHA (Pulse8) form printed for Provider? Yes

## 2021-04-20 ENCOUNTER — PATIENT OUTREACH (OUTPATIENT)
Dept: HEALTH INFORMATION MANAGEMENT | Facility: OTHER | Age: 71
End: 2021-04-20

## 2021-04-20 NOTE — PROGRESS NOTES
Outcome: Left Message to schedule  Comprehensive Geriatric Assessment    Please transfer to Patient Outreach Team at [phone] when patient returns call.      Attempt # 2

## 2021-04-21 ENCOUNTER — OFFICE VISIT (OUTPATIENT)
Dept: MEDICAL GROUP | Facility: LAB | Age: 71
End: 2021-04-21
Payer: MEDICARE

## 2021-04-21 ENCOUNTER — HOSPITAL ENCOUNTER (OUTPATIENT)
Dept: RADIOLOGY | Facility: MEDICAL CENTER | Age: 71
End: 2021-04-21
Attending: FAMILY MEDICINE
Payer: MEDICARE

## 2021-04-21 VITALS
TEMPERATURE: 97.6 F | DIASTOLIC BLOOD PRESSURE: 70 MMHG | SYSTOLIC BLOOD PRESSURE: 104 MMHG | BODY MASS INDEX: 22.12 KG/M2 | HEIGHT: 71 IN | WEIGHT: 158 LBS | HEART RATE: 78 BPM | RESPIRATION RATE: 16 BRPM | OXYGEN SATURATION: 93 %

## 2021-04-21 DIAGNOSIS — R93.7 ABNORMAL X-RAY OF BONE: ICD-10-CM

## 2021-04-21 DIAGNOSIS — L03.032 CELLULITIS OF TOE OF LEFT FOOT: ICD-10-CM

## 2021-04-21 DIAGNOSIS — M79.672 LEFT FOOT PAIN: ICD-10-CM

## 2021-04-21 DIAGNOSIS — E11.65 TYPE 2 DIABETES MELLITUS WITH HYPERGLYCEMIA, WITHOUT LONG-TERM CURRENT USE OF INSULIN (HCC): ICD-10-CM

## 2021-04-21 DIAGNOSIS — I73.9 VASCULAR DISORDER OF EXTREMITY (HCC): ICD-10-CM

## 2021-04-21 DIAGNOSIS — N18.32 STAGE 3B CHRONIC KIDNEY DISEASE: ICD-10-CM

## 2021-04-21 PROCEDURE — 73700 CT LOWER EXTREMITY W/O DYE: CPT | Mod: LT,ME

## 2021-04-21 PROCEDURE — 99214 OFFICE O/P EST MOD 30 MIN: CPT | Performed by: FAMILY MEDICINE

## 2021-04-21 ASSESSMENT — FIBROSIS 4 INDEX: FIB4 SCORE: 1.893346026127914136

## 2021-04-21 NOTE — PROGRESS NOTES
Subjective:     Chief Complaint   Patient presents with   • Foot Pain     left foot       Jaime Lee is a 70 y.o. male here today for evaluation and management of:    Left foot pain  Patient was seen in the urgent care on 2021 with a presumed gout events.  He had significant pain and swelling.  His uric acid was in the normal range and he was started on steroids.  That has helped with the swelling but not the pain.  X-ray at that time showed the followin.  Swelling adjacent the LEFT 1st metatarsal head with possible underlying bony resorption raising the possibility of inflammatory or infectious process.  2.  No fracture or dislocation of LEFT foot.    Patient does have diabetes, vascular disease and chronic kidney disease which complicates all of this.  Additionally he has an implantable defibrillator in place so cannot get an MRI.       Allergies   Allergen Reactions   • Bloodless    • Pcn [Penicillins] Anaphylaxis   • Ace Inhibitors      Cough       Current medicines (including changes today)  Current Outpatient Medications   Medication Sig Dispense Refill   • metFORMIN (GLUCOPHAGE) 500 MG Tab TAKE 1 TABLET BY MOUTH TWICE A DAY WITH MEALS 200 tablet 3   • zinc sulfate (ZINCATE) 220 (50 Zn) MG Cap Take 220 mg by mouth every day.     • vitamin D (CHOLECALCIFEROL) 1000 Unit (25 mcg) Tab Take 1,000 Units by mouth every day.     • lovastatin (MEVACOR) 10 MG tablet Take 1 tablet by mouth every evening. 100 tablet 3   • metoprolol SR (TOPROL XL) 100 MG TABLET SR 24 HR Take 1 tablet by mouth every day. 100 tablet 3   • losartan (COZAAR) 25 MG Tab Take 0.5 Tablets by mouth every day. 50 tablet 3   • spironolactone (ALDACTONE) 25 MG Tab Take 0.5 Tablets by mouth every day. 50 tablet 3   • potassium chloride SA (KDUR) 20 MEQ Tab CR Take 1 tablet by mouth every day. 100 tablet 3   • rivaroxaban (XARELTO) 20 MG Tab tablet Take 1 tablet by mouth with dinner. 100 tablet 3   • furosemide (LASIX) 40 MG  Tab Take 1 tablet by mouth every day. 100 tablet 3   • Magnesium 500 MG Cap Take 1 Cap by mouth every day. 180 Cap 4     No current facility-administered medications for this visit.       He  has a past medical history of Acute anterior myocardial infarction ([facility]) (12/28/2000), AICD (automatic cardioverter/defibrillator) present (2003/2008/2015), Allergy, CAD (coronary artery disease) (12/28/2000), Congestive heart failure ([facility]) (12/28/2000), Dental disorder, GERD (gastroesophageal reflux disease), Ischemic cardiomyopathy (02/2021), Left bundle branch block ( ), Pneumonia (2014), Pulmonary embolism ([facility]) (March 2014), and Type 2 diabetes mellitus without complication ([facility]) ( ).    Patient Active Problem List    Diagnosis Date Noted   • Ventricular tachycardia ([facility]) 04/25/2019     Priority: High   • Chronic anticoagulation 02/01/2018     Priority: High   • Chronic deep vein thrombosis (DVT) of femoral vein of left lower extremity ([facility]) 01/04/2018     Priority: High   • Hyperlipidemia 01/18/2017     Priority: High   • S/P CABG x 4 06/22/2016     Priority: High   • AICD (automatic cardioverter/defibrillator) present 03/11/2014     Priority: High   • Ischemic cardiomyopathy 03/11/2014     Priority: High   • History of pulmonary embolus (PE) 02/27/2014     Priority: High   • Coronary artery disease involving native coronary artery of native heart without angina pectoris 12/28/2000     Priority: High   • RBBB 02/04/2019     Priority: Medium   • Type 2 diabetes mellitus with hyperglycemia, without long-term current use of insulin ([facility]) 06/22/2016     Priority: Medium   • Old anterior myocardial infarction 06/22/2016     Priority: Medium   • Chronic systolic congestive heart failure, NYHA class 2 ([facility]) 06/22/2016     Priority: Medium   • Stage 3b chronic kidney disease 02/24/2014     Priority: Medium   • Thrombocytopenia ([facility]) 02/23/2014     Priority: Medium   • Left foot pain 04/21/2021   • Vascular disorder of extremity  "([facility]) 02/11/2020   • Macrocytosis without anemia 04/24/2018   • Basal cell carcinoma of skin of left ear 02/01/2018   • Basal cell carcinoma of skin of face 11/28/2017       ROS   No fever or chills.  No nausea or vomiting.  No chest pain or palpitations.  No cough or SOB.  No pain with urination or hematuria.  No black or bloody stools.       Objective:     /70 (BP Location: Right arm, Patient Position: Sitting, BP Cuff Size: Adult)   Pulse 78   Temp 36.4 °C (97.6 °F) (Temporal)   Resp 16   Ht 1.803 m (5' 11\")   Wt 71.7 kg (158 lb)   SpO2 93%  Body mass index is 22.04 kg/m².   Physical Exam:  Well developed, well nourished.  Alert, oriented in no acute distress.  Eye contact is good, speech goal directed, affect calm  Eyes: conjunctiva non-injected, sclera non-icteric.  Extremity with venous stasis and slow capillary refill.  Exquisitely tender to palpation of the first MP joint with swelling and warmth.  Decreased sensation distally.  No calf tenderness or swelling    Assessment and Plan:   The following treatment plan was discussed    1. Left foot pain  Concern for osteomyelitis given his history of diabetes and vascular disease.  Steroids did not improve the symptoms much.  Uric acid was in a normal range.  Discussed with Dr. Hopkins, radiology about best imaging options given that he cannot have an MRI.  Given the patient's kidney function and diabetes we will proceed with CT of the foot to further assess without contrast.  Await results.  - CT-EXTREMITY, LOWER W/O LEFT; Future    2. Abnormal x-ray of bone  Concern for osteomyelitis given his history of diabetes and vascular disease.  Steroids did not improve the symptoms much.  Uric acid was in a normal range.  Discussed with Dr. Hopkins, radiology about best imaging options given that he cannot have an MRI.  Given the patient's kidney function and diabetes we will proceed with CT of the foot to further assess without contrast.  Await results.  - " CT-EXTREMITY, LOWER W/O LEFT; Future    3. Cellulitis of toe of left foot  Concern for osteomyelitis given his history of diabetes and vascular disease.  Steroids did not improve the symptoms much.  Uric acid was in a normal range.  Discussed with Dr. Hopkins, radiology about best imaging options given that he cannot have an MRI.  Given the patient's kidney function and diabetes we will proceed with CT of the foot to further assess without contrast.  Await results.  - CT-EXTREMITY, LOWER W/O LEFT; Future    4. Stage 3b chronic kidney disease  Concern for osteomyelitis given his history of diabetes and vascular disease.  Steroids did not improve the symptoms much.  Uric acid was in a normal range.  Discussed with Dr. Hopkins, radiology about best imaging options given that he cannot have an MRI.  Given the patient's kidney function and diabetes we will proceed with CT of the foot to further assess without contrast.  Await results.    5. Vascular disorder of extremity (HCC)  Concern for osteomyelitis given his history of diabetes and vascular disease.  Steroids did not improve the symptoms much.  Uric acid was in a normal range.  Discussed with Dr. Hopkins, radiology about best imaging options given that he cannot have an MRI.  Given the patient's kidney function and diabetes we will proceed with CT of the foot to further assess without contrast.  Await results.    6. Type 2 diabetes mellitus with hyperglycemia, without long-term current use of insulin (HCC)  Concern for osteomyelitis given his history of diabetes and vascular disease.  Steroids did not improve the symptoms much.  Uric acid was in a normal range.  Discussed with Dr. Hopkins, radiology about best imaging options given that he cannot have an MRI.  Given the patient's kidney function and diabetes we will proceed with CT of the foot to further assess without contrast.  Await results.    Any change or worsening of signs or symptoms, patient encouraged to follow-up  or report to the emergency room for further evaluation. Patient understands and agrees.    Followup: Return in about 4 weeks (around 5/19/2021).

## 2021-04-21 NOTE — PATIENT INSTRUCTIONS
Osteomyelitis, Adult    Bone infections (osteomyelitis) occur when bacteria or other germs get inside a bone. This can happen if you have an infection in another part of your body that spreads through your blood. Germs from your skin or from outside of your body can also cause this type of infection if you have a wound or a broken bone (fracture) that breaks the skin.  Bone infections need to be treated quickly to prevent bone damage and to prevent the infection from spreading to other areas of your body.  What are the causes?  Most bone infections are caused by bacteria. They can also be caused by other germs, such as viruses and funguses.  What increases the risk?  You are more likely to develop this condition if you:  · Recently had surgery, especially bone or joint surgery.  · Have a long-term (chronic) disease, such as:  ? Diabetes.  ? HIV (human immunodeficiency virus).  ? Rheumatoid arthritis.  ? Sickle cell anemia.  ? Kidney disease that requires dialysis.  · Are aged 60 years or older.  · Have a condition or take medicines that block or weaken your body's defense system (immune system).  · Have a condition that reduces your blood flow.  · Have an artificial joint.  · Have had a joint or bone repaired with plates or screws (surgical hardware).  · Use IV drugs.  · Have a central line for IV access.  · Have had trauma, such as stepping on a nail or a broken bone that came through the skin.  What are the signs or symptoms?  Symptoms vary depending on the type and location of your infection. Common symptoms of bone infections include:  · Fever and chills.  · Skin redness and warmth.  · Swelling.  · Pain and stiffness.  · Drainage of fluid or pus near the infection.  How is this diagnosed?  This condition may be diagnosed based on:  · Your symptoms and medical history.  · A physical exam.  · Tests, such as:  ? A sample of tissue, fluid, or blood taken to be examined under a microscope.  ? Pus or discharge swabbed  from a wound for testing to identify germs and to determine what type of medicine will kill them (culture and sensitivity).  ? Blood tests.  · Imaging studies. These may include:  ? X-rays.  ? MRI.  ? CT scan.  ? Bone scan.  ? Ultrasound.  How is this treated?  Treatment for this condition depends on the cause and type of infection. Antibiotic medicines are usually the first treatment for a bone infection. This may be done in a hospital at first. You may have to continue IV antibiotics at home or take antibiotics by mouth for several weeks after that.  Other treatments may include surgery to remove:  · Dead or dying tissue from a bone.  · An infected artificial joint.  · Infected plates or screws that were used to repair a broken bone.  Follow these instructions at home:  Medicines    · Take over-the-counter and prescription medicines only as told by your health care provider.  · Take your antibiotic medicine as told by your health care provider. Do not stop taking the antibiotic even if you start to feel better.  · Follow instructions from your health care provider about how to take IV antibiotics at home. You may need to have a nurse come to your home to give you the IV antibiotics.  General instructions    · Ask your health care provider if you have any restrictions on your activities.  · If directed, put ice on the affected area:  ? Put ice in a plastic bag.  ? Place a towel between your skin and the bag.  ? Leave the ice on for 20 minutes, 2-3 times a day.  · Wash your hands often with soap and water. If soap and water are not available, use hand .  · Do not use any products that contain nicotine or tobacco, such as cigarettes and e-cigarettes. These can delay bone healing. If you need help quitting, ask your health care provider.  · Keep all follow-up visits as told by your health care provider. This is important.  Contact a health care provider if:  · You develop a fever or chills.  · You have  redness, warmth, pain, or swelling that returns after treatment.  Get help right away if:  · You have rapid breathing or you have trouble breathing.  · You have chest pain.  · You cannot drink fluids or make urine.  · The affected area swells, changes color, or turns blue.  · You have numbness or severe pain in the affected area.  Summary  · Bone infections (osteomyelitis) occur when bacteria or other germs get inside a bone.  · You may be more likely to get this type of infection if you have a condition, such as diabetes, that lowers your ability to fight infection or increases your chances of getting an infection.  · Most bone infections are caused by bacteria. They can also be caused by other germs, such as viruses and funguses.  · Treatment for this condition usually starts with taking antibiotics. Further treatment depends on the cause and type of infection.  This information is not intended to replace advice given to you by your health care provider. Make sure you discuss any questions you have with your health care provider.  Document Released: 12/18/2006 Document Revised: 01/03/2019 Document Reviewed: 12/27/2018  [employer] Patient Education © 2020 [employer]

## 2021-04-21 NOTE — ASSESSMENT & PLAN NOTE
Patient was seen in the urgent care on 2021 with a presumed gout events.  He had significant pain and swelling.  His uric acid was in the normal range and he was started on steroids.  That has helped with the swelling but not the pain.  X-ray at that time showed the followin.  Swelling adjacent the LEFT 1st metatarsal head with possible underlying bony resorption raising the possibility of inflammatory or infectious process.  2.  No fracture or dislocation of LEFT foot.    Patient does have diabetes, vascular disease and chronic kidney disease which complicates all of this.  Additionally he has an implantable defibrillator in place so cannot get an MRI.

## 2021-04-22 ENCOUNTER — TELEPHONE (OUTPATIENT)
Dept: MEDICAL GROUP | Facility: LAB | Age: 71
End: 2021-04-22

## 2021-04-22 ENCOUNTER — HOSPITAL ENCOUNTER (EMERGENCY)
Facility: MEDICAL CENTER | Age: 71
End: 2021-04-22
Attending: EMERGENCY MEDICINE
Payer: MEDICARE

## 2021-04-22 VITALS
HEART RATE: 80 BPM | HEIGHT: 71 IN | DIASTOLIC BLOOD PRESSURE: 73 MMHG | BODY MASS INDEX: 22.87 KG/M2 | WEIGHT: 163.36 LBS | RESPIRATION RATE: 16 BRPM | TEMPERATURE: 97.8 F | OXYGEN SATURATION: 97 % | SYSTOLIC BLOOD PRESSURE: 105 MMHG

## 2021-04-22 DIAGNOSIS — M86.9 OSTEOMYELITIS OF LEFT FOOT, UNSPECIFIED TYPE (HCC): ICD-10-CM

## 2021-04-22 DIAGNOSIS — R73.9 HYPERGLYCEMIA: ICD-10-CM

## 2021-04-22 DIAGNOSIS — M25.572 ARTHRALGIA OF LEFT FOOT: ICD-10-CM

## 2021-04-22 DIAGNOSIS — M79.672 LEFT FOOT PAIN: ICD-10-CM

## 2021-04-22 LAB
ALBUMIN SERPL BCP-MCNC: 4.2 G/DL (ref 3.2–4.9)
ALBUMIN/GLOB SERPL: 1.3 G/DL
ALP SERPL-CCNC: 72 U/L (ref 30–99)
ALT SERPL-CCNC: 11 U/L (ref 2–50)
ANION GAP SERPL CALC-SCNC: 11 MMOL/L (ref 7–16)
AST SERPL-CCNC: 14 U/L (ref 12–45)
BASOPHILS # BLD AUTO: 0.6 % (ref 0–1.8)
BASOPHILS # BLD: 0.06 K/UL (ref 0–0.12)
BILIRUB SERPL-MCNC: 0.8 MG/DL (ref 0.1–1.5)
BUN SERPL-MCNC: 26 MG/DL (ref 8–22)
CALCIUM SERPL-MCNC: 9.8 MG/DL (ref 8.4–10.2)
CHLORIDE SERPL-SCNC: 98 MMOL/L (ref 96–112)
CO2 SERPL-SCNC: 29 MMOL/L (ref 20–33)
CREAT SERPL-MCNC: 1.39 MG/DL (ref 0.5–1.4)
CRP SERPL HS-MCNC: 3.1 MG/L (ref 0–7.5)
EOSINOPHIL # BLD AUTO: 0.12 K/UL (ref 0–0.51)
EOSINOPHIL NFR BLD: 1.2 % (ref 0–6.9)
ERYTHROCYTE [DISTWIDTH] IN BLOOD BY AUTOMATED COUNT: 44.4 FL (ref 35.9–50)
ERYTHROCYTE [SEDIMENTATION RATE] IN BLOOD BY WESTERGREN METHOD: 5 MM/HOUR (ref 0–20)
GLOBULIN SER CALC-MCNC: 3.2 G/DL (ref 1.9–3.5)
GLUCOSE SERPL-MCNC: 191 MG/DL (ref 65–99)
HCT VFR BLD AUTO: 50.6 % (ref 42–52)
HGB BLD-MCNC: 16.4 G/DL (ref 14–18)
IMM GRANULOCYTES # BLD AUTO: 0.07 K/UL (ref 0–0.11)
IMM GRANULOCYTES NFR BLD AUTO: 0.7 % (ref 0–0.9)
LYMPHOCYTES # BLD AUTO: 1.12 K/UL (ref 1–4.8)
LYMPHOCYTES NFR BLD: 11.3 % (ref 22–41)
MCH RBC QN AUTO: 30.5 PG (ref 27–33)
MCHC RBC AUTO-ENTMCNC: 32.4 G/DL (ref 33.7–35.3)
MCV RBC AUTO: 94.2 FL (ref 81.4–97.8)
MONOCYTES # BLD AUTO: 0.67 K/UL (ref 0–0.85)
MONOCYTES NFR BLD AUTO: 6.8 % (ref 0–13.4)
NEUTROPHILS # BLD AUTO: 7.86 K/UL (ref 1.82–7.42)
NEUTROPHILS NFR BLD: 79.4 % (ref 44–72)
NRBC # BLD AUTO: 0 K/UL
NRBC BLD-RTO: 0 /100 WBC
PLATELET # BLD AUTO: 242 K/UL (ref 164–446)
PMV BLD AUTO: 11.1 FL (ref 9–12.9)
POTASSIUM SERPL-SCNC: 4.1 MMOL/L (ref 3.6–5.5)
PROT SERPL-MCNC: 7.4 G/DL (ref 6–8.2)
RBC # BLD AUTO: 5.37 M/UL (ref 4.7–6.1)
SODIUM SERPL-SCNC: 138 MMOL/L (ref 135–145)
WBC # BLD AUTO: 9.9 K/UL (ref 4.8–10.8)

## 2021-04-22 PROCEDURE — 87040 BLOOD CULTURE FOR BACTERIA: CPT

## 2021-04-22 PROCEDURE — 80053 COMPREHEN METABOLIC PANEL: CPT

## 2021-04-22 PROCEDURE — 85025 COMPLETE CBC W/AUTO DIFF WBC: CPT

## 2021-04-22 PROCEDURE — 86141 C-REACTIVE PROTEIN HS: CPT

## 2021-04-22 PROCEDURE — 85652 RBC SED RATE AUTOMATED: CPT

## 2021-04-22 PROCEDURE — 99283 EMERGENCY DEPT VISIT LOW MDM: CPT

## 2021-04-22 PROCEDURE — 36415 COLL VENOUS BLD VENIPUNCTURE: CPT

## 2021-04-22 RX ORDER — METHYLPREDNISOLONE 4 MG/1
4 TABLET ORAL DAILY
Status: SHIPPED | COMMUNITY
Start: 2021-04-15 | End: 2021-04-27

## 2021-04-22 ASSESSMENT — FIBROSIS 4 INDEX: FIB4 SCORE: 1.893346026127914136

## 2021-04-22 NOTE — TELEPHONE ENCOUNTER
0916 4/22/2021  Spoke with patient and his wife Rosy.  Discussed CT results that shows signs of probable osteomyelitis of the great toe.  Refer to [facility] for further evaluation and treatment and to start probable IV antibiotics.  Angelica Stephenson M.D.

## 2021-04-22 NOTE — ED TRIAGE NOTES
Pt ambulates to triage with wife  Chief Complaint   Patient presents with   • Sent by MD   • Foot Pain   seen by PCP yesterday, had CT yesterday  Dx with bone infection told to come to ER, denies fever    Pt reports increased foot pain x 2 weeks    Pt A & 0 x 4, speech clear, ambulates well    Pt updated on triage process and asked to inform RN of any changes while waiting in lobby.

## 2021-04-22 NOTE — ED NOTES
Pharmacy Medication Reconciliation      Medication reconciliation updated and complete per pt at bedside  Allergies have been verified and updated   No oral ABX within the last 14 days  Patient home pharmacy:[facility]      No current facility-administered medications on file prior to encounter.     Current Outpatient Medications on File Prior to Encounter   Medication Sig Dispense Refill   • Non Formulary Request Take 1 tablet by mouth every evening. Vitamin C/Zinc/Vitamin D/[employer] Vitamin     • methylPREDNISolone (MEDROL) 4 MG Tablet Therapy Pack Take 4 mg by mouth every day. Day 1  2 tablets before breakfast, 1 tablet after lunch and after supper, 2 tablets at bedtime  Day 2  1 tablet before breakfast, 1 tablet after lunch and after supper, 2 tablets at bedtime  Day 3  1 tablet before breakfast and 1 tablet after lunch, after supper and at bedtime  Day 4  1 tablet before breakfast, after lunch, and at bedtime  Day 5  1 tablet before breakfast and at bedtime  Day 6  1 tablet before breakfast     • metFORMIN (GLUCOPHAGE) 500 MG Tab TAKE 1 TABLET BY MOUTH TWICE A DAY WITH MEALS 200 tablet 3   • vitamin D (CHOLECALCIFEROL) 1000 Unit (25 mcg) Tab Take 1,000 Units by mouth every day.     • lovastatin (MEVACOR) 10 MG tablet Take 1 tablet by mouth every evening. 100 tablet 3   • metoprolol SR (TOPROL XL) 100 MG TABLET SR 24 HR Take 1 tablet by mouth every day. 100 tablet 3   • losartan (COZAAR) 25 MG Tab Take 0.5 Tablets by mouth every day. 50 tablet 3   • spironolactone (ALDACTONE) 25 MG Tab Take 0.5 Tablets by mouth every day. 50 tablet 3   • potassium chloride SA (KDUR) 20 MEQ Tab CR Take 1 tablet by mouth every day. 100 tablet 3   • rivaroxaban (XARELTO) 20 MG Tab tablet Take 1 tablet by mouth with dinner. 100 tablet 3   • furosemide (LASIX) 40 MG Tab Take 1 tablet by mouth every day. 100 tablet 3   • [DISCONTINUED] zinc sulfate (ZINCATE) 220 (50 Zn) MG Cap Take 220 mg by mouth every day.     • Magnesium 500  MG Cap Take 1 Cap by mouth every day. 180 Cap 4

## 2021-04-22 NOTE — ED PROVIDER NOTES
"ED Provider Note    CHIEF COMPLAINT   Chief Complaint   Patient presents with   • Sent by MD   • Foot Pain       HPI   Jaime Lee is a 70 y.o. male who presents with 2 and half weeks of left distal foot pain greatest over the first metatarsal.  He was seen at a urgent care, treated with steroids for presumed diagnosis of gout, he did not improve.  He saw his primary doctor who had CT scan performed, identifying distal first metatarsal erosion concerning for osteomyelitis.  He states he was sent to the emerge department for \"an IV\".  Pain is been constant, worse with movement or touch, gradual onset over 2-1/2 weeks.  He denies sores to the foot.  He does treat himself for cracks in his heel skin, although recently this has not been a problem.  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  There is a erythematous discoloration to both feet, better with elevation, he states is chronic.  There has been no new leg swelling.  No fever or chills.  No trauma    REVIEW OF SYSTEMS   Musculoskeletal: Left foot pain, first MP joint pain  Neurologic: No acute numbness   Cardiac: History of cardiomyopathy, implanted defibrillator/pacemaker  Endocrine: Type 2 diabetes  Skin: Chronic erythema both feet.  No acute new swelling.      PAST MEDICAL HISTORY   Past Medical History:   Diagnosis Date   • Acute anterior myocardial infarction (HCC) 12/28/2000    PCI attempted by Dr. Malloy but unsuccessful   • AICD (automatic cardioverter/defibrillator) present 2003/2008/2015    Defibrillator is a Medtronic model [id number], serial #[id number], implanted Dr. Stephenson November 2015. The ventricular lead is a Medtronic model #6947-65, lead serial #[id number], implant 7/2/2003   • Allergy     within 2 years   • CAD (coronary artery disease) 12/28/2000    CABG x 4 (LIMA to LAD, rSVG to first diagonal, left circumflex and PDA).2000: CABG x 4 (LIMA to LAD, rSVG to first diagonal, left " circumflex and PDA).  2003: LIMA and circumflex grafts patent, diagonal and PDA grafts closed.   • Congestive heart failure (HCC) 2000   • Dental disorder     Upper partial   • GERD (gastroesophageal reflux disease)    • Ischemic cardiomyopathy 2021    Echocardiogram with severely dilated LV, LVEF 20%. Small RV. Enlarged RA and mildly dilated LA.   • Left bundle branch block     • Pneumonia    • Pulmonary embolism (HCC) 2014   • Type 2 diabetes mellitus without complication ([facility])      Diet controlled, followed by Dr. Walls       FAMILY HISTORY  Family History   Problem Relation Age of Onset   • Diabetes Mother    • Heart Disease Mother    • Diabetes Sister    • Alcohol/Drug Brother        SOCIAL HISTORY  Social History     Socioeconomic History   • Marital status:      Spouse name: Not on file   • Number of children: Not on file   • Years of education: Not on file   • Highest education level: Not on file   Occupational History   • Not on file   Tobacco Use   • Smoking status: Former Smoker     Packs/day: 1.00     Years: 32.00     Pack years: 32.00     Types: Cigarettes     Quit date: 2000     Years since quittin.3   • Smokeless tobacco: Never Used   Substance and Sexual Activity   • Alcohol use: Yes     Alcohol/week: 0.6 oz     Types: 1 Glasses of wine per week     Comment: occasionally   • Drug use: Yes     Types: Marijuana, Oral     Comment: CBD, THC, edibals    • Sexual activity: Never   Other Topics Concern   • Not on file   Social History Narrative   • Not on file     Social Determinants of Health     Financial Resource Strain:    • Difficulty of Paying Living Expenses:    Food Insecurity:    • Worried About Running Out of Food in the Last Year:    • Ran Out of Food in the Last Year:    Transportation Needs:    • Lack of Transportation (Medical):    • Lack of Transportation (Non-Medical):    Physical Activity:    • Days of Exercise per Week:    • Minutes of Exercise per  Session:    Stress:    • Feeling of Stress :    Social Connections:    • Frequency of Communication with Friends and Family:    • Frequency of Social Gatherings with Friends and Family:    • Attends Evangelical Services:    • Active Member of Clubs or Organizations:    • Attends Club or Organization Meetings:    • Marital Status:    Intimate Partner Violence:    • Fear of Current or Ex-Partner:    • Emotionally Abused:    • Physically Abused:    • Sexually Abused:        SURGICAL HISTORY  Past Surgical History:   Procedure Laterality Date   • RECOVERY  11/3/2015    Procedure: CATH LAB-STEPHENSON-ICD GENERATOR CHANGE 29283- EUNICE T82.111A-MEDTRONIC BLOODLESS;  Surgeon: Recoveryonly Surgery;  Location: SURGERY PRE-POST PROC UNIT [facility] [place];  Service:    • AICD BATTERY CHANGE  November 2015    Generator replacement with Medtronic Evera S VR [id number] implanted by Dr. Marcus Stephenson.   • AICD IMPLANT  January 2008    Medtronic Virtuoso VR [id number] implanted by Dr. Stephenson.   • OTHER CARDIAC SURGERY  12/2000    CABG x 4   • APPENDECTOMY  1969   • MULTIPLE CORONARY ARTERY BYPASS         CURRENT MEDICATIONS   Home Medications     Reviewed by Naila Reid R.N. (Registered Nurse) on 04/22/21 at 1026  Med List Status: Partial   Medication Last Dose Status   furosemide (LASIX) 40 MG Tab  Active   losartan (COZAAR) 25 MG Tab  Active   lovastatin (MEVACOR) 10 MG tablet  Active   Magnesium 500 MG Cap  Active   metFORMIN (GLUCOPHAGE) 500 MG Tab  Active   metoprolol SR (TOPROL XL) 100 MG TABLET SR 24 HR  Active   potassium chloride SA (KDUR) 20 MEQ Tab CR  Active   rivaroxaban (XARELTO) 20 MG Tab tablet  Active   spironolactone (ALDACTONE) 25 MG Tab  Active   vitamin D (CHOLECALCIFEROL) 1000 Unit (25 mcg) Tab  Active   zinc sulfate (ZINCATE) 220 (50 Zn) MG Cap  Active                ALLERGIES   Allergies   Allergen Reactions   • Bloodless    • Pcn [Penicillins] Anaphylaxis   • Ace Inhibitors      Cough       PHYSICAL EXAM  VITAL SIGNS: /82    "Pulse 86   Temp 36.7 °C (98.1 °F) (Temporal)   Resp 16   Ht 1.803 m (5' 11\")   Wt 74.1 kg (163 lb 5.8 oz)   SpO2 95%   BMI 22.78 kg/m²   Skin: Erythematous dark red change both feet and ankles, patient states this is chronic.  It improves with elevation.  Slight swelling over the first MP joint on the left.  No ulceration.  No foot wound.  Vascular: Intact distal capillary refill.  Dorsal pedis pulse present  Musculoskeletal: Tenderness first MP joint, first metatarsal region.  No crepitance or deformity.  Left ankle nontender  Neurologic: Sensation is intact left foot      Results for orders placed or performed during the hospital encounter of 04/22/21   CBC WITH DIFFERENTIAL   Result Value Ref Range    WBC 9.9 4.8 - 10.8 K/uL    RBC 5.37 4.70 - 6.10 M/uL    Hemoglobin 16.4 14.0 - 18.0 g/dL    Hematocrit 50.6 42.0 - 52.0 %    MCV 94.2 81.4 - 97.8 fL    MCH 30.5 27.0 - 33.0 pg    MCHC 32.4 (L) 33.7 - 35.3 g/dL    RDW 44.4 35.9 - 50.0 fL    Platelet Count 242 164 - 446 K/uL    MPV 11.1 9.0 - 12.9 fL    Neutrophils-Polys 79.40 (H) 44.00 - 72.00 %    Lymphocytes 11.30 (L) 22.00 - 41.00 %    Monocytes 6.80 0.00 - 13.40 %    Eosinophils 1.20 0.00 - 6.90 %    Basophils 0.60 0.00 - 1.80 %    Immature Granulocytes 0.70 0.00 - 0.90 %    Nucleated RBC 0.00 /100 WBC    Neutrophils (Absolute) 7.86 (H) 1.82 - 7.42 K/uL    Lymphs (Absolute) 1.12 1.00 - 4.80 K/uL    Monos (Absolute) 0.67 0.00 - 0.85 K/uL    Eos (Absolute) 0.12 0.00 - 0.51 K/uL    Baso (Absolute) 0.06 0.00 - 0.12 K/uL    Immature Granulocytes (abs) 0.07 0.00 - 0.11 K/uL    NRBC (Absolute) 0.00 K/uL   COMP METABOLIC PANEL   Result Value Ref Range    Sodium 138 135 - 145 mmol/L    Potassium 4.1 3.6 - 5.5 mmol/L    Chloride 98 96 - 112 mmol/L    Co2 29 20 - 33 mmol/L    Anion Gap 11.0 7.0 - 16.0    Glucose 191 (H) 65 - 99 mg/dL    Bun 26 (H) 8 - 22 mg/dL    Creatinine 1.39 0.50 - 1.40 mg/dL    Calcium 9.8 8.4 - 10.2 mg/dL    AST(SGOT) 14 12 - 45 U/L    ALT(SGPT) " 11 2 - 50 U/L    Alkaline Phosphatase 72 30 - 99 U/L    Total Bilirubin 0.8 0.1 - 1.5 mg/dL    Albumin 4.2 3.2 - 4.9 g/dL    Total Protein 7.4 6.0 - 8.2 g/dL    Globulin 3.2 1.9 - 3.5 g/dL    A-G Ratio 1.3 g/dL   ESTIMATED GFR   Result Value Ref Range    GFR If African American >60 >60 mL/min/1.73 m 2    GFR If Non African American 50 (A) >60 mL/min/1.73 m 2   Sed Rate   Result Value Ref Range    Sed Rate Westergren 5 0 - 20 mm/hour   CRP HIGH SENSITIVE (CARDIAC)   Result Value Ref Range    C Reactive Protein High Sensitive 3.1 0.0 - 7.5 mg/L         RADIOLOGY/PROCEDURES  CT foot from yesterday:  IMPRESSION:     1.  Bone erosions in the distal first metatarsal bone are identified including medially. This finding could indicate osteomyelitis.     2.  Soft tissue swelling is noted which could be due to cellulitis.     3.  No acute fracture is identified.    COURSE & MEDICAL DECISION MAKING  Pertinent Labs & Imaging studies reviewed. (See chart for details)  Etiology of this patient's foot pain differential includes osteomyelitis, arthritis with bone erosion, crystal induced arthritis, occult trauma.  Patient's blood pressure at 98/71 he states is typical for him.  Patient does not appear septic, borderline low blood pressure during his stay in the ER however he was asymptomatic during this.  No dizziness, no fever or chills.  Sedimentation rate, CRP and white blood cell count are all normal.  Spoken with the orthopedist on-call, Dr. Schwartz who states no operative indication at this time with above blood work findings, patient does not appear septic, states the metatarsal erosion seen on CT scan could be due to multiple different causes.  Dr. Schwartz has recommended consultation with infectious disease.  I have spoken with Dr. Lujan on-call for infectious disease.  We have discussed the patient's normal sedimentation rate, CRP, white blood cell count, normal vital signs including lack of fever.  He has recommended  conservative treatment, no antibiotics at this time, follow-up in their clinic for reevaluation.  Patient has thus been referred to both orthopedics and infectious disease for follow-up.  He did have blood sugar 191 although not a formal fasting test.  He is advised to follow-up his primary care doctor for recheck soon as possible.  Patient is well-appearing upon discharge.    FINAL IMPRESSION     1. Arthralgia of left foot     2. Hyperglycemia     3. Left foot pain  REFERRAL TO INFECTIOUS DISEASE             Electronically signed by: Jaime Bose M.D., 4/22/2021 10:47 AM

## 2021-04-22 NOTE — ED NOTES
Patient resting calmly on gurney. Spouse at bedside. Patient updated on plan of care. Call light within reach.

## 2021-04-22 NOTE — ED NOTES
Patient and wife verbalized understanding to plan of care and discharge information. Patient in stable condition. No signs of distress. Patient pain free. Patient ambulatory out of ED to personal vehicle with stable gait with family.

## 2021-04-26 ENCOUNTER — PATIENT OUTREACH (OUTPATIENT)
Dept: HEALTH INFORMATION MANAGEMENT | Facility: OTHER | Age: 71
End: 2021-04-26

## 2021-04-26 NOTE — PROGRESS NOTES
Called member to follow up on ED visit. Verified HIPPA. Member stated that he made all needed follow up appointments after being seen as this is still an on going issue. Member declined scheduling any follow up with PCP at this time. Used Epic and PQ

## 2021-04-27 ENCOUNTER — OFFICE VISIT (OUTPATIENT)
Dept: INFECTIOUS DISEASES | Facility: MEDICAL CENTER | Age: 71
End: 2021-04-27
Payer: MEDICARE

## 2021-04-27 VITALS
RESPIRATION RATE: 16 BRPM | TEMPERATURE: 96.8 F | HEIGHT: 71 IN | BODY MASS INDEX: 21.42 KG/M2 | SYSTOLIC BLOOD PRESSURE: 102 MMHG | DIASTOLIC BLOOD PRESSURE: 70 MMHG | OXYGEN SATURATION: 96 % | WEIGHT: 153 LBS | HEART RATE: 87 BPM

## 2021-04-27 DIAGNOSIS — M79.672 LEFT FOOT PAIN: ICD-10-CM

## 2021-04-27 LAB
BACTERIA BLD CULT: NORMAL
BACTERIA BLD CULT: NORMAL
SIGNIFICANT IND 70042: NORMAL
SIGNIFICANT IND 70042: NORMAL
SITE SITE: NORMAL
SITE SITE: NORMAL
SOURCE SOURCE: NORMAL
SOURCE SOURCE: NORMAL

## 2021-04-27 PROCEDURE — 99204 OFFICE O/P NEW MOD 45 MIN: CPT | Performed by: NURSE PRACTITIONER

## 2021-04-27 ASSESSMENT — FIBROSIS 4 INDEX: FIB4 SCORE: 1.22

## 2021-04-27 ASSESSMENT — PAIN SCALES - GENERAL: PAINLEVEL: 3=SLIGHT PAIN

## 2021-04-27 NOTE — PROGRESS NOTES
Infectious Disease Clinic    Subjective:     Chief Complaint   Patient presents with   • New Patient     Possible osteomyelitis     Referring physician: Dr. Jaime Bose from ER  Reason for referral: Left foot pain    This is my first time meeting Mr. Lee.  Accompanied by his wife, Rosy.    Interval History: 70-year-old male with a PMH of CHF, type 2 diabetes, GERD, MI, CAD, AICD, pneumonia, PE and multiple DVTs to [facility]-remains on Xarelto.  Patient states that near the end of March he woke up 1 morning with his left first great toe hurting tremendously, in addition to noting that it was hot and swollen.  Stated it was hard to walk.  Due to worsening, he presented to an urgent care and was treated with a steroid pack for presumed gout; however, his left foot did not improve.  X-ray of the foot on 4/14 showed swelling adjacent to the left first metatarsal head with possible underlying bone reabsorption reason the possibility of inflammatory or infectious process.  He was then evaluated by his PCP who ordered CT of the left foot on 4/21 that showed bone erosions in the distal first metatarsal bone, findings could indicate osteomyelitis.  It was recommended to the patient that he presented to the ER for IV antibiotics.  He was evaluated in the ER on 4/22 with complaints of left distal foot pain greatest over the first metatarsal.  Case was reviewed by orthopedics, Dr. Schwartz, who did not feel that surgical intervention was warranted at that time.  ID was then consulted who recommended conservative treatment, no antibiotics were recommended.  Laboratory markers were well within normal limits.  Referral was placed to ID for further infectious work-up.    Records reviewed    Today, 4/27/2021: Patient denies any antibiotics within the last 30 days.  Denies any trauma to the left foot.  Feels that the swelling and redness are slightly improved and it is not as hard for him to walk on the foot; however, he states that  he has been elevating his left foot for the past 2 weeks.  The pain comes and goes, worse with ambulation.  There is no breakdown to the left great toe.  He denies any burning sensations.  Aside from his left great toe, he feels healthy.  Denies feeling generally ill, fevers/chills, general malaise, headache, n/v/d, abdominal pain, chest pain, shortness of breath, cough, sore throat or rash.      ROS as above in HPI.    Past Medical History:   Diagnosis Date   • Acute anterior myocardial infarction (HCC) 12/28/2000    PCI attempted by Dr. Malloy but unsuccessful   • AICD (automatic cardioverter/defibrillator) present 2003/2008/2015    Defibrillator is a Medtronic model PGEL4A1, serial #[id number], implanted Dr. Stephenson November 2015. The ventricular lead is a Medtronic model #6947-65, lead serial #[id number], implant 7/2/2003   • Allergy     within 2 years   • CAD (coronary artery disease) 12/28/2000    CABG x 4 (LIMA to LAD, rSVG to first diagonal, left circumflex and PDA).2000: CABG x 4 (LIMA to LAD, rSVG to first diagonal, left circumflex and PDA).  2003: LIMA and circumflex grafts patent, diagonal and PDA grafts closed.   • Congestive heart failure (HCC) 12/28/2000   • Dental disorder     Upper partial   • GERD (gastroesophageal reflux disease)    • Ischemic cardiomyopathy 02/2021    Echocardiogram with severely dilated LV, LVEF 20%. Small RV. Enlarged RA and mildly dilated LA.   • Left bundle branch block     • Pneumonia 2014   • Pulmonary embolism ([facility]) March 2014   • Type 2 diabetes mellitus without complication ([facility])      Diet controlled, followed by Dr. Walls       Family History   Problem Relation Age of Onset   • Diabetes Mother    • Heart Disease Mother    • Diabetes Sister    • Alcohol/Drug Brother        Social History     Tobacco Use   • Smoking status: Former Smoker     Packs/day: 1.00     Years: 32.00     Pack years: 32.00     Types: Cigarettes     Quit date: 12/28/2000     Years since quitting:  "20.3   • Smokeless tobacco: Never Used   Substance Use Topics   • Alcohol use: Yes     Alcohol/week: 0.6 oz     Types: 1 Glasses of wine per week     Comment: occasionally   • Drug use: Yes     Types: Marijuana, Oral     Comment: CBD, THC, edibals        Allergies: Bloodless, Pcn [penicillins], and Ace inhibitors    Pt's medication and problem list reviewed.     Objective:     /70 (BP Location: Left arm, Patient Position: Sitting, BP Cuff Size: Adult)   Pulse 87   Temp 36 °C (96.8 °F) (Temporal)   Resp 16   Ht 1.803 m (5' 11\") Comment: patient reported  Wt 69.4 kg (153 lb)   SpO2 96%   BMI 21.34 kg/m²     Physical Exam   Constitutional: He is oriented to person, place, and time and well-developed, well-nourished, and in no distress. No distress.   HENT:   Head: Normocephalic and atraumatic.   Right Ear: External ear normal.   Left Ear: External ear normal.   Eyes: Pupils are equal, round, and reactive to light. Conjunctivae and EOM are normal. No scleral icterus.   Neck: No JVD present. No tracheal deviation present.   Cardiovascular: Normal rate, regular rhythm and normal heart sounds.   No murmur heard.  Unable to palpate LLE distal pulses.   Pulmonary/Chest: Effort normal and breath sounds normal. No respiratory distress. He has no wheezes. He has no rales.   Abdominal: Soft. Bowel sounds are normal. He exhibits no distension. There is no abdominal tenderness. There is no rebound and no guarding.   Musculoskeletal:         General: Tenderness and edema (Trace LLE) present.      Cervical back: Normal range of motion and neck supple.      Comments: Left great toe- intact without any breakdown or drainage, no erythema to the toe, slightly tender with deep palpation to distal tip and along medial edge, no induration or fluctuance, not hot to touch.   Neurological: He is alert and oriented to person, place, and time. No cranial nerve deficit. He exhibits normal muscle tone. Gait normal.   Skin: Skin is " warm and dry. No rash noted. He is not diaphoretic. There is erythema.   Chronic erythematous discoloration to LLE that improves with elevation above the heart.   Psychiatric: Mood, memory, affect and judgment normal.   Pleasant   Vitals reviewed.      Labs:  WBC   Date/Time Value Ref Range Status   04/22/2021 10:45 AM 9.9 4.8 - 10.8 K/uL Final     RBC   Date/Time Value Ref Range Status   04/22/2021 10:45 AM 5.37 4.70 - 6.10 M/uL Final     Hemoglobin   Date/Time Value Ref Range Status   04/22/2021 10:45 AM 16.4 14.0 - 18.0 g/dL Final     Hematocrit   Date/Time Value Ref Range Status   04/22/2021 10:45 AM 50.6 42.0 - 52.0 % Final     MCV   Date/Time Value Ref Range Status   04/22/2021 10:45 AM 94.2 81.4 - 97.8 fL Final     MCH   Date/Time Value Ref Range Status   04/22/2021 10:45 AM 30.5 27.0 - 33.0 pg Final     MCHC   Date/Time Value Ref Range Status   04/22/2021 10:45 AM 32.4 (L) 33.7 - 35.3 g/dL Final     MPV   Date/Time Value Ref Range Status   04/22/2021 10:45 AM 11.1 9.0 - 12.9 fL Final        Sodium   Date/Time Value Ref Range Status   04/22/2021 10:45  135 - 145 mmol/L Final     Potassium   Date/Time Value Ref Range Status   04/22/2021 10:45 AM 4.1 3.6 - 5.5 mmol/L Final     Chloride   Date/Time Value Ref Range Status   04/22/2021 10:45 AM 98 96 - 112 mmol/L Final     Co2   Date/Time Value Ref Range Status   04/22/2021 10:45 AM 29 20 - 33 mmol/L Final     Glucose   Date/Time Value Ref Range Status   04/22/2021 10:45  (H) 65 - 99 mg/dL Final     Bun   Date/Time Value Ref Range Status   04/22/2021 10:45 AM 26 (H) 8 - 22 mg/dL Final     Creatinine   Date/Time Value Ref Range Status   04/22/2021 10:45 AM 1.39 0.50 - 1.40 mg/dL Final   01/08/2008 07:45 AM 1.0 0.5 - 1.4 mg/dL Final       Alkaline Phosphatase   Date/Time Value Ref Range Status   04/22/2021 10:45 AM 72 30 - 99 U/L Final     AST(SGOT)   Date/Time Value Ref Range Status   04/22/2021 10:45 AM 14 12 - 45 U/L Final     ALT(SGPT)   Date/Time  Value Ref Range Status   2021 10:45 AM 11 2 - 50 U/L Final     Total Bilirubin   Date/Time Value Ref Range Status   2021 10:45 AM 0.8 0.1 - 1.5 mg/dL Final      ESR 5  CRP (high-sensitivity) 3.1     Microbiology:   Blood cultures on  were negative    Imagin2021   CT-EXTREMITY, LOWER W/O LEFT  IMPRESSION:  1.  Bone erosions in the distal first metatarsal bone are identified including medially. This finding could indicate osteomyelitis.  2.  Soft tissue swelling is noted which could be due to cellulitis.  3.  No acute fracture is identified.      Assessment and Plan:   The following treatment plan was discussed with patient at length:    1. Left foot pain      -Do not feel that antibiotics are warranted at this time as there are no signs of gross infection.  Inflammatory markers are well within normal limits.  CT and x-ray are fairly unremarkable with indications of possible inflammation or osteomyelitis; however, in conversation with Dr. Bettencourt, the concern for osteomyelitis was fairly minimal especially in light of the normal inflammatory markers and presentation of an intact foot.  -Would recommend patient follow-up with PCP for possible treatment with gabapentin for possible neuropathy.    -Uric acid is within normal limits; however, may benefit from gout treatment.  Discussed alternative options such as dark cherries other than use of medication such as allopurinol or colchicine.  -If these treatments do not help, patient may need to be evaluated by a vascular surgeon based on his significant history of multiple DVTs to the LLE.  -Did advise patient to keep a close eye on his left great toe for any hotspots or black spots that appear to be getting bigger in size.  There is nothing present at this time.     Follow up: PRN, RTC sooner if needed. FU with PCP for ongoing chronic medical conditions.     PATY Meza.    Case discussed with Dr. Bettencourt from [facility] to review  x-ray and CT-felt that both were fairly unremarkable and no obvious signs of osteomyelitis.  Also discussed case with Stacia Jeter, ID pharmacist, regarding antibiotic options should treatment be warranted.  Due to patient's anaphylactic penicillin allergy, would have to avoid penicillins and cephalosporins.  QTC prolongation of 500 noted on last EKG in February 2021, would need to avoid fluoroquinolones.    My total time spent caring for the patient on the day of the encounter was 55 minutes.   This does not include time spent on separately billable procedures/tests.       Please note that this dictation was created using voice recognition software. I have  worked with technical experts from [facility] to optimize the interface.  I have made every reasonable attempt to correct obvious errors, but there may be errors of grammar and possibly content that I did not discover before finalizing the note.

## 2021-07-20 ENCOUNTER — OFFICE VISIT (OUTPATIENT)
Dept: MEDICAL GROUP | Facility: LAB | Age: 71
End: 2021-07-20
Payer: MEDICARE

## 2021-07-20 VITALS
RESPIRATION RATE: 16 BRPM | HEIGHT: 71 IN | TEMPERATURE: 98.2 F | DIASTOLIC BLOOD PRESSURE: 50 MMHG | OXYGEN SATURATION: 95 % | SYSTOLIC BLOOD PRESSURE: 90 MMHG | BODY MASS INDEX: 22.12 KG/M2 | HEART RATE: 77 BPM | WEIGHT: 158 LBS

## 2021-07-20 DIAGNOSIS — I47.20 VENTRICULAR TACHYCARDIA (HCC): ICD-10-CM

## 2021-07-20 DIAGNOSIS — Z95.810 AICD (AUTOMATIC CARDIOVERTER/DEFIBRILLATOR) PRESENT: ICD-10-CM

## 2021-07-20 DIAGNOSIS — I50.22 CHRONIC SYSTOLIC CONGESTIVE HEART FAILURE, NYHA CLASS 2 (HCC): ICD-10-CM

## 2021-07-20 DIAGNOSIS — E11.65 TYPE 2 DIABETES MELLITUS WITH HYPERGLYCEMIA, WITHOUT LONG-TERM CURRENT USE OF INSULIN (HCC): ICD-10-CM

## 2021-07-20 PROCEDURE — 99214 OFFICE O/P EST MOD 30 MIN: CPT | Performed by: FAMILY MEDICINE

## 2021-07-20 ASSESSMENT — FIBROSIS 4 INDEX: FIB4 SCORE: 1.22

## 2021-07-27 NOTE — ASSESSMENT & PLAN NOTE
Patient is followed by cardiology.  He denies any worsening of his symptoms.  No increased swelling or shortness of breath.

## 2021-07-27 NOTE — ASSESSMENT & PLAN NOTE
Stable. Currently taking Metformin 500 mg twice a day as directed.  Denies side effects or hypoglycemic events.  He is not monitoring blood sugar regularly at home.  Reports diet is stable  He is not exercising regularly.  Last eye exam: in the last year  Denies polyuria, polydipsia, blurred vision, or neuropathies.

## 2021-07-27 NOTE — ASSESSMENT & PLAN NOTE
November 2015: Generator replacement with Medtronic Well Mansion For Expecteensa S VR [id number] implanted by Dr. Marcus Stephenson.  January 2008: Medtronic i7 Networksso VR [id number] implanted by Dr. Marcus Stephenson.  The ventricular lead is a Medtronic model #6947-65, lead serial #[id number], implantation date 07/02/2003.  Patient has not had the defibrillator activated in 3 to 4 years.  No loss of consciousness over the last 3 to 4 years.

## 2021-07-27 NOTE — PROGRESS NOTES
Subjective:     Chief Complaint   Patient presents with   • Paperwork       Jaime Lee is a 70 y.o. male here today for evaluation and management of:    AICD (automatic cardioverter/defibrillator) present  November 2015: Generator replacement with Medtronic Evera S VR [id number] implanted by Dr. Marcus Stephenson.  January 2008: Medtronic Virtuoso VR [id number] implanted by Dr. Marcus Stephenson.  The ventricular lead is a Medtronic model #6947-65, lead serial #[id number], implantation date 07/02/2003.  Patient has not had the defibrillator activated in 3 to 4 years.  No loss of consciousness over the last 3 to 4 years.    Type 2 diabetes mellitus with hyperglycemia, without long-term current use of insulin ([facility])  Stable. Currently taking Metformin 500 mg twice a day as directed.  Denies side effects or hypoglycemic events.  He is not monitoring blood sugar regularly at home.  Reports diet is stable  He is not exercising regularly.  Last eye exam: in the last year  Denies polyuria, polydipsia, blurred vision, or neuropathies.      Chronic systolic congestive heart failure, NYHA class 2 ([facility])  Patient is followed by cardiology.  He denies any worsening of his symptoms.  No increased swelling or shortness of breath.    Patient also needs his DMV form filled out to continue to drive.      Allergies   Allergen Reactions   • Bloodless    • Pcn [Penicillins] Anaphylaxis   • Ace Inhibitors Cough     Cough       Current medicines (including changes today)  Current Outpatient Medications   Medication Sig Dispense Refill   • Non Formulary Request Take 1 tablet by mouth every evening. Vitamin C/Zinc/Vitamin D/AlderBerry Vitamin     • metFORMIN (GLUCOPHAGE) 500 MG Tab TAKE 1 TABLET BY MOUTH TWICE A DAY WITH MEALS 200 tablet 3   • vitamin D (CHOLECALCIFEROL) 1000 Unit (25 mcg) Tab Take 1,000 Units by mouth every day.     • lovastatin (MEVACOR) 10 MG tablet Take 1 tablet by mouth every evening. 100 tablet 3   • metoprolol SR  (TOPROL XL) 100 MG TABLET SR 24 HR Take 1 tablet by mouth every day. 100 tablet 3   • losartan (COZAAR) 25 MG Tab Take 0.5 Tablets by mouth every day. 50 tablet 3   • spironolactone (ALDACTONE) 25 MG Tab Take 0.5 Tablets by mouth every day. 50 tablet 3   • potassium chloride SA (KDUR) 20 MEQ Tab CR Take 1 tablet by mouth every day. 100 tablet 3   • rivaroxaban (XARELTO) 20 MG Tab tablet Take 1 tablet by mouth with dinner. 100 tablet 3   • furosemide (LASIX) 40 MG Tab Take 1 tablet by mouth every day. 100 tablet 3   • Magnesium 500 MG Cap Take 1 Cap by mouth every day. 180 Cap 4     No current facility-administered medications for this visit.       He  has a past medical history of Acute anterior myocardial infarction ([facility]) (12/28/2000), AICD (automatic cardioverter/defibrillator) present (2003/2008/2015), Allergy, CAD (coronary artery disease) (12/28/2000), Congestive heart failure ([facility]) (12/28/2000), Dental disorder, GERD (gastroesophageal reflux disease), Ischemic cardiomyopathy (02/2021), Left bundle branch block ( ), Pneumonia (2014), Pulmonary embolism ([facility]) (March 2014), and Type 2 diabetes mellitus without complication ([facility]) ( ).    Patient Active Problem List    Diagnosis Date Noted   • Left foot pain 04/21/2021   • Vascular disorder of extremity ([facility]) 02/11/2020   • Ventricular tachycardia ([facility]) 04/25/2019   • RBBB 02/04/2019   • Macrocytosis without anemia 04/24/2018   • Chronic anticoagulation 02/01/2018   • Basal cell carcinoma of skin of left ear 02/01/2018   • Chronic deep vein thrombosis (DVT) of femoral vein of left lower extremity ([facility]) 01/04/2018   • Basal cell carcinoma of skin of face 11/28/2017   • Hyperlipidemia 01/18/2017   • S/P CABG x 4 06/22/2016   • Type 2 diabetes mellitus with hyperglycemia, without long-term current use of insulin ([facility]) 06/22/2016   • Old anterior myocardial infarction 06/22/2016   • Chronic systolic congestive heart failure, NYHA class 2 ([facility]) 06/22/2016   • AICD  "(automatic cardioverter/defibrillator) present 03/11/2014   • Ischemic cardiomyopathy 03/11/2014   • History of pulmonary embolus (PE) 02/27/2014   • Stage 3b chronic kidney disease (HCC) 02/24/2014   • Thrombocytopenia (HCC) 02/23/2014   • Coronary artery disease involving native coronary artery of native heart without angina pectoris 12/28/2000       ROS   No fever or chills.  No nausea or vomiting.  No chest pain or palpitations.  No cough or SOB.  No pain with urination or hematuria.  No black or bloody stools.       Objective:     BP (!) 90/50 (BP Location: Right arm, Patient Position: Sitting, BP Cuff Size: Adult)   Pulse 77   Temp 36.8 °C (98.2 °F) (Temporal)   Resp 16   Ht 1.803 m (5' 11\")   Wt 71.7 kg (158 lb)   SpO2 95%  Body mass index is 22.04 kg/m².   Physical Exam:  Well developed, well nourished.  Alert, oriented in no acute distress.  Eye contact is good, speech goal directed, affect calm  Eyes: conjunctiva non-injected, sclera non-icteric.  Ears: Pinna normal. TM pearly gray.   Neck Supple.  No adenopathy or masses in the neck or supraclavicular regions. No thyromegaly  Lungs: clear to auscultation bilaterally with good excursion. No wheezes or rhonchi  CV: regular rate and rhythm. No murmur  Abdomen: soft, nontender, no masses or organomegaly.  No rebound or gaurding  Ext: no edema, color normal, vascularity normal, temperature normal          Assessment and Plan:   The following treatment plan was discussed    1. Type 2 diabetes mellitus with hyperglycemia, without long-term current use of insulin (HCC)  Stable.  Continue taking Metformin 500 mg twice a day as directed.  Denies side effects or hypoglycemic events.  He is not monitoring blood sugar regularly at home.  Reports diet is stable  He is not exercising regularly.  Last eye exam: in the last year  Denies polyuria, polydipsia, blurred vision, or neuropathies.      2. Ventricular tachycardia (HCC)  Patient is been stable.  Continue " Xarelto and Toprol.  Cardiology is following.  Patient has not had any sustained tachycardia resulting in firing of AICD.  Okay to continue driving.    3. Chronic systolic congestive heart failure, NYHA class 2 (HCC)  Patient is been stable.  Continue Xarelto and Toprol.  Cardiology is following.  Patient has not had any sustained tachycardia resulting in firing of AICD.  Okay to continue driving.    4. AICD (automatic cardioverter/defibrillator) present  November 2015: Generator replacement with MedTellMia S VR [id number] implanted by Dr. Marcus Stephenson.  January 2008: Medtronic Zymeworksso VR [id number] implanted by Dr. Marcus Stephenson.  The ventricular lead is a Medtronic model #6947-65, lead serial #[id number], implantation date 07/02/2003.  Patient has not had the defibrillator activated in 3 to 4 years.  No loss of consciousness over the last 3 to 4 years.  Patient is okay to drive.    Any change or worsening of signs or symptoms, patient encouraged to follow-up or report to the emergency room for further evaluation. Patient understands and agrees.    Followup: Return in about 6 months (around 1/20/2022).

## 2021-08-16 ENCOUNTER — NON-PROVIDER VISIT (OUTPATIENT)
Dept: CARDIOLOGY | Facility: MEDICAL CENTER | Age: 71
End: 2021-08-16
Payer: MEDICARE

## 2021-08-16 ENCOUNTER — OFFICE VISIT (OUTPATIENT)
Dept: CARDIOLOGY | Facility: MEDICAL CENTER | Age: 71
End: 2021-08-16
Payer: MEDICARE

## 2021-08-16 VITALS
WEIGHT: 157.4 LBS | DIASTOLIC BLOOD PRESSURE: 60 MMHG | WEIGHT: 157.4 LBS | OXYGEN SATURATION: 96 % | HEART RATE: 73 BPM | RESPIRATION RATE: 12 BRPM | RESPIRATION RATE: 12 BRPM | BODY MASS INDEX: 22.04 KG/M2 | BODY MASS INDEX: 22.04 KG/M2 | HEIGHT: 71 IN | HEIGHT: 71 IN | HEART RATE: 73 BPM | SYSTOLIC BLOOD PRESSURE: 100 MMHG | SYSTOLIC BLOOD PRESSURE: 100 MMHG | DIASTOLIC BLOOD PRESSURE: 60 MMHG | OXYGEN SATURATION: 96 %

## 2021-08-16 DIAGNOSIS — Z95.1 S/P CABG X 4: ICD-10-CM

## 2021-08-16 DIAGNOSIS — I50.22 CHRONIC SYSTOLIC CONGESTIVE HEART FAILURE, NYHA CLASS 2 (HCC): ICD-10-CM

## 2021-08-16 DIAGNOSIS — I47.20 VENTRICULAR TACHYCARDIA (HCC): ICD-10-CM

## 2021-08-16 DIAGNOSIS — I25.10 CORONARY ARTERY DISEASE INVOLVING NATIVE CORONARY ARTERY OF NATIVE HEART WITHOUT ANGINA PECTORIS: ICD-10-CM

## 2021-08-16 DIAGNOSIS — Z86.711 HISTORY OF PULMONARY EMBOLUS (PE): ICD-10-CM

## 2021-08-16 DIAGNOSIS — E78.2 MIXED HYPERLIPIDEMIA: ICD-10-CM

## 2021-08-16 DIAGNOSIS — I82.512 CHRONIC DEEP VEIN THROMBOSIS (DVT) OF FEMORAL VEIN OF LEFT LOWER EXTREMITY (HCC): ICD-10-CM

## 2021-08-16 DIAGNOSIS — I25.5 ISCHEMIC CARDIOMYOPATHY: ICD-10-CM

## 2021-08-16 DIAGNOSIS — Z95.810 AICD (AUTOMATIC CARDIOVERTER/DEFIBRILLATOR) PRESENT: ICD-10-CM

## 2021-08-16 DIAGNOSIS — E11.65 TYPE 2 DIABETES MELLITUS WITH HYPERGLYCEMIA, WITHOUT LONG-TERM CURRENT USE OF INSULIN (HCC): ICD-10-CM

## 2021-08-16 DIAGNOSIS — Z79.01 CHRONIC ANTICOAGULATION: ICD-10-CM

## 2021-08-16 DIAGNOSIS — N18.32 STAGE 3B CHRONIC KIDNEY DISEASE: ICD-10-CM

## 2021-08-16 PROCEDURE — 99214 OFFICE O/P EST MOD 30 MIN: CPT | Mod: 25 | Performed by: NURSE PRACTITIONER

## 2021-08-16 PROCEDURE — 93282 PRGRMG EVAL IMPLANTABLE DFB: CPT | Performed by: NURSE PRACTITIONER

## 2021-08-16 ASSESSMENT — ENCOUNTER SYMPTOMS
DEPRESSION: 0
HEADACHES: 0
ABDOMINAL PAIN: 0
ORTHOPNEA: 0
FEVER: 0
INSOMNIA: 0
PALPITATIONS: 0
DIZZINESS: 1
MYALGIAS: 0
LOSS OF CONSCIOUSNESS: 0
WEIGHT LOSS: 0
CHILLS: 0
BRUISES/BLEEDS EASILY: 0
NAUSEA: 0
PND: 0
SHORTNESS OF BREATH: 0

## 2021-08-16 ASSESSMENT — FIBROSIS 4 INDEX
FIB4 SCORE: 1.22
FIB4 SCORE: 1.22

## 2021-08-16 NOTE — PROGRESS NOTES
Chief Complaint   Patient presents with   • Follow-Up   • AICD Check/Dysfunction   • Coronary Artery Disease   • Cardiomyopathy (Ischemic)   • Ventricular Tachycardia   • Deep Vein Thrombosis   • Anticoagulation   • Hyperlipidemia   • Diabetes Mellitus   • Chronic Kidney Disease       Sam Lee is a 70 y.o. male who presents today for six month follow-up for AICD interrogation, CAD/ischemic cardiomyopathy, history of PE/DVT on anticoagulation, hyperlipidemia, and diabetes.    Jaime is a 70 year old male with history of CAD/ischemic cardiomyopathy status post remote CABG x 4, with LVEF 20-25% with AICD since 2003, and last generator replacement in 2015; he also has hyperlipidemia and diabetes. He also has a history of left LE DVT/PE, and is on Xarelto.     He is here today for six month follow-up. In April 2021, he was seen in the [facility] ER for left great toe pain, thought to be gout, but all the tests came back OK. He was seen by podiatry, who did not change any treatment. The pain gradually subsided with massage.    From a cardiac standpoint, he is doing well. No chest pain, pressure or discomfort; no palpitations. No dyspnea, orthopnea or PND. No device therapy.  No LE edema.    Past Medical History:   Diagnosis Date   • Acute anterior myocardial infarction (HCC) 12/28/2000    PCI attempted by Dr. Malloy but unsuccessful   • AICD (automatic cardioverter/defibrillator) present 2003/2008/2015    Defibrillator is a Medtronic model [id number], serial #[id number], implanted Dr. Stephenson November 2015. The ventricular lead is a Medtronic model #6947-65, lead serial #[id number], implant 7/2/2003   • Allergy     within 2 years   • CAD (coronary artery disease) 12/28/2000    CABG x 4 (LIMA to LAD, rSVG to first diagonal, left circumflex and PDA).2000: CABG x 4 (LIMA to LAD, rSVG to first diagonal, left circumflex and PDA).  2003: LIMA and circumflex grafts patent, diagonal and PDA  grafts closed.   • Congestive heart failure (HCC) 2000   • Dental disorder     Upper partial   • GERD (gastroesophageal reflux disease)    • Ischemic cardiomyopathy 2021    Echocardiogram with severely dilated LV, LVEF 20%. Small RV. Enlarged RA and mildly dilated LA.   • Left bundle branch block     • Pneumonia    • Pulmonary embolism (HCC) 2014   • Type 2 diabetes mellitus without complication (HCC)      Diet controlled, followed by Dr. Walls     Past Surgical History:   Procedure Laterality Date   • RECOVERY  11/3/2015    Procedure: CATH LAB-STEPHENSON-ICD GENERATOR CHANGE 71246- EUNICE T82.111A-MEDTRONIC BLOODLESS;  Surgeon: Recoveryonly Surgery;  Location: SURGERY PRE-POST PROC UNIT [facility] [place];  Service:    • AICD BATTERY CHANGE  2015    Generator replacement with Medtronic Evera S VR [id number] implanted by Dr. Marcus Stephenson.   • AICD IMPLANT  2008    Medtronic Virtuoso VR [id number] implanted by Dr. Stephenson.   • OTHER CARDIAC SURGERY  2000    CABG x 4   • APPENDECTOMY  1969   • MULTIPLE CORONARY ARTERY BYPASS       Family History   Problem Relation Age of Onset   • Diabetes Mother    • Heart Disease Mother    • Diabetes Sister    • Alcohol/Drug Brother      Social History     Socioeconomic History   • Marital status:      Spouse name: Not on file   • Number of children: Not on file   • Years of education: Not on file   • Highest education level: Not on file   Occupational History   • Not on file   Tobacco Use   • Smoking status: Former Smoker     Packs/day: 1.00     Years: 32.00     Pack years: 32.00     Types: Cigarettes     Quit date: 2000     Years since quittin.6   • Smokeless tobacco: Never Used   Vaping Use   • Vaping Use: Never used   Substance and Sexual Activity   • Alcohol use: Yes     Alcohol/week: 0.6 oz     Types: 1 Glasses of wine per week     Comment: occasionally   • Drug use: Yes     Types: Marijuana, Oral     Comment: CBD, THC, edibals    • Sexual activity:  Never   Other Topics Concern   • Not on file   Social History Narrative   • Not on file     Social Determinants of Health     Financial Resource Strain:    • Difficulty of Paying Living Expenses:    Food Insecurity:    • Worried About Running Out of Food in the Last Year:    • Ran Out of Food in the Last Year:    Transportation Needs:    • Lack of Transportation (Medical):    • Lack of Transportation (Non-Medical):    Physical Activity:    • Days of Exercise per Week:    • Minutes of Exercise per Session:    Stress:    • Feeling of Stress :    Social Connections:    • Frequency of Communication with Friends and Family:    • Frequency of Social Gatherings with Friends and Family:    • Attends Jew Services:    • Active Member of Clubs or Organizations:    • Attends Club or Organization Meetings:    • Marital Status:    Intimate Partner Violence:    • Fear of Current or Ex-Partner:    • Emotionally Abused:    • Physically Abused:    • Sexually Abused:      Allergies   Allergen Reactions   • Bloodless    • Pcn [Penicillins] Anaphylaxis   • Ace Inhibitors Cough     Cough     Outpatient Encounter Medications as of 8/16/2021   Medication Sig Dispense Refill   • Non Formulary Request Take 1 tablet by mouth every evening. Vitamin C/Zinc/Vitamin D/AlderBerry Vitamin     • metFORMIN (GLUCOPHAGE) 500 MG Tab TAKE 1 TABLET BY MOUTH TWICE A DAY WITH MEALS 200 tablet 3   • vitamin D (CHOLECALCIFEROL) 1000 Unit (25 mcg) Tab Take 1,000 Units by mouth every day.     • lovastatin (MEVACOR) 10 MG tablet Take 1 tablet by mouth every evening. 100 tablet 3   • metoprolol SR (TOPROL XL) 100 MG TABLET SR 24 HR Take 1 tablet by mouth every day. 100 tablet 3   • losartan (COZAAR) 25 MG Tab Take 0.5 Tablets by mouth every day. 50 tablet 3   • spironolactone (ALDACTONE) 25 MG Tab Take 0.5 Tablets by mouth every day. 50 tablet 3   • potassium chloride SA (KDUR) 20 MEQ Tab CR Take 1 tablet by mouth every day. 100 tablet 3   • rivaroxaban  "(XARELTO) 20 MG Tab tablet Take 1 tablet by mouth with dinner. 100 tablet 3   • furosemide (LASIX) 40 MG Tab Take 1 tablet by mouth every day. 100 tablet 3   • Magnesium 500 MG Cap Take 1 Cap by mouth every day. 180 Cap 4     No facility-administered encounter medications on file as of 8/16/2021.     Review of Systems   Constitutional: Negative for chills, fever and weight loss.   HENT: Negative for congestion.    Respiratory: Negative for shortness of breath.    Cardiovascular: Negative for chest pain, palpitations, orthopnea, leg swelling and PND.   Gastrointestinal: Negative for abdominal pain and nausea.   Musculoskeletal: Negative for myalgias.   Skin: Negative for rash.   Neurological: Positive for dizziness. Negative for loss of consciousness and headaches.        Chronic, stable.   Endo/Heme/Allergies: Does not bruise/bleed easily.   Psychiatric/Behavioral: Negative for depression. The patient does not have insomnia.               Objective     /60 (BP Location: Left arm, Patient Position: Sitting, BP Cuff Size: Adult)   Pulse 73   Resp 12   Ht 1.803 m (5' 11\")   Wt 71.4 kg (157 lb 6.4 oz)   SpO2 96%   BMI 21.95 kg/m²     Physical Exam   Constitutional: He is oriented to person, place, and time. He appears well-developed.   HENT:   Head: Normocephalic.   Neck: No JVD present.   Cardiovascular: Normal rate, regular rhythm and normal heart sounds.   Pulmonary/Chest: Effort normal and breath sounds normal. No respiratory distress. He has no wheezes. He has no rales.   Abdominal: Soft. Bowel sounds are normal. He exhibits no distension. There is no abdominal tenderness.   Musculoskeletal:         General: Normal range of motion.      Cervical back: Normal range of motion and neck supple.   Neurological: He is alert and oriented to person, place, and time.   Skin: Skin is warm and dry. No rash noted.   Skin changes of chronic venous stasis in lower legs.     AICD is working normally. No device " therapy. No changes are made today.      CONCLUSIONS OF ECHOCARDIOGRAM OF 2/17/2021:  No significant chnage compared to prior echo in 2019  Left ventricle is severely dilated.  Left ventricular ejection fraction is visually estimated to be 20%.  No significant valve disease or flow abnormalities.     Component      Latest Ref Rng & Units 3/5/2021 3/5/2021           9:07 AM  9:07 AM   Glycohemoglobin      4.0 - 5.6 %  6.7 (H)   Estim. Avg Glu      mg/dL  146   TSH      0.380 - 5.330 uIU/mL 2.310      Lab Results   Component Value Date/Time    CHOLSTRLTOT 166 03/05/2021 09:07 AM    LDL 85 03/05/2021 09:07 AM    HDL 57 03/05/2021 09:07 AM    TRIGLYCERIDE 120 03/05/2021 09:07 AM       Lab Results   Component Value Date/Time    SODIUM 138 04/22/2021 10:45 AM    POTASSIUM 4.1 04/22/2021 10:45 AM    CHLORIDE 98 04/22/2021 10:45 AM    CO2 29 04/22/2021 10:45 AM    GLUCOSE 191 (H) 04/22/2021 10:45 AM    BUN 26 (H) 04/22/2021 10:45 AM    CREATININE 1.39 04/22/2021 10:45 AM    CREATININE 1.0 01/08/2008 07:45 AM     Lab Results   Component Value Date/Time    ALKPHOSPHAT 72 04/22/2021 10:45 AM    ASTSGOT 14 04/22/2021 10:45 AM    ALTSGPT 11 04/22/2021 10:45 AM    TBILIRUBIN 0.8 04/22/2021 10:45 AM        Assessment & Plan     1. AICD (automatic cardioverter/defibrillator) present     2. Coronary artery disease involving native coronary artery of native heart without angina pectoris     3. Ischemic cardiomyopathy     4. S/P CABG x 4     5. Ventricular tachycardia ([facility])     6. Chronic systolic congestive heart failure, NYHA class 2 ([facility])     7. Chronic deep vein thrombosis (DVT) of femoral vein of left lower extremity ([facility])     8. History of pulmonary embolus (PE)     9. Chronic anticoagulation     10. Mixed hyperlipidemia     11. Type 2 diabetes mellitus with hyperglycemia, without long-term current use of insulin ([facility])     12. Stage 3b chronic kidney disease ([facility])         Medical Decision Making: Today's  Assessment/Status/Plan:        1. CAD/ischemic cardiomyopathy status post CABG x 4 in 2000, with LVEF 20%, with AICD. The device is working normally. He remains on Xarelto, BB, ARB, Lasix/Aldactone and statin. No HF symptoms either.    2. History of VT, currently stable on Toprol XL 100mg once daily.    3. History of chronic systolic HF, on BB, ARB, Lasix/Aldactone and statin.    4. History of DVT/PE on Xarelto. No bleeding problems.    5. Hyperlipidemia, treated with Mevacor.    6. Diabetes mellitus, treated with Metformin, stable. Recent HgbA1c was good.    7. Chronic kidney disease, stable.    8. Left great toe pain, now resolved.     He is doing well, and remains stable. Same medications for now. FU in 6 months for next AICD check; FU sooner if clinical condition changes.

## 2021-10-12 ENCOUNTER — PATIENT MESSAGE (OUTPATIENT)
Dept: HEALTH INFORMATION MANAGEMENT | Facility: OTHER | Age: 71
End: 2021-10-12

## 2021-10-13 ENCOUNTER — TELEPHONE (OUTPATIENT)
Dept: MEDICAL GROUP | Facility: LAB | Age: 71
End: 2021-10-13

## 2021-10-13 NOTE — TELEPHONE ENCOUNTER
ESTABLISHED PATIENT PRE-VISIT PLANNING     Patient was NOT contacted to complete PVP.     Note: Patient will not be contacted if there is no indication to call.     1.  Reviewed notes from the last few office visits within the medical group: Yes    2.  If any orders were placed at last visit or intended to be done for this visit (i.e. 6 mos follow-up), do we have Results/Consult Notes?         •  Labs - Labs were not ordered at last office visit.  Note: If patient appointment is for lab review and patient did not complete labs, check with provider if OK to reschedule patient until labs completed.       •  Imaging - Imaging was not ordered at last office visit.       •  Referrals - No referrals were ordered at last office visit.    3. Is this appointment scheduled as a Hospital Follow-Up? No    4.  Immunizations were updated in Epic using Reconcile Outside Information activity? Yes    5.  Patient is due for the following Health Maintenance Topics:   Health Maintenance Due   Topic Date Due   • URINE ACR / MICROALBUMIN  05/27/2021   • A1C SCREENING  09/05/2021   • DIABETES MONOFILAMENT / LE EXAM  10/14/2021     6.  AHA (Pulse8) form printed for Provider? No, patient does not have any open alerts

## 2021-10-20 ENCOUNTER — OFFICE VISIT (OUTPATIENT)
Dept: MEDICAL GROUP | Facility: LAB | Age: 71
End: 2021-10-20
Payer: MEDICARE

## 2021-10-20 VITALS
BODY MASS INDEX: 22.26 KG/M2 | DIASTOLIC BLOOD PRESSURE: 60 MMHG | HEART RATE: 76 BPM | TEMPERATURE: 97.8 F | HEIGHT: 71 IN | OXYGEN SATURATION: 96 % | RESPIRATION RATE: 16 BRPM | WEIGHT: 159 LBS | SYSTOLIC BLOOD PRESSURE: 110 MMHG

## 2021-10-20 DIAGNOSIS — N18.32 STAGE 3B CHRONIC KIDNEY DISEASE: ICD-10-CM

## 2021-10-20 DIAGNOSIS — E11.65 TYPE 2 DIABETES MELLITUS WITH HYPERGLYCEMIA, WITHOUT LONG-TERM CURRENT USE OF INSULIN (HCC): ICD-10-CM

## 2021-10-20 LAB
HBA1C MFR BLD: 6.5 % (ref 0–5.6)
INT CON NEG: NEGATIVE
INT CON POS: POSITIVE

## 2021-10-20 PROCEDURE — 99214 OFFICE O/P EST MOD 30 MIN: CPT | Performed by: FAMILY MEDICINE

## 2021-10-20 PROCEDURE — 83036 HEMOGLOBIN GLYCOSYLATED A1C: CPT | Performed by: FAMILY MEDICINE

## 2021-10-20 ASSESSMENT — FIBROSIS 4 INDEX: FIB4 SCORE: 1.24

## 2021-10-20 NOTE — PATIENT INSTRUCTIONS
Diabetes Mellitus and Nutrition, Adult  When you have diabetes (diabetes mellitus), it is very important to have healthy eating habits because your blood sugar (glucose) levels are greatly affected by what you eat and drink. Eating healthy foods in the appropriate amounts, at about the same times every day, can help you:  · Control your blood glucose.  · Lower your risk of heart disease.  · Improve your blood pressure.  · Reach or maintain a healthy weight.  Every person with diabetes is different, and each person has different needs for a meal plan. Your health care provider may recommend that you work with a diet and nutrition specialist (dietitian) to make a meal plan that is best for you. Your meal plan may vary depending on factors such as:  · The calories you need.  · The medicines you take.  · Your weight.  · Your blood glucose, blood pressure, and cholesterol levels.  · Your activity level.  · Other health conditions you have, such as heart or kidney disease.  How do carbohydrates affect me?  Carbohydrates, also called carbs, affect your blood glucose level more than any other type of food. Eating carbs naturally raises the amount of glucose in your blood. Carb counting is a method for keeping track of how many carbs you eat. Counting carbs is important to keep your blood glucose at a healthy level, especially if you use insulin or take certain oral diabetes medicines.  It is important to know how many carbs you can safely have in each meal. This is different for every person. Your dietitian can help you calculate how many carbs you should have at each meal and for each snack.  Foods that contain carbs include:  · Bread, cereal, rice, pasta, and crackers.  · Potatoes and corn.  · Peas, beans, and lentils.  · Milk and yogurt.  · Fruit and juice.  · Desserts, such as cakes, cookies, ice cream, and candy.  How does alcohol affect me?  Alcohol can cause a sudden decrease in blood glucose (hypoglycemia),  "especially if you use insulin or take certain oral diabetes medicines. Hypoglycemia can be a life-threatening condition. Symptoms of hypoglycemia (sleepiness, dizziness, and confusion) are similar to symptoms of having too much alcohol.  If your health care provider says that alcohol is safe for you, follow these guidelines:  · Limit alcohol intake to no more than 1 drink per day for nonpregnant women and 2 drinks per day for men. One drink equals 12 oz of beer, 5 oz of wine, or 1½ oz of hard liquor.  · Do not drink on an empty stomach.  · Keep yourself hydrated with water, diet soda, or unsweetened iced tea.  · Keep in mind that regular soda, juice, and other mixers may contain a lot of sugar and must be counted as carbs.  What are tips for following this plan?    Reading food labels  · Start by checking the serving size on the \"Nutrition Facts\" label of packaged foods and drinks. The amount of calories, carbs, fats, and other nutrients listed on the label is based on one serving of the item. Many items contain more than one serving per package.  · Check the total grams (g) of carbs in one serving. You can calculate the number of servings of carbs in one serving by dividing the total carbs by 15. For example, if a food has 30 g of total carbs, it would be equal to 2 servings of carbs.  · Check the number of grams (g) of saturated and trans fats in one serving. Choose foods that have low or no amount of these fats.  · Check the number of milligrams (mg) of salt (sodium) in one serving. Most people should limit total sodium intake to less than 2,300 mg per day.  · Always check the nutrition information of foods labeled as \"low-fat\" or \"nonfat\". These foods may be higher in added sugar or refined carbs and should be avoided.  · Talk to your dietitian to identify your daily goals for nutrients listed on the label.  Shopping  · Avoid buying canned, premade, or processed foods. These foods tend to be high in fat, sodium, " and added sugar.  · Shop around the outside edge of the grocery store. This includes fresh fruits and vegetables, bulk grains, fresh meats, and fresh dairy.  Cooking  · Use low-heat cooking methods, such as baking, instead of high-heat cooking methods like deep frying.  · Cook using healthy oils, such as olive, canola, or sunflower oil.  · Avoid cooking with butter, cream, or high-fat meats.  Meal planning  · Eat meals and snacks regularly, preferably at the same times every day. Avoid going long periods of time without eating.  · Eat foods high in fiber, such as fresh fruits, vegetables, beans, and whole grains. Talk to your dietitian about how many servings of carbs you can eat at each meal.  · Eat 4-6 ounces (oz) of lean protein each day, such as lean meat, chicken, fish, eggs, or tofu. One oz of lean protein is equal to:  ? 1 oz of meat, chicken, or fish.  ? 1 egg.  ? ¼ cup of tofu.  · Eat some foods each day that contain healthy fats, such as avocado, nuts, seeds, and fish.  Lifestyle  · Check your blood glucose regularly.  · Exercise regularly as told by your health care provider. This may include:  ? 150 minutes of moderate-intensity or vigorous-intensity exercise each week. This could be brisk walking, biking, or water aerobics.  ? Stretching and doing strength exercises, such as yoga or weightlifting, at least 2 times a week.  · Take medicines as told by your health care provider.  · Do not use any products that contain nicotine or tobacco, such as cigarettes and e-cigarettes. If you need help quitting, ask your health care provider.  · Work with a counselor or diabetes educator to identify strategies to manage stress and any emotional and social challenges.  Questions to ask a health care provider  · Do I need to meet with a diabetes educator?  · Do I need to meet with a dietitian?  · What number can I call if I have questions?  · When are the best times to check my blood glucose?  Where to find more  information:  · American Diabetes Association: diabetes.org  · Academy of Nutrition and Dietetics: www.eatright.org  · National [place] of Diabetes and Digestive and Kidney Diseases (NIH): www.niddk.nih.gov  Summary  · A healthy meal plan will help you control your blood glucose and maintain a healthy lifestyle.  · Working with a diet and nutrition specialist (dietitian) can help you make a meal plan that is best for you.  · Keep in mind that carbohydrates (carbs) and alcohol have immediate effects on your blood glucose levels. It is important to count carbs and to use alcohol carefully.  This information is not intended to replace advice given to you by your health care provider. Make sure you discuss any questions you have with your health care provider.  Document Released: 09/14/2006 Document Revised: 11/30/2018 Document Reviewed: 01/22/2018  [employer] Patient Education © 2020 [employer]  Diabetes Mellitus and Foot Care  Foot care is an important part of your health, especially when you have diabetes. Diabetes may cause you to have problems because of poor blood flow (circulation) to your feet and legs, which can cause your skin to:  · Become thinner and drier.  · Break more easily.  · Heal more slowly.  · Peel and crack.  You may also have nerve damage (neuropathy) in your legs and feet, causing decreased feeling in them. This means that you may not notice minor injuries to your feet that could lead to more serious problems. Noticing and addressing any potential problems early is the best way to prevent future foot problems.  How to care for your feet  Foot hygiene  · Wash your feet daily with warm water and mild soap. Do not use hot water. Then, pat your feet and the areas between your toes until they are completely dry. Do not soak your feet as this can dry your skin.  · Trim your toenails straight across. Do not dig under them or around the cuticle. File the edges of your nails with an emery board or nail  file.  · Apply a moisturizing lotion or petroleum jelly to the skin on your feet and to dry, brittle toenails. Use lotion that does not contain alcohol and is unscented. Do not apply lotion between your toes.  Shoes and socks  · Wear clean socks or stockings every day. Make sure they are not too tight. Do not wear knee-high stockings since they may decrease blood flow to your legs.  · Wear shoes that fit properly and have enough cushioning. Always look in your shoes before you put them on to be sure there are no objects inside.  · To break in new shoes, wear them for just a few hours a day. This prevents injuries on your feet.  Wounds, scrapes, corns, and calluses  · Check your feet daily for blisters, cuts, bruises, sores, and redness. If you cannot see the bottom of your feet, use a mirror or ask someone for help.  · Do not cut corns or calluses or try to remove them with medicine.  · If you find a minor scrape, cut, or break in the skin on your feet, keep it and the skin around it clean and dry. You may clean these areas with mild soap and water. Do not clean the area with peroxide, alcohol, or iodine.  · If you have a wound, scrape, corn, or callus on your foot, look at it several times a day to make sure it is healing and not infected. Check for:  ? Redness, swelling, or pain.  ? Fluid or blood.  ? Warmth.  ? Pus or a bad smell.  General instructions  · Do not cross your legs. This may decrease blood flow to your feet.  · Do not use heating pads or hot water bottles on your feet. They may burn your skin. If you have lost feeling in your feet or legs, you may not know this is happening until it is too late.  · Protect your feet from hot and cold by wearing shoes, such as at the beach or on hot pavement.  · Schedule a complete foot exam at least once a year (annually) or more often if you have foot problems. If you have foot problems, report any cuts, sores, or bruises to your health care provider  immediately.  Contact a health care provider if:  · You have a medical condition that increases your risk of infection and you have any cuts, sores, or bruises on your feet.  · You have an injury that is not healing.  · You have redness on your legs or feet.  · You feel burning or tingling in your legs or feet.  · You have pain or cramps in your legs and feet.  · Your legs or feet are numb.  · Your feet always feel cold.  · You have pain around a toenail.  Get help right away if:  · You have a wound, scrape, corn, or callus on your foot and:  ? You have pain, swelling, or redness that gets worse.  ? You have fluid or blood coming from the wound, scrape, corn, or callus.  ? Your wound, scrape, corn, or callus feels warm to the touch.  ? You have pus or a bad smell coming from the wound, scrape, corn, or callus.  ? You have a fever.  ? You have a red line going up your leg.  Summary  · Check your feet every day for cuts, sores, red spots, swelling, and blisters.  · Moisturize feet and legs daily.  · Wear shoes that fit properly and have enough cushioning.  · If you have foot problems, report any cuts, sores, or bruises to your health care provider immediately.  · Schedule a complete foot exam at least once a year (annually) or more often if you have foot problems.  This information is not intended to replace advice given to you by your health care provider. Make sure you discuss any questions you have with your health care provider.  Document Released: 12/15/2001 Document Revised: 01/30/2019 Document Reviewed: 01/19/2018  [employer] Patient Education © 2020 [employer]

## 2021-11-02 NOTE — ASSESSMENT & PLAN NOTE
Stable. Currently taking Metformin 500 mg twice daily as directed.  Denies side effects or hypoglycemic events.  He is not monitoring blood sugar regularly at home.  Reports diet is stable  He is exercising regularly.  Last eye exam: In this last year  Denies polyuria, polydipsia, blurred vision, or neuropathies.

## 2021-11-02 NOTE — PROGRESS NOTES
Subjective:     Chief Complaint   Patient presents with   • Diabetes     3 mo FV       Jaime Lee is a 71 y.o. male here today for evaluation and management of:    Type 2 diabetes mellitus with hyperglycemia, without long-term current use of insulin ([facility])  Stable. Currently taking Metformin 500 mg twice daily as directed.  Denies side effects or hypoglycemic events.  He is not monitoring blood sugar regularly at home.  Reports diet is stable  He is exercising regularly.  Last eye exam: In this last year  Denies polyuria, polydipsia, blurred vision, or neuropathies.         Allergies   Allergen Reactions   • Bloodless    • Pcn [Penicillins] Anaphylaxis   • Ace Inhibitors Cough     Cough       Current medicines (including changes today)  Current Outpatient Medications   Medication Sig Dispense Refill   • Non Formulary Request Take 1 tablet by mouth every evening. Vitamin C/Zinc/Vitamin D/AlderBerry Vitamin     • metFORMIN (GLUCOPHAGE) 500 MG Tab TAKE 1 TABLET BY MOUTH TWICE A DAY WITH MEALS 200 tablet 3   • vitamin D (CHOLECALCIFEROL) 1000 Unit (25 mcg) Tab Take 1,000 Units by mouth every day.     • lovastatin (MEVACOR) 10 MG tablet Take 1 tablet by mouth every evening. 100 tablet 3   • metoprolol SR (TOPROL XL) 100 MG TABLET SR 24 HR Take 1 tablet by mouth every day. 100 tablet 3   • losartan (COZAAR) 25 MG Tab Take 0.5 Tablets by mouth every day. 50 tablet 3   • spironolactone (ALDACTONE) 25 MG Tab Take 0.5 Tablets by mouth every day. 50 tablet 3   • potassium chloride SA (KDUR) 20 MEQ Tab CR Take 1 tablet by mouth every day. 100 tablet 3   • rivaroxaban (XARELTO) 20 MG Tab tablet Take 1 tablet by mouth with dinner. 100 tablet 3   • furosemide (LASIX) 40 MG Tab Take 1 tablet by mouth every day. 100 tablet 3   • Magnesium 500 MG Cap Take 1 Cap by mouth every day. 180 Cap 4     No current facility-administered medications for this visit.       He  has a past medical history of Acute anterior  "myocardial infarction (HCC) (12/28/2000), AICD (automatic cardioverter/defibrillator) present (2003/2008/2015), Allergy, CAD (coronary artery disease) (12/28/2000), Congestive heart failure (HCC) (12/28/2000), Dental disorder, GERD (gastroesophageal reflux disease), Ischemic cardiomyopathy (02/2021), Left bundle branch block ( ), Pneumonia (2014), Pulmonary embolism (HCC) (March 2014), and Type 2 diabetes mellitus without complication ([facility]) ( ).    Patient Active Problem List    Diagnosis Date Noted   • Left foot pain 04/21/2021   • Vascular disorder of extremity ([facility]) 02/11/2020   • Ventricular tachycardia ([facility]) 04/25/2019   • RBBB 02/04/2019   • Macrocytosis without anemia 04/24/2018   • Chronic anticoagulation 02/01/2018   • Basal cell carcinoma of skin of left ear 02/01/2018   • Chronic deep vein thrombosis (DVT) of femoral vein of left lower extremity ([facility]) 01/04/2018   • Basal cell carcinoma of skin of face 11/28/2017   • Hyperlipidemia 01/18/2017   • S/P CABG x 4 06/22/2016   • Type 2 diabetes mellitus with hyperglycemia, without long-term current use of insulin ([facility]) 06/22/2016   • Old anterior myocardial infarction 06/22/2016   • Chronic systolic congestive heart failure, NYHA class 2 ([facility]) 06/22/2016   • AICD (automatic cardioverter/defibrillator) present 03/11/2014   • Ischemic cardiomyopathy 03/11/2014   • History of pulmonary embolus (PE) 02/27/2014   • Stage 3b chronic kidney disease ([facility]) 02/24/2014   • Thrombocytopenia ([facility]) 02/23/2014   • Coronary artery disease involving native coronary artery of native heart without angina pectoris 12/28/2000       ROS   No fever or chills.  No nausea or vomiting.  No chest pain or palpitations.  No cough or SOB.  No pain with urination or hematuria.  No black or bloody stools.       Objective:     /60 (BP Location: Right arm, Patient Position: Sitting, BP Cuff Size: Adult)   Pulse 76   Temp 36.6 °C (97.8 °F) (Temporal)   Resp 16   Ht 1.803 m (5' 11\")   " Wt 72.1 kg (159 lb)   SpO2 96%  Body mass index is 22.18 kg/m².   Physical Exam:  Well developed, well nourished.  Alert, oriented in no acute distress.  Eye contact is good, speech goal directed, affect calm  Eyes: conjunctiva non-injected, sclera non-icteric.  Neck Supple.  No adenopathy or masses in the neck or supraclavicular regions. No thyromegaly  Lungs: clear to auscultation bilaterally with good excursion. No wheezes or rhonchi  CV: regular rate and rhythm. No murmur  Monofilament testing with a 10 gram force: sensation intact: intact bilaterally  Visual Inspection: Feet without maceration, ulcers, fissures.  Pedal pulses: Mildly decreased bilaterally with decreased capillary refill and purple discoloration left greater than right        Assessment and Plan:   The following treatment plan was discussed    1. Type 2 diabetes mellitus with hyperglycemia, without long-term current use of insulin (HCC)  Stable.  Continue Metformin 500 mg twice a day  Denies side effects or hypoglycemic events.  Discussed monitoring blood sugar regularly at home.  Denies polyuria, polydipsia, blurred vision, or neuropathies.    - POCT Hemoglobin A1C  - MICROALBUMIN CREAT RATIO URINE; Future  - Diabetic Monofilament LE Exam    2. Stage 3b chronic kidney disease (HCC)  chronic condition which is stable. Currently avoids high dose NSAIDs. Denies nausea/vomiting, headache, loss of energy/unusual somnolence, mouth sores, skin discoloration or itching, muscle cramps. .       Any change or worsening of signs or symptoms, patient encouraged to follow-up or report to the emergency room for further evaluation. Patient understands and agrees.    Followup: Return in about 4 months (around 2/14/2022).

## 2021-12-14 DIAGNOSIS — I25.5 ISCHEMIC CARDIOMYOPATHY: ICD-10-CM

## 2021-12-14 DIAGNOSIS — I25.119 CORONARY ARTERY DISEASE INVOLVING NATIVE CORONARY ARTERY OF NATIVE HEART WITH ANGINA PECTORIS (HCC): ICD-10-CM

## 2021-12-16 NOTE — ED NOTES
Not taking CADUET (amlodipine + atorvastatin) to my knowledge    Is taking amlodipine 10  I will refill that.   This RN introduced self to patient & spouse. Second set of blood cultures collected & sent to lab. Patient reminded NPO until further notice. Plan of care explained & patient has no questions at this time. Safety precautions in place including gurney locked & in lowest position, rails up, call light within reach.

## 2021-12-20 RX ORDER — POTASSIUM CHLORIDE 20 MEQ/1
20 TABLET, EXTENDED RELEASE ORAL DAILY
Qty: 90 TABLET | Refills: 3 | Status: SHIPPED | OUTPATIENT
Start: 2021-12-20 | End: 2022-02-15 | Stop reason: SDUPTHER

## 2022-02-15 ENCOUNTER — PATIENT MESSAGE (OUTPATIENT)
Dept: HEALTH INFORMATION MANAGEMENT | Facility: OTHER | Age: 72
End: 2022-02-15

## 2022-02-15 ENCOUNTER — NON-PROVIDER VISIT (OUTPATIENT)
Dept: CARDIOLOGY | Facility: MEDICAL CENTER | Age: 72
End: 2022-02-15
Payer: MEDICARE

## 2022-02-15 ENCOUNTER — OFFICE VISIT (OUTPATIENT)
Dept: CARDIOLOGY | Facility: MEDICAL CENTER | Age: 72
End: 2022-02-15
Payer: MEDICARE

## 2022-02-15 VITALS
DIASTOLIC BLOOD PRESSURE: 62 MMHG | WEIGHT: 163 LBS | BODY MASS INDEX: 22.82 KG/M2 | HEIGHT: 71 IN | OXYGEN SATURATION: 94 % | SYSTOLIC BLOOD PRESSURE: 110 MMHG | RESPIRATION RATE: 16 BRPM | HEART RATE: 86 BPM

## 2022-02-15 DIAGNOSIS — Z86.711 HISTORY OF PULMONARY EMBOLUS (PE): ICD-10-CM

## 2022-02-15 DIAGNOSIS — E11.65 TYPE 2 DIABETES MELLITUS WITH HYPERGLYCEMIA, WITHOUT LONG-TERM CURRENT USE OF INSULIN (HCC): ICD-10-CM

## 2022-02-15 DIAGNOSIS — I47.20 VENTRICULAR TACHYCARDIA (HCC): ICD-10-CM

## 2022-02-15 DIAGNOSIS — Z95.810 AICD (AUTOMATIC CARDIOVERTER/DEFIBRILLATOR) PRESENT: ICD-10-CM

## 2022-02-15 DIAGNOSIS — I25.5 ISCHEMIC CARDIOMYOPATHY: ICD-10-CM

## 2022-02-15 DIAGNOSIS — I25.119 CORONARY ARTERY DISEASE INVOLVING NATIVE CORONARY ARTERY OF NATIVE HEART WITH ANGINA PECTORIS (HCC): ICD-10-CM

## 2022-02-15 DIAGNOSIS — N18.32 STAGE 3B CHRONIC KIDNEY DISEASE: ICD-10-CM

## 2022-02-15 DIAGNOSIS — I25.10 CORONARY ARTERY DISEASE INVOLVING NATIVE CORONARY ARTERY OF NATIVE HEART WITHOUT ANGINA PECTORIS: ICD-10-CM

## 2022-02-15 DIAGNOSIS — Z79.01 CHRONIC ANTICOAGULATION: ICD-10-CM

## 2022-02-15 DIAGNOSIS — E78.5 DYSLIPIDEMIA: ICD-10-CM

## 2022-02-15 DIAGNOSIS — Z95.1 S/P CABG X 4: ICD-10-CM

## 2022-02-15 DIAGNOSIS — I82.512 CHRONIC DEEP VEIN THROMBOSIS (DVT) OF FEMORAL VEIN OF LEFT LOWER EXTREMITY (HCC): ICD-10-CM

## 2022-02-15 PROCEDURE — 99214 OFFICE O/P EST MOD 30 MIN: CPT | Performed by: NURSE PRACTITIONER

## 2022-02-15 PROCEDURE — 93282 PRGRMG EVAL IMPLANTABLE DFB: CPT | Performed by: NURSE PRACTITIONER

## 2022-02-15 RX ORDER — POTASSIUM CHLORIDE 20 MEQ/1
20 TABLET, EXTENDED RELEASE ORAL DAILY
Qty: 90 TABLET | Refills: 3 | Status: SHIPPED | OUTPATIENT
Start: 2022-02-15 | End: 2023-03-06

## 2022-02-15 RX ORDER — METOPROLOL SUCCINATE 100 MG/1
100 TABLET, EXTENDED RELEASE ORAL
Qty: 100 TABLET | Refills: 3 | Status: SHIPPED | OUTPATIENT
Start: 2022-02-15 | End: 2023-02-07 | Stop reason: SDUPTHER

## 2022-02-15 RX ORDER — FUROSEMIDE 40 MG/1
40 TABLET ORAL DAILY
Qty: 100 TABLET | Refills: 3 | Status: SHIPPED | OUTPATIENT
Start: 2022-02-15 | End: 2023-02-07 | Stop reason: SDUPTHER

## 2022-02-15 RX ORDER — LOVASTATIN 10 MG/1
10 TABLET ORAL EVERY EVENING
Qty: 100 TABLET | Refills: 3 | Status: SHIPPED | OUTPATIENT
Start: 2022-02-15 | End: 2023-02-07 | Stop reason: SDUPTHER

## 2022-02-15 RX ORDER — SPIRONOLACTONE 25 MG/1
12.5 TABLET ORAL
Qty: 50 TABLET | Refills: 3 | Status: SHIPPED | OUTPATIENT
Start: 2022-02-15 | End: 2023-02-07 | Stop reason: SDUPTHER

## 2022-02-15 RX ORDER — LOSARTAN POTASSIUM 25 MG/1
12.5 TABLET ORAL DAILY
Qty: 50 TABLET | Refills: 3 | Status: SHIPPED | OUTPATIENT
Start: 2022-02-15 | End: 2023-02-07 | Stop reason: SDUPTHER

## 2022-02-15 ASSESSMENT — ENCOUNTER SYMPTOMS
FEVER: 0
HEADACHES: 0
ORTHOPNEA: 0
MYALGIAS: 0
SHORTNESS OF BREATH: 0
BRUISES/BLEEDS EASILY: 0
WEIGHT LOSS: 0
PND: 0
CHILLS: 0
PALPITATIONS: 0
INSOMNIA: 0
DEPRESSION: 0
ABDOMINAL PAIN: 0
NAUSEA: 0
LOSS OF CONSCIOUSNESS: 0
DIZZINESS: 1

## 2022-02-15 ASSESSMENT — FIBROSIS 4 INDEX: FIB4 SCORE: 1.24

## 2022-02-15 NOTE — PROGRESS NOTES
Chief Complaint   Patient presents with   • Follow-Up   • AICD Check/Dysfunction   • Coronary Artery Disease   • Cardiomyopathy (Ischemic)   • Ventricular Tachycardia   • Anticoagulation   • Hyperlipidemia   • Diabetes Mellitus       Subjective     Jaime Lee is a 71 y.o. male who presents today for six month follow-up for AICD interrogation, CAD/ischemic cardiomyopathy, history of VT, history of PE/DVT, and anticoagulation.    Jaime is a 71 year old male with history of CAD/ischemic cardiomyopathy status post remote CABG x 4, with LVEF 20-25% with AICD since 2003, and last generator replacement in 2015; he also has hyperlipidemia, CKD and diabetes, and a history of left LE DVT/PE, and is on Xarelto.     He is here today for six month follow-up. Generally, he is doing well. He still exercise daily (lifts weights and rides his stationery bike) without any problems. HR gets to 120-130bpm.  He denies any chest pain, pressure or discomfort; no palpitations. No dyspnea, orthopnea or PND. No device therapy.  No LE edema. He does have discoloration of his lower legs.    He is very anxious to travel again; he understands his physical limitations and inherent risks, but he really wants to get some traveling in.    Past Medical History:   Diagnosis Date   • Acute anterior myocardial infarction (HCC) 12/28/2000    PCI attempted by Dr. Malloy but unsuccessful   • AICD (automatic cardioverter/defibrillator) present 2003/2008/2015    Defibrillator is a Medtronic model SAWQ1G8, serial #[id number], implanted Dr. Stephenson November 2015. The ventricular lead is a Medtronic model #6947-65, lead serial #[id number], implant 7/2/2003   • Allergy     within 2 years   • CAD (coronary artery disease) 12/28/2000    CABG x 4 (LIMA to LAD, rSVG to first diagonal, left circumflex and PDA).2000: CABG x 4 (LIMA to LAD, rSVG to first diagonal, left circumflex and PDA).  2003: LIMA and circumflex grafts patent, diagonal and PDA grafts closed.    • Congestive heart failure (HCC) 2000   • Dental disorder     Upper partial   • GERD (gastroesophageal reflux disease)    • Ischemic cardiomyopathy 2021    Echocardiogram with severely dilated LV, LVEF 20%. Small RV. Enlarged RA and mildly dilated LA.   • Left bundle branch block     • Pneumonia    • Pulmonary embolism (HCC) 2014   • Type 2 diabetes mellitus without complication (HCC)      Diet controlled, followed by Dr. Walls     Past Surgical History:   Procedure Laterality Date   • RECOVERY  11/3/2015    Procedure: CATH LAB-STEPHENSON-ICD GENERATOR CHANGE 85636- EUNICE T82.111A-MEDTRONIC BLOODLESS;  Surgeon: Recoveryonly Surgery;  [place] [facility] [place]:    • AICD BATTERY CHANGE  2015    Generator replacement with Medtronic Evera S VR [id number] implanted by Dr. Marcus Stephenson.   • AICD IMPLANT  2008    Medtronic Virtuoso VR [id number] implanted by Dr. Stephenson.   • OTHER CARDIAC SURGERY  2000    CABG x 4   • APPENDECTOMY  1969   • MULTIPLE CORONARY ARTERY BYPASS       Family History   Problem Relation Age of Onset   • Diabetes Mother    • Heart Disease Mother    • Diabetes Sister    • Alcohol/Drug Brother      Social History     Socioeconomic History   • Marital status:      Spouse name: Not on file   • Number of children: Not on file   • Years of education: Not on file   • Highest education level: Not on file   Occupational History   • Not on file   Tobacco Use   • Smoking status: Former Smoker     Packs/day: 1.00     Years: 32.00     Pack years: 32.00     Types: Cigarettes     Quit date: 2000     Years since quittin.1   • Smokeless tobacco: Never Used   Vaping Use   • Vaping Use: Never used   Substance and Sexual Activity   • Alcohol use: Yes     Alcohol/week: 0.6 oz     Types: 1 Glasses of wine per week     Comment: occasionally, less than weekly   • Drug use: Not Currently     Types: Marijuana, Oral     Comment: CBD, THC, edibals    •  Sexual activity: Never   Other Topics Concern   • Not on file   Social History Narrative   • Not on file     Social Determinants of Health     Financial Resource Strain:    • Difficulty of Paying Living Expenses: Not on file   Food Insecurity:    • Worried About Running Out of Food in the Last Year: Not on file   • Ran Out of Food in the Last Year: Not on file   Transportation Needs:    • Lack of Transportation (Medical): Not on file   • Lack of Transportation (Non-Medical): Not on file   Physical Activity:    • Days of Exercise per Week: Not on file   • Minutes of Exercise per Session: Not on file   Stress:    • Feeling of Stress : Not on file   Social Connections:    • Frequency of Communication with Friends and Family: Not on file   • Frequency of Social Gatherings with Friends and Family: Not on file   • Attends [employer] Services: Not on file   • Active Member of Clubs or Organizations: Not on file   • Attends Club or Organization Meetings: Not on file   • Marital Status: Not on file   Intimate Partner Violence:    • Fear of Current or Ex-Partner: Not on file   • Emotionally Abused: Not on file   • Physically Abused: Not on file   • Sexually Abused: Not on file   Housing Stability:    • Unable to Pay for Housing in the Last Year: Not on file   • Number of Places Lived in the Last Year: Not on file   • Unstable Housing in the Last Year: Not on file     Allergies   Allergen Reactions   • Bloodless    • Pcn [Penicillins] Anaphylaxis   • Ace Inhibitors Cough     Cough     Outpatient Encounter Medications as of 2/15/2022   Medication Sig Dispense Refill   • furosemide (LASIX) 40 MG Tab Take 1 Tablet by mouth every day. 100 Tablet 3   • rivaroxaban (XARELTO) 20 MG Tab tablet Take 1 Tablet by mouth with dinner. 100 Tablet 3   • potassium chloride SA (KDUR) 20 MEQ Tab CR Take 1 Tablet by mouth every day. 90 Tablet 3   • spironolactone (ALDACTONE) 25 MG Tab Take 0.5 Tablets by mouth every day. 50 Tablet 3   • losartan  (COZAAR) 25 MG Tab Take 0.5 Tablets by mouth every day. 50 Tablet 3   • metoprolol SR (TOPROL XL) 100 MG TABLET SR 24 HR Take 1 Tablet by mouth every day. 100 Tablet 3   • lovastatin (MEVACOR) 10 MG tablet Take 1 Tablet by mouth every evening. 100 Tablet 3   • [DISCONTINUED] potassium chloride SA (KDUR) 20 MEQ Tab CR TAKE 1 TABLET BY MOUTH EVERY DAY 90 Tablet 3   • Non Formulary Request Take 1 tablet by mouth every evening. Vitamin C/Zinc/Vitamin D/AlderBerry Vitamin     • metFORMIN (GLUCOPHAGE) 500 MG Tab TAKE 1 TABLET BY MOUTH TWICE A DAY WITH MEALS 200 tablet 3   • vitamin D (CHOLECALCIFEROL) 1000 Unit (25 mcg) Tab Take 1,000 Units by mouth every day.     • [DISCONTINUED] lovastatin (MEVACOR) 10 MG tablet Take 1 tablet by mouth every evening. 100 tablet 3   • [DISCONTINUED] metoprolol SR (TOPROL XL) 100 MG TABLET SR 24 HR Take 1 tablet by mouth every day. 100 tablet 3   • [DISCONTINUED] losartan (COZAAR) 25 MG Tab Take 0.5 Tablets by mouth every day. 50 tablet 3   • [DISCONTINUED] spironolactone (ALDACTONE) 25 MG Tab Take 0.5 Tablets by mouth every day. 50 tablet 3   • [DISCONTINUED] rivaroxaban (XARELTO) 20 MG Tab tablet Take 1 tablet by mouth with dinner. 100 tablet 3   • [DISCONTINUED] furosemide (LASIX) 40 MG Tab Take 1 tablet by mouth every day. 100 tablet 3   • Magnesium 500 MG Cap Take 1 Cap by mouth every day. 180 Cap 4     No facility-administered encounter medications on file as of 2/15/2022.     Review of Systems   Constitutional: Negative for chills, fever and weight loss.   HENT: Negative for congestion.    Respiratory: Negative for shortness of breath.    Cardiovascular: Negative for chest pain, palpitations, orthopnea, leg swelling and PND.   Gastrointestinal: Negative for abdominal pain and nausea.   Musculoskeletal: Negative for myalgias.   Skin: Negative for rash.   Neurological: Positive for dizziness. Negative for loss of consciousness and headaches.        Chronic, stable, mostly when getting  up and changing positions.   Endo/Heme/Allergies: Does not bruise/bleed easily.   Psychiatric/Behavioral: Negative for depression. The patient does not have insomnia.               Objective     There were no vitals taken for this visit.    Physical Exam  Constitutional:       Appearance: He is well-developed.   HENT:      Head: Normocephalic.   Neck:      Vascular: No JVD.   Cardiovascular:      Rate and Rhythm: Normal rate and regular rhythm.      Heart sounds: Normal heart sounds.      Comments: AICD in left chest wall.  Sternotomy scar present.  Pulmonary:      Effort: Pulmonary effort is normal. No respiratory distress.      Breath sounds: Normal breath sounds. No wheezing or rales.   Abdominal:      General: Bowel sounds are normal. There is no distension.      Palpations: Abdomen is soft.      Tenderness: There is no abdominal tenderness.   Musculoskeletal:         General: Normal range of motion.      Cervical back: Normal range of motion and neck supple.   Skin:     General: Skin is warm and dry.      Findings: No rash.      Comments: Skin changes of chronic venous stasis in lower legs.   Neurological:      Mental Status: He is alert and oriented to person, place, and time.     AICD interrogation today reveals normal function. No ventricular high rate episodes, and no device therapy.    CONCLUSIONS OF ECHOCARDIOGRAM OF 2/17/2021:  No significant chnage compared to prior echo in 2019  Left ventricle is severely dilated.  Left ventricular ejection fraction is visually estimated to be 20%.  No significant valve disease or flow abnormalities.      Lab Results   Component Value Date/Time    CHOLSTRLTOT 166 03/05/2021 09:07 AM    LDL 85 03/05/2021 09:07 AM    HDL 57 03/05/2021 09:07 AM    TRIGLYCERIDE 120 03/05/2021 09:07 AM       Lab Results   Component Value Date/Time    SODIUM 138 04/22/2021 10:45 AM    POTASSIUM 4.1 04/22/2021 10:45 AM    CHLORIDE 98 04/22/2021 10:45 AM    CO2 29 04/22/2021 10:45 AM     GLUCOSE 191 (H) 04/22/2021 10:45 AM    BUN 26 (H) 04/22/2021 10:45 AM    CREATININE 1.39 04/22/2021 10:45 AM    CREATININE 1.0 01/08/2008 07:45 AM     Lab Results   Component Value Date/Time    ALKPHOSPHAT 72 04/22/2021 10:45 AM    ASTSGOT 14 04/22/2021 10:45 AM    ALTSGPT 11 04/22/2021 10:45 AM    TBILIRUBIN 0.8 04/22/2021 10:45 AM        Assessment & Plan     1. AICD (automatic cardioverter/defibrillator) present     2. Coronary artery disease involving native coronary artery of native heart with angina pectoris (HCC)  furosemide (LASIX) 40 MG Tab    potassium chloride SA (KDUR) 20 MEQ Tab CR    losartan (COZAAR) 25 MG Tab    metoprolol SR (TOPROL XL) 100 MG TABLET SR 24 HR    lovastatin (MEVACOR) 10 MG tablet   3. Coronary artery disease involving native coronary artery of native heart without angina pectoris     4. Ischemic cardiomyopathy  furosemide (LASIX) 40 MG Tab    potassium chloride SA (KDUR) 20 MEQ Tab CR    spironolactone (ALDACTONE) 25 MG Tab    metoprolol SR (TOPROL XL) 100 MG TABLET SR 24 HR    lovastatin (MEVACOR) 10 MG tablet   5. Ventricular tachycardia (HCC)     6. S/P CABG x 4     7. Chronic deep vein thrombosis (DVT) of femoral vein of left lower extremity (HCC)  rivaroxaban (XARELTO) 20 MG Tab tablet   8. History of pulmonary embolus (PE)     9. Chronic anticoagulation     10. Dyslipidemia  lovastatin (MEVACOR) 10 MG tablet   11. Type 2 diabetes mellitus with hyperglycemia, without long-term current use of insulin ([facility])     12. Stage 3b chronic kidney disease (HCC)         Medical Decision Making: Today's Assessment/Status/Plan:      1. CAD/ischemic cardiomyopathy with LVEF 20-25%, status post CABG x 4, with single chamber AICD. He remains on Xarelto 20mg, Toprol XL 100mg once daily, Losartan 12.5mg once daily. Lasix 40mg/Aldactone 12.5mg/KCl 20mEq once daily and Mevacor 10mg once daily. Device is working normally.    2. History of VT, on Toprol XL 100mg once daily, No device therapy.    3.  History of DVT/PE, anticoagulated with Xarelto 20mg once daily. No bleeding problems.    4. Hyperlipidemia, treated with Mevacor 10mg.    5. Diabetes mellitus, treated with Metformin. Last HgbA1c was good.    6. Chronic kidney disease, stable.    He remains stable at his time. We discussed HR limitations when exercising. Same medications for now. Offered repeat echocardiogram; he declines for now. Will consider echo next year. Labs per PCP. Follow-up with me in 6 months for next AICD check. Follow-up sooner if clinical condition changes.

## 2022-02-21 ENCOUNTER — PATIENT MESSAGE (OUTPATIENT)
Dept: CARDIOLOGY | Facility: MEDICAL CENTER | Age: 72
End: 2022-02-21
Payer: MEDICARE

## 2022-02-23 DIAGNOSIS — I25.5 ISCHEMIC CARDIOMYOPATHY: ICD-10-CM

## 2022-02-23 DIAGNOSIS — I47.20 VENTRICULAR TACHYCARDIA (HCC): ICD-10-CM

## 2022-02-23 DIAGNOSIS — I25.119 CORONARY ARTERY DISEASE INVOLVING NATIVE CORONARY ARTERY OF NATIVE HEART WITH ANGINA PECTORIS (HCC): ICD-10-CM

## 2022-02-23 DIAGNOSIS — Z95.810 AICD (AUTOMATIC CARDIOVERTER/DEFIBRILLATOR) PRESENT: ICD-10-CM

## 2022-02-23 NOTE — PATIENT COMMUNICATION
LEIF Rodríguez.  You 47 minutes ago (12:26 PM)         I put referral in (hopefully the correct one!)     AB    Message text      ----------------  NOTED

## 2022-03-03 ENCOUNTER — APPOINTMENT (OUTPATIENT)
Dept: MEDICAL GROUP | Facility: PHYSICIAN GROUP | Age: 72
End: 2022-03-03
Payer: MEDICARE

## 2022-03-03 PROBLEM — I73.9 VASCULAR DISORDER OF EXTREMITY (HCC): Status: RESOLVED | Noted: 2020-02-11 | Resolved: 2022-03-03

## 2022-03-03 PROBLEM — D68.69 SECONDARY HYPERCOAGULABLE STATE (HCC): Status: ACTIVE | Noted: 2022-03-03

## 2022-03-03 PROBLEM — I82.512 CHRONIC DEEP VEIN THROMBOSIS (DVT) OF FEMORAL VEIN OF LEFT LOWER EXTREMITY (HCC): Status: RESOLVED | Noted: 2018-01-04 | Resolved: 2022-03-03

## 2022-03-08 ENCOUNTER — TELEPHONE (OUTPATIENT)
Dept: MEDICAL GROUP | Facility: LAB | Age: 72
End: 2022-03-08
Payer: MEDICARE

## 2022-03-08 NOTE — TELEPHONE ENCOUNTER
ESTABLISHED PATIENT PRE-VISIT PLANNING     Patient was NOT contacted to complete PVP.     Note: Patient will not be contacted if there is no indication to call.     1.  Reviewed notes from the last few office visits within the medical group: Yes    2.  If any orders were placed at last visit or intended to be done for this visit (i.e. 6 mos follow-up), do we have Results/Consult Notes?         •  Labs - Labs were not ordered at last office visit.  Note: If patient appointment is for lab review and patient did not complete labs, check with provider if OK to reschedule patient until labs completed.       •  Imaging - Imaging was not ordered at last office visit.       •  Referrals - No referrals were ordered at last office visit.    3. Is this appointment scheduled as a Hospital Follow-Up? No    4.  Immunizations were updated in Epic using Reconcile Outside Information activity? Yes    5.  Patient is due for the following Health Maintenance Topics:   Health Maintenance Due   Topic Date Due   • URINE ACR / MICROALBUMIN  05/27/2021   • FASTING LIPID PROFILE  03/05/2022         6.  AHA (Pulse[employer]) form printed for Provider? Email sent to [facility] requesting form

## 2022-03-09 ENCOUNTER — OFFICE VISIT (OUTPATIENT)
Dept: MEDICAL GROUP | Facility: LAB | Age: 72
End: 2022-03-09
Payer: MEDICARE

## 2022-03-09 VITALS
DIASTOLIC BLOOD PRESSURE: 60 MMHG | BODY MASS INDEX: 22.68 KG/M2 | SYSTOLIC BLOOD PRESSURE: 102 MMHG | RESPIRATION RATE: 12 BRPM | OXYGEN SATURATION: 96 % | TEMPERATURE: 97.4 F | WEIGHT: 162 LBS | HEIGHT: 71 IN | HEART RATE: 72 BPM

## 2022-03-09 DIAGNOSIS — I45.10 RIGHT BUNDLE BRANCH BLOCK: ICD-10-CM

## 2022-03-09 DIAGNOSIS — Z86.711 HISTORY OF PULMONARY EMBOLUS (PE): ICD-10-CM

## 2022-03-09 DIAGNOSIS — D68.69 SECONDARY HYPERCOAGULABLE STATE (HCC): ICD-10-CM

## 2022-03-09 DIAGNOSIS — E11.22 TYPE 2 DIABETES MELLITUS WITH STAGE 3A CHRONIC KIDNEY DISEASE, WITHOUT LONG-TERM CURRENT USE OF INSULIN (HCC): ICD-10-CM

## 2022-03-09 DIAGNOSIS — I25.10 CORONARY ARTERY DISEASE INVOLVING NATIVE CORONARY ARTERY OF NATIVE HEART WITHOUT ANGINA PECTORIS: ICD-10-CM

## 2022-03-09 DIAGNOSIS — I50.22 CHRONIC SYSTOLIC CONGESTIVE HEART FAILURE, NYHA CLASS 2 (HCC): ICD-10-CM

## 2022-03-09 DIAGNOSIS — E78.2 MIXED HYPERLIPIDEMIA: ICD-10-CM

## 2022-03-09 DIAGNOSIS — Z00.00 MEDICARE ANNUAL WELLNESS VISIT, SUBSEQUENT: ICD-10-CM

## 2022-03-09 DIAGNOSIS — I25.5 ISCHEMIC CARDIOMYOPATHY: ICD-10-CM

## 2022-03-09 DIAGNOSIS — N18.31 TYPE 2 DIABETES MELLITUS WITH STAGE 3A CHRONIC KIDNEY DISEASE, WITHOUT LONG-TERM CURRENT USE OF INSULIN (HCC): ICD-10-CM

## 2022-03-09 DIAGNOSIS — Z95.1 S/P CABG X 4: ICD-10-CM

## 2022-03-09 DIAGNOSIS — N18.31 CHRONIC KIDNEY DISEASE (CKD) STAGE G3A/A1, MODERATELY DECREASED GLOMERULAR FILTRATION RATE (GFR) BETWEEN 45-59 ML/MIN/1.73 SQUARE METER AND ALBUMINURIA CREATININE RATIO LESS THAN 30 MG/G: ICD-10-CM

## 2022-03-09 DIAGNOSIS — I47.20 VENTRICULAR TACHYCARDIA (HCC): ICD-10-CM

## 2022-03-09 PROBLEM — M79.672 LEFT FOOT PAIN: Status: RESOLVED | Noted: 2021-04-21 | Resolved: 2022-03-09

## 2022-03-09 PROCEDURE — 99214 OFFICE O/P EST MOD 30 MIN: CPT | Performed by: FAMILY MEDICINE

## 2022-03-09 ASSESSMENT — ACTIVITIES OF DAILY LIVING (ADL): BATHING_REQUIRES_ASSISTANCE: 0

## 2022-03-09 ASSESSMENT — FIBROSIS 4 INDEX: FIB4 SCORE: 1.24

## 2022-03-09 ASSESSMENT — PATIENT HEALTH QUESTIONNAIRE - PHQ9: CLINICAL INTERPRETATION OF PHQ2 SCORE: 0

## 2022-03-09 ASSESSMENT — ENCOUNTER SYMPTOMS: GENERAL WELL-BEING: GOOD

## 2022-03-09 NOTE — PROGRESS NOTES
Chief Complaint   Patient presents with   • Annual Exam     SCP AWV    • Medication Management     Metroprolol          HPI:  LEWIS is a 71 y.o. male patient of Dr. Stephenson here for Medicare Annual Wellness Visit      Patient Active Problem List    Diagnosis Date Noted   • Secondary hypercoagulable state ([facility]) 03/03/2022   • Ventricular tachycardia ([facility]) 04/25/2019   • RBBB 02/04/2019   • Macrocytosis without anemia 04/24/2018   • Chronic anticoagulation 02/01/2018   • Basal cell carcinoma of skin of left ear 02/01/2018   • Basal cell carcinoma of skin of face 11/28/2017   • Hyperlipidemia 01/18/2017   • S/P CABG x 4 06/22/2016   • Type 2 diabetes mellitus with stage 3a chronic kidney disease, without long-term current use of insulin ([facility]) 06/22/2016   • Old anterior myocardial infarction 06/22/2016   • Chronic systolic congestive heart failure, NYHA class 2 ([facility]) 06/22/2016   • AICD (automatic cardioverter/defibrillator) present 03/11/2014   • Ischemic cardiomyopathy 03/11/2014   • History of pulmonary embolus (PE) 02/27/2014   • Chronic kidney disease (CKD) stage G3a/A1, moderately decreased glomerular filtration rate (GFR) between 45-59 mL/min/1.73 square meter and albuminuria creatinine ratio less than 30 mg/g ([facility]) 02/24/2014   • Coronary artery disease involving native coronary artery of native heart without angina pectoris 12/28/2000       Current Outpatient Medications   Medication Sig Dispense Refill   • furosemide (LASIX) 40 MG Tab Take 1 Tablet by mouth every day. 100 Tablet 3   • rivaroxaban (XARELTO) 20 MG Tab tablet Take 1 Tablet by mouth with dinner. 100 Tablet 3   • potassium chloride SA (KDUR) 20 MEQ Tab CR Take 1 Tablet by mouth every day. 90 Tablet 3   • spironolactone (ALDACTONE) 25 MG Tab Take 0.5 Tablets by mouth every day. 50 Tablet 3   • losartan (COZAAR) 25 MG Tab Take 0.5 Tablets by mouth every day. 50 Tablet 3   • metoprolol SR (TOPROL XL) 100 MG TABLET SR 24 HR Take 1 Tablet by mouth every  day. 100 Tablet 3   • lovastatin (MEVACOR) 10 MG tablet Take 1 Tablet by mouth every evening. 100 Tablet 3   • Non Formulary Request Take 1 Tablet by mouth every evening. Vitamin C/Zinc/Vitamin D/AlderBerry Vitamin     • metFORMIN (GLUCOPHAGE) 500 MG Tab TAKE 1 TABLET BY MOUTH TWICE A DAY WITH MEALS 200 tablet 3   • vitamin D (CHOLECALCIFEROL) 1000 Unit (25 mcg) Tab Take 1,000 Units by mouth every day.     • Magnesium 500 MG Cap Take 1 Cap by mouth every day. 180 Cap 4     No current facility-administered medications for this visit.        Patient is taking medications as noted in medication list.  Current supplements as per medication list.     Allergies: Bloodless, Pcn [penicillins], and Ace inhibitors    Current social contact/activities: Traveling     Is patient current with immunizations? Yes.    He  reports that he quit smoking about 21 years ago. His smoking use included cigarettes. He has a 32.00 pack-year smoking history. He has never used smokeless tobacco. He reports current alcohol use of about 0.6 oz of alcohol per week. He reports previous drug use. Drugs: Marijuana and Oral.  Counseling given: Not Answered        DPA/Advanced directive: Patient has Advanced Directive on file.     ROS:    Gait: Uses no assistive device   Ostomy: No   Other tubes: No   Amputations: No   Chronic oxygen use No   Last eye exam 2021   Wears hearing aids: No   : Denies any urinary leakage during the last 6 months      Screening:        Depression Screening  Little interest or pleasure in doing things?  0 - not at all  Feeling down, depressed, or hopeless? 0 - not at all  Patient Health Questionnaire Score: 0    If depressive symptoms identified deferred to follow up visit unless specifically addressed in assessment and plan.    Interpretation of PHQ-9 Total Score   Score Severity   1-4 No Depression   5-9 Mild Depression   10-14 Moderate Depression   15-19 Moderately Severe Depression   20-27 Severe Depression    Screening  for Cognitive Impairment  Three Minute Recall (daughter, heaven, mountain)  3/3    [place]ail clock face with all 12 numbers and set the hands to show 10 past 11.  Yes    If cognitive concerns identified, deferred for follow up unless specifically addressed in assessment and plan.    Fall Risk Assessment  Has the patient had two or more falls in the last year or any fall with injury in the last year?  No  If fall risk identified, deferred for follow up unless specifically addressed in assessment and plan.    Safety Assessment  Throw rugs on floor.  Yes  Handrails on all stairs.  Yes  Good lighting in all hallways.  Yes  Difficulty hearing.  No  Patient counseled about all safety risks that were identified.    Functional Assessment ADLs  Are there any barriers preventing you from cooking for yourself or meeting nutritional needs?  No.    Are there any barriers preventing you from driving safely or obtaining transportation?  No.    Are there any barriers preventing you from using a telephone or calling for help?  No.    Are there any barriers preventing you from shopping?  No.    Are there any barriers preventing you from taking care of your own finances?  No.    Are there any barriers preventing you from managing your medications?  No.    Are there any barriers preventing you from showering, bathing or dressing yourself?  No.    Are you currently engaging in any exercise or physical activity?  Yes.  Weights, stationary bike   What is your perception of your health?  Good.    Health Maintenance Summary          Ordered - URINE ACR / MICROALBUMIN (Yearly) Ordered on 3/9/2022    05/27/2020  MICROALBUMIN CREAT RATIO URINE    04/25/2019  MICROALBUMIN CREAT RATIO URINE (LAB COLLECT)    07/28/2017  MICROALBUMIN CREAT RATIO URINE          Ordered - FASTING LIPID PROFILE (Yearly) Ordered on 3/9/2022    03/05/2021  Lipid Profile    05/27/2020  Lipid Profile    04/25/2019  Lipid Profile    04/18/2018  LIPID PROFILE    07/26/2017   LIPID PROFILE    Only the first 5 history entries have been loaded, but more history exists.          Ordered - A1C SCREENING (Every 6 Months) Ordered on 3/9/2022    10/20/2021  POCT Hemoglobin A1C    03/05/2021  HEMOGLOBIN A1C    10/14/2020  POCT Hemoglobin A1C    07/21/2020  POCT Hemoglobin A1C    02/11/2020  POCT Hemoglobin A1C    Only the first 5 history entries have been loaded, but more history exists.          Ordered - SERUM CREATININE (Yearly) Ordered on 3/9/2022    04/22/2021  COMP METABOLIC PANEL    03/05/2021  Comp Metabolic Panel    05/27/2020  Comp Metabolic Panel    08/05/2019  Basic Metabolic Panel    04/25/2019  Comp Metabolic Panel    Only the first 5 history entries have been loaded, but more history exists.          RETINAL SCREENING (Yearly) Next due on 6/22/2022 06/22/2021  REFERRAL FOR RETINAL SCREENING EXAM    04/25/2019  POCT Retinal Eye Exam    02/01/2018  POCT Retinal Eye Exam          DIABETES MONOFILAMENT / LE EXAM (Yearly) Next due on 10/20/2022    10/20/2021  Diabetic Monofilament LE Exam    10/20/2021  SmartData: WORKFLOW - DIABETES - DIABETIC FOOT EXAM PERFORMED    10/14/2020  SmartData: WORKFLOW - DIABETES - DIABETIC FOOT EXAM PERFORMED    10/14/2020  Diabetic Monofilament Lower Extremity Exam    04/24/2019  Diabetic Monofilament Lower Extremity Exam    Only the first 5 history entries have been loaded, but more history exists.          Annual Wellness Visit (Every 366 Days) Next due on 3/10/2023    03/09/2022  Visit Dx: Medicare annual wellness visit, subsequent    03/09/2022  Level of Service: ANNUAL WELLNESS VISIT-INCLUDES PPPS SUBSEQUE*    03/03/2022  Level of Service: ANNUAL WELLNESS VISIT-INCLUDES PPPS SUBSEQUE*    04/07/2021  Subsequent Annual Wellness Visit - Includes PPPS ()    04/07/2021  Visit Dx: Medicare annual wellness visit, subsequent    Only the first 5 history entries have been loaded, but more history exists.          COLORECTAL CANCER SCREENING  (COLONOSCOPY - Every 10 Years) Next due on 4/22/2024 04/22/2014  COLONOSCOPY (Patient Declined)          IMM DTaP/Tdap/Td Vaccine (2 - Td or Tdap) Next due on 2/11/2030 02/11/2020  Imm Admin: Tdap Vaccine    10/16/2011  Imm Admin: TD Vaccine    07/27/1993  Imm Admin: TD Vaccine          HEPATITIS C SCREENING  Completed    06/02/2011  HEP C VIRUS ANTIBODY          IMM PNEUMOCOCCAL VACCINE: 65+ Years (Series Information) Completed    09/05/2017  Imm Admin: Pneumococcal polysaccharide vaccine (PPSV-23)    09/06/2016  Imm Admin: Pneumococcal Conjugate Vaccine (Prevnar/PCV-13)          IMM HEP B VACCINE (Series Information) Aged Out    10/14/2020  Imm Admin: Hepatitis B Vaccine (Adol/Adult)    07/21/2020  Imm Admin: Hepatitis B Vaccine (Adol/Adult)    02/11/2020  Imm Admin: Hepatitis B Vaccine (Adol/Adult)          IMM ZOSTER VACCINES (Series Information) Completed    10/14/2020  Imm Admin: Zoster Vaccine Recombinant (RZV) (SHINGRIX)    07/21/2020  Imm Admin: Zoster Vaccine Recombinant (RZV) (SHINGRIX)          IMM INFLUENZA (Series Information) Completed    09/17/2021  Imm Admin: Influenza Vaccine Quad Inj (Pf)    10/01/2020  Imm Admin: Influenza Vaccine Adult HD    09/03/2019  Imm Admin: Influenza Vaccine Adult HD    09/13/2018  Imm Admin: Influenza Vaccine Quad Inj (Pf)    09/05/2017  Imm Admin: Influenza Vaccine Adult HD    Only the first 5 history entries have been loaded, but more history exists.          COVID-19 Vaccine (Series Information) Completed    10/08/2021  Imm Admin: Pfizer SARS-CoV-2 Vaccine    03/03/2021  Imm Admin: Pfizer SARS-CoV-2 Vaccine    02/10/2021  Imm Admin: Pfizer SARS-CoV-2 Vaccine          IMM MENINGOCOCCAL VACCINE (MCV4) (Series Information) Aged Out    No completion history exists for this topic.                Patient Care Team:  Angelica Stephenson M.D. as PCP - General (Family Medicine)  Angelica Stephenson M.D. as PCP - [facility]  [facility]  Bianka Ahumada M.D.  as Consulting Physician (Dermatology)  CHRIS Rodríguez as Consulting Physician (Cardiovascular Disease (Cardiology))  Silvia Lau M.D. (Cardiovascular Disease (Cardiology))    Social History     Tobacco Use   • Smoking status: Former Smoker     Packs/day: 1.00     Years: 32.00     Pack years: 32.00     Types: Cigarettes     Quit date: 2000     Years since quittin.2   • Smokeless tobacco: Never Used   Vaping Use   • Vaping Use: Never used   Substance Use Topics   • Alcohol use: Yes     Alcohol/week: 0.6 oz     Types: 1 Glasses of wine per week     Comment: occasionally, less than weekly   • Drug use: Not Currently     Types: Marijuana, Oral     Comment: CBD, THC, edibals      Family History   Problem Relation Age of Onset   • Diabetes Mother    • Heart Disease Mother    • Diabetes Sister    • Alcohol/Drug Brother      He  has a past medical history of Acute anterior myocardial infarction (HCC) (2000), AICD (automatic cardioverter/defibrillator) present (), Allergy, CAD (coronary artery disease) (2000), Chronic deep vein thrombosis (DVT) of femoral vein of left lower extremity (HCC) (2018), Congestive heart failure (HCC) (2000), Dental disorder, GERD (gastroesophageal reflux disease), Ischemic cardiomyopathy (2021), Left bundle branch block ( ), Pneumonia (), Pulmonary embolism (HCC) (2014), Thrombocytopenia (HCC) (2014), Type 2 diabetes mellitus without complication ([facility]) ( ), and Vascular disorder of extremity ([facility]) (2020).   Past Surgical History:   Procedure Laterality Date   • RECOVERY  11/3/2015    Procedure: CATH LAB-MARCO A-ICD GENERATOR CHANGE 64414- EUNICE T82.111A-MEDTRONIC BLOODLESS;  Surgeon: Recoveryonly Surgery;  [place] [facility] [place]:    • AICD BATTERY CHANGE  2015    Generator replacement with Medtronic Evera S VR [id number] implanted by Dr. Marcus Stephenosn.   • AICD IMPLANT  2008     "Medtronic Virtuoso VR [id number] implanted by Dr. Stephenson.   • OTHER CARDIAC SURGERY  12/2000    CABG x 4   • APPENDECTOMY  1969   • MULTIPLE CORONARY ARTERY BYPASS             Exam:     /60 (BP Location: Right arm, Patient Position: Sitting, BP Cuff Size: Adult)   Pulse 72   Temp 36.3 °C (97.4 °F)   Resp 12   Ht 1.803 m (5' 11\")   Wt 73.5 kg (162 lb)   SpO2 96%  Body mass index is 22.59 kg/m².    Hearing good.    Dentition upper and lower dentures  Alert, oriented in no acute distress.  Eye contact is good, speech goal directed, affect calm  CV: RRR  Lungs: CTAB  Ext: No edema    Assessment and Plan. The following treatment and monitoring plan is recommended:      1. Medicare annual wellness visit, subsequent      2. Chronic kidney disease (CKD) stage G3a/A1, moderately decreased glomerular filtration rate (GFR) between 45-59 mL/min/1.73 square meter and albuminuria creatinine ratio less than 30 mg/g ([facility])  Monitor renal function, checking microalbuminuria as below.  - CBC WITH DIFFERENTIAL; Future  - Comp Metabolic Panel; Future    3. Chronic systolic congestive heart failure, NYHA class 2 (HCC)  4. Coronary artery disease involving native coronary artery of native heart without angina pectoris  Following regularly with cardiology.  He is on metoprolol, losartan, spironolactone, lovastatin.  He is looking into possibility of heart transplant.  Patient reports that provider at [facility] [place] mentioned that he should be on Coreg instead of metoprolol.  Discussed that benefit is seen in CHF with extended release metoprolol or Coreg and that I did not see a reason to switch at this point.  In addition, BP is low end of normal so may prefer metoprolol.  Recommended that he further discuss with cardiology.    5. History of pulmonary embolus (PE)  6. Secondary hypercoagulable state (HCC)  Stable on Xarelto.    7. Ventricular tachycardia (HCC)  8. Ischemic cardiomyopathy  9. S/P CABG x 4  10. RBBB  No return of V. tach.  " Continues to follow with cardiology as above.    11. Mixed hyperlipidemia  Continue statin.  Rechecking lipids.  - Lipid Profile; Future    12. Type 2 diabetes mellitus with stage 3a chronic kidney disease, without long-term current use of insulin (HCC)  Stable Metformin 500 mg twice daily.  We will recheck A1c.  - HEMOGLOBIN A1C; Future  - MICROALBUMIN CREAT RATIO URINE; Future    Services suggested: No services needed at this time  Health Care Screening recommendations as per orders if indicated.  Referrals offered: PT/OT/Nutrition counseling/Behavioral Health/Smoking cessation as per orders if indicated.    Discussion today about general wellness and lifestyle habits:    · Prevent falls and reduce trip hazards; Cautioned about securing or removing rugs.  · Have a working fire alarm and carbon monoxide detector;   · Engage in regular physical activity and social activities     Follow-up: Return in about 6 months (around 9/9/2022), or if symptoms worsen or fail to improve.

## 2022-04-02 ENCOUNTER — HOSPITAL ENCOUNTER (OUTPATIENT)
Dept: LAB | Facility: MEDICAL CENTER | Age: 72
End: 2022-04-02
Attending: FAMILY MEDICINE
Payer: MEDICARE

## 2022-04-02 DIAGNOSIS — E11.22 TYPE 2 DIABETES MELLITUS WITH STAGE 3A CHRONIC KIDNEY DISEASE, WITHOUT LONG-TERM CURRENT USE OF INSULIN (HCC): ICD-10-CM

## 2022-04-02 DIAGNOSIS — N18.31 CHRONIC KIDNEY DISEASE (CKD) STAGE G3A/A1, MODERATELY DECREASED GLOMERULAR FILTRATION RATE (GFR) BETWEEN 45-59 ML/MIN/1.73 SQUARE METER AND ALBUMINURIA CREATININE RATIO LESS THAN 30 MG/G: ICD-10-CM

## 2022-04-02 DIAGNOSIS — E78.2 MIXED HYPERLIPIDEMIA: ICD-10-CM

## 2022-04-02 DIAGNOSIS — N18.31 TYPE 2 DIABETES MELLITUS WITH STAGE 3A CHRONIC KIDNEY DISEASE, WITHOUT LONG-TERM CURRENT USE OF INSULIN (HCC): ICD-10-CM

## 2022-04-02 LAB
ALBUMIN SERPL BCP-MCNC: 4.9 G/DL (ref 3.2–4.9)
ALBUMIN/GLOB SERPL: 2.2 G/DL
ALP SERPL-CCNC: 63 U/L (ref 30–99)
ALT SERPL-CCNC: 12 U/L (ref 2–50)
ANION GAP SERPL CALC-SCNC: 13 MMOL/L (ref 7–16)
AST SERPL-CCNC: 15 U/L (ref 12–45)
BASOPHILS # BLD AUTO: 1 % (ref 0–1.8)
BASOPHILS # BLD: 0.07 K/UL (ref 0–0.12)
BILIRUB SERPL-MCNC: 1 MG/DL (ref 0.1–1.5)
BUN SERPL-MCNC: 22 MG/DL (ref 8–22)
CALCIUM SERPL-MCNC: 9.8 MG/DL (ref 8.5–10.5)
CHLORIDE SERPL-SCNC: 100 MMOL/L (ref 96–112)
CHOLEST SERPL-MCNC: 169 MG/DL (ref 100–199)
CO2 SERPL-SCNC: 27 MMOL/L (ref 20–33)
CREAT SERPL-MCNC: 1.21 MG/DL (ref 0.5–1.4)
CREAT UR-MCNC: 14.54 MG/DL
EOSINOPHIL # BLD AUTO: 0.08 K/UL (ref 0–0.51)
EOSINOPHIL NFR BLD: 1.1 % (ref 0–6.9)
ERYTHROCYTE [DISTWIDTH] IN BLOOD BY AUTOMATED COUNT: 45 FL (ref 35.9–50)
GFR SERPLBLD CREATININE-BSD FMLA CKD-EPI: 64 ML/MIN/1.73 M 2
GLOBULIN SER CALC-MCNC: 2.2 G/DL (ref 1.9–3.5)
GLUCOSE SERPL-MCNC: 134 MG/DL (ref 65–99)
HCT VFR BLD AUTO: 50.8 % (ref 42–52)
HDLC SERPL-MCNC: 58 MG/DL
HGB BLD-MCNC: 16.6 G/DL (ref 14–18)
IMM GRANULOCYTES # BLD AUTO: 0.02 K/UL (ref 0–0.11)
IMM GRANULOCYTES NFR BLD AUTO: 0.3 % (ref 0–0.9)
LDLC SERPL CALC-MCNC: 87 MG/DL
LYMPHOCYTES # BLD AUTO: 0.93 K/UL (ref 1–4.8)
LYMPHOCYTES NFR BLD: 13.2 % (ref 22–41)
MCH RBC QN AUTO: 30.7 PG (ref 27–33)
MCHC RBC AUTO-ENTMCNC: 32.7 G/DL (ref 33.7–35.3)
MCV RBC AUTO: 94.1 FL (ref 81.4–97.8)
MICROALBUMIN UR-MCNC: <1.2 MG/DL
MICROALBUMIN/CREAT UR: NORMAL MG/G (ref 0–30)
MONOCYTES # BLD AUTO: 0.58 K/UL (ref 0–0.85)
MONOCYTES NFR BLD AUTO: 8.2 % (ref 0–13.4)
NEUTROPHILS # BLD AUTO: 5.39 K/UL (ref 1.82–7.42)
NEUTROPHILS NFR BLD: 76.2 % (ref 44–72)
NRBC # BLD AUTO: 0 K/UL
NRBC BLD-RTO: 0 /100 WBC
PLATELET # BLD AUTO: 169 K/UL (ref 164–446)
PMV BLD AUTO: 12.3 FL (ref 9–12.9)
POTASSIUM SERPL-SCNC: 4.5 MMOL/L (ref 3.6–5.5)
PROT SERPL-MCNC: 7.1 G/DL (ref 6–8.2)
RBC # BLD AUTO: 5.4 M/UL (ref 4.7–6.1)
SODIUM SERPL-SCNC: 140 MMOL/L (ref 135–145)
TRIGL SERPL-MCNC: 118 MG/DL (ref 0–149)
WBC # BLD AUTO: 7.1 K/UL (ref 4.8–10.8)

## 2022-04-02 PROCEDURE — 82570 ASSAY OF URINE CREATININE: CPT

## 2022-04-02 PROCEDURE — 36415 COLL VENOUS BLD VENIPUNCTURE: CPT

## 2022-04-02 PROCEDURE — 80053 COMPREHEN METABOLIC PANEL: CPT

## 2022-04-02 PROCEDURE — 82043 UR ALBUMIN QUANTITATIVE: CPT

## 2022-04-02 PROCEDURE — 85025 COMPLETE CBC W/AUTO DIFF WBC: CPT

## 2022-04-02 PROCEDURE — 80061 LIPID PANEL: CPT

## 2022-04-02 PROCEDURE — 83036 HEMOGLOBIN GLYCOSYLATED A1C: CPT

## 2022-04-05 LAB
EST. AVERAGE GLUCOSE BLD GHB EST-MCNC: 151 MG/DL
HBA1C MFR BLD: 6.9 % (ref 4–5.6)

## 2022-05-09 DIAGNOSIS — E11.65 TYPE 2 DIABETES MELLITUS WITH HYPERGLYCEMIA, WITHOUT LONG-TERM CURRENT USE OF INSULIN (HCC): ICD-10-CM

## 2022-06-20 ENCOUNTER — TELEPHONE (OUTPATIENT)
Dept: HEALTH INFORMATION MANAGEMENT | Facility: OTHER | Age: 72
End: 2022-06-20
Payer: MEDICARE

## 2022-06-23 ENCOUNTER — HOSPITAL ENCOUNTER (OUTPATIENT)
Dept: PULMONOLOGY | Facility: MEDICAL CENTER | Age: 72
End: 2022-06-23
Payer: MEDICARE

## 2022-06-23 PROCEDURE — 94618 PULMONARY STRESS TESTING: CPT

## 2022-06-27 ENCOUNTER — HOSPITAL ENCOUNTER (OUTPATIENT)
Dept: PULMONOLOGY | Facility: MEDICAL CENTER | Age: 72
End: 2022-06-27
Attending: FAMILY MEDICINE
Payer: MEDICARE

## 2022-06-27 PROCEDURE — 94621 CARDIOPULM EXERCISE TESTING: CPT

## 2022-06-27 PROCEDURE — 94621 CARDIOPULM EXERCISE TESTING: CPT | Mod: 26 | Performed by: INTERNAL MEDICINE

## 2022-07-03 NOTE — PROCEDURES
DATE OF SERVICE:  06/23/2022     PULMONARY FUNCTION TEST INTERPRETATION     Six-minute walk test.     REFERRING PROVIDER:  Lele Ortiz MD     INTERPRETING PROVIDER:  Marciano Yoon III, MD     METHODS:  The baseline vital signs are measured and oxygen saturation, level   of dyspnea and level of fatigue are monitored during the walk.     RESULTS:  Baseline vital signs at rest were:  Blood pressure 108/68 mmHg; heart rate 80   beats per minute; and oxygen saturation 95% on room air.  Prior to the test,   the patient reported the dyspnea rated 0/10 and fatigue rated 0/10.  The   patient walked a total distance of 1130 feet with no rest.  At the end of the   test, heart rate was measured 118 beats per minute and oxygen saturation was   95% on room air.  Dyspnea was reported 1/10 and fatigue was reported as 0/10.        ______________________________  Marciano Yoon III, MD    WBG/ANGEL LUIS    DD:  07/02/2022 17:54  DT:  07/02/2022 18:44    Job#:  [id number]

## 2022-07-03 NOTE — PROCEDURES
"DATE OF SERVICE:  06/27/2022     CARDIOPULMONARY EXERCISE STRESS TESTING     REFERRING PROVIDER:  Lele Ortiz MD     INTERPRETING PROVIDER:  Marciano Yoon III, MD     REASON FOR TEST:  Dyspnea on exertion.     HISTORY OF PRESENT ILLNESS:  This is a 71-year-old male who is undergoing   cardiopulmonary exercise testing to workup his history of ischemic   cardiomyopathy in the context of evaluation for heart transplant.     ALLERGIES:  PENICILLINS, ACE INHIBITORS AND BLOODLESS.     PAST MEDICAL/SURGICAL HISTORY:  1.  Ischemic cardiomyopathy.  2.  Heart failure with reduced ejection fraction, status post ICD.  3.  Coronary artery disease, status post CABG in 2000.  4.  Hyperlipidemia.  5.  History of ventricular tachycardia.  6.  Type 2 diabetes.  7.  Chronic kidney disease.  8.  Unprovoked left lower extremity DVT and PE.  9.  Gastroesophageal reflux disease.  10.  History of appendectomy.     MEDICATIONS:  1.  Calcium phosphate.  2.  Furosemide 40 mg p.o. every day.  3.  Losartan.  4.  Lovastatin.  5.  Magnesium.  6.  Metformin.  7.  Metoprolol 100 mg extended release tablet p.o. every day.  8.  Xarelto 20 mg p.o. every day.  9.  Spironolactone.     SOCIAL HISTORY:  A 30-pack-year smoking history, quit in 2000.     FAMILY HISTORY:  Diabetes and heart disease in his mother, diabetes in a   sister.     METHODS:   This test consists of baseline data collected at rest and then 3   minutes of unloaded exercise followed by a ramped protocol of incremental   increases in resistance, followed by a 3-minute cool down period.  Throughout   the test, oxygen consumption, carbon dioxide production, tidal volume,   respiratory rate, EKG, heart rate, blood pressure, and pulse oximetry were   continuously measured.  The patient stopped the test at 76 mayers secondary to   \"fatigue.\"     RESULTS:    The maximal oxygen uptake is reduced at 1004 mL per minute (13.7 mL   per minute per kilogram) or 44% of maximum predicted.  " Heart rate is normal at   rest and increases in a blunted fashion with exercise.  Heart rate reserve   equals 36 beats per minute.  The blood pressure is normal at rest and   increases appropriately with exercise.  EKG shows sinus rhythm with a right   bundle branch block at rest.  With exertion, there are isolated ST elevations   in lead II, measuring 2.3 mm, not seen in contiguous leads with no reciprocal   changes.  Frequent PVCs are noted during exercise, but no sustained   arrhythmias/ventricular tachycardia.  Oxygen pulse was reduced at rest and   increases minimally during exercise.  Minute ventilation is slightly elevated   at rest and increases to a maximum of 40.1 liters per minute at 76 mayers, or   131% predicted of maximum.  Breathing reserve equals 48%.  Analysis of the   breathing pattern during exercise shows a normal increase in tidal volume   compared to respiratory rate.  Respiratory quotient is 1.02 at peak exercise.    Oxygen saturation was normal during testing; there are brief nonsustained   measurements reading in the low 80s that were likely due to poor pulse   oximetry contact, as otherwise, the SpO2 ranged from 91-95%.  Spirometry   before exercise shows a proportional reduction in the forced volumes   suggestive of a restrictive ventilatory defect.  Spirometry shows no significant  obstruction.  The reduced maximum voluntary ventilation of 76 liters per   minute on spirometry is normal when corrected for the reduced FEV1.  An   anaerobic threshold was detected by V-slope technique at 40% of maximum   predicted VO2.     INTERPRETATION:  1.  This test is considered submaximal as it does not meet any end of test   criteria.  Predicted VO2 max was not reached.  2.  There is abnormal cardiovascular responses to exercise.  With exertion,   there are isolated ST elevations in lead II as noted above.  There are   frequent PVCs, however no sustained arrhythmias or ventricular tachycardia . The  heart rate reserve is   elevated, which may be attributable to beta blockade.  Furthermore, the peak O2 pulse   plateaus early with increasing work, and falls in late exercise after   anaerobic threshold, consistent with history of ischemic cardiomyopathy.  As   noted above, anaerobic threshold occurred at 40% of maximum predicted VO2,   which is considered normal.  3.  There is otherwise no significant ventilatory or gas exchange   abnormalities seen on the study.  The variable SpO2 measurements are likely   attributable to a poor pulse oximetry readings as they are not sustained and   normalized with continued incremental exercise.        ______________________________  MD JANET Dawson III/CONSTANTINO/KAL    DD:  07/02/2022 17:50  DT:  07/02/2022 18:38    Job#:  [id number]

## 2022-08-01 ENCOUNTER — OFFICE VISIT (OUTPATIENT)
Dept: CARDIOLOGY | Facility: MEDICAL CENTER | Age: 72
End: 2022-08-01
Payer: MEDICARE

## 2022-08-01 ENCOUNTER — NON-PROVIDER VISIT (OUTPATIENT)
Dept: CARDIOLOGY | Facility: MEDICAL CENTER | Age: 72
End: 2022-08-01
Payer: MEDICARE

## 2022-08-01 VITALS
HEART RATE: 83 BPM | DIASTOLIC BLOOD PRESSURE: 52 MMHG | OXYGEN SATURATION: 95 % | SYSTOLIC BLOOD PRESSURE: 90 MMHG | RESPIRATION RATE: 14 BRPM | BODY MASS INDEX: 22.96 KG/M2 | HEIGHT: 71 IN | WEIGHT: 164 LBS

## 2022-08-01 VITALS
SYSTOLIC BLOOD PRESSURE: 90 MMHG | HEIGHT: 71 IN | WEIGHT: 164 LBS | BODY MASS INDEX: 22.96 KG/M2 | OXYGEN SATURATION: 95 % | DIASTOLIC BLOOD PRESSURE: 52 MMHG | RESPIRATION RATE: 14 BRPM | HEART RATE: 83 BPM

## 2022-08-01 DIAGNOSIS — N18.31 TYPE 2 DIABETES MELLITUS WITH STAGE 3A CHRONIC KIDNEY DISEASE, WITHOUT LONG-TERM CURRENT USE OF INSULIN (HCC): ICD-10-CM

## 2022-08-01 DIAGNOSIS — E78.2 MIXED HYPERLIPIDEMIA: ICD-10-CM

## 2022-08-01 DIAGNOSIS — Z95.1 S/P CABG X 4: ICD-10-CM

## 2022-08-01 DIAGNOSIS — I25.10 CORONARY ARTERY DISEASE INVOLVING NATIVE CORONARY ARTERY OF NATIVE HEART WITHOUT ANGINA PECTORIS: ICD-10-CM

## 2022-08-01 DIAGNOSIS — I25.5 ISCHEMIC CARDIOMYOPATHY: ICD-10-CM

## 2022-08-01 DIAGNOSIS — I47.20 VENTRICULAR TACHYCARDIA (HCC): ICD-10-CM

## 2022-08-01 DIAGNOSIS — Z79.01 CHRONIC ANTICOAGULATION: ICD-10-CM

## 2022-08-01 DIAGNOSIS — Z95.810 AICD (AUTOMATIC CARDIOVERTER/DEFIBRILLATOR) PRESENT: ICD-10-CM

## 2022-08-01 DIAGNOSIS — E11.22 TYPE 2 DIABETES MELLITUS WITH STAGE 3A CHRONIC KIDNEY DISEASE, WITHOUT LONG-TERM CURRENT USE OF INSULIN (HCC): ICD-10-CM

## 2022-08-01 DIAGNOSIS — Z86.711 HISTORY OF PULMONARY EMBOLUS (PE): ICD-10-CM

## 2022-08-01 DIAGNOSIS — E11.65 TYPE 2 DIABETES MELLITUS WITH HYPERGLYCEMIA, WITHOUT LONG-TERM CURRENT USE OF INSULIN (HCC): ICD-10-CM

## 2022-08-01 DIAGNOSIS — N18.31 CHRONIC KIDNEY DISEASE (CKD) STAGE G3A/A1, MODERATELY DECREASED GLOMERULAR FILTRATION RATE (GFR) BETWEEN 45-59 ML/MIN/1.73 SQUARE METER AND ALBUMINURIA CREATININE RATIO LESS THAN 30 MG/G: ICD-10-CM

## 2022-08-01 PROCEDURE — 99214 OFFICE O/P EST MOD 30 MIN: CPT | Performed by: NURSE PRACTITIONER

## 2022-08-01 PROCEDURE — 93282 PRGRMG EVAL IMPLANTABLE DFB: CPT | Performed by: NURSE PRACTITIONER

## 2022-08-01 RX ORDER — EMPAGLIFLOZIN 10 MG/1
10 TABLET, FILM COATED ORAL DAILY
Qty: 30 TABLET | Refills: 11 | Status: SHIPPED | OUTPATIENT
Start: 2022-08-01 | End: 2022-08-03 | Stop reason: SDUPTHER

## 2022-08-01 ASSESSMENT — ENCOUNTER SYMPTOMS
ORTHOPNEA: 0
LOSS OF CONSCIOUSNESS: 0
SHORTNESS OF BREATH: 0
NAUSEA: 0
FEVER: 0
WEIGHT LOSS: 0
MYALGIAS: 0
ABDOMINAL PAIN: 0
CHILLS: 0
BRUISES/BLEEDS EASILY: 0
DIZZINESS: 1
PALPITATIONS: 0
INSOMNIA: 0
HEADACHES: 0
PND: 0
DEPRESSION: 0

## 2022-08-01 ASSESSMENT — FIBROSIS 4 INDEX
FIB4 SCORE: 1.82
FIB4 SCORE: 1.82

## 2022-08-01 NOTE — PROGRESS NOTES
Chief Complaint   Patient presents with   • Follow-Up   • AICD Check/Dysfunction   • Coronary Artery Disease   • Cardiomyopathy (Ischemic)   • Ventricular Tachycardia   • Hyperlipidemia   • Diabetes (Controlled)   • Chronic Kidney Disease       Subjective     LEWIS Shar Lee is a 71 y.o. male who presents today for six month follow-up of CAD/ischemic cardiomyopathy, HFrEF LVEF 20-25%, VT, hyperlipidemia, DM and CKD.    Jaime is a 71 year old male with history of CAD/ischemic cardiomyopathy status post remote CABG x 4, HFrEF with LVEF 20-25% with AICD since 2003, and last generator replacement in 2015; he also has hyperlipidemia, CKD and diabetes, and a history of left LE DVT/PE, on Xarelto. He was last seen by me in February 2022.    In May 2022, he was evaluated by [facility] (Dr. Ortiz) for possible heart transplant; echocardiogram then showed LVEF 20-25% and severely dilated LV. He did also have cardiopulmonary stress testing done here at [facility].     He is here today for six month follow-up. Generally, he is doing well. He still exercise daily (lifts weights and rides his stationery bike for 20-30 minutes) without any problems. HR gets to 120-130bpm. He does get some fatigue with walking. He denies any chest pain, pressure or discomfort; no palpitations. No dyspnea, orthopnea or PND. No device therapy.  No LE edema. He does have discoloration of his lower legs.    Past Medical History:   Diagnosis Date   • Acute anterior myocardial infarction (HCC) 12/28/2000    PCI attempted by Dr. Malloy but unsuccessful   • AICD (automatic cardioverter/defibrillator) present 2003/2008/2015    Defibrillator is a Medtronic model GHRM7C4, serial #[id number], implanted Dr. Stephenson November 2015. The ventricular lead is a Medtronic model #6947-65, lead serial #[id number], implant 7/2/2003   • Allergy     within 2 years   • CAD (coronary artery disease) 12/28/2000    CABG x 4 (LIMA to LAD, rSVG to first  diagonal, left circumflex and PDA).2000: CABG x 4 (LIMA to LAD, rSVG to first diagonal, left circumflex and PDA).  2003: LIMA and circumflex grafts patent, diagonal and PDA grafts closed.   • Chronic deep vein thrombosis (DVT) of femoral vein of left lower extremity ([facility]) 01/04/2018   • Congestive heart failure ([facility]) 12/28/2000   • Dental disorder     Upper partial   • GERD (gastroesophageal reflux disease)    • Ischemic cardiomyopathy 05/2022    Echocardiogram with severely dilated LV, LVEF <25%. Severe diffuse hypokinesis. Septal akinesis. Normal LA and RV. Moderately dilated RA. Trace MR, mild TR. RVSP 23.8mmHg.   • Left bundle branch block     • Pneumonia 2014   • Pulmonary embolism ([facility]) 03/2014   • Thrombocytopenia ([facility]) 02/23/2014   • Type 2 diabetes mellitus without complication ([facility])      Diet controlled, followed by Dr. Walls   • Vascular disorder of extremity ([facility]) 02/11/2020     Past Surgical History:   Procedure Laterality Date   • RECOVERY  11/3/2015    Procedure: CATH LAB-MARCO A-ICD GENERATOR CHANGE 12939- EUNICE T82.111A-MEDTRONIC BLOODLESS;  Surgeon: Recoveryonly Surgery;  [place] [facility] [place]  Service:    • AICD BATTERY CHANGE  November 2015    Generator replacement with Medtronic Evera S VR [id number] implanted by Dr. Marcus Stephenson.   • AICD IMPLANT  January 2008    Medtronic Virtuoso VR [id number] implanted by Dr. Stephenson.   • OTHER CARDIAC SURGERY  12/2000    CABG x 4   • APPENDECTOMY  1969   • MULTIPLE CORONARY ARTERY BYPASS       Family History   Problem Relation Age of Onset   • Diabetes Mother    • Heart Disease Mother    • Diabetes Sister    • Alcohol/Drug Brother      Social History     Socioeconomic History   • Marital status:      Spouse name: Not on file   • Number of children: Not on file   • Years of education: Not on file   • Highest education level: Not on file   Occupational History   • Not on file   Tobacco Use   • Smoking status: Former Smoker     Packs/day: 1.00      Years: 32.00     Pack years: 32.00     Types: Cigarettes     Quit date: 2000     Years since quittin.6   • Smokeless tobacco: Never Used   Vaping Use   • Vaping Use: Never used   Substance and Sexual Activity   • Alcohol use: Yes     Alcohol/week: 0.6 oz     Types: 1 Glasses of wine per week     Comment: occasionally, less than weekly   • Drug use: Not Currently     Types: Marijuana, Oral     Comment: CBD, THC, edibals    • Sexual activity: Never   Other Topics Concern   • Not on file   Social History Narrative   • Not on file     Social Determinants of Health     Financial Resource Strain: Not on file   Food Insecurity: Not on file   Transportation Needs: Not on file   Physical Activity: Not on file   Stress: Not on file   Social Connections: Not on file   Intimate Partner Violence: Not on file   Housing Stability: Not on file     Allergies   Allergen Reactions   • Bloodless    • Pcn [Penicillins] Anaphylaxis   • Ace Inhibitors Cough     Cough     Outpatient Encounter Medications as of 2022   Medication Sig Dispense Refill   • metFORMIN (GLUCOPHAGE) 500 MG Tab TAKE 1 TABLET BY MOUTH TWICE A DAY WITH MEALS 200 Tablet 3   • furosemide (LASIX) 40 MG Tab Take 1 Tablet by mouth every day. 100 Tablet 3   • rivaroxaban (XARELTO) 20 MG Tab tablet Take 1 Tablet by mouth with dinner. 100 Tablet 3   • potassium chloride SA (KDUR) 20 MEQ Tab CR Take 1 Tablet by mouth every day. 90 Tablet 3   • spironolactone (ALDACTONE) 25 MG Tab Take 0.5 Tablets by mouth every day. 50 Tablet 3   • losartan (COZAAR) 25 MG Tab Take 0.5 Tablets by mouth every day. 50 Tablet 3   • metoprolol SR (TOPROL XL) 100 MG TABLET SR 24 HR Take 1 Tablet by mouth every day. 100 Tablet 3   • lovastatin (MEVACOR) 10 MG tablet Take 1 Tablet by mouth every evening. 100 Tablet 3   • Non Formulary Request Take 1 Tablet by mouth every evening. Vitamin C/Zinc/Vitamin D/AlderBerry Vitamin     • vitamin D (CHOLECALCIFEROL) 1000 Unit (25 mcg) Tab  "Take 1,000 Units by mouth every day.     • Magnesium 500 MG Cap Take 1 Cap by mouth every day. 180 Cap 4     No facility-administered encounter medications on file as of 8/1/2022.     Review of Systems   Constitutional: Positive for malaise/fatigue. Negative for chills, fever and weight loss.        Mostly with exertion/exercise.   HENT: Negative for congestion.    Respiratory: Negative for shortness of breath.    Cardiovascular: Negative for chest pain, palpitations, orthopnea, leg swelling and PND.   Gastrointestinal: Negative for abdominal pain and nausea.   Musculoskeletal: Negative for myalgias.   Skin: Negative for rash.   Neurological: Positive for dizziness. Negative for loss of consciousness and headaches.        Chronic, stable, mostly when getting up and changing positions.   Endo/Heme/Allergies: Does not bruise/bleed easily.   Psychiatric/Behavioral: Negative for depression. The patient does not have insomnia.               Objective     BP (!) 90/52 (BP Location: Left arm, Patient Position: Sitting, BP Cuff Size: Adult)   Pulse 83   Resp 14   Ht 1.803 m (5' 11\")   Wt 74.4 kg (164 lb)   SpO2 95%   BMI 22.87 kg/m²     Physical Exam  Constitutional:       Appearance: He is well-developed.   HENT:      Head: Normocephalic.   Neck:      Vascular: No JVD.   Cardiovascular:      Rate and Rhythm: Normal rate and regular rhythm.      Heart sounds: Normal heart sounds.      Comments: AICD in left chest wall.  Sternotomy scar present.  Pulmonary:      Effort: Pulmonary effort is normal. No respiratory distress.      Breath sounds: Normal breath sounds. No wheezing or rales.   Abdominal:      General: Bowel sounds are normal. There is no distension.      Palpations: Abdomen is soft.      Tenderness: There is no abdominal tenderness.   Musculoskeletal:         General: Normal range of motion.      Cervical back: Normal range of motion and neck supple.   Skin:     General: Skin is warm and dry.      Findings: " No rash.      Comments: Skin changes of chronic venous stasis in lower legs.   Neurological:      Mental Status: He is alert and oriented to person, place, and time.       AICD interrogation today reveals normal function. No device therapy. 3 brief NSVT episodes; no mode switching episodes.    Interpretation Summary of Echocardiogram of 5/2/2022:  1. Severe LV enlargement with severely reduced systolic function. Estimated EF = 19% by MOD. Normal RV size with mild to moderately reduced systolic function.  2. Mild TR. Estimated RVSP = 24 mmHg (RAP 3 mmHg). Trace AR/IN/MR.  3. No prior echo for comparison.    CONCLUSIONS OF ECHOCARDIOGRAM OF 2/17/2021:  No significant chnage compared to prior echo in 2019  Left ventricle is severely dilated.  Left ventricular ejection fraction is visually estimated to be 20%.  No significant valve disease or flow abnormalities.     Component      Latest Ref Rng & Units 4/2/2022           8:51 AM   Glycohemoglobin      4.0 - 5.6 % 6.9 (H)   Estim. Avg Glu      mg/dL 151        Lab Results   Component Value Date/Time    CHOLSTRLTOT 169 04/02/2022 08:51 AM    LDL 87 04/02/2022 08:51 AM    HDL 58 04/02/2022 08:51 AM    TRIGLYCERIDE 118 04/02/2022 08:51 AM       Lab Results   Component Value Date/Time    SODIUM 140 04/02/2022 08:51 AM    POTASSIUM 4.5 04/02/2022 08:51 AM    CHLORIDE 100 04/02/2022 08:51 AM    CO2 27 04/02/2022 08:51 AM    GLUCOSE 134 (H) 04/02/2022 08:51 AM    BUN 22 04/02/2022 08:51 AM    CREATININE 1.21 04/02/2022 08:51 AM    CREATININE 1.0 01/08/2008 07:45 AM     Lab Results   Component Value Date/Time    ALKPHOSPHAT 63 04/02/2022 08:51 AM    ASTSGOT 15 04/02/2022 08:51 AM    ALTSGPT 12 04/02/2022 08:51 AM    TBILIRUBIN 1.0 04/02/2022 08:51 AM        Assessment & Plan     1. AICD (automatic cardioverter/defibrillator) present     2. Coronary artery disease involving native coronary artery of native heart without angina pectoris     3. Ischemic cardiomyopathy     4. S/P  CABG x 4     5. Ventricular tachycardia (HCC)     6. History of pulmonary embolus (PE)     7. Chronic anticoagulation     8. Mixed hyperlipidemia     9. Type 2 diabetes mellitus with stage 3a chronic kidney disease, without long-term current use of insulin (HCC)     10. Chronic kidney disease (CKD) stage G3a/A1, moderately decreased glomerular filtration rate (GFR) between 45-59 mL/min/1.73 square meter and albuminuria creatinine ratio less than 30 mg/g (HCC)         Medical Decision Making: Today's Assessment/Status/Plan:      1. CAD/ischemic cardiomyopathy with LVEF 20-25%, status post CABG x 4, with single chamber AICD. Device is working normally. Continue:  Xarelto 20mg once daily  Torpol XL 100mg once daily  Losartan 12.5mg once daily  Lasix 40mg once daily  Aldactone 12.5mg once daily  KCL 20mEq once daily  Mevacor 10mg once daily  ADD Jardiance 10mg once daily for cardiac benefits  There was mention of stopping Losartan and adding Entresto; his BP tends to already run low; patient would like to hold off on this for now.    2. History of VT, with AICD, on Toprol NS512eg once daily. No device therapy.    3. History of PE, anticoagulated with Xarelto 20mg. No bleeding problems.    4. Hyperlipidemia, treated with Mevacor 10mg once daily. Recent LDL was 87.    5. Diabetes mellitus, treated with Metformin 500mg twice daily. To ADD Jardiance 10mg once daily; repeat fasting BMP and HgbA1c in months.    6. CKD, stable.    As above, add Jardiance 10mg once daily. Repeat labs in 3 months. Keep follow-up with Praveen, later this month. I will see him back in 6 months for next device interrogation.

## 2022-08-03 DIAGNOSIS — E11.65 TYPE 2 DIABETES MELLITUS WITH HYPERGLYCEMIA, WITHOUT LONG-TERM CURRENT USE OF INSULIN (HCC): ICD-10-CM

## 2022-08-03 NOTE — TELEPHONE ENCOUNTER
Is the patient due for a refill? No    Was the patient seen the past year? Yes    Date of last office visit: 08/01/2022    Does the patient have an upcoming appointment?  Yes   If yes, When? 02/07/2023    Provider to refill: REUBEN Berry     Does the patients insurance require a 100 day supply?  Yes    Plan requires a 100 day supply. Thank you

## 2022-08-04 RX ORDER — EMPAGLIFLOZIN 10 MG/1
10 TABLET, FILM COATED ORAL DAILY
Qty: 100 TABLET | Refills: 3 | Status: SHIPPED | OUTPATIENT
Start: 2022-08-04 | End: 2023-02-07 | Stop reason: SDUPTHER

## 2022-08-08 ENCOUNTER — NON-PROVIDER VISIT (OUTPATIENT)
Dept: CARDIOLOGY | Facility: MEDICAL CENTER | Age: 72
End: 2022-08-08
Payer: MEDICARE

## 2022-08-08 PROCEDURE — 93295 DEV INTERROG REMOTE 1/2/MLT: CPT | Performed by: INTERNAL MEDICINE

## 2022-08-09 NOTE — CARDIAC REMOTE MONITOR - SCAN
Device transmission reviewed. Device demonstrated appropriate function.       Electronically Signed by: Fransico Flores M.D.    8/14/2022  11:04 PM

## 2022-08-30 ENCOUNTER — TELEPHONE (OUTPATIENT)
Dept: CARDIOLOGY | Facility: MEDICAL CENTER | Age: 72
End: 2022-08-30
Payer: MEDICARE

## 2022-08-30 NOTE — TELEPHONE ENCOUNTER
AB        Caller: Lele Ortiz from [place]  Topic/issue: His office completed an evaluation of the patient today and was asking for a call back to discuss  Callback Number: [phone]      Thank you    -Luc KINCAID

## 2022-08-30 NOTE — TELEPHONE ENCOUNTER
Upon chart review, this is Dr. Lele Ortiz who evaluated pt at [facility] today and is wanting to speak to AB regarding mutual pt.     To AB

## 2022-09-13 ENCOUNTER — TELEPHONE (OUTPATIENT)
Dept: CARDIOLOGY | Facility: MEDICAL CENTER | Age: 72
End: 2022-09-13
Payer: MEDICARE

## 2022-09-13 ENCOUNTER — PATIENT MESSAGE (OUTPATIENT)
Dept: CARDIOLOGY | Facility: MEDICAL CENTER | Age: 72
End: 2022-09-13
Payer: MEDICARE

## 2022-09-13 DIAGNOSIS — I25.5 ISCHEMIC CARDIOMYOPATHY: ICD-10-CM

## 2022-09-13 DIAGNOSIS — I50.22 CHRONIC SYSTOLIC CONGESTIVE HEART FAILURE, NYHA CLASS 2 (HCC): ICD-10-CM

## 2022-09-13 NOTE — TELEPHONE ENCOUNTER
Liliana,    For the Right heart cath w/nitric oxide. This patient is taking Xarelto. How long would you like this patient off the Xarelto for this procedure?    Thank You,  Katrin

## 2022-09-13 NOTE — PATIENT COMMUNICATION
"LEIF Rodríguez.  You 1 hour ago (1:21 PM)     Yes, OK to order this.   AB      You  CHRIS Rodríguez 2 hours ago (12:18 PM)     Okay to order Right heart cath? Telemed note from Dr. Ortiz on 8/30 is in chart and recommends the procedure. Thanks    ---------------------------------------------------------------------------------  Per Dr. Ortiz's progress note from 8/30:  -\"recommend a right heart catheterization +/- vasodilator study to assess PA pressures and cardiac output\"    S/w Dr. Pinedo in office and he confirmed the order should be placed as a right heart cath with nitric oxide.     Order placed.     To Екатерина to please arrange.   "

## 2022-09-14 NOTE — TELEPHONE ENCOUNTER
LEIF Rodríguez.  Katrin Marinelil, Med Ass't  Caller: Unspecified (Yesterday,  3:12 PM)  Let's do 48 hours hold prior to procedure, and then resume afterwards.

## 2022-09-14 NOTE — TELEPHONE ENCOUNTER
Patient scheduled for RHC w/nitric oxide on 9-21-22 with Dr. Pinedo. Patient has been instructed to check in at 6:00 for 7:30 case time. Hold Metformin and Jardiance am of. Hold Xarelto 2 days. Message sent to authorizations. Emailed Respiratory Leadership.

## 2022-09-20 ENCOUNTER — PRE-ADMISSION TESTING (OUTPATIENT)
Dept: ADMISSIONS | Facility: MEDICAL CENTER | Age: 72
End: 2022-09-20
Attending: INTERNAL MEDICINE
Payer: MEDICARE

## 2022-09-20 DIAGNOSIS — Z01.812 PRE-OPERATIVE LABORATORY EXAMINATION: ICD-10-CM

## 2022-09-21 ENCOUNTER — HOSPITAL ENCOUNTER (OUTPATIENT)
Facility: MEDICAL CENTER | Age: 72
End: 2022-09-21
Attending: INTERNAL MEDICINE | Admitting: INTERNAL MEDICINE
Payer: MEDICARE

## 2022-09-21 ENCOUNTER — APPOINTMENT (OUTPATIENT)
Dept: CARDIOLOGY | Facility: MEDICAL CENTER | Age: 72
End: 2022-09-21
Attending: NURSE PRACTITIONER
Payer: MEDICARE

## 2022-09-21 VITALS
WEIGHT: 158 LBS | DIASTOLIC BLOOD PRESSURE: 61 MMHG | HEIGHT: 71 IN | RESPIRATION RATE: 14 BRPM | OXYGEN SATURATION: 94 % | BODY MASS INDEX: 22.12 KG/M2 | HEART RATE: 97 BPM | TEMPERATURE: 97.1 F | SYSTOLIC BLOOD PRESSURE: 121 MMHG

## 2022-09-21 DIAGNOSIS — I50.22 CHRONIC SYSTOLIC CONGESTIVE HEART FAILURE, NYHA CLASS 2 (HCC): ICD-10-CM

## 2022-09-21 DIAGNOSIS — I25.5 ISCHEMIC CARDIOMYOPATHY: ICD-10-CM

## 2022-09-21 LAB
ALBUMIN SERPL BCP-MCNC: 4.3 G/DL (ref 3.2–4.9)
ALBUMIN/GLOB SERPL: 1.7 G/DL
ALP SERPL-CCNC: 60 U/L (ref 30–99)
ALT SERPL-CCNC: 10 U/L (ref 2–50)
ANION GAP SERPL CALC-SCNC: 12 MMOL/L (ref 7–16)
APTT PPP: 29.4 SEC (ref 24.7–36)
AST SERPL-CCNC: 17 U/L (ref 12–45)
BASE EXCESS BLDV CALC-SCNC: -1 MMOL/L (ref -4–3)
BILIRUB SERPL-MCNC: 1 MG/DL (ref 0.1–1.5)
BODY TEMPERATURE: NORMAL DEGREES
BUN SERPL-MCNC: 18 MG/DL (ref 8–22)
CA-I BLD ISE-SCNC: 1.16 MMOL/L (ref 1.1–1.3)
CALCIUM SERPL-MCNC: 9.9 MG/DL (ref 8.5–10.5)
CHLORIDE SERPL-SCNC: 101 MMOL/L (ref 96–112)
CO2 BLDV-SCNC: 27 MMOL/L (ref 20–33)
CO2 SERPL-SCNC: 27 MMOL/L (ref 20–33)
CREAT SERPL-MCNC: 1.44 MG/DL (ref 0.5–1.4)
EKG IMPRESSION: NORMAL
ERYTHROCYTE [DISTWIDTH] IN BLOOD BY AUTOMATED COUNT: 47.2 FL (ref 35.9–50)
GFR SERPLBLD CREATININE-BSD FMLA CKD-EPI: 52 ML/MIN/1.73 M 2
GLOBULIN SER CALC-MCNC: 2.5 G/DL (ref 1.9–3.5)
GLUCOSE BLD STRIP.AUTO-MCNC: 135 MG/DL (ref 65–99)
GLUCOSE SERPL-MCNC: 139 MG/DL (ref 65–99)
HCO3 BLDV-SCNC: 25.4 MMOL/L (ref 24–28)
HCT VFR BLD AUTO: 50.3 % (ref 42–52)
HCT VFR BLD CALC: 45 % (ref 42–52)
HGB BLD-MCNC: 15.3 G/DL (ref 14–18)
HGB BLD-MCNC: 16.7 G/DL (ref 14–18)
INR PPP: 1.13 (ref 0.87–1.13)
MCH RBC QN AUTO: 31.6 PG (ref 27–33)
MCHC RBC AUTO-ENTMCNC: 33.2 G/DL (ref 33.7–35.3)
MCV RBC AUTO: 95.1 FL (ref 81.4–97.8)
PCO2 BLDV: 47.8 MMHG (ref 41–51)
PH BLDV: 7.33 [PH] (ref 7.31–7.45)
PLATELET # BLD AUTO: 153 K/UL (ref 164–446)
PMV BLD AUTO: 11.6 FL (ref 9–12.9)
PO2 BLDV: 40 MMHG (ref 25–40)
POTASSIUM BLD-SCNC: 3.7 MMOL/L (ref 3.6–5.5)
POTASSIUM SERPL-SCNC: 4.2 MMOL/L (ref 3.6–5.5)
PROT SERPL-MCNC: 6.8 G/DL (ref 6–8.2)
PROTHROMBIN TIME: 14.4 SEC (ref 12–14.6)
RBC # BLD AUTO: 5.29 M/UL (ref 4.7–6.1)
SAO2 % BLDV: 70 %
SODIUM BLD-SCNC: 141 MMOL/L (ref 135–145)
SODIUM SERPL-SCNC: 140 MMOL/L (ref 135–145)
SPECIMEN DRAWN FROM PATIENT: NORMAL
WBC # BLD AUTO: 6.2 K/UL (ref 4.8–10.8)

## 2022-09-21 PROCEDURE — 36415 COLL VENOUS BLD VENIPUNCTURE: CPT

## 2022-09-21 PROCEDURE — C1894 INTRO/SHEATH, NON-LASER: HCPCS

## 2022-09-21 PROCEDURE — 82803 BLOOD GASES ANY COMBINATION: CPT

## 2022-09-21 PROCEDURE — 160002 HCHG RECOVERY MINUTES (STAT)

## 2022-09-21 PROCEDURE — 85610 PROTHROMBIN TIME: CPT

## 2022-09-21 PROCEDURE — 160046 HCHG PACU - 1ST 60 MINS PHASE II

## 2022-09-21 PROCEDURE — 80053 COMPREHEN METABOLIC PANEL: CPT

## 2022-09-21 PROCEDURE — 85730 THROMBOPLASTIN TIME PARTIAL: CPT

## 2022-09-21 PROCEDURE — 82962 GLUCOSE BLOOD TEST: CPT

## 2022-09-21 PROCEDURE — 85027 COMPLETE CBC AUTOMATED: CPT

## 2022-09-21 PROCEDURE — 99152 MOD SED SAME PHYS/QHP 5/>YRS: CPT | Performed by: INTERNAL MEDICINE

## 2022-09-21 PROCEDURE — 84295 ASSAY OF SERUM SODIUM: CPT | Mod: XU

## 2022-09-21 PROCEDURE — 84132 ASSAY OF SERUM POTASSIUM: CPT | Mod: XU

## 2022-09-21 PROCEDURE — 160035 HCHG PACU - 1ST 60 MINS PHASE I

## 2022-09-21 PROCEDURE — 93451 RIGHT HEART CATH: CPT | Mod: 26 | Performed by: INTERNAL MEDICINE

## 2022-09-21 PROCEDURE — 82330 ASSAY OF CALCIUM: CPT

## 2022-09-21 PROCEDURE — 85014 HEMATOCRIT: CPT | Mod: XU

## 2022-09-21 PROCEDURE — 700111 HCHG RX REV CODE 636 W/ 250 OVERRIDE (IP)

## 2022-09-21 PROCEDURE — 93005 ELECTROCARDIOGRAM TRACING: CPT | Performed by: INTERNAL MEDICINE

## 2022-09-21 PROCEDURE — 93010 ELECTROCARDIOGRAM REPORT: CPT | Mod: 59 | Performed by: INTERNAL MEDICINE

## 2022-09-21 PROCEDURE — 700101 HCHG RX REV CODE 250

## 2022-09-21 RX ORDER — HEPARIN SODIUM 1000 [USP'U]/ML
INJECTION, SOLUTION INTRAVENOUS; SUBCUTANEOUS
Status: COMPLETED
Start: 2022-09-21 | End: 2022-09-21

## 2022-09-21 RX ORDER — HEPARIN SODIUM 200 [USP'U]/100ML
INJECTION, SOLUTION INTRAVENOUS
Status: COMPLETED
Start: 2022-09-21 | End: 2022-09-21

## 2022-09-21 RX ORDER — LIDOCAINE HYDROCHLORIDE 20 MG/ML
INJECTION, SOLUTION EPIDURAL; INFILTRATION; INTRACAUDAL; PERINEURAL
Status: COMPLETED
Start: 2022-09-21 | End: 2022-09-21

## 2022-09-21 RX ORDER — MIDAZOLAM HYDROCHLORIDE 1 MG/ML
INJECTION INTRAMUSCULAR; INTRAVENOUS
Status: COMPLETED
Start: 2022-09-21 | End: 2022-09-21

## 2022-09-21 RX ADMIN — LIDOCAINE HYDROCHLORIDE 5 ML: 20 INJECTION, SOLUTION EPIDURAL; INFILTRATION; INTRACAUDAL; PERINEURAL at 08:24

## 2022-09-21 RX ADMIN — HEPARIN SODIUM: 1000 INJECTION, SOLUTION INTRAVENOUS; SUBCUTANEOUS at 08:24

## 2022-09-21 RX ADMIN — FENTANYL CITRATE 75 MCG: 50 INJECTION, SOLUTION INTRAMUSCULAR; INTRAVENOUS at 08:33

## 2022-09-21 RX ADMIN — MIDAZOLAM HYDROCHLORIDE 1.5 MG: 1 INJECTION, SOLUTION INTRAMUSCULAR; INTRAVENOUS at 08:33

## 2022-09-21 RX ADMIN — HEPARIN SODIUM 2000 UNITS: 200 INJECTION, SOLUTION INTRAVENOUS at 08:25

## 2022-09-21 ASSESSMENT — ENCOUNTER SYMPTOMS
HEADACHES: 0
DOUBLE VISION: 0
FEVER: 0
DIZZINESS: 0
LOSS OF CONSCIOUSNESS: 0
COUGH: 0
GASTROINTESTINAL NEGATIVE: 1
MUSCULOSKELETAL NEGATIVE: 1
ORTHOPNEA: 0
NEUROLOGICAL NEGATIVE: 1
VOMITING: 0
PND: 0
PALPITATIONS: 0
BRUISES/BLEEDS EASILY: 0
EYES NEGATIVE: 1
CLAUDICATION: 0
NAUSEA: 0
MYALGIAS: 0
CHILLS: 0
PSYCHIATRIC NEGATIVE: 1
WEAKNESS: 0
CONSTITUTIONAL NEGATIVE: 1
SHORTNESS OF BREATH: 0
BLURRED VISION: 0
ABDOMINAL PAIN: 0

## 2022-09-21 ASSESSMENT — FIBROSIS 4 INDEX: FIB4 SCORE: 1.84

## 2022-09-21 NOTE — DISCHARGE INSTRUCTIONS
HOME CARE INSTRUCTIONS    ACTIVITY: Rest and take it easy for the first 24 hours.  A responsible adult is recommended to remain with you during that time.  It is normal to feel sleepy.  We encourage you to not do anything that requires balance, judgment or coordination.    FOR 24 HOURS DO NOT:  Drive, operate machinery or run household appliances.  Drink beer or alcoholic beverages.  Make important decisions or sign legal documents.    SPECIAL INSTRUCTIONS: POST ANGIOGRAM  General Care Instructions  Maintain a bandage over the incision site for 24 hours.  It's normal to find a small bruise or dime-sized lump at the insertion site. This should disappear within a few weeks.  Do not apply lotions or powders to the site.  Do not immerse the catheter insertion site in water (bathtub/swimming) for five days. It is ok to shower 24 hours after the procedure.  You may resume your normal diet immediately; on the day of your procedure, drink 6-10 glasses of water to help flush the contrast liquid out of your system.  If the doctor inserted the catheter in your arm:  For 24 hours, DO NOT lift anything heavier than 10 pounds (approximately a gallon of milk). DO NOT do any heavy pushing, pulling, or twisting.    Medications  If your current medications need to be changed, you will be provided with an updated list of your medications prior to discharge.  If you take warfarin (Coumadin), resume taking your usual dose the evening after the procedure.  DO NOT STOP taking prescribed blood thinning (anti-platelet) medications unless instructed by your cardiologist.  These medications include:  Aspirin, Clopidogrel (Plavix), Ticagrelor (Brilinta), or Prasugrel (Effient)   If you take one of the following anticoagulants, RESUME 24 HOURS after your procedure:  Apixiban (Eliquis), Rivaroxaban (Xarelto), Dabigatran (Pradaxa), Edoxaban (Savaysa)  If you take metformin (Glucophage), RESUME 48 HOURS after your procedure.    When to call your  healthcare provider  Call your cardiologist right away at [phone] if you have any of the following:   Problems/Concerns taking any of your prescribed heart medicines.   The insertion site has increasing pain, swelling, redness, bleeding, or drainage.   Your arm or leg below where the insertion site changes color, is cool, or is numb.   You have chest pain or shortness of breath that does not go away with rest.   Your pulse feels irregular -- very slow (less than 60 beats/minute) or very fast (over 100 beats/minute).   You have dizziness, fainting, or you are very tired.   You are coughing up blood or yellow or green mucus.   You have chills or a fever over 101°F (38.3°C).    If there is bleeding at the catheter insertion site, apply pressure for 10 minutes.  If bleeding persists, call 911, and continue to hold pressure until advanced medical support arrives.    DIET: To avoid nausea, slowly advance diet as tolerated, avoiding spicy or greasy foods for the first day.  Add more substantial food to your diet according to your physician's instructions.  Babies can be fed formula or breast milk as soon as they are hungry.  INCREASE FLUIDS AND FIBER TO AVOID CONSTIPATION.    SURGICAL DRESSING/BATHING: may remove dressing in 24 hours and then shower    MEDICATIONS: Resume taking daily medication.  Take prescribed pain medication with food.  If no medication is prescribed, you may take non-aspirin pain medication if needed.  PAIN MEDICATION CAN BE VERY CONSTIPATING.  Take a stool softener or laxative such as senokot, pericolace, or milk of magnesia if needed.    A follow-up appointment should be arranged with your doctor; call to schedule.    You should CALL YOUR PHYSICIAN if you develop:  Fever greater than 101 degrees F.  Pain not relieved by medication, or persistent nausea or vomiting.  Excessive bleeding (blood soaking through dressing) or unexpected drainage from the wound.  Extreme redness or swelling around the  incision site, drainage of pus or foul smelling drainage.  Inability to urinate or empty your bladder within 8 hours.  Problems with breathing or chest pain.    You should call 911 if you develop problems with breathing or chest pain.  If you are unable to contact your doctor or surgical center, you should go to the nearest emergency room or urgent care center.  Physician's telephone #: [phone]    MILD FLU-LIKE SYMPTOMS ARE NORMAL.  YOU MAY EXPERIENCE GENERALIZED MUSCLE ACHES, THROAT IRRITATION, HEADACHE AND/OR SOME NAUSEA.    If any questions arise, call your doctor.  If your doctor is not available, please feel free to call the Surgical Center at [phone].  The Center is open Monday through Friday from 7AM to 7PM.      A registered nurse may call you a few days after your surgery to see how you are doing after your procedure.    You may also receive a survey in the mail within the next two weeks and we ask that you take a few moments to complete the survey and return it to us.  Our goal is to provide you with very good care and we value your comments.     Depression / Suicide Risk    As [facility] are [facility], it is important to learn how to keep safe from harming yourself.    Recognize the warning signs:  Abrupt changes in personality, positive or negative- including increase in energy   Giving away possessions  Change in eating patterns- significant weight changes-  positive or negative  Change in sleeping patterns- unable to sleep or sleeping all the time   Unwillingness or inability to communicate  Depression  Unusual sadness, discouragement and loneliness  Talk of wanting to die  Neglect of personal appearance   Rebelliousness- reckless behavior  Withdrawal from people/activities they love  Confusion- inability to concentrate     If you or a loved one observes any of these behaviors or has concerns about self-harm, here's what you can do:  Talk about it- your feelings  and reasons for harming yourself  Remove any means that you might use to hurt yourself (examples: pills, rope, extension cords, firearm)  Get professional help from the community (Mental Health, Substance Abuse, psychological counseling)  Do not be alone:Call your Safe Contact- someone whom you trust who will be there for you.  Call your local CRISIS HOTLINE 052-2464 or 882-221-6543  Call your local Children's Mobile Crisis Response Team [place] (434) 990-5325 or www.Tapad  Call the toll free National Suicide Prevention Hotlines   National Suicide Prevention Lifeline 969-940-JLDQ (0346)  National [place] Line Network 800-SUICIDE (572-1162)    I acknowledge receipt and understanding of these Home Care instructions.

## 2022-09-21 NOTE — PROGRESS NOTES
History:  Primary Diagnosis: Ischemic cardiomyopathy/heart failure with reduced ejection fraction     HPI:  72 year old patient of Liliana Berry and Dr. Lele Ortiz at [facility] with significant history of history of ischemic cardiomyopathy s/p CABG and ICD, heart failure with reduced ejection fraction, type 2 diabetes, chronic kidney disease, and unprovoked pulmonary embolism. Recent cardiac imaging showed Severe LV enlargement with severely reduced systolic function. Estimated EF = 19% by MOD. LVIDd 7.2 cm. Normal RV size with mild to moderately reduced systolic function. Mild TR. Estimated RVSP = 24 mmHg (RAP 3 mmHg). Trace AR/WA/MR.. Recently seen by Dr. Ortiz who recommended a right heart catheterization +/- vasodilator study to assess PA pressures and cardiac output in preparation for heart transplant.     Currently patient denies chest pain, shortness of breath or palpitations. Patient denies any other symptoms.       Past Medical History:   Diagnosis Date    Acute anterior myocardial infarction (HCC) 12/28/2000    PCI attempted by Dr. Malloy but unsuccessful    AICD (automatic cardioverter/defibrillator) present 2003/2008/2015    Defibrillator is a Medtronic model XIOG2P4, serial #[id number], implanted Dr. Stephenson November 2015. The ventricular lead is a Medtronic model #6947-65, lead serial #[id number], implant 7/2/2003    Allergy     within 2 years    CAD (coronary artery disease) 12/28/2000    CABG x 4 (LIMA to LAD, rSVG to first diagonal, left circumflex and PDA).2000: CABG x 4 (LIMA to LAD, rSVG to first diagonal, left circumflex and PDA).  2003: LIMA and circumflex grafts patent, diagonal and PDA grafts closed.    Chronic deep vein thrombosis (DVT) of femoral vein of left lower extremity (HCC) 01/04/2018    Congestive heart failure (HCC) 12/28/2000    Dental disorder     upper denture    GERD (gastroesophageal reflux disease)     High cholesterol     Hypertension     Ischemic cardiomyopathy 05/2022     Echocardiogram with severely dilated LV, LVEF <25%. Severe diffuse hypokinesis. Septal akinesis. Normal LA and RV. Moderately dilated RA. Trace MR, mild TR. RVSP 23.8mmHg.    Left bundle branch block      Pacemaker     Pneumonia 2014    Pulmonary embolism ([facility]) 03/2014    Thrombocytopenia ([facility]) 02/23/2014    Type 2 diabetes mellitus without complication ([facility])      Diet controlled, followed by Dr. Walls    Vascular disorder of extremity ([facility]) 02/11/2020   Review of Systems   Constitutional: Negative.  Negative for chills, fever and malaise/fatigue.   HENT: Negative.     Eyes: Negative.  Negative for blurred vision and double vision.   Respiratory:  Negative for cough and shortness of breath.    Cardiovascular:  Negative for chest pain, palpitations, orthopnea, claudication, leg swelling and PND.   Gastrointestinal: Negative.  Negative for abdominal pain, nausea and vomiting.   Genitourinary: Negative.    Musculoskeletal: Negative.  Negative for myalgias.   Skin: Negative.  Negative for rash.   Neurological: Negative.  Negative for dizziness, loss of consciousness, weakness and headaches.   Endo/Heme/Allergies: Negative.  Does not bruise/bleed easily.   Psychiatric/Behavioral: Negative.     Physical Exam  Constitutional:       General: He is not in acute distress.     Appearance: Normal appearance.   HENT:      Head: Normocephalic and atraumatic.      Right Ear: External ear normal.      Left Ear: External ear normal.      Nose: Nose normal.      Mouth/Throat:      Mouth: Mucous membranes are moist.      Pharynx: Oropharynx is clear.   Eyes:      General: No scleral icterus.     Conjunctiva/sclera: Conjunctivae normal.      Pupils: Pupils are equal, round, and reactive to light.   Cardiovascular:      Rate and Rhythm: Normal rate and regular rhythm.      Pulses: Normal pulses.      Heart sounds: Normal heart sounds. No murmur heard.    No friction rub. No gallop.      Comments:  AICD in left chest  wall.  Sternotomy scar present  Pulmonary:      [facility]: Pulmonary effort is normal.      Breath sounds: Normal breath sounds.   Abdominal:      General: Abdomen is flat. There is no distension.      Tenderness: There is no guarding.   Musculoskeletal:         General: Normal range of motion.      Cervical back: Normal range of motion and neck supple.      Right lower leg: No edema.      Left lower leg: No edema.      Comments: Skin changes of chronic venous stasis in lower legs.    Skin:     General: Skin is warm and dry.      Capillary Refill: Capillary refill takes less than 2 seconds.      Coloration: Skin is not jaundiced.   Neurological:      General: No focal deficit present.      Mental Status: He is alert and oriented to person, place, and time.   Psychiatric:         Mood and Affect: Mood normal.         Behavior: Behavior normal.      Lab Results   Component Value Date/Time    CHOLSTRLTOT 169 04/02/2022 08:51 AM    LDL 87 04/02/2022 08:51 AM    HDL 58 04/02/2022 08:51 AM    TRIGLYCERIDE 118 04/02/2022 08:51 AM       Lab Results   Component Value Date/Time    SODIUM 140 04/02/2022 08:51 AM    POTASSIUM 4.5 04/02/2022 08:51 AM    CHLORIDE 100 04/02/2022 08:51 AM    CO2 27 04/02/2022 08:51 AM    GLUCOSE 134 (H) 04/02/2022 08:51 AM    BUN 22 04/02/2022 08:51 AM    CREATININE 1.21 04/02/2022 08:51 AM    CREATININE 1.0 01/08/2008 07:45 AM     Lab Results   Component Value Date/Time    ALKPHOSPHAT 63 04/02/2022 08:51 AM    ASTSGOT 15 04/02/2022 08:51 AM    ALTSGPT 12 04/02/2022 08:51 AM    TBILIRUBIN 1.0 04/02/2022 08:51 AM       CBC and CMP pending  Impression:  CAD/ischemic cardiomyopathy with LVEF 20-25%/ HFrEF    Bloodless status    Plan:  Right cardiac cath with nitrous oxide to assess PA pressures and cardiac output.     The risks, benefits, and alternatives to coronary angiography with IV sedation were discussed in great detail. Specific risks mentioned include bleeding, infection, kidney damage, allergic  reaction, cardiac perforation with possible tamponade requiring pericardiocentesis or possibly open heart surgery. In addition, we discussed that 10% of patients will experience small to moderate bruising at the site of the arterial puncture. Lastly, the risks of heart attack, stroke and death were discussed; the risk of major complications such as heart attack or stroke caused by the angiogram is approximately 1%; the risk of death is approximately 1 in 1000. The patient verbalized understanding of the potential complications and wishes to proceed with this procedure.    The risks/benefits of the procedure will be further discussed with the consenting physician performing the procedure.

## 2022-09-21 NOTE — OR NURSING
Patient AAOx4, calm, denies pain. VSS, afebrile, room air 92% breathing even and unlabored. Right arm venous cath site CDI, no hematoma. RUE CMS intact, palpable radial pulses. Tolerating sips of water, no nausea. Patients wife updated on status and plan of care.

## 2022-09-21 NOTE — PROCEDURES
Cardiac Catheterization report    9/21/2022  8:59 AM    Referring provider: Liliana Berry    Indication/Preoperative diagnosis:  Cardiomyopathy  Precardiac transplant evaluation    Postoperative diagnosis:  Normal pulmonary artery pressures  Normal cardiac filling pressures  Reduced cardiac output    Recommendations:  Guideline directed medical therapy   Cardiovascular Risk factor modification  Continue with transplant evaluation      Procedures performed:  Right heart catheterization  Supervision moderate sedation      FINDINGS:  I.  HEMODYNAMIC FINDINGS:  AoP 138/70 mmHg   RA 6 mmHg   RV 25/2 mmHg   RVEDP 6 mmHg   PA 23/12 mmHg   mPAP 16 mmHg   PCWP 10 mmHg   TDCO 3.1 L/min   TDCI 1.63 L/min/m2   Sherly CO 3.89 L/min   Sherly CI 2.04 L/min/m2   PA saturation 70%, arterial saturation 98%    No reversibility study indicated due to normal pulmonary artery pressures    Procedure details:  The risks and benefits of cardiac catheterization and possible intervention were explained to the patient including death, heart attack, stroke, and emergency surgery.  The patient verbalized understanding and wished to proceed.  The patient was brought to the cardiac catheterization laboratory in the fasting state and prepped and draped in the usual sterile fashion.  The right brachial was prepped and draped in the fashion.  The area was anesthetized with lidocaine and under ultrasound guidance a 5/6 FR sheath was inserted into the right brachial artery without difficulty. A balloontipped Linville-Heidy catheter was then passed through the right heart and pulmonary circulation without difficulty.  Routine hemodynamics were recorded.  All catheters and guidewires were removed and manual pressure was applied to achieve adequate hemostasis.  Patient tolerated procedure well and left the Cath Lab in stable condition.    Complications:  None apparent    Sedation time:  Moderate sedation directly monitored by me during the case while supervising the  administration of the sedation medication by an independent trained RN to assist in the monitoring of the patient's level of consciousness and physiological status. I, the supervising physician was present the entire time from beginning of medication administration until the end of the procedure from 0 836 until 0 850. For detailed administration records please see the moderate sedation documentation in the media tab.    Andrew Pinedo MD, FACC, [facility]

## 2022-09-21 NOTE — OR NURSING
Pt verbalizes readiness for discharge. Instructions reviewed with pt and pt's family by Katharina TOLBERT. No further needs. Pt taken to car via wheelchair by RN.

## 2022-10-24 NOTE — TELEPHONE ENCOUNTER
CARDIAC ELECTROPHYSIOLOGY CONSULT NOTE          REQUESTING PROVIDER:  Bronson Brice MD    PCP: Tony Burt MD     CHIEF COMPLAINT:  PAF    HISTORY OF PRESENT ILLNESS   Cameron Mc is a 49 year old male seen today for further management of PAF.    Patient patient has past medical history of:  1.  Hypertension, hyperlipidemia, diabetes  2.  Paroxysmal atrial fibrillation CHADSVASC 2, HASBLED 1.   3.  Obstructive sleep apnea on CPAP    Is referred for further discussion regarding atrial fibrillation management, to see if the patient can discontinue anticoagulation.    MEDICATIONS   Outpatient medications:  Current Outpatient Medications   Medication Sig   • Eliquis 5 MG Tab TAKE ONE TABLET BY MOUTH EVERY 12 HOURS   • metoPROLOL tartrate (LOPRESSOR) 50 MG tablet Take 50 mg by mouth 2 times daily.   • Multiple Vitamins-Minerals (MULTIVITAMIN ADULTS PO) Take by mouth daily.   • metFORMIN (GLUCOPHAGE) 500 MG tablet Take 500 mg by mouth 2 times daily (with meals).   • losartan (COZAAR) 25 MG tablet Take 25 mg by mouth daily.     No current facility-administered medications for this visit.      HISTORIES   ALLERGIES:  No Known Allergies  Past Medical History:   Diagnosis Date   • Diabetes (CMS/HCC)    • Hyperlipidemia    • Hypertension    • Paroxysmal atrial fibrillation (CMS/HCC)     CHADs-VASc score 2   • Sleep apnea      Past Surgical History:   Procedure Laterality Date   • ------------other-------------      Facial bone repair    • Gallbladder surgery     • Hernia repair     • Leg surgery      RT leg tendon repair      Family History   Problem Relation Age of Onset   • Patient is unaware of any medical problems Mother         no known CAD   • Patient is unaware of any medical problems Father         no known CAD     Social History     Tobacco Use   • Smoking status: Never Smoker   • Smokeless tobacco: Never Used   Vaping Use   • Vaping Use: never used   Substance Use Topics   • Alcohol use: Yes     Comment:  Pt informed    social   • Drug use: Never       REVIEW OF SYSTEMS   Constitutional: Negative for fatigue, change in activity level or change in weight.   Skin: Negative for edema, pallor, rashes or open wounds.   HEENT: Negative for visual changes, epistaxis or headaches.  Respiratory: Negative for cough, orthopnea, paroxsymal nocturnal dyspnea, wheezing or shortness of breath with or without exertion.    Cardiovascular: Negative for exertional chest discomfort, chest pressure, palpitations or diaphoresis. Negative for lightheadedness or dizziness. Negative for near syncope or syncope.  Negative for intermittent leg claudication.   Gastrointestinal: Negative for abdominal pain.   Genitourinary: Negative for hematuria.  Extremities:  Negative for edema, petechiae or clubbing.  Neuro:  Negative for changes in speech, sensory deficits or change in motor functions.  Endocrine: Negative for heat intolerance, excessive thirst or urination  .  Hematological: Negative for bleeding or brusing.  Psych: Negative for change in affect, change in mentation or sleep disturbance.      PHYSICAL EXAM   Vital Signs:  There were no vitals taken for this visit.  General:   Alert, cooperative, conversive in no acute distress.  Skin:  Warm and dry without rash.    Head:  Normocephalic-atraumatic.   Neck:  Trachea is midline. No adenopathy.  Normal thyroid without mass or tenderness.  Eyes:  Normal conjunctiva and sclera.  Cardiovascular: regular rate and rhythm, S1, S2 normal, no murmur, click, rub or gallop  Respiratory: clear to auscultation bilaterally  Gastrointestinal:  Soft and nontender.  Normal bowel sounds.  No hepatomegaly or splenomegaly.   Extremities:  extremities normal, atraumatic, no cyanosis or edema  Neuro:   Orientated x 4.  No focal deficits or lateralizing signs.  Psychiatric:   Cooperative.  Appropriate mood & affect.    LABORATORY DATA     Lab Results   Component Value Date    TSH 1.847 04/21/2022    TSH 1.295 09/27/2015     CHOLESTEROL 130 08/17/2022    CHOLESTEROL 169 04/21/2022    HDL 61 08/17/2022    HDL 53 04/21/2022    CALCLDL 41 08/17/2022    CALCLDL 87 04/21/2022    TRIGLYCERIDE 141 08/17/2022    TRIGLYCERIDE 143 04/21/2022    HGBA1C 5.6 08/17/2022    HGBA1C 5.2 04/21/2022       DIAGNOSTIC IMAGING   Echo:  Fraction 60%, mild left atrial enlargement.  September 2015.    ASSESSMENT & PLAN   {There are no diagnoses linked to this encounter. (Refresh or delete this SmartLink)}

## 2022-12-30 ENCOUNTER — DOCUMENTATION (OUTPATIENT)
Dept: HEALTH INFORMATION MANAGEMENT | Facility: OTHER | Age: 72
End: 2022-12-30
Payer: MEDICARE

## 2023-01-06 ENCOUNTER — HOSPITAL ENCOUNTER (OUTPATIENT)
Dept: LAB | Facility: MEDICAL CENTER | Age: 73
End: 2023-01-06
Attending: NURSE PRACTITIONER
Payer: MEDICARE

## 2023-01-06 DIAGNOSIS — E11.65 TYPE 2 DIABETES MELLITUS WITH HYPERGLYCEMIA, WITHOUT LONG-TERM CURRENT USE OF INSULIN (HCC): ICD-10-CM

## 2023-01-06 LAB
ANION GAP SERPL CALC-SCNC: 12 MMOL/L (ref 7–16)
BUN SERPL-MCNC: 19 MG/DL (ref 8–22)
CALCIUM SERPL-MCNC: 9.8 MG/DL (ref 8.4–10.2)
CHLORIDE SERPL-SCNC: 99 MMOL/L (ref 96–112)
CO2 SERPL-SCNC: 27 MMOL/L (ref 20–33)
CREAT SERPL-MCNC: 1.37 MG/DL (ref 0.5–1.4)
EST. AVERAGE GLUCOSE BLD GHB EST-MCNC: 143 MG/DL
FASTING STATUS PATIENT QL REPORTED: NORMAL
GFR SERPLBLD CREATININE-BSD FMLA CKD-EPI: 55 ML/MIN/1.73 M 2
GLUCOSE SERPL-MCNC: 168 MG/DL (ref 65–99)
HBA1C MFR BLD: 6.6 % (ref 4–5.6)
POTASSIUM SERPL-SCNC: 4.9 MMOL/L (ref 3.6–5.5)
SODIUM SERPL-SCNC: 138 MMOL/L (ref 135–145)

## 2023-01-06 PROCEDURE — 80048 BASIC METABOLIC PNL TOTAL CA: CPT

## 2023-01-06 PROCEDURE — 83036 HEMOGLOBIN GLYCOSYLATED A1C: CPT

## 2023-01-06 PROCEDURE — 36415 COLL VENOUS BLD VENIPUNCTURE: CPT

## 2023-02-07 ENCOUNTER — OFFICE VISIT (OUTPATIENT)
Dept: CARDIOLOGY | Facility: MEDICAL CENTER | Age: 73
End: 2023-02-07
Payer: MEDICARE

## 2023-02-07 ENCOUNTER — NON-PROVIDER VISIT (OUTPATIENT)
Dept: CARDIOLOGY | Facility: MEDICAL CENTER | Age: 73
End: 2023-02-07
Payer: MEDICARE

## 2023-02-07 VITALS
HEART RATE: 70 BPM | WEIGHT: 153 LBS | OXYGEN SATURATION: 94 % | RESPIRATION RATE: 14 BRPM | HEIGHT: 71 IN | SYSTOLIC BLOOD PRESSURE: 88 MMHG | DIASTOLIC BLOOD PRESSURE: 62 MMHG | BODY MASS INDEX: 21.42 KG/M2

## 2023-02-07 VITALS
WEIGHT: 153 LBS | BODY MASS INDEX: 21.42 KG/M2 | DIASTOLIC BLOOD PRESSURE: 62 MMHG | RESPIRATION RATE: 14 BRPM | OXYGEN SATURATION: 94 % | HEART RATE: 70 BPM | HEIGHT: 71 IN | SYSTOLIC BLOOD PRESSURE: 88 MMHG

## 2023-02-07 DIAGNOSIS — I47.20 VENTRICULAR TACHYCARDIA (HCC): ICD-10-CM

## 2023-02-07 DIAGNOSIS — R63.4 WEIGHT LOSS: ICD-10-CM

## 2023-02-07 DIAGNOSIS — E11.22 TYPE 2 DIABETES MELLITUS WITH STAGE 3A CHRONIC KIDNEY DISEASE, WITHOUT LONG-TERM CURRENT USE OF INSULIN (HCC): ICD-10-CM

## 2023-02-07 DIAGNOSIS — Z95.1 S/P CABG X 4: ICD-10-CM

## 2023-02-07 DIAGNOSIS — E78.2 MIXED HYPERLIPIDEMIA: ICD-10-CM

## 2023-02-07 DIAGNOSIS — D68.69 SECONDARY HYPERCOAGULABLE STATE (HCC): ICD-10-CM

## 2023-02-07 DIAGNOSIS — N18.31 TYPE 2 DIABETES MELLITUS WITH STAGE 3A CHRONIC KIDNEY DISEASE, WITHOUT LONG-TERM CURRENT USE OF INSULIN (HCC): ICD-10-CM

## 2023-02-07 DIAGNOSIS — I82.512 CHRONIC DEEP VEIN THROMBOSIS (DVT) OF FEMORAL VEIN OF LEFT LOWER EXTREMITY (HCC): ICD-10-CM

## 2023-02-07 DIAGNOSIS — E11.65 TYPE 2 DIABETES MELLITUS WITH HYPERGLYCEMIA, WITHOUT LONG-TERM CURRENT USE OF INSULIN (HCC): ICD-10-CM

## 2023-02-07 DIAGNOSIS — I25.5 ISCHEMIC CARDIOMYOPATHY: ICD-10-CM

## 2023-02-07 DIAGNOSIS — E78.5 DYSLIPIDEMIA: ICD-10-CM

## 2023-02-07 DIAGNOSIS — Z79.01 CHRONIC ANTICOAGULATION: ICD-10-CM

## 2023-02-07 DIAGNOSIS — I50.22 CHRONIC SYSTOLIC CONGESTIVE HEART FAILURE, NYHA CLASS 2 (HCC): ICD-10-CM

## 2023-02-07 DIAGNOSIS — Z95.810 AICD (AUTOMATIC CARDIOVERTER/DEFIBRILLATOR) PRESENT: ICD-10-CM

## 2023-02-07 DIAGNOSIS — I25.10 CORONARY ARTERY DISEASE INVOLVING NATIVE CORONARY ARTERY OF NATIVE HEART WITHOUT ANGINA PECTORIS: ICD-10-CM

## 2023-02-07 DIAGNOSIS — Z86.711 HISTORY OF PULMONARY EMBOLUS (PE): ICD-10-CM

## 2023-02-07 DIAGNOSIS — N18.31 CHRONIC KIDNEY DISEASE (CKD) STAGE G3A/A1, MODERATELY DECREASED GLOMERULAR FILTRATION RATE (GFR) BETWEEN 45-59 ML/MIN/1.73 SQUARE METER AND ALBUMINURIA CREATININE RATIO LESS THAN 30 MG/G: ICD-10-CM

## 2023-02-07 DIAGNOSIS — I25.119 CORONARY ARTERY DISEASE INVOLVING NATIVE CORONARY ARTERY OF NATIVE HEART WITH ANGINA PECTORIS (HCC): ICD-10-CM

## 2023-02-07 PROCEDURE — 99214 OFFICE O/P EST MOD 30 MIN: CPT | Mod: 25 | Performed by: NURSE PRACTITIONER

## 2023-02-07 PROCEDURE — 93282 PRGRMG EVAL IMPLANTABLE DFB: CPT | Performed by: NURSE PRACTITIONER

## 2023-02-07 RX ORDER — METOPROLOL SUCCINATE 100 MG/1
100 TABLET, EXTENDED RELEASE ORAL
Qty: 100 TABLET | Refills: 3 | Status: SHIPPED | OUTPATIENT
Start: 2023-02-07 | End: 2023-08-08

## 2023-02-07 RX ORDER — EMPAGLIFLOZIN 10 MG/1
10 TABLET, FILM COATED ORAL DAILY
Qty: 100 TABLET | Refills: 3 | Status: SHIPPED | OUTPATIENT
Start: 2023-02-07 | End: 2023-06-22 | Stop reason: SDUPTHER

## 2023-02-07 RX ORDER — LOVASTATIN 10 MG/1
10 TABLET ORAL EVERY EVENING
Qty: 100 TABLET | Refills: 3 | Status: SHIPPED | OUTPATIENT
Start: 2023-02-07 | End: 2023-08-08

## 2023-02-07 RX ORDER — SPIRONOLACTONE 25 MG/1
12.5 TABLET ORAL
Qty: 50 TABLET | Refills: 3 | Status: SHIPPED | OUTPATIENT
Start: 2023-02-07 | End: 2023-12-15 | Stop reason: SDUPTHER

## 2023-02-07 RX ORDER — FUROSEMIDE 40 MG/1
40 TABLET ORAL DAILY
Qty: 100 TABLET | Refills: 3 | Status: SHIPPED | OUTPATIENT
Start: 2023-02-07 | End: 2023-08-08

## 2023-02-07 RX ORDER — LOSARTAN POTASSIUM 25 MG/1
12.5 TABLET ORAL DAILY
Qty: 50 TABLET | Refills: 3 | Status: SHIPPED | OUTPATIENT
Start: 2023-02-07 | End: 2023-10-05

## 2023-02-07 ASSESSMENT — ENCOUNTER SYMPTOMS
ABDOMINAL PAIN: 0
HEADACHES: 0
WEIGHT LOSS: 0
DIZZINESS: 1
DEPRESSION: 0
FEVER: 0
NAUSEA: 0
MYALGIAS: 0
INSOMNIA: 0
CHILLS: 0
ORTHOPNEA: 0
PND: 0
LOSS OF CONSCIOUSNESS: 0
BRUISES/BLEEDS EASILY: 0
PALPITATIONS: 0
SHORTNESS OF BREATH: 0

## 2023-02-07 ASSESSMENT — FIBROSIS 4 INDEX
FIB4 SCORE: 2.529822128134703466
FIB4 SCORE: 2.529822128134703466

## 2023-02-07 NOTE — LETTER
[facility]   [address]   [address]  [place] [address]  Phone: [phone]  Fax: [phone]              Jaime Lee  1950    Encounter Date: 2/7/2023    CHRIS Rodríguez          PROGRESS NOTE:  Chief Complaint   Patient presents with   • Follow-Up   • AICD Check/Dysfunction   • Coronary Artery Disease   • Cardiomyopathy (Ischemic)   • Ventricular Tachycardia   • CHF (Compensated)   • Pulmonary Embolism   • Anticoagulation   • Hyperlipidemia   • Diabetes (Controlled)   • Chronic Kidney Disease       Sam Lee is a 72 y.o. male who presents today for six month follow-up of CAD/ischemic cardiomyopathy, HFrEF, VT, history of PE/DVT, anticoagulation, hyperlipidemia, DM and CKD.    Mr. Lee is a 72 year old male with history of CAD/ischemic cardiomyopathy status post remote CABG x 4 (2000), HFrEF with LVEF 20-25% with AICD since 2003, and last generator replacement in 2015; he also has hyperlipidemia, CKD and diabetes, and a history of left LE DVT/PE, on Xarelto. He was last seen by me in August 2022.     In May 2022, he was evaluated by [facility] (Dr. Ortiz) for possible heart transplant; echocardiogram then showed LVEF 20-25% and severely dilated LV. He did right heart cath on 9.21/2022 by Dr. Pinedo, which showed normal pulmonary and cardiac filling pressures, and reduced LVEF.     He is here today for six month follow-up. Generally, he is doing well. He aguilar have some mild dizziness with low BP, no syncope or presyncope; he has lost some weight, and is trying to get more regularly. He is still exercising daily.  He denies any chest pain, pressure or discomfort; no palpitations. No dyspnea, orthopnea or PND. No device therapy.  No LE edema. He does have discoloration of his lower legs.     Past Medical History:   Diagnosis Date   • Acute anterior myocardial infarction (HCC)  12/28/2000    PCI attempted by Dr. Malloy but unsuccessful   • AICD (automatic cardioverter/defibrillator) present 2003/2008/2015    Defibrillator is a Medtronic model TMVY8M1, serial #[id number], implanted Dr. Stephenson November 2015. The ventricular lead is a Medtronic model #6947-65, lead serial #[id number], implant 7/2/2003   • Allergy     within 2 years   • CAD (coronary artery disease) 12/28/2000    CABG x 4 (LIMA to LAD, rSVG to first diagonal, left circumflex and PDA).2000: CABG x 4 (LIMA to LAD, rSVG to first diagonal, left circumflex and PDA).  2003: LIMA and circumflex grafts patent, diagonal and PDA grafts closed.   • Chronic deep vein thrombosis (DVT) of femoral vein of left lower extremity (HCC) 01/04/2018   • Congestive heart failure (HCC) 12/28/2000   • Dental disorder     upper denture   • GERD (gastroesophageal reflux disease)    • High cholesterol    • Hypertension    • Ischemic cardiomyopathy 05/2022    Echocardiogram with severely dilated LV, LVEF <25%. Severe diffuse hypokinesis. Septal akinesis. Normal LA and RV. Moderately dilated RA. Trace MR, mild TR. RVSP 23.8mmHg.   • Left bundle branch block     • Pacemaker    • Pneumonia 2014   • Pulmonary embolism ([facility]) 03/2014   • Thrombocytopenia ([facility]) 02/23/2014   • Type 2 diabetes mellitus without complication ([facility])      Diet controlled, followed by Dr. Walls   • Vascular disorder of extremity ([facility]) 02/11/2020     Past Surgical History:   Procedure Laterality Date   • RECOVERY  11/3/2015    Procedure: CATH LAB-MARCO A-ICD GENERATOR CHANGE 78775- EUNICE T82.111A-MEDTRONIC BLOODLESS;  Surgeon: Recoveryonly Surgery;  [place] [facility] [place]  [place]:    • AICD BATTERY CHANGE  November 2015    Generator replacement with Medtronic Evera S VR [id number] implanted by Dr. Marcus Stephenson.   • AICD IMPLANT  January 2008    Medtronic Virtuoso VR [id number] implanted by Dr. Stephenson.   • OTHER CARDIAC SURGERY  12/2000    CABG x 4   • APPENDECTOMY  1969   •  MULTIPLE CORONARY ARTERY BYPASS       Family History   Problem Relation Age of Onset   • Diabetes Mother    • Heart Disease Mother    • Diabetes Sister    • Alcohol/Drug Brother      Social History     Socioeconomic History   • Marital status:      Spouse name: Not on file   • Number of children: Not on file   • Years of education: Not on file   • Highest education level: Not on file   Occupational History   • Not on file   Tobacco Use   • Smoking status: Former     Packs/day: 1.00     Years: 32.00     Pack years: 32.00     Types: Cigarettes     Quit date: 2000     Years since quittin.1   • Smokeless tobacco: Never   Vaping Use   • Vaping Use: Never used   Substance and Sexual Activity   • Alcohol use: Yes     Alcohol/week: 1.2 oz     Types: 2 Standard drinks or equivalent per week   • Drug use: Not Currently     Types: Marijuana, Oral   • Sexual activity: Never   Other Topics Concern   • Not on file   Social History Narrative   • Not on file     Social Determinants of Health     Financial Resource Strain: Not on file   Food Insecurity: Not on file   Transportation Needs: Not on file   Physical Activity: Not on file   Stress: Not on file   Social Connections: Not on file   Intimate Partner Violence: Not on file   Housing Stability: Not on file     Allergies   Allergen Reactions   • Bloodless    • Pcn [Penicillins] Anaphylaxis   • Ace Inhibitors Cough     Cough     Outpatient Encounter Medications as of 2023   Medication Sig Dispense Refill   • [DISCONTINUED] Empagliflozin (JARDIANCE) 10 MG Tab Take 1 Tablet by mouth every day. 100 Tablet 3   • metFORMIN (GLUCOPHAGE) 500 MG Tab TAKE 1 TABLET BY MOUTH TWICE A DAY WITH MEALS 200 Tablet 3   • potassium chloride SA (KDUR) 20 MEQ Tab CR Take 1 Tablet by mouth every day. 90 Tablet 3   • [DISCONTINUED] furosemide (LASIX) 40 MG Tab Take 1 Tablet by mouth every day. 100 Tablet 3   • [DISCONTINUED] rivaroxaban (XARELTO) 20 MG Tab tablet Take 1 Tablet by  "mouth with dinner. 100 Tablet 3   • [DISCONTINUED] spironolactone (ALDACTONE) 25 MG Tab Take 0.5 Tablets by mouth every day. 50 Tablet 3   • [DISCONTINUED] losartan (COZAAR) 25 MG Tab Take 0.5 Tablets by mouth every day. 50 Tablet 3   • [DISCONTINUED] metoprolol SR (TOPROL XL) 100 MG TABLET SR 24 HR Take 1 Tablet by mouth every day. 100 Tablet 3   • [DISCONTINUED] lovastatin (MEVACOR) 10 MG tablet Take 1 Tablet by mouth every evening. 100 Tablet 3   • Non Formulary Request Take 1 Tablet by mouth every evening. Vitamin C/Zinc/Vitamin D/AlderBerry Vitamin     • vitamin D (CHOLECALCIFEROL) 1000 Unit (25 mcg) Tab Take 1,000 Units by mouth every day.     • Magnesium 500 MG Cap Take 1 Cap by mouth every day. 180 Cap 4     No facility-administered encounter medications on file as of 2/7/2023.     Review of Systems   Constitutional:  Positive for malaise/fatigue. Negative for chills, fever and weight loss.        Mostly with exertion/exercise.   HENT:  Negative for congestion.    Respiratory:  Negative for shortness of breath.    Cardiovascular:  Negative for chest pain, palpitations, orthopnea, leg swelling and PND.   Gastrointestinal:  Negative for abdominal pain and nausea.   Musculoskeletal:  Negative for myalgias.   Skin:  Negative for rash.   Neurological:  Positive for dizziness. Negative for loss of consciousness and headaches.        Chronic, stable, mostly when getting up and changing positions.   Endo/Heme/Allergies:  Does not bruise/bleed easily.   Psychiatric/Behavioral:  Negative for depression. The patient does not have insomnia.             Objective    BP (!) 88/62 (BP Location: Left arm, Patient Position: Sitting, BP Cuff Size: Adult)   Pulse 70   Resp 14   Ht 1.803 m (5' 11\")   Wt 69.4 kg (153 lb)   SpO2 94%   BMI 21.34 kg/m²     Physical Exam  Constitutional:       Appearance: He is well-developed.      Comments: BMI 21.34   HENT:      Head: Normocephalic.   Neck:      Vascular: No JVD. "   Cardiovascular:      Rate and Rhythm: Normal rate and regular rhythm.      Heart sounds: Normal heart sounds.      Comments: AICD in left chest wall.  Sternotomy scar present.  Pulmonary:      Effort: Pulmonary effort is normal. No respiratory distress.      Breath sounds: Normal breath sounds. No wheezing or rales.   Abdominal:      General: Bowel sounds are normal. There is no distension.      Palpations: Abdomen is soft.      Tenderness: There is no abdominal tenderness.   Musculoskeletal:         General: Normal range of motion.      Cervical back: Normal range of motion and neck supple.   Skin:     General: Skin is warm and dry.      Findings: No rash.      Comments: Skin changes of chronic venous stasis in lower legs.   Neurological:      Mental Status: He is alert and oriented to person, place, and time.     AICD interrogation today reveals normal function. No device therapy. He is paced on 1.2% of total time. 8 NSVT episodes, all <3 seconds.    RESULTS OF RIGHT HEART CATH OF 9/21/2022:  Indication/Preoperative diagnosis:  1. Cardiomyopathy  2. Precardiac transplant evaluation     Postoperative diagnosis:  1. Normal pulmonary artery pressures  2. Normal cardiac filling pressures  3. Reduced cardiac output     Interpretation Summary of Echocardiogram of 5/2/2022:  1. Severe LV enlargement with severely reduced systolic function. Estimated EF = 19% by MOD. Normal RV size with mild to moderately reduced systolic function.  2. Mild TR. Estimated RVSP = 24 mmHg (RAP 3 mmHg). Trace AR/IA/MR.  3. No prior echo for comparison.    CONCLUSIONS OF CAROTID US OF 2/17/2021:   Very mild plaque of the carotid bifurcation bilaterally.     CONCLUSIONS OF ECHOCARDIOGRAM OF 2/17/2021:  No significant chnage compared to prior echo in 2019  Left ventricle is severely dilated.  Left ventricular ejection fraction is visually estimated to be 20%.  No significant valve disease or flow abnormalities.     CONCLUSIONS OF  ECHOCARDIOGRAM OF 1/30/2019:  Prior echo 08/09/17, no significant change.  Severely reduced left ventricular systolic function.  Left ventricular ejection fraction is visually estimated to be 20-25%.  Global hypokinesis with regional variation, see report.  Grade II diastolic dysfunction.  Reduced right ventricular systolic function.  Pacer/ICD wire seen in right ventricle.  No significant valve abnormality.  Estimated right ventricular systolic pressure  is 25 mmHg.  Inferior vena cava is not well visualized.        Component      Latest Ref Rng 3/5/2021 4/2/2022 1/6/2023   Glycohemoglobin      4.0 - 5.6 % 6.7 (H)  6.9 (H)  6.6 (H)    Estim. Avg Glu      mg/dL 146  151  143       Lab Results   Component Value Date/Time    CHOLSTRLTOT 169 04/02/2022 08:51 AM    LDL 87 04/02/2022 08:51 AM    HDL 58 04/02/2022 08:51 AM    TRIGLYCERIDE 118 04/02/2022 08:51 AM        Lab Results   Component Value Date/Time    SODIUM 138 01/06/2023 09:00 AM    POTASSIUM 4.9 01/06/2023 09:00 AM    CHLORIDE 99 01/06/2023 09:00 AM    CO2 27 01/06/2023 09:00 AM    GLUCOSE 168 (H) 01/06/2023 09:00 AM    BUN 19 01/06/2023 09:00 AM    CREATININE 1.37 01/06/2023 09:00 AM    CREATININE 1.0 01/08/2008 07:45 AM      Lab Results   Component Value Date/Time    ASTSGOT 17 09/21/2022 07:18 AM    ALTSGPT 10 09/21/2022 07:18 AM         Assessment & Plan    1. AICD (automatic cardioverter/defibrillator) present        2. Coronary artery disease involving native coronary artery of native heart without angina pectoris        3. Ischemic cardiomyopathy        4. S/P CABG x 4        5. Chronic systolic congestive heart failure, NYHA class 2 ([facility])        6. Ventricular tachycardia        7. History of pulmonary embolus (PE)        8. Chronic anticoagulation        9. Secondary hypercoagulable state (HCC)        10. Mixed hyperlipidemia        11. Type 2 diabetes mellitus with stage 3a chronic kidney disease, without long-term current use of insulin ([facility])         12. Chronic kidney disease (CKD) stage G3a/A1, moderately decreased glomerular filtration rate (GFR) between 45-59 mL/min/1.73 square meter and albuminuria creatinine ratio less than 30 mg/g (HCC)            Medical Decision Making: Today's Assessment/Status/Plan:                                  No Recipients

## 2023-02-07 NOTE — PROGRESS NOTES
Chief Complaint   Patient presents with    Follow-Up    AICD Check/Dysfunction    Coronary Artery Disease    Cardiomyopathy (Ischemic)    Ventricular Tachycardia    CHF (Compensated)    Pulmonary Embolism    Anticoagulation    Hyperlipidemia    Diabetes (Controlled)    Chronic Kidney Disease       Subjective     Prudencio Shar Lee is a 72 y.o. male who presents today for six month follow-up of CAD/ischemic cardiomyopathy, HFrEF, VT, history of PE/DVT, anticoagulation, hyperlipidemia, DM and CKD.    Mr. Lee is a 72 year old male with history of CAD/ischemic cardiomyopathy status post remote CABG x 4 (2000), HFrEF with LVEF 20-25% with AICD since 2003, and last generator replacement in 2015; he also has hyperlipidemia, CKD and diabetes, and a history of left LE DVT/PE, on Xarelto. He was last seen by me in August 2022.     In May 2022, he was evaluated by [facility] (Dr. Ortiz) for possible heart transplant; echocardiogram then showed LVEF 20-25% and severely dilated LV. He did right heart cath on 9.21/2022 by Dr. Pinedo, which showed normal pulmonary and cardiac filling pressures, and reduced LVEF.     He is here today for six month follow-up. Generally, he is doing well. He does have some mild dizziness with low BP, no syncope or presyncope; he has lost some weight, and is trying to eat more regularly, including more protein. He is still exercising daily.  He denies any chest pain, pressure or discomfort; no palpitations. No dyspnea, orthopnea or PND. No device therapy.  No LE edema. He does have ongoing discoloration of his lower legs.    He does see Dr. Ortiz from [facility] via TeleMedicine later this week.     Past Medical History:   Diagnosis Date    Acute anterior myocardial infarction (HCC) 12/28/2000    PCI attempted by Dr. Malloy but unsuccessful    AICD (automatic cardioverter/defibrillator) present 2003/2008/2015    Defibrillator is a ShoorK model BUHK9S5, serial  #[id number], implanted Dr. Stephenson November 2015. The ventricular lead is a Medtronic model #6947-65, lead serial #[id number], implant 7/2/2003    Allergy     within 2 years    CAD (coronary artery disease) 12/28/2000    CABG x 4 (LIMA to LAD, rSVG to first diagonal, left circumflex and PDA).2000: CABG x 4 (LIMA to LAD, rSVG to first diagonal, left circumflex and PDA).  2003: LIMA and circumflex grafts patent, diagonal and PDA grafts closed.    Chronic deep vein thrombosis (DVT) of femoral vein of left lower extremity ([facility]) 01/04/2018    Congestive heart failure ([facility]) 12/28/2000    Dental disorder     upper denture    GERD (gastroesophageal reflux disease)     High cholesterol     Hypertension     Ischemic cardiomyopathy 05/2022    Echocardiogram with severely dilated LV, LVEF <25%. Severe diffuse hypokinesis. Septal akinesis. Normal LA and RV. Moderately dilated RA. Trace MR, mild TR. RVSP 23.8mmHg.    Left bundle branch block      Pacemaker     Pneumonia 2014    Pulmonary embolism ([facility]) 03/2014    Thrombocytopenia ([facility]) 02/23/2014    Type 2 diabetes mellitus without complication ([facility])      Diet controlled, followed by Dr. Walls    Vascular disorder of extremity ([facility]) 02/11/2020     Past Surgical History:   Procedure Laterality Date    RECOVERY  11/3/2015    Procedure: CATH LAB-MARCO A-ICD GENERATOR CHANGE 88073- EUNICE T82.111A-MEDTRONIC BLOODLESS;  Surgeon: Recoveryonly Surgery;  Location: SURGERY PRE-[place] PROC [place] [facility];  Service:     AICD BATTERY CHANGE  November 2015    Generator replacement with Medtronic Evera S VR [id number] implanted by Dr. Marcus Stephenson.    AICD IMPLANT  January 2008    Medtronic Virtuoso VR [id number] implanted by Dr. Stephenson.    OTHER CARDIAC SURGERY  12/2000    CABG x 4    APPENDECTOMY  1969    MULTIPLE CORONARY ARTERY BYPASS       Family History   Problem Relation Age of Onset    Diabetes Mother     Heart Disease Mother     Diabetes Sister     Alcohol/Drug Brother      Social History      Socioeconomic History    Marital status:      Spouse name: Not on file    Number of children: Not on file    Years of education: Not on file    Highest education level: Not on file   Occupational History    Not on file   Tobacco Use    Smoking status: Former     Packs/day: 1.00     Years: 32.00     Pack years: 32.00     Types: Cigarettes     Quit date: 2000     Years since quittin.1    Smokeless tobacco: Never   Vaping Use    Vaping Use: Never used   Substance and Sexual Activity    Alcohol use: Yes     Alcohol/week: 1.2 oz     Types: 2 Standard drinks or equivalent per week    Drug use: Not Currently     Types: Marijuana, Oral    Sexual activity: Never   Other Topics Concern    Not on file   Social History Narrative    Not on file     Social Determinants of Health     Financial Resource Strain: Not on file   Food Insecurity: Not on file   Transportation Needs: Not on file   Physical Activity: Not on file   Stress: Not on file   Social Connections: Not on file   Intimate Partner Violence: Not on file   Housing Stability: Not on file     Allergies   Allergen Reactions    Bloodless     Pcn [Penicillins] Anaphylaxis    Ace Inhibitors Cough     Cough     Outpatient Encounter Medications as of 2023   Medication Sig Dispense Refill    [DISCONTINUED] Empagliflozin (JARDIANCE) 10 MG Tab Take 1 Tablet by mouth every day. 100 Tablet 3    metFORMIN (GLUCOPHAGE) 500 MG Tab TAKE 1 TABLET BY MOUTH TWICE A DAY WITH MEALS 200 Tablet 3    potassium chloride SA (KDUR) 20 MEQ Tab CR Take 1 Tablet by mouth every day. 90 Tablet 3    [DISCONTINUED] furosemide (LASIX) 40 MG Tab Take 1 Tablet by mouth every day. 100 Tablet 3    [DISCONTINUED] rivaroxaban (XARELTO) 20 MG Tab tablet Take 1 Tablet by mouth with dinner. 100 Tablet 3    [DISCONTINUED] spironolactone (ALDACTONE) 25 MG Tab Take 0.5 Tablets by mouth every day. 50 Tablet 3    [DISCONTINUED] losartan (COZAAR) 25 MG Tab Take 0.5 Tablets by mouth every day.  "50 Tablet 3    [DISCONTINUED] metoprolol SR (TOPROL XL) 100 MG TABLET SR 24 HR Take 1 Tablet by mouth every day. 100 Tablet 3    [DISCONTINUED] lovastatin (MEVACOR) 10 MG tablet Take 1 Tablet by mouth every evening. 100 Tablet 3    Non Formulary Request Take 1 Tablet by mouth every evening. Vitamin C/Zinc/Vitamin D/AlderBerry Vitamin      vitamin D (CHOLECALCIFEROL) 1000 Unit (25 mcg) Tab Take 1,000 Units by mouth every day.      Magnesium 500 MG Cap Take 1 Cap by mouth every day. 180 Cap 4     No facility-administered encounter medications on file as of 2/7/2023.     Review of Systems   Constitutional:  Positive for malaise/fatigue. Negative for chills, fever and weight loss.        Mostly with exertion/exercise.   HENT:  Negative for congestion.    Respiratory:  Negative for shortness of breath.    Cardiovascular:  Negative for chest pain, palpitations, orthopnea, leg swelling and PND.   Gastrointestinal:  Negative for abdominal pain and nausea.   Musculoskeletal:  Negative for myalgias.   Skin:  Negative for rash.   Neurological:  Positive for dizziness. Negative for loss of consciousness and headaches.        Chronic, stable, mostly when getting up and changing positions.   Endo/Heme/Allergies:  Does not bruise/bleed easily.   Psychiatric/Behavioral:  Negative for depression. The patient does not have insomnia.             Objective     BP (!) 88/62 (BP Location: Left arm, Patient Position: Sitting, BP Cuff Size: Adult)   Pulse 70   Resp 14   Ht 1.803 m (5' 11\")   Wt 69.4 kg (153 lb)   SpO2 94%   BMI 21.34 kg/m²     Physical Exam  Constitutional:       Appearance: He is well-developed.      Comments: BMI 21.34   HENT:      Head: Normocephalic.   Neck:      Vascular: No JVD.   Cardiovascular:      Rate and Rhythm: Normal rate and regular rhythm.      Heart sounds: Normal heart sounds.      Comments: AICD in left chest wall.  Sternotomy scar present.  Pulmonary:      Effort: Pulmonary effort is normal. No " respiratory distress.      Breath sounds: Normal breath sounds. No wheezing or rales.   Abdominal:      General: Bowel sounds are normal. There is no distension.      Palpations: Abdomen is soft.      Tenderness: There is no abdominal tenderness.   Musculoskeletal:         General: Normal range of motion.      Cervical back: Normal range of motion and neck supple.   Skin:     General: Skin is warm and dry.      Findings: No rash.      Comments: Skin changes of chronic venous stasis in lower legs.   Neurological:      Mental Status: He is alert and oriented to person, place, and time.     AICD interrogation today reveals normal function. No device therapy. He is paced on 1.2% of total time. 8 NSVT episodes, all <3 seconds.    RESULTS OF RIGHT HEART CATH OF 9/21/2022:  Indication/Preoperative diagnosis:  Cardiomyopathy  Precardiac transplant evaluation     Postoperative diagnosis:  Normal pulmonary artery pressures  Normal cardiac filling pressures  Reduced cardiac output     Interpretation Summary of Echocardiogram of 5/2/2022:  1. Severe LV enlargement with severely reduced systolic function. Estimated EF = 19% by MOD. Normal RV size with mild to moderately reduced systolic function.  2. Mild TR. Estimated RVSP = 24 mmHg (RAP 3 mmHg). Trace AR/MN/MR.  3. No prior echo for comparison.    CONCLUSIONS OF CAROTID US OF 2/17/2021:   Very mild plaque of the carotid bifurcation bilaterally.     CONCLUSIONS OF ECHOCARDIOGRAM OF 2/17/2021:  No significant chnage compared to prior echo in 2019  Left ventricle is severely dilated.  Left ventricular ejection fraction is visually estimated to be 20%.  No significant valve disease or flow abnormalities.     CONCLUSIONS OF ECHOCARDIOGRAM OF 1/30/2019:  Prior echo 08/09/17, no significant change.  Severely reduced left ventricular systolic function.  Left ventricular ejection fraction is visually estimated to be 20-25%.  Global hypokinesis with regional variation, see  report.  Grade II diastolic dysfunction.  Reduced right ventricular systolic function.  Pacer/ICD wire seen in right ventricle.  No significant valve abnormality.  Estimated right ventricular systolic pressure  is 25 mmHg.  Inferior vena cava is not well visualized.        Component      Latest Ref Rng 3/5/2021 4/2/2022 1/6/2023   Glycohemoglobin      4.0 - 5.6 % 6.7 (H)  6.9 (H)  6.6 (H)    Estim. Avg Glu      mg/dL 146  151  143       Lab Results   Component Value Date/Time    CHOLSTRLTOT 169 04/02/2022 08:51 AM    LDL 87 04/02/2022 08:51 AM    HDL 58 04/02/2022 08:51 AM    TRIGLYCERIDE 118 04/02/2022 08:51 AM        Lab Results   Component Value Date/Time    SODIUM 138 01/06/2023 09:00 AM    POTASSIUM 4.9 01/06/2023 09:00 AM    CHLORIDE 99 01/06/2023 09:00 AM    CO2 27 01/06/2023 09:00 AM    GLUCOSE 168 (H) 01/06/2023 09:00 AM    BUN 19 01/06/2023 09:00 AM    CREATININE 1.37 01/06/2023 09:00 AM    CREATININE 1.0 01/08/2008 07:45 AM      Lab Results   Component Value Date/Time    ASTSGOT 17 09/21/2022 07:18 AM    ALTSGPT 10 09/21/2022 07:18 AM         Assessment & Plan     1. AICD (automatic cardioverter/defibrillator) present        2. Coronary artery disease involving native coronary artery of native heart without angina pectoris        3. Ischemic cardiomyopathy        4. S/P CABG x 4        5. Chronic systolic congestive heart failure, NYHA class 2 ([facility])        6. Ventricular tachycardia        7. History of pulmonary embolus (PE)        8. Chronic anticoagulation        9. Secondary hypercoagulable state ([facility])        10. Mixed hyperlipidemia        11. Type 2 diabetes mellitus with stage 3a chronic kidney disease, without long-term current use of insulin ([facility])        12. Chronic kidney disease (CKD) stage G3a/A1, moderately decreased glomerular filtration rate (GFR) between 45-59 mL/min/1.73 square meter and albuminuria creatinine ratio less than 30 mg/g ([facility])            Medical Decision Making: Today's  "Assessment/Status/Plan:      1. CAD/ischemic cardiomyopathy, status post remote CABG x 4, with history of VT and HFrEF, LVEF 20%, with AICD. No device therapy, and no angina. He does have some mild shortness of breath exertion. He is followed closely by [facility] via TeleMedicine, to be consider for possible future heart transplant. At this point, per patient, he is considered \"too healthy.\" Continue:  Xarelto 20mg once daily  Toprol XL 100mg once daily  Losartan 12.5mg once daily  Aldactone 12.5mg once daily  Lasix 40mg once daily  Mevactor 10mg once daily  Jardiance 10mg once daily    2. History of VT, with no device therapy on Toprol XL 100mg once daily.    3. History of DVT/PE, anticoagulated with Xarelto. No bleeding problems.    4. Hyperlipidemia, treated with Mevacor. Last LDL was 87; perhaps consider higher intensity statin?    5. Diabetes mellitus, treated with Metformin and Jardiance. Recent HgbA1c was improved at 6.6.    6. CKD, stage 3a, improved from last labs.    Same medications for now. We did discuss healthy food options to help him put a little bit of weight back on (mainly Mediterranean diet options). To repeat annual labs in April 2023. Keep follow-up with other providers. Follow-up with me in 6 months.    [facility] Form    Diagnosis to address: N18.31 - Chronic kidney disease (CKD) stage G3a/A1, moderately decreased glomerular filtration rate (GFR) between 45-59 mL/min/1.73 square meter and albuminuria creatinine ratio less than 30 mg/g ([facility])  Assessment and plan: Chronic, improving. Continue with current defined treatment plan: recent GFR was improved.  Follow-up at least annually.  Diagnosis: E11.22, N18.31 - Type 2 diabetes mellitus with stage 3a chronic kidney disease, without long-term current use of insulin (HCC)  Assessment and plan: Chronic, improving. Continue with current defined treatment plan: improved with addition of Jardiance.  Follow-up at least annually.  Diagnosis: " I50.22 - Chronic systolic congestive heart failure, NYHA class 2 (HCC)  Assessment and plan: Chronic, stable. Continue with current defined treatment plan: stable on current medications, no HF symptoms. Follow-up at least annually.  Last edited 02/07/23 11:26 PST by CHRIS Rodríguez

## 2023-03-06 DIAGNOSIS — I25.5 ISCHEMIC CARDIOMYOPATHY: ICD-10-CM

## 2023-03-06 DIAGNOSIS — I25.119 CORONARY ARTERY DISEASE INVOLVING NATIVE CORONARY ARTERY OF NATIVE HEART WITH ANGINA PECTORIS (HCC): ICD-10-CM

## 2023-03-06 RX ORDER — POTASSIUM CHLORIDE 20 MEQ/1
20 TABLET, EXTENDED RELEASE ORAL DAILY
Qty: 90 TABLET | Refills: 3 | Status: SHIPPED | OUTPATIENT
Start: 2023-03-06 | End: 2023-08-08

## 2023-04-06 ENCOUNTER — OFFICE VISIT (OUTPATIENT)
Dept: URGENT CARE | Facility: CLINIC | Age: 73
End: 2023-04-06
Payer: MEDICARE

## 2023-04-06 ENCOUNTER — APPOINTMENT (OUTPATIENT)
Dept: URGENT CARE | Facility: CLINIC | Age: 73
End: 2023-04-06

## 2023-04-06 VITALS
RESPIRATION RATE: 18 BRPM | TEMPERATURE: 98.1 F | SYSTOLIC BLOOD PRESSURE: 104 MMHG | BODY MASS INDEX: 21 KG/M2 | WEIGHT: 150 LBS | HEART RATE: 98 BPM | OXYGEN SATURATION: 96 % | DIASTOLIC BLOOD PRESSURE: 62 MMHG | HEIGHT: 71 IN

## 2023-04-06 DIAGNOSIS — L03.211 CELLULITIS OF FACE: ICD-10-CM

## 2023-04-06 DIAGNOSIS — R21 RASH: ICD-10-CM

## 2023-04-06 PROCEDURE — 99213 OFFICE O/P EST LOW 20 MIN: CPT | Performed by: NURSE PRACTITIONER

## 2023-04-06 RX ORDER — VALACYCLOVIR HYDROCHLORIDE 1 G/1
1000 TABLET, FILM COATED ORAL 3 TIMES DAILY
Qty: 21 TABLET | Refills: 0 | Status: SHIPPED | OUTPATIENT
Start: 2023-04-06 | End: 2023-04-13

## 2023-04-06 RX ORDER — CEPHALEXIN 500 MG/1
500 CAPSULE ORAL 2 TIMES DAILY
Qty: 14 CAPSULE | Refills: 0 | Status: SHIPPED | OUTPATIENT
Start: 2023-04-06 | End: 2023-04-13

## 2023-04-06 ASSESSMENT — FIBROSIS 4 INDEX: FIB4 SCORE: 2.529822128134703466

## 2023-04-06 NOTE — PROGRESS NOTES
Patient has consented to treatment and for use of patient information for treatment and billing purposes.    Date: 04/06/23     Arrival Mode: Private Vehicle    Chief Complaint:    Chief Complaint   Patient presents with   • Facial Swelling     X5 days, under left eye         History of Present Illness: 72 y.o.  male presents to clinic with to clinic with 5-day history of redness and swelling to his left cheek.  Patient states that originally started as dry skin and then began being tender and red.  He has concerns for infection.  Upon questioning patient states he has had shingles prior on his back this does not feel similar.  He denies any fevers and or body aches.  No irritation or pain to the eye.  No vision changes.      ROS:    As stated in HPI     Pertinent Medical History:  Past Medical History:   Diagnosis Date   • Acute anterior myocardial infarction (HCC) 12/28/2000    PCI attempted by Dr. Malloy but unsuccessful   • AICD (automatic cardioverter/defibrillator) present 2003/2008/2015    Defibrillator is a Medtronic model KDPM5S7, serial #[id number], implanted Dr. Stephenson November 2015. The ventricular lead is a Medtronic model #6947-65, lead serial #[id number], implant 7/2/2003   • Allergy     within 2 years   • CAD (coronary artery disease) 12/28/2000    CABG x 4 (LIMA to LAD, rSVG to first diagonal, left circumflex and PDA).2000: CABG x 4 (LIMA to LAD, rSVG to first diagonal, left circumflex and PDA).  2003: LIMA and circumflex grafts patent, diagonal and PDA grafts closed.   • Chronic deep vein thrombosis (DVT) of femoral vein of left lower extremity (HCC) 01/04/2018   • Congestive heart failure (HCC) 12/28/2000   • Dental disorder     upper denture   • GERD (gastroesophageal reflux disease)    • High cholesterol    • Hypertension    • Ischemic cardiomyopathy 05/2022    Echocardiogram with severely dilated LV, LVEF <25%. Severe diffuse hypokinesis. Septal akinesis. Normal LA and RV. Moderately dilated  RA. Trace MR, mild TR. RVSP 23.8mmHg.   • Left bundle branch block     • Pacemaker    • Pneumonia 2014   • Pulmonary embolism (HCC) 03/2014   • Thrombocytopenia (HCC) 02/23/2014   • Type 2 diabetes mellitus without complication (HCC)      Diet controlled, followed by Dr. Walls   • Vascular disorder of extremity (HCC) 02/11/2020        Pertinent Surgical History:  Past Surgical History:   Procedure Laterality Date   • RECOVERY  11/3/2015    Procedure: CATH LAB-MARCO A-ICD GENERATOR CHANGE 90300- EUNICE T82.111A-MEDTRONIC BLOODLESS;  Surgeon: Recoveryonly Surgery;  Location: SURGERY PRE-POST PROC UNIT [facility] [place];  Service:    • AICD BATTERY CHANGE  November 2015    Generator replacement with Medtronic Evera S VR [id number] implanted by Dr. Marcus Stephenson.   • AICD IMPLANT  January 2008    Medtronic Virtuoso VR [id number] implanted by Dr. Stephenson.   • OTHER CARDIAC SURGERY  12/2000    CABG x 4   • APPENDECTOMY  1969   • MULTIPLE CORONARY ARTERY BYPASS          Pertinent Medications:    Current Outpatient Medications on File Prior to Visit   Medication Sig Dispense Refill   • potassium chloride SA (KDUR) 20 MEQ Tab CR TAKE 1 TABLET BY MOUTH EVERY DAY 90 Tablet 3   • furosemide (LASIX) 40 MG Tab Take 1 Tablet by mouth every day. 100 Tablet 3   • rivaroxaban (XARELTO) 20 MG Tab tablet Take 1 Tablet by mouth with dinner. 100 Tablet 3   • spironolactone (ALDACTONE) 25 MG Tab Take 0.5 Tablets by mouth every day. 50 Tablet 3   • losartan (COZAAR) 25 MG Tab Take 0.5 Tablets by mouth every day. 50 Tablet 3   • metoprolol SR (TOPROL XL) 100 MG TABLET SR 24 HR Take 1 Tablet by mouth every day. 100 Tablet 3   • lovastatin (MEVACOR) 10 MG tablet Take 1 Tablet by mouth every evening. 100 Tablet 3   • Empagliflozin (JARDIANCE) 10 MG Tab Take 1 Tablet by mouth every day. 100 Tablet 3   • metFORMIN (GLUCOPHAGE) 500 MG Tab TAKE 1 TABLET BY MOUTH TWICE A DAY WITH MEALS 200 Tablet 3   • Non Formulary Request Take 1 Tablet by mouth every evening. Vitamin  C/Zinc/Vitamin D/AlderBerry Vitamin     • vitamin D (CHOLECALCIFEROL) 1000 Unit (25 mcg) Tab Take 1,000 Units by mouth every day.     • Magnesium 500 MG Cap Take 1 Cap by mouth every day. 180 Cap 4     No current facility-administered medications on file prior to visit.        Allergies:    Bloodless, Pcn [penicillins], and Ace inhibitors     Social History:  Social History     Tobacco Use   • Smoking status: Former     Packs/day: 1.00     Years: 32.00     Pack years: 32.00     Types: Cigarettes     Quit date: 2000     Years since quittin.2   • Smokeless tobacco: Never   Vaping Use   • Vaping Use: Never used   Substance Use Topics   • Alcohol use: Yes     Alcohol/week: 1.2 oz     Types: 2 Standard drinks or equivalent per week     Comment: occ   • Drug use: Not Currently     Types: Marijuana, Oral        No LMP for male patient.           Physical Exam:    Vitals:    23 1201   BP: 104/62   Pulse: 98   Resp: 18   Temp: 36.7 °C (98.1 °F)   SpO2: 96%             Physical Exam  Constitutional:       General: He is awake.      Appearance: Normal appearance. He is not ill-appearing, toxic-appearing or diaphoretic.   HENT:      Head: Normocephalic and atraumatic.        Right Ear: Tympanic membrane, ear canal and external ear normal.      Left Ear: Tympanic membrane, ear canal and external ear normal.      Nose: Nose normal.      Mouth/Throat:      Lips: Pink.      Mouth: Mucous membranes are moist.      Pharynx: Oropharynx is clear.   Eyes:      General: Lids are normal. Gaze aligned appropriately. No allergic shiner or scleral icterus.     Extraocular Movements: Extraocular movements intact.      Conjunctiva/sclera: Conjunctivae normal.      Pupils: Pupils are equal, round, and reactive to light.   Cardiovascular:      Rate and Rhythm: Normal rate and regular rhythm.      Pulses:           Radial pulses are 2+ on the right side and 2+ on the left side.      Heart sounds: Normal heart sounds.   Pulmonary:       Effort: Pulmonary effort is normal.      Breath sounds: Normal breath sounds and air entry. No decreased breath sounds, wheezing, rhonchi or rales.   Musculoskeletal:      Right lower leg: No edema.      Left lower leg: No edema.   Lymphadenopathy:      Cervical: No cervical adenopathy.   Skin:     General: Skin is warm.      Capillary Refill: Capillary refill takes less than 2 seconds.      Coloration: Skin is not cyanotic or pale.   Neurological:      Mental Status: He is alert and oriented to person, place, and time.      Gait: Gait is intact.   Psychiatric:         Behavior: Behavior normal. Behavior is cooperative.            Diagnostics:    None     Medical Decision making and clinic course :  I personally reviewed prior external notes and test results pertinent to today's visit. Pt is clinically stable at today's acute urgent care visit.  No acute distress noted. Appropriate for outpatient care at this time. Shared decision-making was utilized with patient for treatment plan.    Pleasant nontoxic-appearing 72-year-old male presenting clinic with a sore to his left cheek. Due to vesicle discussed possible shingles. Will send for keflex to treat for infection. Discussed s/s of shingles, will send for valtrex now as pt will be traveling soon. If he begins to  have more vesicles start valtrex. Creatinine clearance calculated by myself. No medication adjustment needed.       The patient remained stable during the urgent care visit.    Plan:    Medication discussed included indication for use and the potential  benefits and side effects.        1. Cellulitis of face    - cephALEXin (KEFLEX) 500 MG Cap; Take 1 Capsule by mouth 2 times a day for 7 days.  Dispense: 14 Capsule; Refill: 0    2. Rash    - valacyclovir (VALTREX) 1 GM Tab; Take 1 Tablet by mouth 3 times a day for 7 days.  Dispense: 21 Tablet; Refill: 0       All of the patient's questions were answered to their satisfaction at the time of  discharge.    Follow up:    Recommended f/u in  2 days  if there is no improvement.    Patient was encouraged to monitor symptoms closely. Those signs and symptoms which would warrant concern and mandate seeking a higher level of service through the emergency department discussed at length and included in discharge papers.  Patient stated agreement and understanding of this plan of care.    Disposition:  Home in stable condition       Voice Recognition Disclaimer:  Portions of this document were created using voice recognition software. The software does have a chance of producing errors of grammar and possibly content. I have made every reasonable attempt to correct obvious errors, but there may be errors of grammar and possibly content that I did not discover before finalizing the documentation.    Amanda Bowen, [email or web address].

## 2023-04-14 ENCOUNTER — DOCUMENTATION (OUTPATIENT)
Dept: HEALTH INFORMATION MANAGEMENT | Facility: OTHER | Age: 73
End: 2023-04-14

## 2023-04-14 ENCOUNTER — PATIENT MESSAGE (OUTPATIENT)
Dept: HEALTH INFORMATION MANAGEMENT | Facility: OTHER | Age: 73
End: 2023-04-14

## 2023-05-03 PROBLEM — Z79.01 CHRONIC ANTICOAGULATION: Status: RESOLVED | Noted: 2018-02-01 | Resolved: 2023-05-03

## 2023-05-03 PROBLEM — Z85.828 HX OF BASAL CELL CARCINOMA: Status: ACTIVE | Noted: 2017-11-28

## 2023-06-18 ENCOUNTER — PATIENT MESSAGE (OUTPATIENT)
Dept: CARDIOLOGY | Facility: MEDICAL CENTER | Age: 73
End: 2023-06-18
Payer: MEDICARE

## 2023-06-19 ENCOUNTER — TELEPHONE (OUTPATIENT)
Dept: CARDIOLOGY | Facility: MEDICAL CENTER | Age: 73
End: 2023-06-19
Payer: MEDICARE

## 2023-06-19 NOTE — TELEPHONE ENCOUNTER
Phone Number Called: [phone]    Call outcome: Spoke to patient wife Rosy regarding message below.    Message: Called to advise metformin is not a cardiac medication and to reach out to PCP office, they should be able to help and assist with getting reestablished. Answered all questions and concerns, appreciative of call.

## 2023-06-19 NOTE — TELEPHONE ENCOUNTER
AB    Caller: Wife - Nacy     Medication Name and Dosage: metFORMIN (GLUCOPHAGE) 500 MG Tab    Medication amount left: 2 days left     Preferred Pharmacy: [facility][id number] - [place], [place] - [address]    Other questions (Topic): Patients PCP has left , requesting refill via cardio    Callback Number (Will only call for issues): [phone]      Thank you   Criselda HUERTA

## 2023-06-20 ENCOUNTER — TELEPHONE (OUTPATIENT)
Dept: MEDICAL GROUP | Facility: LAB | Age: 73
End: 2023-06-20
Payer: MEDICARE

## 2023-06-20 ENCOUNTER — TELEPHONE (OUTPATIENT)
Dept: HEALTH INFORMATION MANAGEMENT | Facility: OTHER | Age: 73
End: 2023-06-20
Payer: MEDICARE

## 2023-06-20 RX ORDER — METOPROLOL SUCCINATE 50 MG/1
50 TABLET, EXTENDED RELEASE ORAL EVERY MORNING
COMMUNITY
Start: 2023-02-14

## 2023-06-20 RX ORDER — COVID-19 ANTIGEN TEST
KIT MISCELLANEOUS
COMMUNITY
Start: 2023-03-30 | End: 2023-06-20

## 2023-06-22 ENCOUNTER — HOSPITAL ENCOUNTER (OUTPATIENT)
Facility: MEDICAL CENTER | Age: 73
End: 2023-06-22
Attending: FAMILY MEDICINE
Payer: MEDICARE

## 2023-06-22 ENCOUNTER — OFFICE VISIT (OUTPATIENT)
Dept: MEDICAL GROUP | Facility: LAB | Age: 73
End: 2023-06-22
Payer: MEDICARE

## 2023-06-22 VITALS
WEIGHT: 149 LBS | SYSTOLIC BLOOD PRESSURE: 94 MMHG | HEIGHT: 71 IN | DIASTOLIC BLOOD PRESSURE: 60 MMHG | TEMPERATURE: 97.3 F | RESPIRATION RATE: 16 BRPM | BODY MASS INDEX: 20.86 KG/M2 | HEART RATE: 68 BPM | OXYGEN SATURATION: 97 %

## 2023-06-22 DIAGNOSIS — M79.671 CHRONIC PAIN OF BOTH FEET: ICD-10-CM

## 2023-06-22 DIAGNOSIS — E26.9 ALDOSTERONISM (HCC): ICD-10-CM

## 2023-06-22 DIAGNOSIS — N18.31 CHRONIC KIDNEY DISEASE (CKD) STAGE G3A/A1, MODERATELY DECREASED GLOMERULAR FILTRATION RATE (GFR) BETWEEN 45-59 ML/MIN/1.73 SQUARE METER AND ALBUMINURIA CREATININE RATIO LESS THAN 30 MG/G: ICD-10-CM

## 2023-06-22 DIAGNOSIS — E11.65 TYPE 2 DIABETES MELLITUS WITH HYPERGLYCEMIA, WITHOUT LONG-TERM CURRENT USE OF INSULIN (HCC): ICD-10-CM

## 2023-06-22 DIAGNOSIS — I50.22 CHRONIC SYSTOLIC CONGESTIVE HEART FAILURE, NYHA CLASS 2 (HCC): ICD-10-CM

## 2023-06-22 DIAGNOSIS — M79.672 CHRONIC PAIN OF BOTH FEET: ICD-10-CM

## 2023-06-22 DIAGNOSIS — G89.29 CHRONIC PAIN OF BOTH FEET: ICD-10-CM

## 2023-06-22 DIAGNOSIS — Z76.89 ENCOUNTER TO ESTABLISH CARE WITH NEW DOCTOR: ICD-10-CM

## 2023-06-22 LAB
HBA1C MFR BLD: 6.2 % (ref ?–5.8)
POCT INT CON NEG: NEGATIVE
POCT INT CON POS: POSITIVE

## 2023-06-22 PROCEDURE — 3078F DIAST BP <80 MM HG: CPT | Performed by: FAMILY MEDICINE

## 2023-06-22 PROCEDURE — 82043 UR ALBUMIN QUANTITATIVE: CPT

## 2023-06-22 PROCEDURE — 3074F SYST BP LT 130 MM HG: CPT | Performed by: FAMILY MEDICINE

## 2023-06-22 PROCEDURE — 82570 ASSAY OF URINE CREATININE: CPT

## 2023-06-22 PROCEDURE — 83036 HEMOGLOBIN GLYCOSYLATED A1C: CPT | Performed by: FAMILY MEDICINE

## 2023-06-22 PROCEDURE — 99214 OFFICE O/P EST MOD 30 MIN: CPT | Performed by: FAMILY MEDICINE

## 2023-06-22 RX ORDER — EMPAGLIFLOZIN 10 MG/1
10 TABLET, FILM COATED ORAL DAILY
Qty: 30 TABLET | Refills: 3 | Status: SHIPPED | OUTPATIENT
Start: 2023-06-22 | End: 2023-10-30 | Stop reason: SDUPTHER

## 2023-06-22 ASSESSMENT — FIBROSIS 4 INDEX: FIB4 SCORE: 2.529822128134703466

## 2023-06-22 NOTE — PROGRESS NOTES
Chief Complaint:   Chief Complaint   Patient presents with    Establish Care    Medication Refill       HPI: Established patient, accompanied with Rosy andrews  Jaime Lee is a 72 y.o. female who presents for establish care, discussed the following today:        1. Encounter to establish care with new doctor  As part of establishing care visit reviewed past medical problems past surgical history, family/social history, health maintenance reviewed.    Exercise: Regular exercise at the gym and weightlifting along with cardio diet: Well-balanced    Colon cancer screening due in 2024  Vaccinations up-to-date, abdominal ultrasound for aortic aneurysm screening, patient declines.    2. Type 2 diabetes mellitus with hyperglycemia, without long-term current use of insulin     Chronic, on metformin 500 mg twice a day, requesting refill of his medication today  Rechecked A1c 6.2 today  3. Chronic pain of both feet  Chronic ongoing problem pain and numbness and tingling sensation on his toes and feet area.  Patient with history of blood clot on the left lower extremity.  Having signs suggestive of chronic venous insufficiency 4. Chronic kidney disease (CKD)  chronic ongoing GFR at baseline at 50.  Denies concerns      5. Chronic systolic congestive heart failure, NYHA class 2  Chronic, continues to take all medications as directed, reports lightheadedness sometimes, noted a little bit low blood pressure today 94/60, patient on multiple diuretics along with blood pressure medications.    Past medical history, family history, social history and medications reviewed and updated in the record.   Current medications, problem list and allergies reviewed in [facility]  Health maintenance topics are reviewed and updated.    Patient Active Problem List    Diagnosis Date Noted    Encounter to establish care with new doctor 06/22/2023    BMI 21.0-21.9, adult 03/03/2022    Secondary hypercoagulable state (HCC) 03/03/2022     Ventricular tachycardia (HCC) 2019    RBBB 2019    Macrocytosis without anemia 2018    Basal cell carcinoma of skin of left ear 2018    Hx of basal cell carcinoma 2017    Hyperlipidemia 2017    S/P CABG x 4 2016    Type 2 diabetes mellitus with stage 3a chronic kidney disease, without long-term current use of insulin ([facility]) 2016    Old anterior myocardial infarction 2016    Chronic systolic congestive heart failure, NYHA class 2 ([facility]) 2016    AICD (automatic cardioverter/defibrillator) present 2014    Ischemic cardiomyopathy 2014    History of pulmonary embolus (PE) 2014    Chronic kidney disease (CKD) stage G3a/A1, moderately decreased glomerular filtration rate (GFR) between 45-59 mL/min/1.73 square meter and albuminuria creatinine ratio less than 30 mg/g ([facility]) 2014    Coronary artery disease involving native coronary artery of native heart without angina pectoris 2000     Family History   Problem Relation Age of Onset    Diabetes Mother     Heart Disease Mother     Diabetes Sister     Alcohol/Drug Brother      Social History     Socioeconomic History    Marital status:      Spouse name: Not on file    Number of children: Not on file    Years of education: Not on file    Highest education level: Not on file   Occupational History     Comment: new   retired   Tobacco Use    Smoking status: Former     Packs/day: 1.00     Years: 32.00     Pack years: 32.00     Types: Cigarettes     Quit date: 2000     Years since quittin.4    Smokeless tobacco: Never   Vaping Use    Vaping Use: Never used   Substance and Sexual Activity    Alcohol use: Yes     Alcohol/week: 1.2 oz     Types: 2 Standard drinks or equivalent per week     Comment: occ    Drug use: Not Currently     Types: Marijuana, Oral    Sexual activity: Never   Other Topics Concern    Not on file   Social History Narrative    Not on file  "    Social Determinants of Health     Financial Resource Strain: Not on file   Food Insecurity: Not on file   Transportation Needs: Not on file   Physical Activity: Not on file   Stress: Not on file   Social Connections: Not on file   Intimate Partner Violence: Not on file   Housing Stability: Not on file     Current Outpatient Medications   Medication Sig Dispense Refill    metFORMIN (GLUCOPHAGE) 500 MG Tab Take 1 Tablet by mouth 2 times a day with meals. 180 Tablet 3    Empagliflozin (JARDIANCE) 10 MG Tab tablet Take 1 Tablet by mouth every day. 30 Tablet 3    metoprolol SR (TOPROL XL) 50 MG TABLET SR 24 HR Take 50 mg by mouth every day.      potassium chloride SA (KDUR) 20 MEQ Tab CR TAKE 1 TABLET BY MOUTH EVERY DAY 90 Tablet 3    furosemide (LASIX) 40 MG Tab Take 1 Tablet by mouth every day. 100 Tablet 3    rivaroxaban (XARELTO) 20 MG Tab tablet Take 1 Tablet by mouth with dinner. 100 Tablet 3    spironolactone (ALDACTONE) 25 MG Tab Take 0.5 Tablets by mouth every day. 50 Tablet 3    losartan (COZAAR) 25 MG Tab Take 0.5 Tablets by mouth every day. 50 Tablet 3    metoprolol SR (TOPROL XL) 100 MG TABLET SR 24 HR Take 1 Tablet by mouth every day. (Patient not taking: Reported on 6/22/2023) 100 Tablet 3    lovastatin (MEVACOR) 10 MG tablet Take 1 Tablet by mouth every evening. 100 Tablet 3    Non Formulary Request Take 1 Tablet by mouth every evening. Vitamin C/Zinc/Vitamin D/AlderBerry Vitamin      vitamin D (CHOLECALCIFEROL) 1000 Unit (25 mcg) Tab Take 1,000 Units by mouth every day.      Magnesium 500 MG Cap Take 1 Cap by mouth every day. 180 Cap 4     No current facility-administered medications for this visit.           Review Of Systems  As documented in HPI above  PHYSICAL EXAMINATION:    BP 94/60 (BP Location: Left arm, Patient Position: Sitting, BP Cuff Size: Adult)   Pulse 68   Temp 36.3 °C (97.3 °F) (Temporal)   Resp 16   Ht 1.803 m (5' 11\")   Wt 67.6 kg (149 lb)   SpO2 97%   BMI 20.78 kg/m² "   Gen.: Well-developed, well-nourished, no apparent distress, pleasant and cooperative with the examination  HEENT: Normocephalic/atraumatic,   Neck: No JVD or bruits, no adenopathy  Cor: Regular rate and rhythm without murmur gallop or rub  Lungs: Clear to auscultation with equal breath sounds bilaterally. No wheezes, rhonchi.  Abdomen: Soft nontender without hepatosplenomegaly or masses appreciated, normoactive bowel sounds  Extremities: No cyanosis, clubbing or edema, chronic discoloration on both lower extremities suggestive of chronic venous insufficiency, more discoloration on the left lower extremity       ASSESSMENT/Plan:  1. Encounter to establish care with new doctor  Reviewed medical history and health maintenance as above      2. Type 2 diabetes mellitus with hyperglycemia, without long-term current use of insulin ([facility])  Chronic, controlled A1c 6.2.  Continue current regimen  POCT  A1C    Diabetic Monofilament LE Exam    Microalbumin Creat Ratio Urine - Clinic Collect    metFORMIN (GLUCOPHAGE) 500 MG Tab    Empagliflozin (JARDIANCE) 10 MG Tab tablet      3. Chronic pain of both feet  Chronic, discussed with the patient possible causes including diabetic neuropathy, declines medications.  Will rule out ischemic changes, ordered ultrasound today.  Chronic venous insufficiency on exam    US-EXTREMITY ARTERY LOWER BILAT W/GWEN (COMBO)      4. Chronic kidney disease (CKD) stage G3a/A1, moderately decreased glomerular filtration rate (GFR) between 45-59 mL/min/1.73 square meter and albuminuria creatinine ratio less than 30 mg/g ([facility])  Chronic stable no concerns      5. Chronic systolic congestive heart failure, NYHA class 2 ([facility])  Chronic stable, continue all medications, patient can try reducing the dose of Lasix to half a pill because of history of lightheadedness and feeling dizzy that happened frequently.  Discuss it further with cardiology.  If not improved or continues to have lightheadedness patient need  to come back for to address the concern.        Please note that this dictation was created using voice recognition software. I have worked with consultants from the vendor as well as technical experts from [facility] to optimize the interface. I have made every reasonable attempt to correct obvious errors, but I expect that there are errors of grammar and possibly content that I did not discover before finalizing the note.

## 2023-06-23 DIAGNOSIS — E11.65 TYPE 2 DIABETES MELLITUS WITH HYPERGLYCEMIA, WITHOUT LONG-TERM CURRENT USE OF INSULIN (HCC): ICD-10-CM

## 2023-06-23 LAB
CREAT UR-MCNC: 68.78 MG/DL
MICROALBUMIN UR-MCNC: <1.2 MG/DL
MICROALBUMIN/CREAT UR: NORMAL MG/G (ref 0–30)

## 2023-07-19 DIAGNOSIS — E11.65 TYPE 2 DIABETES MELLITUS WITH HYPERGLYCEMIA, WITHOUT LONG-TERM CURRENT USE OF INSULIN (HCC): ICD-10-CM

## 2023-08-02 ENCOUNTER — HOSPITAL ENCOUNTER (OUTPATIENT)
Dept: LAB | Facility: MEDICAL CENTER | Age: 73
End: 2023-08-02
Attending: NURSE PRACTITIONER
Payer: MEDICARE

## 2023-08-02 DIAGNOSIS — E78.2 MIXED HYPERLIPIDEMIA: ICD-10-CM

## 2023-08-02 DIAGNOSIS — R63.4 WEIGHT LOSS: ICD-10-CM

## 2023-08-02 LAB
ALBUMIN SERPL BCP-MCNC: 4.4 G/DL (ref 3.2–4.9)
ALBUMIN/GLOB SERPL: 1.6 G/DL
ALP SERPL-CCNC: 58 U/L (ref 30–99)
ALT SERPL-CCNC: 8 U/L (ref 2–50)
ANION GAP SERPL CALC-SCNC: 11 MMOL/L (ref 7–16)
AST SERPL-CCNC: 14 U/L (ref 12–45)
BILIRUB SERPL-MCNC: 1.2 MG/DL (ref 0.1–1.5)
BUN SERPL-MCNC: 22 MG/DL (ref 8–22)
CALCIUM ALBUM COR SERPL-MCNC: 9.5 MG/DL (ref 8.5–10.5)
CALCIUM SERPL-MCNC: 9.8 MG/DL (ref 8.4–10.2)
CHLORIDE SERPL-SCNC: 99 MMOL/L (ref 96–112)
CHOLEST SERPL-MCNC: 173 MG/DL (ref 100–199)
CO2 SERPL-SCNC: 27 MMOL/L (ref 20–33)
CREAT SERPL-MCNC: 1.28 MG/DL (ref 0.5–1.4)
FASTING STATUS PATIENT QL REPORTED: NORMAL
GFR SERPLBLD CREATININE-BSD FMLA CKD-EPI: 59 ML/MIN/1.73 M 2
GLOBULIN SER CALC-MCNC: 2.8 G/DL (ref 1.9–3.5)
GLUCOSE SERPL-MCNC: 138 MG/DL (ref 65–99)
HDLC SERPL-MCNC: 64 MG/DL
LDLC SERPL CALC-MCNC: 90 MG/DL
POTASSIUM SERPL-SCNC: 4.4 MMOL/L (ref 3.6–5.5)
PROT SERPL-MCNC: 7.2 G/DL (ref 6–8.2)
SODIUM SERPL-SCNC: 137 MMOL/L (ref 135–145)
TRIGL SERPL-MCNC: 93 MG/DL (ref 0–149)
TSH SERPL DL<=0.005 MIU/L-ACNC: 2.34 UIU/ML (ref 0.38–5.33)

## 2023-08-02 PROCEDURE — 80053 COMPREHEN METABOLIC PANEL: CPT

## 2023-08-02 PROCEDURE — 80061 LIPID PANEL: CPT

## 2023-08-02 PROCEDURE — 84443 ASSAY THYROID STIM HORMONE: CPT

## 2023-08-02 PROCEDURE — 36415 COLL VENOUS BLD VENIPUNCTURE: CPT

## 2023-08-08 ENCOUNTER — OFFICE VISIT (OUTPATIENT)
Dept: CARDIOLOGY | Facility: MEDICAL CENTER | Age: 73
End: 2023-08-08
Attending: NURSE PRACTITIONER
Payer: MEDICARE

## 2023-08-08 VITALS
BODY MASS INDEX: 21 KG/M2 | DIASTOLIC BLOOD PRESSURE: 62 MMHG | SYSTOLIC BLOOD PRESSURE: 112 MMHG | HEIGHT: 71 IN | WEIGHT: 150 LBS | OXYGEN SATURATION: 97 % | HEART RATE: 76 BPM | RESPIRATION RATE: 16 BRPM

## 2023-08-08 VITALS
WEIGHT: 150 LBS | DIASTOLIC BLOOD PRESSURE: 62 MMHG | HEART RATE: 76 BPM | SYSTOLIC BLOOD PRESSURE: 112 MMHG | BODY MASS INDEX: 21 KG/M2 | HEIGHT: 71 IN | RESPIRATION RATE: 16 BRPM | OXYGEN SATURATION: 97 %

## 2023-08-08 DIAGNOSIS — I25.119 ATHEROSCLEROSIS OF NATIVE CORONARY ARTERY OF NATIVE HEART WITH ANGINA PECTORIS (HCC): ICD-10-CM

## 2023-08-08 DIAGNOSIS — I47.20 VENTRICULAR TACHYCARDIA (HCC): ICD-10-CM

## 2023-08-08 DIAGNOSIS — Z86.711 HISTORY OF PULMONARY EMBOLUS (PE): ICD-10-CM

## 2023-08-08 DIAGNOSIS — E78.2 MIXED HYPERLIPIDEMIA: ICD-10-CM

## 2023-08-08 DIAGNOSIS — I50.22 CHRONIC SYSTOLIC CONGESTIVE HEART FAILURE, NYHA CLASS 2 (HCC): ICD-10-CM

## 2023-08-08 DIAGNOSIS — E11.22 TYPE 2 DIABETES MELLITUS WITH STAGE 3A CHRONIC KIDNEY DISEASE, WITHOUT LONG-TERM CURRENT USE OF INSULIN (HCC): ICD-10-CM

## 2023-08-08 DIAGNOSIS — Z95.1 S/P CABG X 4: ICD-10-CM

## 2023-08-08 DIAGNOSIS — I25.10 CORONARY ARTERY DISEASE INVOLVING NATIVE CORONARY ARTERY OF NATIVE HEART WITHOUT ANGINA PECTORIS: ICD-10-CM

## 2023-08-08 DIAGNOSIS — Z95.810 AICD (AUTOMATIC CARDIOVERTER/DEFIBRILLATOR) PRESENT: ICD-10-CM

## 2023-08-08 DIAGNOSIS — I25.5 ISCHEMIC CARDIOMYOPATHY: ICD-10-CM

## 2023-08-08 DIAGNOSIS — I25.119 CORONARY ARTERY DISEASE INVOLVING NATIVE CORONARY ARTERY OF NATIVE HEART WITH ANGINA PECTORIS (HCC): ICD-10-CM

## 2023-08-08 DIAGNOSIS — N18.31 TYPE 2 DIABETES MELLITUS WITH STAGE 3A CHRONIC KIDNEY DISEASE, WITHOUT LONG-TERM CURRENT USE OF INSULIN (HCC): ICD-10-CM

## 2023-08-08 DIAGNOSIS — N18.31 CHRONIC KIDNEY DISEASE (CKD) STAGE G3A/A1, MODERATELY DECREASED GLOMERULAR FILTRATION RATE (GFR) BETWEEN 45-59 ML/MIN/1.73 SQUARE METER AND ALBUMINURIA CREATININE RATIO LESS THAN 30 MG/G: ICD-10-CM

## 2023-08-08 DIAGNOSIS — D68.69 SECONDARY HYPERCOAGULABLE STATE (HCC): ICD-10-CM

## 2023-08-08 PROCEDURE — 93282 PRGRMG EVAL IMPLANTABLE DFB: CPT | Performed by: NURSE PRACTITIONER

## 2023-08-08 PROCEDURE — 3074F SYST BP LT 130 MM HG: CPT | Performed by: NURSE PRACTITIONER

## 2023-08-08 PROCEDURE — 3078F DIAST BP <80 MM HG: CPT | Performed by: NURSE PRACTITIONER

## 2023-08-08 PROCEDURE — 99214 OFFICE O/P EST MOD 30 MIN: CPT | Performed by: NURSE PRACTITIONER

## 2023-08-08 PROCEDURE — 93289 INTERROG DEVICE EVAL HEART: CPT | Performed by: NURSE PRACTITIONER

## 2023-08-08 PROCEDURE — 99213 OFFICE O/P EST LOW 20 MIN: CPT | Performed by: NURSE PRACTITIONER

## 2023-08-08 PROCEDURE — 99212 OFFICE O/P EST SF 10 MIN: CPT | Performed by: NURSE PRACTITIONER

## 2023-08-08 PROCEDURE — 93289 INTERROG DEVICE EVAL HEART: CPT | Mod: 26 | Performed by: NURSE PRACTITIONER

## 2023-08-08 RX ORDER — POTASSIUM CHLORIDE 20 MEQ/1
10 TABLET, EXTENDED RELEASE ORAL DAILY
Qty: 50 TABLET | Refills: 3 | Status: SHIPPED | OUTPATIENT
Start: 2023-08-08 | End: 2024-03-04 | Stop reason: SDUPTHER

## 2023-08-08 RX ORDER — FUROSEMIDE 40 MG/1
20 TABLET ORAL DAILY
Qty: 50 TABLET | Refills: 3 | Status: SHIPPED | OUTPATIENT
Start: 2023-08-08 | End: 2024-03-05

## 2023-08-08 RX ORDER — ROSUVASTATIN CALCIUM 5 MG/1
5 TABLET, COATED ORAL
Qty: 50 TABLET | Refills: 4 | Status: SHIPPED | OUTPATIENT
Start: 2023-08-09 | End: 2023-12-12 | Stop reason: SDUPTHER

## 2023-08-08 ASSESSMENT — ENCOUNTER SYMPTOMS
DEPRESSION: 0
MYALGIAS: 0
NAUSEA: 0
WEIGHT LOSS: 0
ORTHOPNEA: 0
PND: 0
DIZZINESS: 1
CHILLS: 0
SHORTNESS OF BREATH: 0
INSOMNIA: 0
ABDOMINAL PAIN: 0
HEADACHES: 0
LOSS OF CONSCIOUSNESS: 0
BRUISES/BLEEDS EASILY: 0
FEVER: 0
PALPITATIONS: 0

## 2023-08-08 ASSESSMENT — FIBROSIS 4 INDEX
FIB4 SCORE: 2.329292926261568315
FIB4 SCORE: 2.329292926261568315

## 2023-08-08 NOTE — PROGRESS NOTES
Chief Complaint   Patient presents with    Follow-Up    AICD Check/Dysfunction    Coronary Artery Disease    Cardiomyopathy (Ischemic)    Coronary Artery Bypass Graft (CABG)    Ventricular Tachycardia    CHF (Compensated)    Hyperlipidemia    Diabetes (Controlled)    Chronic Kidney Disease       Subjective     Prudencio Lee is a 72 y.o. male who presents today for six month follow-up of CAD/ischemic cardiomyopathy, CABG, HFrEF, AICD, history of DVT/PE, anticoagulation, hyperlipidemia, DM and CKD.    Mr. Lee is a 72 year old male with history of CAD/ischemic cardiomyopathy status post remote CABG x 4 (2000), HFrEF with LVEF 20-25% with AICD since 2003, and last generator replacement in 2015; he also has hyperlipidemia, CKD and diabetes, and a history of left LE DVT/PE, on Xarelto. He was last seen by me in February 2023.     In May 2022, he was evaluated by [facility] (Dr. Ortiz) for possible heart transplant; echocardiogram then showed LVEF 20-25% and severely dilated LV. He did right heart cath on 9/21/2022 by Dr. Pinedo, which showed normal pulmonary and cardiac filling pressures, and reduced LVEF. Patient is not interested in pursuing heart transplant at this time. His last visit with Dr. Ortiz was in June 2023 via TeleMedicine; Metoprolol was lowered from 100mg to 50mg; repeat VO2 max test was mentioned/recommended.     He is here today for six month follow-up. Generally, he is doing well. He did travel to [place], and did well:  no chest pain, pressure, tightness or discomfort; no palpitations or device therapy. Mild dyspnea on exertion (unchanged from previous), but no orthopnea or PND. No LE edema. He does have ongoing discoloration of his lower legs. He is scheduled to LE US/GWEN done next month.     Past Medical History:   Diagnosis Date    Acute anterior myocardial infarction (HCC) 12/28/2000    PCI attempted by Dr. Malloy but unsuccessful    AICD (automatic  cardioverter/defibrillator) present 2003/2008/2015    Defibrillator is a Medtronic model [id number], serial #[id number], implanted Dr. Stephenson November 2015. The ventricular lead is a Medtronic model #6947-65, lead serial #[id number], implant 7/2/2003    Allergy     within 2 years    CAD (coronary artery disease) 12/28/2000    CABG x 4 (LIMA to LAD, rSVG to first diagonal, left circumflex and PDA).2000: CABG x 4 (LIMA to LAD, rSVG to first diagonal, left circumflex and PDA).  2003: LIMA and circumflex grafts patent, diagonal and PDA grafts closed.    Chronic anticoagulation 2/1/2018    Chronic deep vein thrombosis (DVT) of femoral vein of left lower extremity ([facility]) 01/04/2018    Congestive heart failure ([facility]) 12/28/2000    Dental disorder     upper denture    GERD (gastroesophageal reflux disease)     High cholesterol     Hypertension     Ischemic cardiomyopathy 05/2022    Echocardiogram with severely dilated LV, LVEF <25%. Severe diffuse hypokinesis. Septal akinesis. Normal LA and RV. Moderately dilated RA. Trace MR, mild TR. RVSP 23.8mmHg.    Left bundle branch block      Pacemaker     Pneumonia 2014    Pulmonary embolism ([facility]) 03/2014    Thrombocytopenia ([facility]) 02/23/2014    Type 2 diabetes mellitus without complication ([facility])      Diet controlled, followed by Dr. Walls    Vascular disorder of extremity ([facility]) 02/11/2020     Past Surgical History:   Procedure Laterality Date    RECOVERY  11/3/2015    Procedure: CATH LAB-MARCO A-ICD GENERATOR CHANGE 55204- EUNICE T82.111A-MEDTRONIC BLOODLESS;  Surgeon: Recoveryonly Surgery;  Location: SURGERY PRE-POST PROC UNIT [facility];  Service:     AICD BATTERY CHANGE  November 2015    Generator replacement with Medtronic Evera S VR [id number] implanted by Dr. Marcus Stephenson.    AICD IMPLANT  January 2008    Medtronic Virtuoso VR [id number] implanted by Dr. Stephenson.    OTHER CARDIAC SURGERY  12/2000    CABG x 4    APPENDECTOMY  1969    MULTIPLE CORONARY ARTERY BYPASS       Family History   Problem  Relation Age of Onset    Diabetes Mother     Heart Disease Mother     Diabetes Sister     Alcohol/Drug Brother      Social History     Socioeconomic History    Marital status:      Spouse name: Not on file    Number of children: Not on file    Years of education: Not on file    Highest education level: Not on file   Occupational History     Comment: new   retired   Tobacco Use    Smoking status: Former     Packs/day: 1.00     Years: 32.00     Pack years: 32.00     Types: Cigarettes     Quit date: 2000     Years since quittin.6    Smokeless tobacco: Never   Vaping Use    Vaping Use: Never used   Substance and Sexual Activity    Alcohol use: Yes     Alcohol/week: 1.2 oz     Types: 2 Standard drinks or equivalent per week     Comment: occ    Drug use: Not Currently     Types: Marijuana, Oral    Sexual activity: Never   Other Topics Concern    Not on file   Social History Narrative    Not on file     Social Determinants of Health     Financial Resource Strain: Not on file   Food Insecurity: Not on file   Transportation Needs: Not on file   Physical Activity: Not on file   Stress: Not on file   Social Connections: Not on file   Intimate Partner Violence: Not on file   Housing Stability: Not on file     Allergies   Allergen Reactions    Bloodless     Pcn [Penicillins] Anaphylaxis    Ace Inhibitors Cough     Cough     Outpatient Encounter Medications as of 2023   Medication Sig Dispense Refill    [START ON 2023] rosuvastatin (CRESTOR) 5 MG Tab Take 1 Tablet by mouth every Monday, Wednesday, and Friday. 50 Tablet 4    metFORMIN (GLUCOPHAGE) 500 MG Tab TAKE 1 TABLET BY MOUTH TWICE A DAY WITH FOOD 200 Tablet 3    Empagliflozin (JARDIANCE) 10 MG Tab tablet Take 1 Tablet by mouth every day. 30 Tablet 3    metoprolol SR (TOPROL XL) 50 MG TABLET SR 24 HR Take 50 mg by mouth every day.      potassium chloride SA (KDUR) 20 MEQ Tab CR TAKE 1 TABLET BY MOUTH EVERY DAY (Patient taking  differently: Take 20 mEq by mouth every day. 1/2 tab daily) 90 Tablet 3    furosemide (LASIX) 40 MG Tab Take 1 Tablet by mouth every day. (Patient taking differently: Take 40 mg by mouth every day. 1/2 tab daily) 100 Tablet 3    rivaroxaban (XARELTO) 20 MG Tab tablet Take 1 Tablet by mouth with dinner. 100 Tablet 3    spironolactone (ALDACTONE) 25 MG Tab Take 0.5 Tablets by mouth every day. 50 Tablet 3    losartan (COZAAR) 25 MG Tab Take 0.5 Tablets by mouth every day. 50 Tablet 3    [DISCONTINUED] metoprolol SR (TOPROL XL) 100 MG TABLET SR 24 HR Take 1 Tablet by mouth every day. (Patient not taking: Reported on 8/8/2023) 100 Tablet 3    [DISCONTINUED] lovastatin (MEVACOR) 10 MG tablet Take 1 Tablet by mouth every evening. 100 Tablet 3    Non Formulary Request Take 1 Tablet by mouth every evening. Vitamin C/Zinc/Vitamin D/AlderBerry Vitamin      vitamin D (CHOLECALCIFEROL) 1000 Unit (25 mcg) Tab Take 1,000 Units by mouth every day.      Magnesium 500 MG Cap Take 1 Cap by mouth every day. 180 Cap 4     No facility-administered encounter medications on file as of 8/8/2023.     Review of Systems   Constitutional:  Positive for malaise/fatigue. Negative for chills, fever and weight loss.        Mostly with exertion/exercise, unchanged from previous.   HENT:  Negative for congestion.    Respiratory:  Negative for shortness of breath.    Cardiovascular:  Negative for chest pain, palpitations, orthopnea, leg swelling and PND.   Gastrointestinal:  Negative for abdominal pain and nausea.   Musculoskeletal:  Negative for myalgias.   Skin:  Negative for rash.   Neurological:  Positive for dizziness. Negative for loss of consciousness and headaches.        Chronic, stable, mostly when getting up and changing positions.   Endo/Heme/Allergies:  Does not bruise/bleed easily.   Psychiatric/Behavioral:  Negative for depression. The patient does not have insomnia.               Objective     /62 (BP Location: Left arm, Patient  "Position: Sitting)   Pulse 76   Resp 16   Ht 1.803 m (5' 11\")   Wt 68 kg (150 lb)   SpO2 97%   BMI 20.92 kg/m²     Physical Exam  Constitutional:       Appearance: He is well-developed.      Comments: BMI 20.92 (weight is down slightly)   HENT:      Head: Normocephalic.   Neck:      Vascular: No JVD.   Cardiovascular:      Rate and Rhythm: Normal rate and regular rhythm.      Heart sounds: Normal heart sounds.      Comments: AICD in left chest wall.  Sternotomy scar present.  Pulmonary:      Effort: Pulmonary effort is normal. No respiratory distress.      Breath sounds: Normal breath sounds. No wheezing or rales.   Abdominal:      General: Bowel sounds are normal. There is no distension.      Palpations: Abdomen is soft.      Tenderness: There is no abdominal tenderness.   Musculoskeletal:         General: Normal range of motion.      Cervical back: Normal range of motion and neck supple.   Skin:     General: Skin is warm and dry.      Findings: No rash.      Comments: Skin changes of chronic venous stasis in lower legs.   Neurological:      Mental Status: He is alert and oriented to person, place, and time.       RESULTS OF RIGHT HEART CATH OF 9/21/2022:  Indication/Preoperative diagnosis:  Cardiomyopathy  Precardiac transplant evaluation     Postoperative diagnosis:  Normal pulmonary artery pressures  Normal cardiac filling pressures  Reduced cardiac output     Interpretation Summary of Echocardiogram of 5/2/2022:  1. Severe LV enlargement with severely reduced systolic function. Estimated EF = 19% by MOD. Normal RV size with mild to moderately reduced systolic function.  2. Mild TR. Estimated RVSP = 24 mmHg (RAP 3 mmHg). Trace AR/NJ/MR.  3. No prior echo for comparison.     CONCLUSIONS OF CAROTID US OF 2/17/2021:   Very mild plaque of the carotid bifurcation bilaterally.     CONCLUSIONS OF ECHOCARDIOGRAM OF 2/17/2021:  No significant chnage compared to prior echo in 2019  Left ventricle is severely " dilated.  Left ventricular ejection fraction is visually estimated to be 20%.  No significant valve disease or flow abnormalities.      CONCLUSIONS OF ECHOCARDIOGRAM OF 1/30/2019:  Prior echo 08/09/17, no significant change.  Severely reduced left ventricular systolic function.  Left ventricular ejection fraction is visually estimated to be 20-25%.  Global hypokinesis with regional variation, see report.  Grade II diastolic dysfunction.  Reduced right ventricular systolic function.  Pacer/ICD wire seen in right ventricle.  No significant valve abnormality.  Estimated right ventricular systolic pressure  is 25 mmHg.  Inferior vena cava is not well visualized.     Component      Latest Ref Rng 6/22/2023   Glycohemoglobin      5.8 % 6.2 !        Lab Results   Component Value Date/Time    ASTSGOT 14 08/02/2023 07:51 AM    ALTSGPT 8 08/02/2023 07:51 AM       Lab Results   Component Value Date/Time    CHOLSTRLTOT 173 08/02/2023 07:51 AM    LDL 90 08/02/2023 07:51 AM    HDL 64 08/02/2023 07:51 AM    TRIGLYCERIDE 93 08/02/2023 07:51 AM        Lab Results   Component Value Date/Time    SODIUM 137 08/02/2023 07:51 AM    POTASSIUM 4.4 08/02/2023 07:51 AM    CHLORIDE 99 08/02/2023 07:51 AM    CO2 27 08/02/2023 07:51 AM    GLUCOSE 138 (H) 08/02/2023 07:51 AM    BUN 22 08/02/2023 07:51 AM    CREATININE 1.28 08/02/2023 07:51 AM    CREATININE 1.0 01/08/2008 07:45 AM          Assessment & Plan     1. AICD (automatic cardioverter/defibrillator) present        2. Coronary artery disease involving native coronary artery of native heart without angina pectoris  NM-CARDIAC TREADMILL ONLY-NO IMAGING      3. Ischemic cardiomyopathy  NM-CARDIAC TREADMILL ONLY-NO IMAGING      4. S/P CABG x 4  NM-CARDIAC TREADMILL ONLY-NO IMAGING      5. Ventricular tachycardia (HCC)        6. Chronic systolic congestive heart failure, NYHA class 2 (HCC)  NM-CARDIAC TREADMILL ONLY-NO IMAGING      7. History of pulmonary embolus (PE)        8. Secondary  hypercoagulable state (HCC)        9. Mixed hyperlipidemia  rosuvastatin (CRESTOR) 5 MG Tab    Lipid Profile      10. Type 2 diabetes mellitus with stage 3a chronic kidney disease, without long-term current use of insulin ([facility])        11. Chronic kidney disease (CKD) stage G3a/A1, moderately decreased glomerular filtration rate (GFR) between 45-59 mL/min/1.73 square meter and albuminuria creatinine ratio less than 30 mg/g ([facility])            Medical Decision Making: Today's Assessment/Status/Plan:          1. CAD/ischemic cardiomyopathy, status post remote CABG x 4 in 2000, with history of VT and HFrEF, LVEF 20-25%, with AICD. No device therapy, and no angina. He does have ongoing mild shortness of breath exertion and is followed closely by [facility] via TeleMedicine, to be consider for possible future heart transplant. At this point, patient does NOT want heart transplant. We will order VO2 max stress testing per [place]'s request.   Continue:  Xarelto 20mg once daily  Toprol XL 50mg once daily (lowered from 100mg)  Losartan 12.5mg once daily  Aldactone 12.5mg once daily  Lasix 20mg once daily  Jardiance 10mg once daily  CHANGE Mevacor 10mg to Crestor 5mg M/W/F     2. History of VT, with no device therapy on Toprol XL 50mg once daily.     3. History of DVT/PE, anticoagulated with Xarelto. No bleeding problems.     4. Hyperlipidemia, treated with Mevacor. Recent LDL is 90 (goal is <70, closer to 55). To CHANGE to Crestor 5mg M/W/F. Repeat lipid panel in 1 month.     5. Diabetes mellitus, treated with Metformin and Jardiance. Recent HgbA1c was good at 6.2.     6. CKD, stage 3a, improved from last labs.    As above, CHANGE Mevacor to Crestor. Repeat labs in 1 month.    We will get VO2 max stress testing ordered and scheduled.    Follow-up with me in 6 months for next AICD interrogation.    Keep follow-up with [facility] as well as PCP.

## 2023-09-11 ENCOUNTER — APPOINTMENT (OUTPATIENT)
Dept: RADIOLOGY | Facility: MEDICAL CENTER | Age: 73
End: 2023-09-11
Attending: NURSE PRACTITIONER
Payer: MEDICARE

## 2023-09-13 ENCOUNTER — HOSPITAL ENCOUNTER (OUTPATIENT)
Dept: RADIOLOGY | Facility: MEDICAL CENTER | Age: 73
End: 2023-09-13
Attending: FAMILY MEDICINE
Payer: MEDICARE

## 2023-09-13 DIAGNOSIS — M79.672 CHRONIC PAIN OF BOTH FEET: ICD-10-CM

## 2023-09-13 DIAGNOSIS — M79.671 CHRONIC PAIN OF BOTH FEET: ICD-10-CM

## 2023-09-13 DIAGNOSIS — G89.29 CHRONIC PAIN OF BOTH FEET: ICD-10-CM

## 2023-09-13 PROCEDURE — 93922 UPR/L XTREMITY ART 2 LEVELS: CPT

## 2023-09-13 PROCEDURE — 93926 LOWER EXTREMITY STUDY: CPT | Mod: RT

## 2023-09-13 PROCEDURE — 93922 UPR/L XTREMITY ART 2 LEVELS: CPT | Mod: 26 | Performed by: INTERNAL MEDICINE

## 2023-09-13 PROCEDURE — 93926 LOWER EXTREMITY STUDY: CPT | Mod: 26,RT | Performed by: INTERNAL MEDICINE

## 2023-09-20 ENCOUNTER — APPOINTMENT (OUTPATIENT)
Dept: RADIOLOGY | Facility: MEDICAL CENTER | Age: 73
End: 2023-09-20
Payer: MEDICARE

## 2023-09-20 ENCOUNTER — HOSPITAL ENCOUNTER (INPATIENT)
Facility: MEDICAL CENTER | Age: 73
LOS: 1 days | DRG: 309 | End: 2023-09-21
Attending: EMERGENCY MEDICINE | Admitting: HOSPITALIST
Payer: MEDICARE

## 2023-09-20 ENCOUNTER — HOSPITAL ENCOUNTER (EMERGENCY)
Facility: MEDICAL CENTER | Age: 73
End: 2023-09-20
Attending: EMERGENCY MEDICINE
Payer: MEDICARE

## 2023-09-20 VITALS
WEIGHT: 149.91 LBS | DIASTOLIC BLOOD PRESSURE: 78 MMHG | BODY MASS INDEX: 20.91 KG/M2 | HEART RATE: 85 BPM | RESPIRATION RATE: 18 BRPM | SYSTOLIC BLOOD PRESSURE: 126 MMHG | OXYGEN SATURATION: 96 %

## 2023-09-20 DIAGNOSIS — I47.20 VENTRICULAR TACHYCARDIA (HCC): ICD-10-CM

## 2023-09-20 DIAGNOSIS — I47.20 V TACH (HCC): ICD-10-CM

## 2023-09-20 DIAGNOSIS — R55 SYNCOPE, UNSPECIFIED SYNCOPE TYPE: ICD-10-CM

## 2023-09-20 DIAGNOSIS — S09.90XA CLOSED HEAD INJURY, INITIAL ENCOUNTER: ICD-10-CM

## 2023-09-20 LAB
ALBUMIN SERPL BCP-MCNC: 4.2 G/DL (ref 3.2–4.9)
ALBUMIN/GLOB SERPL: 1.6 G/DL
ALP SERPL-CCNC: 51 U/L (ref 30–99)
ALT SERPL-CCNC: 11 U/L (ref 2–50)
ANION GAP SERPL CALC-SCNC: 16 MMOL/L (ref 7–16)
APTT PPP: 33 SEC (ref 24.7–36)
AST SERPL-CCNC: 21 U/L (ref 12–45)
BASOPHILS # BLD AUTO: 0.5 % (ref 0–1.8)
BASOPHILS # BLD: 0.05 K/UL (ref 0–0.12)
BILIRUB SERPL-MCNC: 0.9 MG/DL (ref 0.1–1.5)
BUN SERPL-MCNC: 31 MG/DL (ref 8–22)
CALCIUM ALBUM COR SERPL-MCNC: 9.5 MG/DL (ref 8.5–10.5)
CALCIUM SERPL-MCNC: 9.7 MG/DL (ref 8.4–10.2)
CHLORIDE SERPL-SCNC: 101 MMOL/L (ref 96–112)
CO2 SERPL-SCNC: 22 MMOL/L (ref 20–33)
CREAT SERPL-MCNC: 1.22 MG/DL (ref 0.5–1.4)
EKG IMPRESSION: NORMAL
EKG IMPRESSION: NORMAL
EOSINOPHIL # BLD AUTO: 0.04 K/UL (ref 0–0.51)
EOSINOPHIL NFR BLD: 0.4 % (ref 0–6.9)
ERYTHROCYTE [DISTWIDTH] IN BLOOD BY AUTOMATED COUNT: 46.5 FL (ref 35.9–50)
GFR SERPLBLD CREATININE-BSD FMLA CKD-EPI: 63 ML/MIN/1.73 M 2
GLOBULIN SER CALC-MCNC: 2.7 G/DL (ref 1.9–3.5)
GLUCOSE BLD STRIP.AUTO-MCNC: 200 MG/DL (ref 65–99)
GLUCOSE BLD STRIP.AUTO-MCNC: 227 MG/DL (ref 65–99)
GLUCOSE SERPL-MCNC: 154 MG/DL (ref 65–99)
HCT VFR BLD AUTO: 49.6 % (ref 42–52)
HGB BLD-MCNC: 16.4 G/DL (ref 14–18)
IMM GRANULOCYTES # BLD AUTO: 0.05 K/UL (ref 0–0.11)
IMM GRANULOCYTES NFR BLD AUTO: 0.5 % (ref 0–0.9)
INR PPP: 1.5 (ref 0.87–1.13)
LYMPHOCYTES # BLD AUTO: 0.48 K/UL (ref 1–4.8)
LYMPHOCYTES NFR BLD: 5 % (ref 22–41)
MCH RBC QN AUTO: 31.7 PG (ref 27–33)
MCHC RBC AUTO-ENTMCNC: 33.1 G/DL (ref 32.3–36.5)
MCV RBC AUTO: 95.9 FL (ref 81.4–97.8)
MONOCYTES # BLD AUTO: 0.52 K/UL (ref 0–0.85)
MONOCYTES NFR BLD AUTO: 5.4 % (ref 0–13.4)
NEUTROPHILS # BLD AUTO: 8.54 K/UL (ref 1.82–7.42)
NEUTROPHILS NFR BLD: 88.2 % (ref 44–72)
NRBC # BLD AUTO: 0 K/UL
NRBC BLD-RTO: 0 /100 WBC (ref 0–0.2)
PLATELET # BLD AUTO: 158 K/UL (ref 164–446)
PMV BLD AUTO: 11.9 FL (ref 9–12.9)
POTASSIUM SERPL-SCNC: 4.6 MMOL/L (ref 3.6–5.5)
PROT SERPL-MCNC: 6.9 G/DL (ref 6–8.2)
PROTHROMBIN TIME: 18.9 SEC (ref 12–14.6)
RBC # BLD AUTO: 5.17 M/UL (ref 4.7–6.1)
SODIUM SERPL-SCNC: 139 MMOL/L (ref 135–145)
TROPONIN T SERPL-MCNC: 37 NG/L (ref 6–19)
TROPONIN T SERPL-MCNC: 89 NG/L (ref 6–19)
WBC # BLD AUTO: 9.7 K/UL (ref 4.8–10.8)

## 2023-09-20 PROCEDURE — 80053 COMPREHEN METABOLIC PANEL: CPT

## 2023-09-20 PROCEDURE — 84484 ASSAY OF TROPONIN QUANT: CPT

## 2023-09-20 PROCEDURE — 36415 COLL VENOUS BLD VENIPUNCTURE: CPT

## 2023-09-20 PROCEDURE — 700102 HCHG RX REV CODE 250 W/ 637 OVERRIDE(OP): Performed by: HOSPITALIST

## 2023-09-20 PROCEDURE — 85025 COMPLETE CBC W/AUTO DIFF WBC: CPT

## 2023-09-20 PROCEDURE — 82962 GLUCOSE BLOOD TEST: CPT

## 2023-09-20 PROCEDURE — 70450 CT HEAD/BRAIN W/O DYE: CPT

## 2023-09-20 PROCEDURE — 85730 THROMBOPLASTIN TIME PARTIAL: CPT

## 2023-09-20 PROCEDURE — 99223 1ST HOSP IP/OBS HIGH 75: CPT | Performed by: INTERNAL MEDICINE

## 2023-09-20 PROCEDURE — 84484 ASSAY OF TROPONIN QUANT: CPT | Mod: 91

## 2023-09-20 PROCEDURE — 93005 ELECTROCARDIOGRAM TRACING: CPT

## 2023-09-20 PROCEDURE — 99285 EMERGENCY DEPT VISIT HI MDM: CPT

## 2023-09-20 PROCEDURE — 85610 PROTHROMBIN TIME: CPT

## 2023-09-20 PROCEDURE — 99223 1ST HOSP IP/OBS HIGH 75: CPT | Mod: AI | Performed by: HOSPITALIST

## 2023-09-20 PROCEDURE — 99284 EMERGENCY DEPT VISIT MOD MDM: CPT

## 2023-09-20 PROCEDURE — 72125 CT NECK SPINE W/O DYE: CPT

## 2023-09-20 PROCEDURE — A9270 NON-COVERED ITEM OR SERVICE: HCPCS | Performed by: HOSPITALIST

## 2023-09-20 PROCEDURE — 770020 HCHG ROOM/CARE - TELE (206)

## 2023-09-20 RX ORDER — LABETALOL HYDROCHLORIDE 5 MG/ML
10 INJECTION, SOLUTION INTRAVENOUS EVERY 4 HOURS PRN
Status: DISCONTINUED | OUTPATIENT
Start: 2023-09-20 | End: 2023-09-21 | Stop reason: HOSPADM

## 2023-09-20 RX ORDER — FUROSEMIDE 20 MG/1
20 TABLET ORAL DAILY
Status: DISCONTINUED | OUTPATIENT
Start: 2023-09-21 | End: 2023-09-21 | Stop reason: HOSPADM

## 2023-09-20 RX ORDER — ACETAMINOPHEN 325 MG/1
650 TABLET ORAL EVERY 6 HOURS PRN
Status: DISCONTINUED | OUTPATIENT
Start: 2023-09-20 | End: 2023-09-21 | Stop reason: HOSPADM

## 2023-09-20 RX ORDER — LANOLIN ALCOHOL/MO/W.PET/CERES
400 CREAM (GRAM) TOPICAL DAILY
Status: DISCONTINUED | OUTPATIENT
Start: 2023-09-21 | End: 2023-09-21 | Stop reason: HOSPADM

## 2023-09-20 RX ORDER — AMOXICILLIN 250 MG
2 CAPSULE ORAL 2 TIMES DAILY
Status: DISCONTINUED | OUTPATIENT
Start: 2023-09-20 | End: 2023-09-21 | Stop reason: HOSPADM

## 2023-09-20 RX ORDER — POTASSIUM CHLORIDE 20 MEQ/1
10 TABLET, EXTENDED RELEASE ORAL DAILY
Status: DISCONTINUED | OUTPATIENT
Start: 2023-09-20 | End: 2023-09-21 | Stop reason: HOSPADM

## 2023-09-20 RX ORDER — LOVASTATIN 20 MG/1
10 TABLET ORAL NIGHTLY
Status: DISCONTINUED | OUTPATIENT
Start: 2023-09-20 | End: 2023-09-21 | Stop reason: HOSPADM

## 2023-09-20 RX ORDER — LOVASTATIN 10 MG/1
10 TABLET ORAL NIGHTLY
Status: SHIPPED | COMMUNITY
End: 2023-09-25

## 2023-09-20 RX ORDER — BISACODYL 10 MG
10 SUPPOSITORY, RECTAL RECTAL
Status: DISCONTINUED | OUTPATIENT
Start: 2023-09-20 | End: 2023-09-21 | Stop reason: HOSPADM

## 2023-09-20 RX ORDER — SPIRONOLACTONE 25 MG/1
12.5 TABLET ORAL DAILY
Status: DISCONTINUED | OUTPATIENT
Start: 2023-09-21 | End: 2023-09-21 | Stop reason: HOSPADM

## 2023-09-20 RX ORDER — ONDANSETRON 4 MG/1
4 TABLET, ORALLY DISINTEGRATING ORAL EVERY 4 HOURS PRN
Status: DISCONTINUED | OUTPATIENT
Start: 2023-09-20 | End: 2023-09-20

## 2023-09-20 RX ORDER — ONDANSETRON 2 MG/ML
4 INJECTION INTRAMUSCULAR; INTRAVENOUS EVERY 4 HOURS PRN
Status: DISCONTINUED | OUTPATIENT
Start: 2023-09-20 | End: 2023-09-20

## 2023-09-20 RX ORDER — DEXTROSE MONOHYDRATE 25 G/50ML
25 INJECTION, SOLUTION INTRAVENOUS
Status: DISCONTINUED | OUTPATIENT
Start: 2023-09-20 | End: 2023-09-21 | Stop reason: HOSPADM

## 2023-09-20 RX ORDER — LOSARTAN POTASSIUM 25 MG/1
12.5 TABLET ORAL DAILY
Status: DISCONTINUED | OUTPATIENT
Start: 2023-09-21 | End: 2023-09-21 | Stop reason: HOSPADM

## 2023-09-20 RX ORDER — METOPROLOL SUCCINATE 50 MG/1
50 TABLET, EXTENDED RELEASE ORAL DAILY
Status: DISCONTINUED | OUTPATIENT
Start: 2023-09-20 | End: 2023-09-21 | Stop reason: HOSPADM

## 2023-09-20 RX ORDER — POLYETHYLENE GLYCOL 3350 17 G/17G
1 POWDER, FOR SOLUTION ORAL
Status: DISCONTINUED | OUTPATIENT
Start: 2023-09-20 | End: 2023-09-21 | Stop reason: HOSPADM

## 2023-09-20 RX ADMIN — METFORMIN HYDROCHLORIDE 500 MG: 500 TABLET ORAL at 18:34

## 2023-09-20 RX ADMIN — LOVASTATIN 10 MG: 20 TABLET ORAL at 20:32

## 2023-09-20 RX ADMIN — RIVAROXABAN 20 MG: 20 TABLET, FILM COATED ORAL at 18:36

## 2023-09-20 RX ADMIN — POTASSIUM CHLORIDE 10 MEQ: 1500 TABLET, EXTENDED RELEASE ORAL at 18:35

## 2023-09-20 ASSESSMENT — ENCOUNTER SYMPTOMS
SEIZURES: 0
PALPITATIONS: 0
SPEECH CHANGE: 0
HEADACHES: 0
NECK PAIN: 1
ABDOMINAL PAIN: 0
SHORTNESS OF BREATH: 0
FALLS: 1
LOSS OF CONSCIOUSNESS: 1
NERVOUS/ANXIOUS: 0
FEVER: 0
FOCAL WEAKNESS: 0
NAUSEA: 0

## 2023-09-20 ASSESSMENT — PATIENT HEALTH QUESTIONNAIRE - PHQ9
SUM OF ALL RESPONSES TO PHQ9 QUESTIONS 1 AND 2: 0
1. LITTLE INTEREST OR PLEASURE IN DOING THINGS: NOT AT ALL
2. FEELING DOWN, DEPRESSED, IRRITABLE, OR HOPELESS: NOT AT ALL
1. LITTLE INTEREST OR PLEASURE IN DOING THINGS: NOT AT ALL
2. FEELING DOWN, DEPRESSED, IRRITABLE, OR HOPELESS: NOT AT ALL
SUM OF ALL RESPONSES TO PHQ9 QUESTIONS 1 AND 2: 0

## 2023-09-20 ASSESSMENT — LIFESTYLE VARIABLES
ON A TYPICAL DAY WHEN YOU DRINK ALCOHOL HOW MANY DRINKS DO YOU HAVE: 1
AVERAGE NUMBER OF DAYS PER WEEK YOU HAVE A DRINK CONTAINING ALCOHOL: 2
EVER HAD A DRINK FIRST THING IN THE MORNING TO STEADY YOUR NERVES TO GET RID OF A HANGOVER: NO
HAVE PEOPLE ANNOYED YOU BY CRITICIZING YOUR DRINKING: NO
EVER FELT BAD OR GUILTY ABOUT YOUR DRINKING: NO
HAVE YOU EVER FELT YOU SHOULD CUT DOWN ON YOUR DRINKING: NO
TOTAL SCORE: 0
SUBSTANCE_ABUSE: 0
CONSUMPTION TOTAL: NEGATIVE
TOTAL SCORE: 0
DOES PATIENT WANT TO STOP DRINKING: NO
ALCOHOL_USE: YES
TOTAL SCORE: 0
HOW MANY TIMES IN THE PAST YEAR HAVE YOU HAD 5 OR MORE DRINKS IN A DAY: 0

## 2023-09-20 ASSESSMENT — FIBROSIS 4 INDEX
FIB4 SCORE: 2.36
FIB4 SCORE: 2.93
FIB4 SCORE: 2.93

## 2023-09-20 ASSESSMENT — COGNITIVE AND FUNCTIONAL STATUS - GENERAL
SUGGESTED CMS G CODE MODIFIER DAILY ACTIVITY: CH
DAILY ACTIVITIY SCORE: 24
SUGGESTED CMS G CODE MODIFIER MOBILITY: CI
MOBILITY SCORE: 23
CLIMB 3 TO 5 STEPS WITH RAILING: A LITTLE

## 2023-09-20 ASSESSMENT — PAIN DESCRIPTION - PAIN TYPE: TYPE: ACUTE PAIN

## 2023-09-20 NOTE — H&P
Hospital Medicine History & Physical Note    Date of Service  9/20/2023    Primary Care Physician  Rafael Park M.D.    Consultants  cardiology    Specialist Names: Jose Deluna MD    Code Status  DNAR/DNI    Chief Complaint  Chief Complaint   Patient presents with    Syncope     Transferred from [facility] for cardiology consult - AICD shocked 3 times today       History of Presenting Illness  Jaime Lee is a 73 y.o. male with a hx of afib, AICD, HFrEF 20%, CAD prior MI, htn, DLD, DMII who presented 9/20/2023 with a syncopal event after taking a hot shower today.  He states he remembered stepping out of his shower grasping the sink then waking up on the floor.  His wife rushed to the bathroom and stated he was unconscious for ~10seconds then regained consciousness.  He then states he was shocked by his AICD.  He denies any other defibrillation shocks there after.  He was brought to the hospital and AICD interrogation was concerning for ventricular tachycardia and three AICD discharges/firings.      Currently the patient is alert and oriented.  He has some neck pain after falling to the ground but imaging with CT head/neck unremarkable for acute finding.  Cardiology request to start amiodarone but patient reluctant and wants to talk to electrophysiologist first.  Currently his rate is controlled and no further suggestion of ventricular tachycardia.  Of note he states in past month he had covid while on a cruise and was in isolation for a few weeks.    I discussed the plan of care with patient and cardiology.    Review of Systems  Review of Systems   Constitutional:  Negative for fever and malaise/fatigue.   HENT:  Negative for congestion.    Respiratory:  Negative for shortness of breath.    Cardiovascular:  Negative for chest pain, palpitations and leg swelling.   Gastrointestinal:  Negative for abdominal pain and nausea.   Genitourinary:  Negative for dysuria and frequency.    Musculoskeletal:  Positive for falls and neck pain.   Neurological:  Positive for loss of consciousness. Negative for speech change, focal weakness, seizures and headaches.   Psychiatric/Behavioral:  Negative for substance abuse. The patient is not nervous/anxious.        Past Medical History   has a past medical history of Acute anterior myocardial infarction (HCC) (12/28/2000), AICD (automatic cardioverter/defibrillator) present (2003/2008/2015), Allergy, CAD (coronary artery disease) (12/28/2000), Chronic anticoagulation (2/1/2018), Chronic deep vein thrombosis (DVT) of femoral vein of left lower extremity ([facility]) (01/04/2018), Congestive heart failure ([facility]) (12/28/2000), Dental disorder, GERD (gastroesophageal reflux disease), High cholesterol, Hypertension, Ischemic cardiomyopathy (05/2022), Left bundle branch block ( ), Pacemaker, Pneumonia (2014), Pulmonary embolism (HCC) (03/2014), Thrombocytopenia ([facility]) (02/23/2014), Type 2 diabetes mellitus without complication ([facility]) ( ), and Vascular disorder of extremity ([facility]) (02/11/2020).    Surgical History   has a past surgical history that includes other cardiac surgery (12/2000); appendectomy (1969); aicd implant (January 2008); recovery (11/3/2015); aicd battery change (November 2015); and multiple coronary artery bypass.     Family History  family history includes Alcohol/Drug in his brother; Diabetes in his mother and sister; Heart Disease in his mother.   Family history reviewed with patient. There is no family history that is pertinent to the chief complaint.     Social History   reports that he quit smoking about 22 years ago. His smoking use included cigarettes. He started smoking about 54 years ago. He has a 32.0 pack-year smoking history. He has never used smokeless tobacco. He reports current alcohol use of about 1.2 oz of alcohol per week. He reports that he does not currently use drugs after having used the following drugs: Marijuana and  Oral.    Allergies  Allergies   Allergen Reactions    Bloodless     Pcn [Penicillins] Anaphylaxis    Ace Inhibitors Cough     Cough       Medications  Prior to Admission Medications   Prescriptions Last Dose Informant Patient Reported? Taking?   Cholecalciferol (D3 PO) 9/20/2023 at 0545 Patient, Significant Other Yes No   Sig: Take 1 Capsule by mouth every day.   Empagliflozin (JARDIANCE) 10 MG Tab tablet 9/20/2023 at 0545 Patient, Significant Other No No   Sig: Take 1 Tablet by mouth every day.   Magnesium 500 MG Cap 9/20/2023 at 0545 Patient, Significant Other No No   Sig: Take 1 Cap by mouth every day.   furosemide (LASIX) 40 MG Tab 9/20/2023 at 0545 Patient, Significant Other No No   Sig: Take 0.5 Tablets by mouth every day.   losartan (COZAAR) 25 MG Tab 9/20/2023 at 0545 Patient, Significant Other No No   Sig: Take 0.5 Tablets by mouth every day.   lovastatin (MEVACOR) 10 MG tablet 9/19/2023 at 1900 Patient, Significant Other Yes No   Sig: Take 10 mg by mouth every evening.   metFORMIN (GLUCOPHAGE) 500 MG Tab 9/20/2023 at 0545 Patient, Significant Other No No   Sig: TAKE 1 TABLET BY MOUTH TWICE A DAY WITH FOOD   Patient taking differently: Take 500 mg by mouth 2 times a day with meals.   metoprolol SR (TOPROL XL) 50 MG TABLET SR 24 HR  Patient, Significant Other Yes No   Sig: Take 50 mg by mouth every day.   potassium chloride SA (KDUR) 20 MEQ Tab CR 9/20/2023 at 0545 Patient, Significant Other No No   Sig: Take 0.5 Tablets by mouth every day. 1/2 tab daily   rivaroxaban (XARELTO) 20 MG Tab tablet 9/19/2023 at 1900 Patient, Significant Other No No   Sig: Take 1 Tablet by mouth with dinner.   rosuvastatin (CRESTOR) 5 MG Tab NEW RX Patient, Significant Other No No   Sig: Take 1 Tablet by mouth every Monday, Wednesday, and Friday.   spironolactone (ALDACTONE) 25 MG Tab 9/20/2023 at 0545 Patient, Significant Other No No   Sig: Take 0.5 Tablets by mouth every day.      Facility-Administered Medications: None        Physical Exam  Temp:  [37 °C (98.6 °F)-37.2 °C (99 °F)] 37 °C (98.6 °F)  Pulse:  [] 104  Resp:  [18-20] 20  BP: (115-134)/(78-87) 115/78  SpO2:  [93 %-96 %] 94 %  Blood Pressure : 134/87   Temperature: 37.2 °C (99 °F)   Pulse: 93   Respiration: 18   Pulse Oximetry: 93 %       Physical Exam  Vitals reviewed.   Constitutional:       Appearance: Normal appearance. He is not diaphoretic.   HENT:      Head: Normocephalic and atraumatic.      Nose: Nose normal.      Mouth/Throat:      Mouth: Mucous membranes are moist.      Pharynx: No oropharyngeal exudate.   Eyes:      General: No scleral icterus.        Right eye: No discharge.         Left eye: No discharge.      Extraocular Movements: Extraocular movements intact.      Conjunctiva/sclera: Conjunctivae normal.   Cardiovascular:      Rate and Rhythm: Normal rate and regular rhythm.      Pulses:           Radial pulses are 2+ on the right side and 2+ on the left side.        Dorsalis pedis pulses are 2+ on the right side and 2+ on the left side.      Heart sounds: Normal heart sounds. No murmur heard.  Pulmonary:      Effort: Pulmonary effort is normal. No respiratory distress.      Breath sounds: Normal breath sounds. No wheezing or rales.   Abdominal:      General: Bowel sounds are normal. There is no distension.      Palpations: Abdomen is soft.      Tenderness: There is no abdominal tenderness.   Musculoskeletal:         General: No swelling or tenderness.      Cervical back: Neck supple. No muscular tenderness.      Right lower leg: No edema.      Left lower leg: No edema.   Lymphadenopathy:      Cervical: No cervical adenopathy.   Skin:     Coloration: Skin is not jaundiced or pale.   Neurological:      General: No focal deficit present.      Mental Status: He is alert and oriented to person, place, and time. Mental status is at baseline.      Cranial Nerves: No cranial nerve deficit.   Psychiatric:         Mood and Affect: Mood normal.          "Behavior: Behavior normal.         Laboratory:  Recent Labs     09/20/23  1124   WBC 9.7   RBC 5.17   HEMOGLOBIN 16.4   HEMATOCRIT 49.6   MCV 95.9   MCH 31.7   MCHC 33.1   RDW 46.5   PLATELETCT 158*   MPV 11.9     Recent Labs     09/20/23  1124   SODIUM 139   POTASSIUM 4.6   CHLORIDE 101   CO2 22   GLUCOSE 154*   BUN 31*   CREATININE 1.22   CALCIUM 9.7     Recent Labs     09/20/23  1124   ALTSGPT 11   ASTSGOT 21   ALKPHOSPHAT 51   TBILIRUBIN 0.9   GLUCOSE 154*     Recent Labs     09/20/23  1124   APTT 33.0   INR 1.50*     No results for input(s): \"NTPROBNP\" in the last 72 hours.      Recent Labs     09/20/23  1124   TROPONINT 37*       Imaging:  No orders to display         Assessment/Plan:  Justification for Admission Status  I anticipate this patient will require at least two midnights for appropriate medical management, necessitating inpatient admission because possible need of antiarrhythmic medications in face of syncope and ventricular arrhythmia.    Patient will need a Telemetry bed on MEDICAL service .  The need is secondary to monitor of arrhythmia.    * Ventricular tachycardia (HCC)- (present on admission)  Assessment & Plan  Noted on interrogation of AICD today  Monitor on telemetry  Question of hypotension after hot shower and possible over diuresed with subsequent syncope that led to ventriculary tachycardia or vtach that led to syncope.  No reccurents  Metoprolol Sr 50mg   Cardiology consulting and may get EP MD to see patient  Serial troponins    Syncope and collapse  Assessment & Plan  Monitor on telemetry  Hx of HFrEF 20%   Last echo 2/17/21      S/P CABG x 4- (present on admission)  Assessment & Plan  Hx of CABG  Medical management    AICD (automatic cardioverter/defibrillator) present- (present on admission)  Assessment & Plan  Interrogated 9/20/23    History of pulmonary embolus (PE)- (present on admission)  Assessment & Plan  Anticoagulated on Xarelto 20mg qd    Elevated troponin  Assessment & " Plan  Serial troponin  Monitor on tele        VTE prophylaxis: therapeutic anticoagulation with Xarelto

## 2023-09-20 NOTE — CONSULTS
Reason for Consult:  Asked by Dr Eddie Lau and Vijay Huang M.D. to see this patient with an ICD shock  Patient's PCP: Rafael Park M.D.    CC:   Chief Complaint   Patient presents with    Syncope     Transferred from [facility] for cardiology consult - AICD shocked 3 times today       HPI:       This is a 73-year-old with a history of ischemic cardiomyopathy severely reduced ejection With CAD and prior CABG he had prior history of DVT is remarkably he presents with after a hot shower today he did recently have COVID and flu vaccination.  He has a remote history in 2018 and 19 of ICD firing and has been on beta-blockers in without recurrence.  He is reluctant to start amiodarone for what ever reason.            Medications / Drug list prior to admission:  No current facility-administered medications on file prior to encounter.     Current Outpatient Medications on File Prior to Encounter   Medication Sig Dispense Refill    Cholecalciferol (D3 PO) Take 1 Capsule by mouth every day.      lovastatin (MEVACOR) 10 MG tablet Take 10 mg by mouth every evening.      rosuvastatin (CRESTOR) 5 MG Tab Take 1 Tablet by mouth every Monday, Wednesday, and Friday. 50 Tablet 4    furosemide (LASIX) 40 MG Tab Take 0.5 Tablets by mouth every day. 50 Tablet 3    potassium chloride SA (KDUR) 20 MEQ Tab CR Take 0.5 Tablets by mouth every day. 1/2 tab daily 50 Tablet 3    metFORMIN (GLUCOPHAGE) 500 MG Tab TAKE 1 TABLET BY MOUTH TWICE A DAY WITH FOOD (Patient taking differently: Take 500 mg by mouth 2 times a day with meals.) 200 Tablet 3    Empagliflozin (JARDIANCE) 10 MG Tab tablet Take 1 Tablet by mouth every day. 30 Tablet 3    metoprolol SR (TOPROL XL) 50 MG TABLET SR 24 HR Take 50 mg by mouth every day.      rivaroxaban (XARELTO) 20 MG Tab tablet Take 1 Tablet by mouth with dinner. 100 Tablet 3    spironolactone (ALDACTONE) 25 MG Tab Take 0.5 Tablets by mouth every day. 50 Tablet 3    losartan (COZAAR) 25 MG Tab Take 0.5  Tablets by mouth every day. 50 Tablet 3    Magnesium 500 MG Cap Take 1 Cap by mouth every day. 180 Cap 4       Current list of administered Medications:  No current facility-administered medications for this encounter.    Current Outpatient Medications:     Cholecalciferol (D3 PO), Take 1 Capsule by mouth every day., Disp: , Rfl:     lovastatin (MEVACOR) 10 MG tablet, Take 10 mg by mouth every evening., Disp: , Rfl:     rosuvastatin (CRESTOR) 5 MG Tab, Take 1 Tablet by mouth every Monday, Wednesday, and Friday., Disp: 50 Tablet, Rfl: 4    furosemide (LASIX) 40 MG Tab, Take 0.5 Tablets by mouth every day., Disp: 50 Tablet, Rfl: 3    potassium chloride SA (KDUR) 20 MEQ Tab CR, Take 0.5 Tablets by mouth every day. 1/2 tab daily, Disp: 50 Tablet, Rfl: 3    metFORMIN (GLUCOPHAGE) 500 MG Tab, TAKE 1 TABLET BY MOUTH TWICE A DAY WITH FOOD (Patient taking differently: Take 500 mg by mouth 2 times a day with meals.), Disp: 200 Tablet, Rfl: 3    Empagliflozin (JARDIANCE) 10 MG Tab tablet, Take 1 Tablet by mouth every day., Disp: 30 Tablet, Rfl: 3    metoprolol SR (TOPROL XL) 50 MG TABLET SR 24 HR, Take 50 mg by mouth every day., Disp: , Rfl:     rivaroxaban (XARELTO) 20 MG Tab tablet, Take 1 Tablet by mouth with dinner., Disp: 100 Tablet, Rfl: 3    spironolactone (ALDACTONE) 25 MG Tab, Take 0.5 Tablets by mouth every day., Disp: 50 Tablet, Rfl: 3    losartan (COZAAR) 25 MG Tab, Take 0.5 Tablets by mouth every day., Disp: 50 Tablet, Rfl: 3    Magnesium 500 MG Cap, Take 1 Cap by mouth every day., Disp: 180 Cap, Rfl: 4    Past Medical History:   Diagnosis Date    Acute anterior myocardial infarction (HCC) 12/28/2000    PCI attempted by Dr. Malloy but unsuccessful    AICD (automatic cardioverter/defibrillator) present 2003/2008/2015    Defibrillator is a Medtronic model [id number], serial #[id number], implanted Dr. Stephenson November 2015. The ventricular lead is a Medtronic model #6947-65, lead serial #[id number], implant 7/2/2003     Allergy     within 2 years    CAD (coronary artery disease) 12/28/2000    CABG x 4 (LIMA to LAD, rSVG to first diagonal, left circumflex and PDA).2000: CABG x 4 (LIMA to LAD, rSVG to first diagonal, left circumflex and PDA).  2003: LIMA and circumflex grafts patent, diagonal and PDA grafts closed.    Chronic anticoagulation 2/1/2018    Chronic deep vein thrombosis (DVT) of femoral vein of left lower extremity ([facility]) 01/04/2018    Congestive heart failure ([facility]) 12/28/2000    Dental disorder     upper denture    GERD (gastroesophageal reflux disease)     High cholesterol     Hypertension     Ischemic cardiomyopathy 05/2022    Echocardiogram with severely dilated LV, LVEF <25%. Severe diffuse hypokinesis. Septal akinesis. Normal LA and RV. Moderately dilated RA. Trace MR, mild TR. RVSP 23.8mmHg.    Left bundle branch block      Pacemaker     Pneumonia 2014    Pulmonary embolism ([facility]) 03/2014    Thrombocytopenia ([facility]) 02/23/2014    Type 2 diabetes mellitus without complication ([facility])      Diet controlled, followed by Dr. Walls    Vascular disorder of extremity ([facility]) 02/11/2020       Past Surgical History:   Procedure Laterality Date    RECOVERY  11/3/2015    Procedure: CATH LAB-MARCO A-ICD GENERATOR CHANGE 73926- EUNICE T82.111A-MEDTRONIC BLOODLESS;  Surgeon: Recoveryonly Surgery;  Location: [place] PRE-[place] PROC UNIT [facility] [place];  Service:     AICD BATTERY CHANGE  November 2015    Generator replacement with Medtronic Evera S VR [id number] implanted by Dr. Marcus Stephenson.    AICD IMPLANT  January 2008    Medtronic Virtuoso VR [id number] implanted by Dr. Stephenson.    OTHER CARDIAC SURGERY  12/2000    CABG x 4    APPENDECTOMY  1969    MULTIPLE CORONARY ARTERY BYPASS         Family History   Problem Relation Age of Onset    Diabetes Mother     Heart Disease Mother     Diabetes Sister     Alcohol/Drug Brother      Patient family history was personally reviewed, no pertinent family history to current presentation    Social History     Tobacco Use  "   Smoking status: Former     Current packs/day: 0.00     Average packs/day: 1 pack/day for 32.0 years (32.0 ttl pk-yrs)     Types: Cigarettes     Start date: 1968     Quit date: 2000     Years since quittin.7    Smokeless tobacco: Never   Vaping Use    Vaping Use: Never used   Substance Use Topics    Alcohol use: Yes     Alcohol/week: 1.2 oz     Types: 2 Standard drinks or equivalent per week     Comment: occ    Drug use: Not Currently     Types: Marijuana, Oral       ALLERGIES:  Allergies   Allergen Reactions    Bloodless     Pcn [Penicillins] Anaphylaxis    Ace Inhibitors Cough     Cough       Review of systems:  A complete review of symptoms was reviewed with patient. This is reviewed in H&P and PMH. ALL OTHERS reviewed and negative    Physical exam:  Patient Vitals for the past 24 hrs:   BP Temp Temp src Pulse Resp SpO2 Height Weight   23 1354 134/87 37.2 °C (99 °F) Temporal 93 18 93 % -- --   23 1352 -- -- -- -- -- -- 1.803 m (5' 11\") 68 kg (149 lb 14.6 oz)     General: No acute distress.   EYES: no jaundice  HEENT: OP clear   Neck:  No JVD.   CVS:  [ RRR.   Resp: Normal respiratory effort,   Abdomen: ND,  Skin: Grossly nothing acute no obvious rashes  Neurological: Alert, Moves all extremities  Extremities:   [   no edema. No cyanosis.       Data:  Laboratory studies personally reviewed by me:  Recent Results (from the past 24 hour(s))   EKG    Collection Time: 23 10:56 AM   Result Value Ref Range    Report       [facility] Emergency Dept.    Test Date:  2023  Pt Name:    NGOC LONG              Department: [facility]  MRN:        [id number]                      Room:       [facility]  Gender:     Male                         Technician: [id number]  :        1950                   Requested By:ER TRIAGE PROTOCOL  Order #:    [id number]                    Reading MD: TUAN THOMAS MD    Measurements  Intervals                               "  Axis  Rate:       81                           P:          81  MO:         223                          QRS:        261  QRSD:       152                          T:          83  QT:         429  QTc:        498    Interpretive Statements  Twelve-lead EKG shows a normal sinus rhythm with occasional premature  ventricular contractions and a ventricular rate of 81, prolonged MO interval,  slight ST segment elevation inferiorly but this is not acute from prior.  Notched QRS again which is not acute.  Overall no dynamic change  from prior  Electronically Signed On 09- 11:06:34 PDT by TUAN THOMAS MD     CBC WITH DIFFERENTIAL    Collection Time: 09/20/23 11:24 AM   Result Value Ref Range    WBC 9.7 4.8 - 10.8 K/uL    RBC 5.17 4.70 - 6.10 M/uL    Hemoglobin 16.4 14.0 - 18.0 g/dL    Hematocrit 49.6 42.0 - 52.0 %    MCV 95.9 81.4 - 97.8 fL    MCH 31.7 27.0 - 33.0 pg    MCHC 33.1 32.3 - 36.5 g/dL    RDW 46.5 35.9 - 50.0 fL    Platelet Count 158 (L) 164 - 446 K/uL    MPV 11.9 9.0 - 12.9 fL    Neutrophils-Polys 88.20 (H) 44.00 - 72.00 %    Lymphocytes 5.00 (L) 22.00 - 41.00 %    Monocytes 5.40 0.00 - 13.40 %    Eosinophils 0.40 0.00 - 6.90 %    Basophils 0.50 0.00 - 1.80 %    Immature Granulocytes 0.50 0.00 - 0.90 %    Nucleated RBC 0.00 0.00 - 0.20 /100 WBC    Neutrophils (Absolute) 8.54 (H) 1.82 - 7.42 K/uL    Lymphs (Absolute) 0.48 (L) 1.00 - 4.80 K/uL    Monos (Absolute) 0.52 0.00 - 0.85 K/uL    Eos (Absolute) 0.04 0.00 - 0.51 K/uL    Baso (Absolute) 0.05 0.00 - 0.12 K/uL    Immature Granulocytes (abs) 0.05 0.00 - 0.11 K/uL    NRBC (Absolute) 0.00 K/uL   COMP METABOLIC PANEL    Collection Time: 09/20/23 11:24 AM   Result Value Ref Range    Sodium 139 135 - 145 mmol/L    Potassium 4.6 3.6 - 5.5 mmol/L    Chloride 101 96 - 112 mmol/L    Co2 22 20 - 33 mmol/L    Anion Gap 16.0 7.0 - 16.0    Glucose 154 (H) 65 - 99 mg/dL    Bun 31 (H) 8 - 22 mg/dL    Creatinine 1.22 0.50 - 1.40 mg/dL    Calcium 9.7 8.4 - 10.2  mg/dL    Correct Calcium 9.5 8.5 - 10.5 mg/dL    AST(SGOT) 21 12 - 45 U/L    ALT(SGPT) 11 2 - 50 U/L    Alkaline Phosphatase 51 30 - 99 U/L    Total Bilirubin 0.9 0.1 - 1.5 mg/dL    Albumin 4.2 3.2 - 4.9 g/dL    Total Protein 6.9 6.0 - 8.2 g/dL    Globulin 2.7 1.9 - 3.5 g/dL    A-G Ratio 1.6 g/dL   APTT    Collection Time: 23 11:24 AM   Result Value Ref Range    APTT 33.0 24.7 - 36.0 sec   PROTHROMBIN TIME    Collection Time: 23 11:24 AM   Result Value Ref Range    PT 18.9 (H) 12.0 - 14.6 sec    INR 1.50 (H) 0.87 - 1.13   TROPONIN    Collection Time: 23 11:24 AM   Result Value Ref Range    Troponin T 37 (H) 6 - 19 ng/L   ESTIMATED GFR    Collection Time: 23 11:24 AM   Result Value Ref Range    GFR (CKD-EPI) 63 >60 mL/min/1.73 m 2   EKG    Collection Time: 23  1:53 PM   Result Value Ref Range    Report       [facility] Emergency Dept.    Test Date:  2023  Pt Name:    NGOC LONG              Department: ER  MRN:        [id number]                      Room:       [facility]  Gender:     Male                         Technician: [id number]  :        1950                   Requested By:JAMIE YADAV  Order #:    [id number]                    Reading MD:    Measurements  Intervals                                Axis  Rate:       87                           P:          83  VT:         190                          QRS:        245  QRSD:       148                          T:          78  QT:         427  QTc:        514    Interpretive Statements  Sinus arrhythmia  Nonspecific IVCD with LAD  Inferior infarct, old  Abnormal lateral Q waves  Anteroseptal infarct, age indeterminate  Compared to ECG 2023 10:56:25  Intraventricular conduction delay now present  Myocardial infarct finding now present  Q waves now present  Sinus rhythm no longer present  Ventricular joey ature complex(es) no longer present  First degree AV block no longer present  ST (T wave) deviation  no longer present         Imaging:  No orders to display           EKG tracings personally reviewed by me sinus rhythm with right bundle branch block      Echocardiogram images personally reviewed by me show in  DCM with EF <20      George shows the same in     Echocardiography Lab                       [facility]          [facility] and      Name:           ESTEBAN               Age:  71 yrs          Clinics                    MRN:            [id number]                  :  1950          [place], [place] [address] Ordering M.D.:  SHELLEY VAZQUEZ          BP: 148/79 mmHg          Phone:  [phone]     Study Date:     2022 02:59 PM       HR: 89          Fax:     [phone]    HT: 71 in       WT: 163 lb                BSA: 1.9 m2                                     Gender: Male    A/C #: [id number]       Location: [id number]^^^[id number]                                                                                    Transthoracic Echocardiography Report     _________________________________________________________________________________________________________________________________________________________________________________________________________   Reason for Study: CHF     _________________________________________________________________________________________________________________________________________________________________________________________________________     ICD10   ICD-10: Heart failure unspecified, I50.9;.       Interpretation Summary   A complete two-dimensional transthoracic echocardiogram was performed (2D, M-mode, Doppler and color flow Doppler).   1. Severe LV enlargement with severely reduced systolic function. Estimated EF = 19% by MOD. Normal RV size with mild to moderately reduced systolic function.   2. Mild TR. Estimated RVSP = 24 mmHg (RAP 3 mmHg). Trace AR/PA/MR.     Cardiac Catheterization report     2022  8:59 AM      Referring provider: Liliana Berry     Indication/Preoperative diagnosis:  Cardiomyopathy  Precardiac transplant evaluation     Postoperative diagnosis:  Normal pulmonary artery pressures  Normal cardiac filling pressures  Reduced cardiac output     Recommendations:  Guideline directed medical therapy   Cardiovascular Risk factor modification  Continue with transplant evaluation        Procedures performed:  Right heart catheterization  Supervision moderate sedation        FINDINGS:  I.  HEMODYNAMIC FINDINGS:  AoP 138/70 mmHg   RA 6 mmHg   RV 25/2 mmHg   RVEDP 6 mmHg   PA 23/12 mmHg   mPAP 16 mmHg   PCWP 10 mmHg   TDCO 3.1 L/min   TDCI 1.63 L/min/m2   Sherly CO 3.89 L/min   Sherly CI 2.04 L/min/m2   PA saturation 70%, arterial saturation 98%     No reversibility study indicated due to normal pulmonary artery pressures  All pertinent features of laboratory and imaging reviewed including primary images where applicable      Active Problems:    AICD (automatic cardioverter/defibrillator) present (POA: Yes)      Overview: November 2015: Generator replacement with Medtronic Interior Definea S VR [id number]       implanted by Dr. Marcus Stephenson.      January 2008: Medtronic Employee Benef[employer] Plansso VR [id number] implanted by Dr. Marcus Stephneson.      The ventricular lead is a Medtronic model #6947-65, lead serial       #[id number], implantation date 07/02/2003.          Ischemic cardiomyopathy (POA: Yes)      Overview: May 2022: Echocardiogram with severely dilated LV, LVEF <25%. Severe       diffuse hypokinesis. Septal akinesis. Normal LA and RV. Moderately dilated       RA. Trace MR, mild TR. RVSP 23.8mmHg.      February 2021: Echocardiogram with severely dilated LV, LVEF 20%. Small       RV. Enlarged RA and mildly dilated LA.      January 2019: Echocardiogram with normal size, LVEF 20-25%. Grade II       diastolic dysfunction. Trace MR, mild TR. RVSP 25mmHg.      August 2017: Echocardiogram with moderately dilated LV, LVEF 20%.      March 2014: Echocardiogram with  severely dilated LV, LVEF 15-20%.    S/P CABG x 4 (POA: Yes)      Overview: December 2000: CABG x 4 (LIMA to LAD, rSVG to first diagonal, left       circumflex and PDA).     Old anterior myocardial infarction (POA: Yes)    Chronic systolic congestive heart failure, NYHA class 2 (HCC) (POA: Yes)    RBBB (POA: Yes)      Overview: IMO load March 2020    Ventricular tachycardia (HCC) (POA: Yes)  Resolved Problems:    * No resolved hospital problems. *      Assessment / Plan:  Ventricular tachycardia and end-stage cardiomyopathy with severely reduced ejection fraction  He has known severe ischemic heart disease remote catheterization showed no intervenable targets and he does not describe angina.  I have encouraged him to take an antiarrhythmic to reduce his recurrence of ventricular tachycardia we discussed in the setting of severe cardiomyopathy recurrent ventricular tachycardia can precipitate a precipitous decline in functional status and heart function.  We will have EP see him tomorrow  No sign for MI, not clearly of benefit at this point from an ischemic evaluation but can address with EP        I personally discussed his case with  Dr Vijay Huang M.D.    Future Appointments   Date Time Provider Department Center   10/24/2023 10:00 AM Rafael Park M.D. [facility] [place]   2/13/2024 10:30 AM CHRIS Rodríguez None   2/13/2024 10:45 AM CHRIS Rodríguez None       It is my pleasure to participate in the care of Mr. Lee.  Please do not hesitate to contact me with questions or concerns.    Jose Deluna MD PhD [facility]  Cardiologist [facility]    9/20/2023    Please note that this dictation was created using voice recognition software. There may be errors I did not discover before finalizing the note.      Mr. Lee's care is highly complex due to high risk diagnosis with either severe exacerbation, progression, or side effects of treatment. We  specifically discussed the need for high risk medication requiring at least quarterly testing and/or made a decision on elective/emergent major surgery with identified patient or procedure risk factors specific to Mr. Lee. I have personally spent extra time in discussion about these facts with Mr. Lee and reviewed and or ordered at least 3 tests, documents or other physician/RAMANDEEP reports available including labs, imaging and EKGs as appropriate separate from today's encounter.  I have reviewed images with Mr. Lee and personally interpreted on this encounter day the referenced EKG, echocardiogram, stress tests, CT scan, cardiac catheterization or other cardiac imaging and my personal interpretation is what is specifically stated in this note.

## 2023-09-20 NOTE — ED NOTES
"Med rec completed by Harper Love [facility] at [facility] on 09/20/23, prior to transfer:    \"Medication history reviewed with pt and pts wife. Med rec is complete.  Allergies reviewed, per pt and pts wife     Pt thinks he took his XARELTO 20MG last night.  Pt will start taking his CRESTOR 5MG on 9/22/2023, pt is finishing taking his LOVASTATIN 10MG and thinks he took it last night.     Patient has not had any outpatient antibiotics in the last 30 days.     Pt is on anticoagulants, pt is taking XARELTO 20MG 1 tablet every evening last dose @ 1900 \"    "

## 2023-09-20 NOTE — ED TRIAGE NOTES
Pt to ed by rem from home  Syncope at home, hit head. Pt on blood thinners  Neck tenderness, refused c-collor  Pt defib possible shocked pt

## 2023-09-20 NOTE — ED NOTES
Pt refusing Amio infusion, states med will react with his medications currently on.  Matthew pharmacist reviewed meds and only minor drug interactions and benefit of given Amio out weighs risk.  ERP updated.

## 2023-09-20 NOTE — ED PROVIDER NOTES
ED Provider Note    CHIEF COMPLAINT  Chief Complaint   Patient presents with    Syncope     Transferred from [facility] for cardiology consult - AICD shocked 3 times today       EXTERNAL RECORDS REVIEWED  Reviewed records from [facility] earlier today.    HPI/ROS  LIMITATION TO HISTORY   Select: : None  OUTSIDE HISTORIAN(S):  I spoke with Dr. Lau the transferring physician.  About the patient's presentation lab results and plan.    Jaime Lee is a 73 y.o. male who presents transferred to [facility] from [facility] for V. tach.  The patient has a history of ischemic heart disease and has an AICD.  Today he had a syncopal episode without prodrome.  He fell and hit his head had a head and neck pain.  He went to [facility] was worked up for syncope and closed head injury.  CT of the head and neck were negative per the radiologist.  There is AICD was interrogated and this shows ventricular tachycardia took multiple shocks to the cardiovert.  Cardiology was consulted and requested the patient be sent here for higher level of care.    PAST MEDICAL HISTORY   has a past medical history of Acute anterior myocardial infarction (HCC) (12/28/2000), AICD (automatic cardioverter/defibrillator) present (2003/2008/2015), Allergy, CAD (coronary artery disease) (12/28/2000), Chronic anticoagulation (2/1/2018), Chronic deep vein thrombosis (DVT) of femoral vein of left lower extremity ([facility]) (01/04/2018), Congestive heart failure ([facility]) (12/28/2000), Dental disorder, GERD (gastroesophageal reflux disease), High cholesterol, Hypertension, Ischemic cardiomyopathy (05/2022), Left bundle branch block ( ), Pacemaker, Pneumonia (2014), Pulmonary embolism (HCC) (03/2014), Thrombocytopenia ([facility]) (02/23/2014), Type 2 diabetes mellitus without complication ([facility]) ( ), and Vascular disorder of extremity ([facility]) (02/11/2020).    SURGICAL HISTORY   has a past  "surgical history that includes other cardiac surgery (2000); appendectomy (); aicd implant (2008); recovery (11/3/2015); aicd battery change (2015); and multiple coronary artery bypass.    FAMILY HISTORY  Family History   Problem Relation Age of Onset    Diabetes Mother     Heart Disease Mother     Diabetes Sister     Alcohol/Drug Brother        SOCIAL HISTORY  Social History     Tobacco Use    Smoking status: Former     Current packs/day: 0.00     Average packs/day: 1 pack/day for 32.0 years (32.0 ttl pk-yrs)     Types: Cigarettes     Start date: 1968     Quit date: 2000     Years since quittin.7    Smokeless tobacco: Never   Vaping Use    Vaping Use: Never used   Substance and Sexual Activity    Alcohol use: Yes     Alcohol/week: 1.2 oz     Types: 2 Standard drinks or equivalent per week     Comment: occ    Drug use: Not Currently     Types: Marijuana, Oral    Sexual activity: Never       CURRENT MEDICATIONS  Home Medications    **Home medications have not yet been reviewed for this encounter**         ALLERGIES  Allergies   Allergen Reactions    Bloodless     Pcn [Penicillins] Anaphylaxis    Ace Inhibitors Cough     Cough       PHYSICAL EXAM  VITAL SIGNS: /87   Pulse 93   Temp 37.2 °C (99 °F) (Temporal)   Resp 18   Ht 1.803 m (5' 11\")   Wt 68 kg (149 lb 14.6 oz)   SpO2 93%   BMI 20.91 kg/m²    Constitutional: Awake alert nontoxic.  HENT: Normocephalic, no occipital hematoma.  Eyes: PERRL, EOMI, Conjunctiva normal, No discharge.   Neck: Normal range of motion, tenderness at the base of the skull and upper neck more on the left than the right  Cardiovascular: Normal heart rate, Normal rhythm, No murmurs, No rubs, No gallops.   Thorax & Lungs: Normal breath sounds, No respiratory distress, No wheezing  Abdomen: Soft, No tenderness  Skin: Warm, Dry, No erythema, No rash.   Back: No tenderness, No CVA tenderness.     Musculoskeletal: Good range of motion in all " major joints.  Neurologic: Alert, No focal deficits noted.   Psychiatric: Affect normal      DIAGNOSTIC STUDIES / PROCEDURES    Results for orders placed or performed during the hospital encounter of 23   EKG   Result Value Ref Range    Report       [facility] Emergency Dept.    Test Date:  2023  Pt Name:    NGOC LONG              Department: ER  MRN:        [id number]                      Room:       [facility]  Gender:     Male                         Technician: [id number]  :        1950                   Requested By:JAMIE YADAV  Order #:    [id number]                    Reading MD: JAMIE YADAV. [facility]    Measurements  Intervals                                Axis  Rate:       87                           P:          83  WI:         190                          QRS:        245  QRSD:       148                          T:          78  QT:         427  QTc:        514    Interpretive Statements  Sinus arrhythmia  Nonspecific IVCD with LAD  Inferior infarct, old  Compared to ECG 2023 10:56:25  Intraventricular conduction delay now present  Myocardial infarct finding now present  Q waves now present  Sinus rhythm no longer present  Electronically Signed On 2023 15:05:18 PDT by JAMIE EPPERSON. [facility]          RADIOLOGY  I have independently interpreted the diagnostic imaging associated with this visit   Radiologist interpretation:     CT is read earlier today were negative.  I have reviewed the radiology results    COURSE & MEDICAL DECISION MAKING    ED Observation Status?  The patient does not meet observation criteria sent here for further work-up and treatment.    INITIAL ASSESSMENT, COURSE AND PLAN  Care Narrative:     Patient presents to the emergency department for evaluation of syncope.  The patient had a syncopal episode and fell and hit his head.  He had a negative head and neck CT.  The patient had V. tach.  Cardiology consulted who sent here.    EKG is  largely unchanged.  Labs from earlier today were reviewed.    I spoke with the cardiologist Dr. Deluna feels the patient recommends hospitalization.    I spoke with Dr. Medina from the hospital service he will see the patient for further work-up and treatment.    The patient asked not to be resuscitated.  He is happy to have more work-up and medical therapy.  Would like to discuss this further with cardiology.    Discussed with Dr. Medina and care is transferred at that time.        ADDITIONAL PROBLEM LIST  Ischemic heart disease  Congestive heart failure  AICD.    DISPOSITION AND DISCUSSIONS  I have discussed management of the patient with the following physicians and RAMANDEEP's: Cardiology and the [facility] hospitalist as above.      FINAL DIAGNOSIS  1. Syncope, unspecified syncope type    2. V tach (HCC)    3. Closed head injury, initial encounter           Electronically signed by: Vijay Huang M.D., 9/20/2023 2:05 PM

## 2023-09-20 NOTE — ED PROVIDER NOTES
ED Provider Note    CHIEF COMPLAINT  Chief Complaint   Patient presents with    Near Syncopal    Head Injury       EXTERNAL RECORDS REVIEWED  Other reviewed the patient's cardiology note from 8 August and it was noted the patient does have an AICD with a history of ventricular tachycardia.    HPI/ROS    OUTSIDE HISTORIAN(S):  Family wife presents with the patient as she noted the syncopal episode.  She states that the patient got out of the shower and then collapsed hit in his head and neck on a little step    Jaime Lee is a 73 y.o. male who presents for evaluation after a fall.  The patient had a syncopal episode this morning as noted above per his wife.  The patient does not remember the event.  He states the last thing he remembers is seeing his wife looking at him as he was on the ground.  He does have a little bit of occipital discomfort as well as neck pain.  He is on anticoagulation due to history of a DVT.  He does have a defibrillator in place as he has a history of ventricular tachycardia.  He has not any recent vomiting or diarrhea.  The patient has not had any associated fevers.    In further speak with the patient he states that he has defibrillator did fire after the syncopal episode.    PAST MEDICAL HISTORY   has a past medical history of Acute anterior myocardial infarction (HCC) (12/28/2000), AICD (automatic cardioverter/defibrillator) present (2003/2008/2015), Allergy, CAD (coronary artery disease) (12/28/2000), Chronic anticoagulation (2/1/2018), Chronic deep vein thrombosis (DVT) of femoral vein of left lower extremity (HCC) (01/04/2018), Congestive heart failure (HCC) (12/28/2000), Dental disorder, GERD (gastroesophageal reflux disease), High cholesterol, Hypertension, Ischemic cardiomyopathy (05/2022), Left bundle branch block ( ), Pacemaker, Pneumonia (2014), Pulmonary embolism (HCC) (03/2014), Thrombocytopenia ([facility]) (02/23/2014), Type 2 diabetes mellitus without complication  (HCC) ( ), and Vascular disorder of extremity (HCC) (2020).    SURGICAL HISTORY   has a past surgical history that includes other cardiac surgery (2000); appendectomy (); aicd implant (2008); recovery (11/3/2015); aicd battery change (2015); and multiple coronary artery bypass.    FAMILY HISTORY  Family History   Problem Relation Age of Onset    Diabetes Mother     Heart Disease Mother     Diabetes Sister     Alcohol/Drug Brother        SOCIAL HISTORY  Social History     Tobacco Use    Smoking status: Former     Current packs/day: 0.00     Average packs/day: 1 pack/day for 32.0 years (32.0 ttl pk-yrs)     Types: Cigarettes     Start date: 1968     Quit date: 2000     Years since quittin.7    Smokeless tobacco: Never   Vaping Use    Vaping Use: Never used   Substance and Sexual Activity    Alcohol use: Yes     Alcohol/week: 1.2 oz     Types: 2 Standard drinks or equivalent per week     Comment: occ    Drug use: Not Currently     Types: Marijuana, Oral    Sexual activity: Never       CURRENT MEDICATIONS  Home Medications       Reviewed by Dior Mcrae (Pharmacy Tech) on 23 at 1052  Med List Status: <None>     Medication Last Dose Status   Cholecalciferol (D3 PO) Unknown Active   Empagliflozin (JARDIANCE) 10 MG Tab tablet Unknown Active   furosemide (LASIX) 40 MG Tab Unknown Active   losartan (COZAAR) 25 MG Tab Unknown Active   Magnesium 500 MG Cap Unknown Active   metFORMIN (GLUCOPHAGE) 500 MG Tab Unknown Active   metoprolol SR (TOPROL XL) 50 MG TABLET SR 24 HR Unknown Active   potassium chloride SA (KDUR) 20 MEQ Tab CR Unknown Active   rivaroxaban (XARELTO) 20 MG Tab tablet Unknown Active   rosuvastatin (CRESTOR) 5 MG Tab NEW RX Active   spironolactone (ALDACTONE) 25 MG Tab Unknown Active                    ALLERGIES  Allergies   Allergen Reactions    Bloodless     Pcn [Penicillins] Anaphylaxis    Ace Inhibitors Cough     Cough       PHYSICAL EXAM  VITAL  SIGNS: Wt 68 kg (149 lb 14.6 oz)   BMI 20.91 kg/m²    In general the patient does not appear toxic    Head the patient has some occipital and cervical discomfort    Ears TMs intact with no hemotympanums, nares and mouth are moist    Eyes pupils are 2 and reactive bilaterally neck psychomotor is intact    Cervical spine does have some mild diffuse discomfort with no step-offs.  Thoracic and lumbar spine has no midline tenderness nor step-offs    Pulmonary the patient's lungs are clear to auscultation bilaterally    Cardiovascular S1-S2 with a regular rate and rhythm    GI abdomen soft    Extremities no edema nor evidence of trauma except for a small skin tear to the posterior aspect of his right distal humerus    Skin the skin tear to the right posterior humerus measuring approximately 3 cm    Neurologic examination cranials 2 through 12 are intact, motor is 5-5 and symmetric throughout, sensations intact    DIAGNOSTIC STUDIES  Results for orders placed or performed during the hospital encounter of 09/20/23   CBC WITH DIFFERENTIAL   Result Value Ref Range    WBC 9.7 4.8 - 10.8 K/uL    RBC 5.17 4.70 - 6.10 M/uL    Hemoglobin 16.4 14.0 - 18.0 g/dL    Hematocrit 49.6 42.0 - 52.0 %    MCV 95.9 81.4 - 97.8 fL    MCH 31.7 27.0 - 33.0 pg    MCHC 33.1 32.3 - 36.5 g/dL    RDW 46.5 35.9 - 50.0 fL    Platelet Count 158 (L) 164 - 446 K/uL    MPV 11.9 9.0 - 12.9 fL    Neutrophils-Polys 88.20 (H) 44.00 - 72.00 %    Lymphocytes 5.00 (L) 22.00 - 41.00 %    Monocytes 5.40 0.00 - 13.40 %    Eosinophils 0.40 0.00 - 6.90 %    Basophils 0.50 0.00 - 1.80 %    Immature Granulocytes 0.50 0.00 - 0.90 %    Nucleated RBC 0.00 0.00 - 0.20 /100 WBC    Neutrophils (Absolute) 8.54 (H) 1.82 - 7.42 K/uL    Lymphs (Absolute) 0.48 (L) 1.00 - 4.80 K/uL    Monos (Absolute) 0.52 0.00 - 0.85 K/uL    Eos (Absolute) 0.04 0.00 - 0.51 K/uL    Baso (Absolute) 0.05 0.00 - 0.12 K/uL    Immature Granulocytes (abs) 0.05 0.00 - 0.11 K/uL    NRBC (Absolute) 0.00  K/uL   COMP METABOLIC PANEL   Result Value Ref Range    Sodium 139 135 - 145 mmol/L    Potassium 4.6 3.6 - 5.5 mmol/L    Chloride 101 96 - 112 mmol/L    Co2 22 20 - 33 mmol/L    Anion Gap 16.0 7.0 - 16.0    Glucose 154 (H) 65 - 99 mg/dL    Bun 31 (H) 8 - 22 mg/dL    Creatinine 1.22 0.50 - 1.40 mg/dL    Calcium 9.7 8.4 - 10.2 mg/dL    Correct Calcium 9.5 8.5 - 10.5 mg/dL    AST(SGOT) 21 12 - 45 U/L    ALT(SGPT) 11 2 - 50 U/L    Alkaline Phosphatase 51 30 - 99 U/L    Total Bilirubin 0.9 0.1 - 1.5 mg/dL    Albumin 4.2 3.2 - 4.9 g/dL    Total Protein 6.9 6.0 - 8.2 g/dL    Globulin 2.7 1.9 - 3.5 g/dL    A-G Ratio 1.6 g/dL   APTT   Result Value Ref Range    APTT 33.0 24.7 - 36.0 sec   PROTHROMBIN TIME   Result Value Ref Range    PT 18.9 (H) 12.0 - 14.6 sec    INR 1.50 (H) 0.87 - 1.13   TROPONIN   Result Value Ref Range    Troponin T 37 (H) 6 - 19 ng/L   ESTIMATED GFR   Result Value Ref Range    GFR (CKD-EPI) 63 >60 mL/min/1.73 m 2   EKG   Result Value Ref Range    Report       [facility] Emergency Dept.    Test Date:  2023  Pt Name:    NGOC LONG              Department: [facility]  MRN:        [id number]                      Room:       [facility]  Gender:     Male                         Technician: [id number]  :        1950                   Requested By:ER TRIAGE PROTOCOL  Order #:    [id number]                    Reading MD: TUAN THOMAS MD    Measurements  Intervals                                Axis  Rate:       81                           P:          81  NH:         223                          QRS:        261  QRSD:       152                          T:          83  QT:         429  QTc:        498    Interpretive Statements  Twelve-lead EKG shows a normal sinus rhythm with occasional premature  ventricular contractions and a ventricular rate of 81, prolonged NH interval,  slight ST segment elevation inferiorly but this is not acute from prior.  Notched QRS again which is  not acute.  Overall no dynamic change  from prior  Electronically Signed On 09- 11:06:34 PDT by TUAN THOMAS MD         EKG  I have independently interpreted this EKG  Please see my interpretation above    RADIOLOGY  CT-CSPINE WITHOUT PLUS RECONS   Final Result      1.  No evidence of cervical spine fracture.      2.  Multilevel degenerative disc disease and facet degeneration.      CT-HEAD W/O   Final Result      1.  No evidence of acute intracranial process.      2.  Cerebral atrophy as well as periventricular chronic small vessel ischemic change.             COURSE & MEDICAL DECISION MAKING    This a 73-year-old gentleman who presents after syncopal episode.  We did interrogate the patient's pacemaker and he did go into persistent V. tach that required 3 shocks and according to the tech the patient then went into a slow V. tach for a period of time.  Throughout his stay here in the emergency department he has been in a sinus rhythm.  Laboratory analysis does not show any metabolic derangements that could cause cardiac irritability.  The patient's troponin is slightly elevated but his EKG does not show any ischemic change.  Ischemia would still be in the differential as a cause of the ventricular tachycardia.  I did speak with cardiology and they want the patient to receive amiodarone intravenously and transferred emergently to [facility] for cardiology consultation.    As for the trauma from the syncopal episode I did perform a CT scan of the head neck and this shows no intracranial hemorrhage nor evidence of a cervical fracture.  I suspect he does have a significant cervical strain.      FINAL DIAGNOSIS  1.  Syncope  2.  Secondary to ventricular tachycardia  3.  Cervical strain  4.  Occipital scalp contusion  5.  Critical care time 35 minutes    CRITICAL CARE  The very real possibilty of a deterioration of this patient's condition required the highest level of my preparedness  for sudden, emergent intervention.  I provided critical care services, which included medication orders, frequent reevaluations of the patient's condition and response to treatment, ordering and reviewing test results, and discussing the case with various consultants.  The critical care time associated with the care of the patient was 35 minutes. Review chart for interventions. This time is exclusive of any other billable procedures.     Disposition  The patient will be transferred around [facility] in guarded condition.       Electronically signed by: Eddie Lau M.D., 9/20/2023 10:56 AM

## 2023-09-20 NOTE — ED TRIAGE NOTES
"Chief Complaint   Patient presents with    Syncope     Transferred from [facility] for cardiology consult - AICD shocked 3 times today     Pt transferred from [facility] for Cards consult. Pt was at home in the shower when he had a syncopal event. Upon arrival to ER, pt was noted to be in V tach and AICD fired. Defibrillator interrogated - 3 shocks delivered today. Pt arrives A&Ox4 and no complaints.    /87   Pulse 93   Temp 37.2 °C (99 °F) (Temporal)   Resp 18   Ht 1.803 m (5' 11\")   Wt 68 kg (149 lb 14.6 oz)   SpO2 93%     "

## 2023-09-20 NOTE — ED NOTES
Medication history reviewed with pt and pts wife. Med rec is complete.  Allergies reviewed, per pt and pts wife    Pt thinks he took his XARELTO 20MG last night.  Pt will start taking his CRESTOR 5MG on 9/22/2023, pt is finishing taking his LOVASTATIN 10MG and thinks he took it last night.    Patient has not had any outpatient antibiotics in the last 30 days.    Pt is on anticoagulants, pt is taking XARELTO 20MG 1 tablet every evening last dose @ 1900

## 2023-09-21 ENCOUNTER — PHARMACY VISIT (OUTPATIENT)
Dept: PHARMACY | Facility: MEDICAL CENTER | Age: 73
End: 2023-09-21
Payer: COMMERCIAL

## 2023-09-21 VITALS
RESPIRATION RATE: 17 BRPM | DIASTOLIC BLOOD PRESSURE: 59 MMHG | BODY MASS INDEX: 20.43 KG/M2 | WEIGHT: 145.94 LBS | SYSTOLIC BLOOD PRESSURE: 111 MMHG | OXYGEN SATURATION: 96 % | HEIGHT: 71 IN | TEMPERATURE: 98.6 F | HEART RATE: 91 BPM

## 2023-09-21 LAB
ERYTHROCYTE [DISTWIDTH] IN BLOOD BY AUTOMATED COUNT: 46 FL (ref 35.9–50)
GLUCOSE BLD STRIP.AUTO-MCNC: 168 MG/DL (ref 65–99)
HCT VFR BLD AUTO: 45.6 % (ref 42–52)
HGB BLD-MCNC: 15.4 G/DL (ref 14–18)
MCH RBC QN AUTO: 32 PG (ref 27–33)
MCHC RBC AUTO-ENTMCNC: 33.8 G/DL (ref 32.3–36.5)
MCV RBC AUTO: 94.8 FL (ref 81.4–97.8)
PLATELET # BLD AUTO: 150 K/UL (ref 164–446)
PMV BLD AUTO: 11.8 FL (ref 9–12.9)
RBC # BLD AUTO: 4.81 M/UL (ref 4.7–6.1)
WBC # BLD AUTO: 8.7 K/UL (ref 4.8–10.8)

## 2023-09-21 PROCEDURE — 82962 GLUCOSE BLOOD TEST: CPT

## 2023-09-21 PROCEDURE — 99223 1ST HOSP IP/OBS HIGH 75: CPT | Performed by: INTERNAL MEDICINE

## 2023-09-21 PROCEDURE — 36415 COLL VENOUS BLD VENIPUNCTURE: CPT

## 2023-09-21 PROCEDURE — 99239 HOSP IP/OBS DSCHRG MGMT >30: CPT | Performed by: INTERNAL MEDICINE

## 2023-09-21 PROCEDURE — 700102 HCHG RX REV CODE 250 W/ 637 OVERRIDE(OP): Performed by: HOSPITALIST

## 2023-09-21 PROCEDURE — 99232 SBSQ HOSP IP/OBS MODERATE 35: CPT | Mod: FS | Performed by: INTERNAL MEDICINE

## 2023-09-21 PROCEDURE — 700102 HCHG RX REV CODE 250 W/ 637 OVERRIDE(OP): Performed by: INTERNAL MEDICINE

## 2023-09-21 PROCEDURE — A9270 NON-COVERED ITEM OR SERVICE: HCPCS | Performed by: HOSPITALIST

## 2023-09-21 PROCEDURE — RXMED WILLOW AMBULATORY MEDICATION CHARGE: Performed by: INTERNAL MEDICINE

## 2023-09-21 PROCEDURE — A9270 NON-COVERED ITEM OR SERVICE: HCPCS | Performed by: INTERNAL MEDICINE

## 2023-09-21 PROCEDURE — 85027 COMPLETE CBC AUTOMATED: CPT

## 2023-09-21 RX ORDER — AMIODARONE HYDROCHLORIDE 200 MG/1
TABLET ORAL
Qty: 86 TABLET | Refills: 0 | Status: SHIPPED | OUTPATIENT
Start: 2023-09-21 | End: 2023-10-05

## 2023-09-21 RX ORDER — AMIODARONE HYDROCHLORIDE 200 MG/1
400 TABLET ORAL TWICE DAILY
Status: DISCONTINUED | OUTPATIENT
Start: 2023-09-21 | End: 2023-09-21 | Stop reason: HOSPADM

## 2023-09-21 RX ADMIN — SPIRONOLACTONE 12.5 MG: 25 TABLET ORAL at 06:30

## 2023-09-21 RX ADMIN — POTASSIUM CHLORIDE 10 MEQ: 1500 TABLET, EXTENDED RELEASE ORAL at 06:31

## 2023-09-21 RX ADMIN — AMIODARONE HYDROCHLORIDE 400 MG: 200 TABLET ORAL at 14:18

## 2023-09-21 RX ADMIN — METFORMIN HYDROCHLORIDE 500 MG: 500 TABLET ORAL at 08:11

## 2023-09-21 RX ADMIN — METOPROLOL SUCCINATE 50 MG: 50 TABLET, EXTENDED RELEASE ORAL at 06:30

## 2023-09-21 RX ADMIN — Medication 400 MG: at 06:31

## 2023-09-21 RX ADMIN — FUROSEMIDE 20 MG: 20 TABLET ORAL at 06:30

## 2023-09-21 RX ADMIN — LOSARTAN POTASSIUM 12.5 MG: 25 TABLET, FILM COATED ORAL at 06:31

## 2023-09-21 ASSESSMENT — ENCOUNTER SYMPTOMS
ABDOMINAL PAIN: 0
PALPITATIONS: 0
ACTIVITY CHANGE: 0
ABDOMINAL DISTENTION: 0
DIAPHORESIS: 0
DIZZINESS: 0
FATIGUE: 0
HEADACHES: 0
COUGH: 0
COLOR CHANGE: 0
SHORTNESS OF BREATH: 0
CHEST TIGHTNESS: 0

## 2023-09-21 ASSESSMENT — FIBROSIS 4 INDEX: FIB4 SCORE: 3.08

## 2023-09-21 NOTE — CARE PLAN
The patient is Stable - Low risk of patient condition declining or worsening    Shift Goals  Clinical Goals: tele monitor and hemodynamic stability  Patient Goals: comfort and discharge    Progress made toward(s) clinical / shift goals:     Problem: Knowledge Deficit - Standard  Goal: Patient and family/care givers will demonstrate understanding of plan of care, disease process/condition, diagnostic tests and medications  9/21/2023 0231 by Sally Jorge R.N.  Outcome: Progressing  Note: Pt educated on POC and disease processes. Pt verbalized understanding of medication regimen and interventions.     Problem: Mobility  Goal: Patient's capacity to carry out activities will improve  Outcome: Progressing  Note: Pt encourage to mobilize and toleration of ambulation assessed. Pt encouraged to perform ADLs with minimal help from nurse.

## 2023-09-21 NOTE — PROGRESS NOTES
Monitor Summary:   Rhythm: SR LBBB  Rate: 80-94  Measurement: .19/.14/.36  Ectopy: 4 bt VT, pvc (r), bigem (r), couplet (r)

## 2023-09-21 NOTE — PROGRESS NOTES
Cardiology Follow Up Progress Note    Date of Service  9/21/2023    Attending Physician  Alfa Akers M.D.    Chief Complaint   Syncope    LUKAS Lee is a 73 y.o. male admitted 9/20/2023 with a hx of afib, AICD, HFrEF 20%, CAD prior MI, htn, DLD, DMII who presented 9/20/2023 with a syncopal event after taking a hot shower. He was shocked by his AICD. Interrogation was concerning for V-tach ad 3 AICD discharges.     Interim Events  Overnight patient spoke with his [place] cardiologist and decided that he is willing to trial amiodarone. No cardiac complaints, denies chest pain or shortness of breath, euvolemic on exam. Wife at bedside discussed necessity of antiarrythmic in detail.    Review of Systems  Review of Systems   Constitutional:  Negative for activity change, diaphoresis and fatigue.   Respiratory:  Negative for cough, chest tightness and shortness of breath.    Cardiovascular:  Negative for chest pain, palpitations and leg swelling.   Gastrointestinal:  Negative for abdominal distention and abdominal pain.   Skin:  Negative for color change.   Neurological:  Negative for dizziness, syncope and headaches.       Vital signs in last 24 hours  Temp:  [36.8 °C (98.2 °F)-37.2 °C (99 °F)] 36.8 °C (98.2 °F)  Pulse:  [] 99  Resp:  [16-20] 18  BP: (101-134)/(64-87) 107/67  SpO2:  [93 %-95 %] 94 %    Physical Exam  Physical Exam  Vitals and nursing note reviewed.   Constitutional:       General: He is not in acute distress.     Appearance: He is well-developed. He is not ill-appearing.   HENT:      Head: Normocephalic.   Cardiovascular:      Rate and Rhythm: Normal rate and regular rhythm.      Pulses: Normal pulses.      Heart sounds: Normal heart sounds. No murmur heard.  Pulmonary:      Effort: Pulmonary effort is normal.      Breath sounds: Normal breath sounds. No decreased air movement. No wheezing.   Abdominal:      Tenderness: There is no abdominal tenderness.   Musculoskeletal:       "Right lower leg: No edema.      Left lower leg: No edema.   Skin:     General: Skin is warm and dry.   Neurological:      General: No focal deficit present.      Mental Status: He is alert and oriented to person, place, and time.   Psychiatric:         Behavior: Behavior is cooperative.         Lab Review  Lab Results   Component Value Date/Time    WBC 8.7 09/21/2023 02:07 AM    RBC 4.81 09/21/2023 02:07 AM    HEMOGLOBIN 15.4 09/21/2023 02:07 AM    HEMATOCRIT 45.6 09/21/2023 02:07 AM    MCV 94.8 09/21/2023 02:07 AM    MCH 32.0 09/21/2023 02:07 AM    MCHC 33.8 09/21/2023 02:07 AM    MPV 11.8 09/21/2023 02:07 AM      Lab Results   Component Value Date/Time    SODIUM 139 09/20/2023 11:24 AM    POTASSIUM 4.6 09/20/2023 11:24 AM    CHLORIDE 101 09/20/2023 11:24 AM    CO2 22 09/20/2023 11:24 AM    GLUCOSE 154 (H) 09/20/2023 11:24 AM    BUN 31 (H) 09/20/2023 11:24 AM    CREATININE 1.22 09/20/2023 11:24 AM    CREATININE 1.0 01/08/2008 07:45 AM      Lab Results   Component Value Date/Time    ASTSGOT 21 09/20/2023 11:24 AM    ALTSGPT 11 09/20/2023 11:24 AM     Lab Results   Component Value Date/Time    CHOLSTRLTOT 173 08/02/2023 07:51 AM    LDL 90 08/02/2023 07:51 AM    HDL 64 08/02/2023 07:51 AM    TRIGLYCERIDE 93 08/02/2023 07:51 AM    TROPONINT 89 (H) 09/20/2023 10:16 PM       No results for input(s): \"NTPROBNP\" in the last 72 hours.      Assessment:  #Severe dilated cardiomyopathy, EF less than 25%  #Ischemic cardiomyopathy  #Class II systolic heart failure  #AICD 8 with generator replacement 2015 (Medtronic)  #S/p CABG times 4/2000    Recommendations:  -Regular tachycardia related to end-stage cardiomyopathy with severely reduced ejection fraction  -EP consulted  -Patient will benefit from amiodarone, pending EP's recommendations for loading dose  -Patient followed closely by [place] cardiology and will follow-up with them outpatient    Thank you for allowing us to participate in the care of this patient.    Carlotta" Staci, MSN, REUBEN  [facility]  [phone]    ICarlotta A.P.RISABEL performed a portion of the service face-to-face with the same  patient on the same date of service INDEPENDENTLY OF Dr. Deluna FOR 15 MINUTES. I was  personally involved in reviewing and conducting elements of the history, exam and/or  medical decision making, including the information as described above.    Please note this dictation was created using voice recognition software.  I have made every reasonable attempt to correct obvious errors, but there may be errors of grammar and possibly content that I did not discover before finalizing the note.

## 2023-09-21 NOTE — PROGRESS NOTES
Patient being discharged. Pt educated on discharge instructions and new prescriptions, verbalized understanding. Follow up appointment made with cardiology. PIV removed, monitor checked in. Patient going home via wife

## 2023-09-21 NOTE — PROGRESS NOTES
4 Eyes Skin Assessment Completed by TOMASZ Coronado and TESFAYE Deutsch.    Head WDL  Ears Redness and Blanching  Nose WDL  Mouth WDL  Neck swollen lump to back of neck from fall  Breast/Chest WDL  Shoulder Blades abrasion right shoulder blade  Spine WDL  (R) Arm/Elbow/Hand Redness, Blanching, and Bruising, tear  (L) Arm/Elbow/Hand Redness, Blanching, and Bruising, tear  Abdomen WDL  Groin WDL  Scrotum/Coccyx/Buttocks WDL  (R) Leg WDL  (L) Leg WDL  (R) Heel/Foot/Toe Redness and Blanching  (L) Heel/Foot/Toe Redness and Blanching     Right elbow     Left elbow           Devices In Places Tele Box and Pulse Ox      Interventions In Place Pillows, Heels Loaded W/Pillows, and Pressure Redistribution Mattress    Possible Skin Injury Yes    Pictures Uploaded Into Epic Yes  Wound Consult Placed N/A  RN Wound Prevention Protocol Ordered No

## 2023-09-21 NOTE — ASSESSMENT & PLAN NOTE
Noted on interrogation of AICD today  Monitor on telemetry  Question of hypotension after hot shower and possible over diuresed with subsequent syncope that led to ventriculary tachycardia or vtach that led to syncope.  No reccurents  Metoprolol Sr 50mg   Cardiology consulting and may get EP MD to see patient  Serial troponins

## 2023-09-21 NOTE — DISCHARGE SUMMARY
Discharge Summary    CHIEF COMPLAINT ON ADMISSION  Chief Complaint   Patient presents with    Syncope     Transferred from [facility] for cardiology consult - AICD shocked 3 times today       Reason for Admission  EMS     Admission Date  9/20/2023    CODE STATUS  DNAR/DNI    HPI & HOSPITAL COURSE  72 yo man with afib, CHF EF 20%, CAD, DM, ICD, HTN, HLD who was taking a hot shower when he syncopized, says he was shocked by his ICD.  ICD interrogation showed episode of VT with 3 ICD discharges.  Cardiology and EP were consulted.  Patient was agreeable to starting on amiodarone and following up with his cardiologist at [facility].  Cardiology discussed work-up for ischemic disease, patient will think about it and follow-up with his cardiologist.    Therefore, he is discharged in good and stable condition to home with close outpatient follow-up.    The patient recovered much more quickly than anticipated on admission.    Discharge Date  9/21/2023    FOLLOW UP ITEMS POST DISCHARGE  Follow-up with [place] cardiology about ischemic heart disease work-up and discussion of continuing amiodarone for VT    DISCHARGE DIAGNOSES  Principal Problem:    Ventricular tachycardia (HCC) (POA: Yes)  Active Problems:    Elevated troponin (POA: Unknown)    History of pulmonary embolus (PE) (POA: Yes)    AICD (automatic cardioverter/defibrillator) present (POA: Yes)      Overview: November 2015: Generator replacement with Medtronic Evera S VR [id number]       implanted by Dr. Marcus Stephenson.      January 2008: Medtronic Virtuoso VR [id number] implanted by Dr. Marcus Stephenson.      The ventricular lead is a Medtronic model #6947-65, lead serial       #[id number], implantation date 07/02/2003.          Ischemic cardiomyopathy (POA: Yes)      Overview: May 2022: Echocardiogram with severely dilated LV, LVEF <25%. Severe       diffuse hypokinesis. Septal akinesis. Normal LA and RV. Moderately dilated       RA. Trace MR, mild TR. RVSP 23.8mmHg.      February  2021: Echocardiogram with severely dilated LV, LVEF 20%. Small       RV. Enlarged RA and mildly dilated LA.      January 2019: Echocardiogram with normal size, LVEF 20-25%. Grade II       diastolic dysfunction. Trace MR, mild TR. RVSP 25mmHg.      August 2017: Echocardiogram with moderately dilated LV, LVEF 20%.      March 2014: Echocardiogram with severely dilated LV, LVEF 15-20%.    S/P CABG x 4 (POA: Yes)      Overview: December 2000: CABG x 4 (LIMA to LAD, rSVG to first diagonal, left       circumflex and PDA).     Type 2 diabetes mellitus with stage 3a chronic kidney disease, without long-term current use of insulin (HCC) (POA: Yes)      Overview: Diet controlled, followed by Dr. Walls    Old anterior myocardial infarction (POA: Yes)    Chronic systolic congestive heart failure, NYHA class 2 (HCC) (POA: Yes)    Hyperlipidemia (POA: Yes)    RBBB (POA: Yes)      Overview: IMO load March 2020    Secondary hypercoagulable state (HCC) (POA: Yes)    Ventricular tachyarrhythmia (HCC) (POA: Yes)    Syncope and collapse (POA: Unknown)  Resolved Problems:    * No resolved hospital problems. *      FOLLOW UP  Future Appointments   Date Time Provider Department Center   9/25/2023  8:45 AM CHRIS Rodríguez None   10/24/2023 10:00 AM Rafael Park M.D. [facility]   2/13/2024 10:30 AM CHRIS Rodríguez None   2/13/2024 10:45 AM CHRIS Rodríguez None     No follow-up provider specified.    MEDICATIONS ON DISCHARGE     Medication List        START taking these medications        Instructions   amiodarone 200 MG Tabs  Start taking on: September 21, 2023  Commonly known as: Cordarone   Take 2 Tablets by mouth 2 times a day for 14 days, THEN 1 Tablet every day for 30 days.            CONTINUE taking these medications        Instructions   D3 PO   Take 1 Capsule by mouth every day.  Dose: 1 Capsule     furosemide 40 MG Tabs  Commonly known as: Lasix   Take 0.5 Tablets by mouth every day.  Dose:  20 mg     Jardiance 10 MG Tabs tablet  Generic drug: Empagliflozin   Doctor's comments: Plan requires a 100 day supply. Thank you  Take 1 Tablet by mouth every day.  Dose: 10 mg     losartan 25 MG Tabs  Commonly known as: Cozaar   Doctor's comments: Replace lisinopril  Take 0.5 Tablets by mouth every day.  Dose: 12.5 mg     lovastatin 10 MG tablet  Commonly known as: Mevacor   Take 10 mg by mouth every evening.  Dose: 10 mg     Magnesium 500 MG Caps   Take 1 Cap by mouth every day.  Dose: 500 mg     metFORMIN 500 MG Tabs  Commonly known as: Glucophage   TAKE 1 TABLET BY MOUTH TWICE A DAY WITH FOOD     metoprolol SR 50 MG Tb24  Commonly known as: Toprol XL   Take 50 mg by mouth every day.  Dose: 50 mg     potassium chloride SA 20 MEQ Tbcr  Commonly known as: Kdur   Take 0.5 Tablets by mouth every day. 1/2 tab daily  Dose: 10 mEq     rivaroxaban 20 MG Tabs tablet  Commonly known as: Xarelto   Take 1 Tablet by mouth with dinner.  Dose: 20 mg     rosuvastatin 5 MG Tabs  Commonly known as: Crestor   Take 1 Tablet by mouth every Monday, Wednesday, and Friday.  Dose: 5 mg     spironolactone 25 MG Tabs  Commonly known as: Aldactone   Take 0.5 Tablets by mouth every day.  Dose: 12.5 mg              Allergies  Allergies   Allergen Reactions    Bloodless     Pcn [Penicillins] Anaphylaxis    Ace Inhibitors Cough     Cough       DIET  Orders Placed This Encounter   Procedures    Diet Order Diet: Consistent CHO (Diabetic)     Standing Status:   Standing     Number of Occurrences:   1     Order Specific Question:   Diet:     Answer:   Consistent CHO (Diabetic) [4]       ACTIVITY  As tolerated.    CONSULTATIONS  Dr. Deluna, Dr. Flores    PROCEDURES  No orders to display         LABORATORY  Lab Results   Component Value Date    SODIUM 139 09/20/2023    POTASSIUM 4.6 09/20/2023    CHLORIDE 101 09/20/2023    CO2 22 09/20/2023    GLUCOSE 154 (H) 09/20/2023    BUN 31 (H) 09/20/2023    CREATININE 1.22 09/20/2023    CREATININE 1.0  01/08/2008        Lab Results   Component Value Date    WBC 8.7 09/21/2023    HEMOGLOBIN 15.4 09/21/2023    HEMATOCRIT 45.6 09/21/2023    PLATELETCT 150 (L) 09/21/2023        Total time of the discharge process exceeds 32 minutes.

## 2023-09-21 NOTE — CONSULTS
Electrophysiology Initial Consult Note    DOS: 9/21/2023    Referring physician: Dr Deluna    Chief complaint/Reason for consult: VT    HPI: 72 y/o M with ICM and EF <25%, chronic systolic heart failure, and VT/VF s/p ICD. He had CABG in 2000, 4 vessel, LIMA-LAD, SVG-diag, SVG-Lcx, SVG-PDA. In 2003 he had angiogram showing graft failure of diagonal and PDA grafts, with patent LIMA. In 2003 he had ICD. He has had multiple episodes of ICD firing for ventricular arrhythmias. He has been recommended to take amiodarone to suppress VT/VF but initially declined. He was admitted with syncope and ICD firing. EP is asked to consult. He currently has no complaints at rest.    ROS (+ highlighted in bold):  Constitutional: Fevers/chills/fatigue/weightloss  HEENT: Blurry vision/eye pain/sore throat/hearing loss  Respiratory: Shortness of breath/cough  Cardiovascular: Chest pain/palpitations/edema/orthopnea/syncope  GI: Nausea/vomitting/diarrhea  MSK: Arthralgias/myagias/muscle weakness  Skin: Rash/sores  Neurological: Numbness/tremors/vertigo  Endocrine: Excessive thirst/polyuria/cold intolerance/heat intolerance  Psych: Depression/anxiety    Past Medical History:   Diagnosis Date    Acute anterior myocardial infarction (HCC) 12/28/2000    PCI attempted by Dr. Malloy but unsuccessful    AICD (automatic cardioverter/defibrillator) present 2003/2008/2015    Defibrillator is a Medtronic model [id number], serial #[id number], implanted Dr. Stephenson November 2015. The ventricular lead is a Medtronic model #6947-65, lead serial #[id number], implant 7/2/2003    Allergy     within 2 years    CAD (coronary artery disease) 12/28/2000    CABG x 4 (LIMA to LAD, rSVG to first diagonal, left circumflex and PDA).2000: CABG x 4 (LIMA to LAD, rSVG to first diagonal, left circumflex and PDA).  2003: LIMA and circumflex grafts patent, diagonal and PDA grafts closed.    Chronic anticoagulation 2/1/2018    Chronic deep vein thrombosis (DVT) of femoral vein  of left lower extremity (HCC) 2018    Congestive heart failure (HCC) 2000    Dental disorder     upper denture    GERD (gastroesophageal reflux disease)     High cholesterol     Hypertension     Ischemic cardiomyopathy 2022    Echocardiogram with severely dilated LV, LVEF <25%. Severe diffuse hypokinesis. Septal akinesis. Normal LA and RV. Moderately dilated RA. Trace MR, mild TR. RVSP 23.8mmHg.    Left bundle branch block      Pacemaker     Pneumonia     Pulmonary embolism ([facility]) 2014    Thrombocytopenia ([facility]) 2014    Type 2 diabetes mellitus without complication ([facility])      Diet controlled, followed by Dr. Walls    Vascular disorder of extremity ([facility]) 2020       Past Surgical History:   Procedure Laterality Date    RECOVERY  11/3/2015    Procedure: CATH LAB-MARCO A-ICD GENERATOR CHANGE 12809- EUNICE T82.111A-MEDTRONIC BLOODLESS;  Surgeon: [facility];  Location: SURGERY PRE-POST PROC UNIT [facility];  Service:     AICD BATTERY CHANGE  2015    Generator replacement with Medtronic Evera S VR [id number] implanted by Dr. Marcus Stephenson.    AICD IMPLANT  2008    Medtronic Virtuoso VR [id number] implanted by Dr. Stephenson.    OTHER CARDIAC SURGERY  2000    CABG x 4    APPENDECTOMY  1969    MULTIPLE CORONARY ARTERY BYPASS         Social History     Socioeconomic History    Marital status:      Spouse name: Not on file    Number of children: Not on file    Years of education: Not on file    Highest education level: Not on file   Occupational History     Comment: new   retired   Tobacco Use    Smoking status: Former     Current packs/day: 0.00     Average packs/day: 1 pack/day for 32.0 years (32.0 ttl pk-yrs)     Types: Cigarettes     Start date: 1968     Quit date: 2000     Years since quittin.7    Smokeless tobacco: Never   Vaping Use    Vaping Use: Never used   Substance and Sexual Activity    Alcohol use: Yes     Alcohol/week: 1.2 oz      Types: 2 Standard drinks or equivalent per week     Comment: occ    Drug use: Not Currently     Types: Marijuana, Oral    Sexual activity: Never   Other Topics Concern    Not on file   Social History Narrative    Not on file     Social Determinants of Health     Financial Resource Strain: Not on file   Food Insecurity: Not on file   Transportation Needs: Not on file   Physical Activity: Not on file   Stress: Not on file   Social Connections: Not on file   Intimate Partner Violence: Not on file   Housing Stability: Not on file       Family History   Problem Relation Age of Onset    Diabetes Mother     Heart Disease Mother     Diabetes Sister     Alcohol/Drug Brother        Allergies   Allergen Reactions    Bloodless     Pcn [Penicillins] Anaphylaxis    Ace Inhibitors Cough     Cough       Current Facility-Administered Medications   Medication Dose Route Frequency Provider Last Rate Last Admin    furosemide (Lasix) tablet 20 mg  20 mg Oral DAILY DEANDRE BlakelyODirk   20 mg at 09/21/23 0630    losartan (Cozaar) tablet 12.5 mg  12.5 mg Oral DAILY SLY Blakely.O.   12.5 mg at 09/21/23 0631    lovastatin (Mevacor) tablet 10 mg  10 mg Oral Nightly DEANDRE BlakelyO.   10 mg at 09/20/23 2032    magnesium oxide tablet 400 mg  400 mg Oral DAILY DEANDRE BlakelyO.   400 mg at 09/21/23 0631    metoprolol SR (Toprol XL) tablet 50 mg  50 mg Oral DAILY SLY Blakely.O.   50 mg at 09/21/23 0630    potassium chloride SA (Kdur) tablet 10 mEq  10 mEq Oral DAILY DEANDRE BlakelyODirk   10 mEq at 09/21/23 0631    rivaroxaban (Xarelto) tablet 20 mg  20 mg Oral PM MEAL DEANDRE BlakelyO.   20 mg at 09/20/23 1836    spironolactone (Aldactone) tablet 12.5 mg  12.5 mg Oral DAILY DEANDRE BlakelyO.   12.5 mg at 09/21/23 0630    senna-docusate (Pericolace Or Senokot S) 8.6-50 MG per tablet 2 Tablet  2 Tablet Oral BID Marcus Medina D.O.        And    polyethylene glycol/lytes (Miralax) PACKET 1 Packet  1 Packet Oral QDAY PRN  Marcus Medina D.O.        And    magnesium hydroxide (Milk Of Magnesia) suspension 30 mL  30 mL Oral QDAY PRN Marcus Medina D.O.        And    bisacodyl (Dulcolax) suppository 10 mg  10 mg Rectal QDAY PRN Marcus Medina D.O.        acetaminophen (Tylenol) tablet 650 mg  650 mg Oral Q6HRS PRN Marcus Medina D.O.        labetalol (Normodyne/Trandate) injection 10 mg  10 mg Intravenous Q4HRS PRN Marcus Medina D.O.        insulin regular (HumuLIN R,NovoLIN R) injection  2-9 Units Subcutaneous 4X/DAY ACHS Marcus Medina D.O.        And    dextrose 50% (D50W) injection 25 g  25 g Intravenous Q15 MIN PRN Marcus Medina D.O.        metFORMIN (Glucophage) tablet 500 mg  500 mg Oral BID WITH MEALS aMrcus Medina D.O.   500 mg at 09/21/23 0811       Physical Exam:  Vitals:    09/21/23 0000 09/21/23 0400 09/21/23 0631 09/21/23 0800   BP: 114/69 101/65 103/66 107/67   Pulse: 83 67  99   Resp: 16 16  18   Temp: 37 °C (98.6 °F) 37 °C (98.6 °F)  36.8 °C (98.2 °F)   TempSrc: Temporal Temporal  Temporal   SpO2: 95% 94%  94%   Weight:  66.2 kg (145 lb 15.1 oz)     Height:         General appearance: NAD, conversant   Eyes: anicteric sclerae, moist conjunctivae; no lid-lag; PERRLA  HENT: Atraumatic; oropharynx clear with moist mucous membranes and no mucosal ulcerations; normal hard and soft palate  Neck: Trachea midline; FROM, supple, no thyromegaly or lymphadenopathy  Lungs: CTA, with normal respiratory effort and no intercostal retractions  CV: RRR, no MRGs, no JVD   Abdomen: Soft, non-tender; no masses or HSM  Extremities: No peripheral edema or extremity lymphadenopathy  Skin: Normal temperature, turgor and texture; no rash, ulcers or subcutaneous nodules  Psych: Appropriate affect, alert and oriented to person, place and time    Data:  Lipids:   Lab Results   Component Value Date/Time    CHOLSTRLTOT 173 08/02/2023 07:51 AM    TRIGLYCERIDE 93 08/02/2023 07:51 AM    HDL 64 08/02/2023 07:51 AM    LDL 90 08/02/2023 07:51  "AM        BMP:  Lab Results   Component Value Date/Time    SODIUM 139 09/20/2023 1124    POTASSIUM 4.6 09/20/2023 1124    CHLORIDE 101 09/20/2023 1124    CO2 22 09/20/2023 1124    GLUCOSE 154 (H) 09/20/2023 1124    BUN 31 (H) 09/20/2023 1124    CREATININE 1.22 09/20/2023 1124    CALCIUM 9.7 09/20/2023 1124    ANION 16.0 09/20/2023 1124        TSH:   Lab Results   Component Value Date/Time    TSHULTRASEN 2.340 08/02/2023 0751        THYROXINE (T4):   No results found for: \"FREEDIR\"     CBC:   Lab Results   Component Value Date/Time    WBC 8.7 09/21/2023 02:07 AM    RBC 4.81 09/21/2023 02:07 AM    HEMOGLOBIN 15.4 09/21/2023 02:07 AM    HEMATOCRIT 45.6 09/21/2023 02:07 AM    MCV 94.8 09/21/2023 02:07 AM    MCH 32.0 09/21/2023 02:07 AM    MCHC 33.8 09/21/2023 02:07 AM    RDW 46.0 09/21/2023 02:07 AM    PLATELETCT 150 (L) 09/21/2023 02:07 AM    MPV 11.8 09/21/2023 02:07 AM    NEUTSPOLYS 88.20 (H) 09/20/2023 11:24 AM    LYMPHOCYTES 5.00 (L) 09/20/2023 11:24 AM    MONOCYTES 5.40 09/20/2023 11:24 AM    EOSINOPHILS 0.40 09/20/2023 11:24 AM    BASOPHILS 0.50 09/20/2023 11:24 AM    IMMGRAN 0.50 09/20/2023 11:24 AM    NRBC 0.00 09/20/2023 11:24 AM    NEUTS 8.54 (H) 09/20/2023 11:24 AM    LYMPHS 0.48 (L) 09/20/2023 11:24 AM    MONOS 0.52 09/20/2023 11:24 AM    EOS 0.04 09/20/2023 11:24 AM    BASO 0.05 09/20/2023 11:24 AM    IMMGRANAB 0.05 09/20/2023 11:24 AM    NRBCAB 0.00 09/20/2023 11:24 AM        CBC w/o DIFF  Lab Results   Component Value Date/Time    WBC 8.7 09/21/2023 02:07 AM    RBC 4.81 09/21/2023 02:07 AM    HEMOGLOBIN 15.4 09/21/2023 02:07 AM    MCV 94.8 09/21/2023 02:07 AM    MCH 32.0 09/21/2023 02:07 AM    MCHC 33.8 09/21/2023 02:07 AM    RDW 46.0 09/21/2023 02:07 AM    MPV 11.8 09/21/2023 02:07 AM       Prior echo/stress results reviewed: EF <25%    Prior cath results reviewed: 2003 cath with patient LIMA-LAD    EKG interpreted by me:  with IVCD QRS 150ms    Impression/Plan:  1. Syncope, unspecified syncope type   "      2. V tach (HCC)        3. Closed head injury, initial encounter          VT/VF  ICD storm  ICM Ef <25%  Chronic systolic heart failure  High risk medication use    - I recommend amiodarone loading with 400mg PO BID x 2 weeks and 200 mg daily thereafter  - VT/VF is very fast and may potentially be ischemic mediated. It has been 20 yrs since last evaluated. LHC, stress, or PET are all reasonable modalities to assess this. I will defer to Dr Deluna's excellent general cardiology evaluation for this.  - For heart failure he would be a borderline IIa-IIb  indication for CRT upgrade although this would not affect his VT as much as his HF symptoms and EF. He does have 3 years left on his current ICD battery. Upgrade to BiV can be considered at time of gen change, or sooner if needed for better HF treatment, but not needed urgently; additionally given his 20 yr old ICD in situ an upgrade via traditional transvenous approach may not be possible and may require surgical intervention.    Thank you for this consult, EP will sign off.    Fransico Flores MD  Cardiac Electrophysiology

## 2023-09-21 NOTE — PROGRESS NOTES
Assumed care of patient and received report from RN. Tele monitor in place and patient in SR. VS stable. A&Ox4. Pain 2/10 in neck and declines, pt given ice. POC discussed with patient and verbalized understanding. Call light in reach. Fall precautions in place. Bed locked in lowest position.

## 2023-09-21 NOTE — DISCHARGE PLANNING
HTH/SCP TCN chart review completed. Collaborated with RULA Pringle prior to meeting with the pt. The most current review of medical record, knowledge of pt's PLOF and social support, LACE+ score of 78, 6 clicks scores of 23 mobility were considered.      Pt seen at bedside with spouse present. Introduced TCN program. Provided education regarding post acute levels of care. Discussed HTH/SCP plan benefits (Meds to Beds, medical uber and [facility] transitional care). Pt verbalizes understanding.     Pt reports no functional concerns with dc to home at this time. He reports he is at his functional baseline and note pt is independently ambulatory with no AD at time of TCN visit as well. Spouse will provider transportation at time of dc to home and outpatient follow ups if indicated. Pt also aware of upcoming PCP appointment as well and is anticipating additional outpatient follow ups post this admission if indicated.     Pt inquiring into hearing aid benefits/process and TCN reached out to HUM team for information. Provided pt with appropriate telephone # and web address for follow up.     No additional provider requests at this time and no choice indicated. TCN will continue to follow and collaborate with discharge planning team as additional post acute needs arise. Thank you.     Completed today:  Choice obtained: none indicated  SCP with [facility] PCP; has upcoming PCP appt  
none

## 2023-09-22 ENCOUNTER — PATIENT OUTREACH (OUTPATIENT)
Dept: MEDICAL GROUP | Facility: LAB | Age: 73
End: 2023-09-22
Payer: MEDICARE

## 2023-09-22 NOTE — PROGRESS NOTES
Transitional Care Management  TCM Outreach Date and Time: Filed (9/22/2023 11:33 AM)    Discharge Questions  Actual Discharge Date: 09/21/23  Now that you are home, how are you feeling?: Good (No chest pain or palpitations; no AICD firing; feeling very good)  Did you receive any new prescriptions?: Yes  Were you able to get them filled?: Yes  Meds to Bed or Pharmacy filled?: Pharmacy  Do you have any questions about your current medications or new medications (Review Med Rec)?: No  Do you have a follow up appointment scheduled with your PCP?: Yes  Appointment Date: 10/06/23  Appointment Time: 1340  Any issues or paperwork you wish to discuss with your PCP?: No  Does this patient qualify for the CCM program?: Yes    Transitional Care  Number of attempts made to contact patient: 1  Current or previous attempts competed within two business days of discharge? : Yes  Provided education regarding treatment plan, medications, self-management, ADLs?: No  Has patient completed an Advanced Directive?: Yes  Is the patient's advanced directive on file?: Yes  Has the Care Manager's phone number provided?: No  Is there anything else I can help you with?: No    Discharge Summary  Chief Complaint: Syncopal episode; AICD fire X3  Admitting Diagnosis: Syncopy; V-tach  Discharge Diagnosis: Syncopal with fall; V-tach; CHF; DM

## 2023-09-25 ENCOUNTER — HOSPITAL ENCOUNTER (OUTPATIENT)
Facility: MEDICAL CENTER | Age: 73
End: 2023-09-25
Attending: INTERNAL MEDICINE | Admitting: INTERNAL MEDICINE
Payer: MEDICARE

## 2023-09-25 ENCOUNTER — TELEPHONE (OUTPATIENT)
Dept: CARDIOLOGY | Facility: MEDICAL CENTER | Age: 73
End: 2023-09-25
Payer: MEDICARE

## 2023-09-25 ENCOUNTER — OFFICE VISIT (OUTPATIENT)
Dept: CARDIOLOGY | Facility: MEDICAL CENTER | Age: 73
End: 2023-09-25
Attending: NURSE PRACTITIONER
Payer: MEDICARE

## 2023-09-25 ENCOUNTER — APPOINTMENT (OUTPATIENT)
Dept: ADMISSIONS | Facility: MEDICAL CENTER | Age: 73
End: 2023-09-25
Attending: INTERNAL MEDICINE
Payer: MEDICARE

## 2023-09-25 ENCOUNTER — TELEPHONE (OUTPATIENT)
Dept: CARDIOLOGY | Facility: MEDICAL CENTER | Age: 73
End: 2023-09-25

## 2023-09-25 VITALS
DIASTOLIC BLOOD PRESSURE: 58 MMHG | HEIGHT: 71 IN | WEIGHT: 147 LBS | SYSTOLIC BLOOD PRESSURE: 110 MMHG | OXYGEN SATURATION: 97 % | HEART RATE: 65 BPM | BODY MASS INDEX: 20.58 KG/M2 | RESPIRATION RATE: 16 BRPM

## 2023-09-25 DIAGNOSIS — I25.5 ISCHEMIC CARDIOMYOPATHY: ICD-10-CM

## 2023-09-25 DIAGNOSIS — I47.20 VENTRICULAR TACHYCARDIA (HCC): ICD-10-CM

## 2023-09-25 DIAGNOSIS — E11.22 TYPE 2 DIABETES MELLITUS WITH STAGE 3A CHRONIC KIDNEY DISEASE, WITHOUT LONG-TERM CURRENT USE OF INSULIN (HCC): ICD-10-CM

## 2023-09-25 DIAGNOSIS — N18.31 TYPE 2 DIABETES MELLITUS WITH STAGE 3A CHRONIC KIDNEY DISEASE, WITHOUT LONG-TERM CURRENT USE OF INSULIN (HCC): ICD-10-CM

## 2023-09-25 DIAGNOSIS — E78.2 MIXED HYPERLIPIDEMIA: ICD-10-CM

## 2023-09-25 DIAGNOSIS — Z95.810 AICD (AUTOMATIC CARDIOVERTER/DEFIBRILLATOR) PRESENT: ICD-10-CM

## 2023-09-25 DIAGNOSIS — I50.22 CHRONIC SYSTOLIC CONGESTIVE HEART FAILURE, NYHA CLASS 2 (HCC): ICD-10-CM

## 2023-09-25 DIAGNOSIS — N18.31 CHRONIC KIDNEY DISEASE (CKD) STAGE G3A/A1, MODERATELY DECREASED GLOMERULAR FILTRATION RATE (GFR) BETWEEN 45-59 ML/MIN/1.73 SQUARE METER AND ALBUMINURIA CREATININE RATIO LESS THAN 30 MG/G: ICD-10-CM

## 2023-09-25 DIAGNOSIS — I25.119 ATHEROSCLEROSIS OF NATIVE CORONARY ARTERY OF NATIVE HEART WITH ANGINA PECTORIS (HCC): ICD-10-CM

## 2023-09-25 DIAGNOSIS — Z95.1 S/P CABG X 4: ICD-10-CM

## 2023-09-25 DIAGNOSIS — D68.69 SECONDARY HYPERCOAGULABLE STATE (HCC): ICD-10-CM

## 2023-09-25 DIAGNOSIS — Z86.711 HISTORY OF PULMONARY EMBOLUS (PE): ICD-10-CM

## 2023-09-25 PROCEDURE — 99213 OFFICE O/P EST LOW 20 MIN: CPT | Performed by: NURSE PRACTITIONER

## 2023-09-25 PROCEDURE — 3074F SYST BP LT 130 MM HG: CPT | Performed by: NURSE PRACTITIONER

## 2023-09-25 PROCEDURE — 99214 OFFICE O/P EST MOD 30 MIN: CPT | Performed by: NURSE PRACTITIONER

## 2023-09-25 PROCEDURE — 3078F DIAST BP <80 MM HG: CPT | Performed by: NURSE PRACTITIONER

## 2023-09-25 ASSESSMENT — ENCOUNTER SYMPTOMS
WEIGHT LOSS: 0
INSOMNIA: 0
HEADACHES: 0
DEPRESSION: 0
CHILLS: 0
SHORTNESS OF BREATH: 0
MYALGIAS: 0
NAUSEA: 0
ORTHOPNEA: 0
PALPITATIONS: 0
DIZZINESS: 1
ABDOMINAL PAIN: 0
LOSS OF CONSCIOUSNESS: 0
BRUISES/BLEEDS EASILY: 0
PND: 0
FEVER: 0

## 2023-09-25 ASSESSMENT — FIBROSIS 4 INDEX: FIB4 SCORE: 3.08

## 2023-09-25 NOTE — TELEPHONE ENCOUNTER
AB    Caller: TOMASZ Paulson     Name and Department:     [place] [facility]  DR. ORTIZ  P: [phone]    Topic/Issue: MEDICAL ADVICE    Per Lorene;    Prudencio informed us that he has a schedule CL-LEFT HEART CATHETERIZATION WITH POSSIBLE INTERVENTION on 9/27 with [facility].     Dr. Ortiz believes that a RIGHT HEART CATHETERIZATION completed at the same time would also be beneficial for our mutual patient to measure filling pressures and cardiac index. He does appreciate the opportunity to collaborate with [facility] Team.    Please adviseAdriel    Callback Number or Extension: [phone]

## 2023-09-25 NOTE — TELEPHONE ENCOUNTER
----- Message from CHRIS Rodríguez sent at 9/25/2023  9:43 AM PDT -----  Regarding: [facility]  Екатерина,    Can you please call Prudencio to set up a [facility]?    He was recently hospitalized for VT (with AICD shocks), and C was discussed with patient by Dr. Deluna.    Patient knows to expect your call. I'm also happy to see the patient for follow-up afterwards.    Order is in Epic - thank you!  AB

## 2023-09-25 NOTE — TELEPHONE ENCOUNTER
Patient is scheduled on 9-28-23 for a [facility] w/poss with Dr. Benitez. Patient was told to hold Xarelto for 2 days prior, Hold lasix,jardiance,spironolactone and meformin AM day of and to hold metformin for 48hrs after. Patient to check in at 6:00 for a 7:30 procedure. H&P was done on 9-25-23 by Liliana Berry. Pre admit to call patient.

## 2023-09-25 NOTE — TELEPHONE ENCOUNTER
AB- Please advise, you saw patient today in office. [facility] is wanting a RLC preformed at the same time of LHC. Do you want me to place orders?

## 2023-09-25 NOTE — PROGRESS NOTES
Chief Complaint   Patient presents with    Hospital Follow-up    Ventricular Tachycardia    Coronary Artery Disease    Cardiomyopathy (Ischemic)    Coronary Artery Bypass Graft (CABG)    CHF (Compensated)       Subjective     Prudencio Lee is a 73 y.o. male who presents today for hospital follow-up for AICD shocks for VT.    Mr. Lee is a 73 year old male with history of CAD/ischemic cardiomyopathy status post remote CABG x 4 (2000), HFrEF with LVEF 20-25% with AICD since 2003, and last generator replacement in 2015; he also has hyperlipidemia, CKD and diabetes, and a history of left LE DVT/PE, on Xarelto. He was last seen by me in August 2023.     In May 2022, he was evaluated by [facility] (Dr. Ortiz) for possible heart transplant; echocardiogram then showed LVEF 20-25% and severely dilated LV. He did have a right heart cath on 9/21/2022 by Dr. Pinedo, which showed normal pulmonary and cardiac filling pressures, and reduced LVEF. Patient is not interested in pursuing heart transplant at this time. His last visit with Dr. Ortiz was in June 2023 via TeleMedicine; repeat VO2 max test was mentioned/recommended, which has not yet been completed.    On 9/20/2023, after returning home from a cruise (during which he did develop Covid, mostly mild), he got out of the shower, and then received a shock. He remembers waking up and being on the ground of the bathroom. He did hit his head on the shower door, and did go to the ER at [facility]. CT scan of the head and negative were negative for any fracture or bleed.    Given he did receive 3 shocks, he was transferred up to [facility] for higher level of care. He was started on Amiodarone; Mevacor was changed to low dose Crestor. LHC was also recommended, but he wanted to think about it.    He is here today for follow-up with his wife and son. Generally, he is doing well, but admits to being very tired and not having a lot of energy. No further shocks.   He denies any chest pain, pressure, tightness or discomfort; no palpitations or device therapy. Mild dyspnea on exertion (unchanged from previous), but no orthopnea or PND. No LE edema. He does have ongoing discoloration of his lower legs.     Past Medical History:   Diagnosis Date    Acute anterior myocardial infarction (HCC) 12/28/2000    PCI attempted by Dr. Malloy but unsuccessful    AICD (automatic cardioverter/defibrillator) present 2003/2008/2015    Defibrillator is a Medtronic model [id number], serial #[id number], implanted Dr. Stephenson November 2015. The ventricular lead is a Medtronic model #6947-65, lead serial #[id number], implant 7/2/2003    Allergy     within 2 years    CAD (coronary artery disease) 12/28/2000    CABG x 4 (LIMA to LAD, rSVG to first diagonal, left circumflex and PDA).2000: CABG x 4 (LIMA to LAD, rSVG to first diagonal, left circumflex and PDA).  2003: LIMA and circumflex grafts patent, diagonal and PDA grafts closed.    Chronic anticoagulation 2/1/2018    Chronic deep vein thrombosis (DVT) of femoral vein of left lower extremity (HCC) 01/04/2018    Congestive heart failure ([facility]) 12/28/2000    Dental disorder     upper denture    GERD (gastroesophageal reflux disease)     High cholesterol     Hypertension     Ischemic cardiomyopathy 05/2022    Echocardiogram with severely dilated LV, LVEF <25%. Severe diffuse hypokinesis. Septal akinesis. Normal LA and RV. Moderately dilated RA. Trace MR, mild TR. RVSP 23.8mmHg.    Left bundle branch block      Pacemaker     Pneumonia 2014    Pulmonary embolism ([facility]) 03/2014    Thrombocytopenia ([facility]) 02/23/2014    Type 2 diabetes mellitus without complication ([facility])      Diet controlled, followed by Dr. Walls    Vascular disorder of extremity ([facility]) 02/11/2020     Past Surgical History:   Procedure Laterality Date    RECOVERY  11/3/2015    Procedure: CATH LAB-MARCO A-ICD GENERATOR CHANGE 31794- EUNICE T82.111A-MEDTRONIC BLOODLESS;  Surgeon: Recoveryonly Surgery;   Location: SURGERY PRE-POST PROC UNIT [facility];  Service:     AICD BATTERY CHANGE  2015    Generator replacement with Medtronic Evera S VR [id number] implanted by Dr. Marcus Stephenson.    AICD IMPLANT  2008    Medtronic Virtuoso VR [id number] implanted by Dr. Stephenson.    OTHER CARDIAC SURGERY  2000    CABG x 4    APPENDECTOMY  1969    MULTIPLE CORONARY ARTERY BYPASS       Family History   Problem Relation Age of Onset    Diabetes Mother     Heart Disease Mother     Diabetes Sister     Alcohol/Drug Brother      Social History     Socioeconomic History    Marital status:      Spouse name: Not on file    Number of children: Not on file    Years of education: Not on file    Highest education level: Not on file   Occupational History     Comment: new   retired   Tobacco Use    Smoking status: Former     Current packs/day: 0.00     Average packs/day: 1 pack/day for 32.0 years (32.0 ttl pk-yrs)     Types: Cigarettes     Start date: 1968     Quit date: 2000     Years since quittin.7    Smokeless tobacco: Never   Vaping Use    Vaping Use: Never used   Substance and Sexual Activity    Alcohol use: Yes     Alcohol/week: 1.2 oz     Types: 2 Standard drinks or equivalent per week     Comment: occ    Drug use: Not Currently     Types: Marijuana, Oral    Sexual activity: Never   Other Topics Concern    Not on file   Social History Narrative    Not on file     Social Determinants of Health     Financial Resource Strain: Not on file   Food Insecurity: Not on file   Transportation Needs: Not on file   Physical Activity: Not on file   Stress: Not on file   Social Connections: Not on file   Intimate Partner Violence: Not on file   Housing Stability: Not on file     Allergies   Allergen Reactions    Bloodless     Pcn [Penicillins] Anaphylaxis    Ace Inhibitors Cough     Cough     Outpatient Encounter Medications as of 2023   Medication Sig Dispense Refill    amiodarone (CORDARONE) 200 MG Tab  Take 2 Tablets by mouth 2 times a day for 14 days, THEN 1 Tablet every day for 30 days. 86 Tablet 0    Cholecalciferol (D3 PO) Take 1 Capsule by mouth every day.      rosuvastatin (CRESTOR) 5 MG Tab Take 1 Tablet by mouth every Monday, Wednesday, and Friday. 50 Tablet 4    furosemide (LASIX) 40 MG Tab Take 0.5 Tablets by mouth every day. 50 Tablet 3    potassium chloride SA (KDUR) 20 MEQ Tab CR Take 0.5 Tablets by mouth every day. 1/2 tab daily 50 Tablet 3    metFORMIN (GLUCOPHAGE) 500 MG Tab TAKE 1 TABLET BY MOUTH TWICE A DAY WITH FOOD (Patient taking differently: Take 500 mg by mouth 2 times a day with meals.) 200 Tablet 3    Empagliflozin (JARDIANCE) 10 MG Tab tablet Take 1 Tablet by mouth every day. 30 Tablet 3    metoprolol SR (TOPROL XL) 50 MG TABLET SR 24 HR Take 50 mg by mouth every day.      rivaroxaban (XARELTO) 20 MG Tab tablet Take 1 Tablet by mouth with dinner. 100 Tablet 3    spironolactone (ALDACTONE) 25 MG Tab Take 0.5 Tablets by mouth every day. 50 Tablet 3    losartan (COZAAR) 25 MG Tab Take 0.5 Tablets by mouth every day. 50 Tablet 3    Magnesium 500 MG Cap Take 1 Cap by mouth every day. 180 Cap 4    [DISCONTINUED] lovastatin (MEVACOR) 10 MG tablet Take 10 mg by mouth every evening. (Patient not taking: Reported on 9/22/2023)       No facility-administered encounter medications on file as of 9/25/2023.     Review of Systems   Constitutional:  Positive for malaise/fatigue. Negative for chills, fever and weight loss.        Mostly with exertion/exercise, unchanged from previous.   HENT:  Negative for congestion.    Respiratory:  Negative for shortness of breath.    Cardiovascular:  Negative for chest pain, palpitations, orthopnea, leg swelling and PND.   Gastrointestinal:  Negative for abdominal pain and nausea.   Musculoskeletal:  Negative for myalgias.   Skin:  Negative for rash.   Neurological:  Positive for dizziness. Negative for loss of consciousness and headaches.        Chronic, stable,  "mostly when getting up and changing positions.   Endo/Heme/Allergies:  Does not bruise/bleed easily.   Psychiatric/Behavioral:  Negative for depression. The patient does not have insomnia.               Objective     /58 (BP Location: Left arm, Patient Position: Sitting, BP Cuff Size: Adult)   Pulse 65   Resp 16   Ht 1.803 m (5' 11\")   Wt 66.7 kg (147 lb)   SpO2 97%   BMI 20.50 kg/m²     Physical Exam  Constitutional:       Appearance: He is well-developed.      Comments: BMI 20.50 (weight is down slightly)   HENT:      Head: Normocephalic.   Neck:      Vascular: No JVD.   Cardiovascular:      Rate and Rhythm: Normal rate and regular rhythm.      Heart sounds: Normal heart sounds.      Comments: AICD in left chest wall.  Sternotomy scar present.  Pulmonary:      Effort: Pulmonary effort is normal. No respiratory distress.      Breath sounds: Normal breath sounds. No wheezing or rales.   Abdominal:      General: Bowel sounds are normal. There is no distension.      Palpations: Abdomen is soft.      Tenderness: There is no abdominal tenderness.   Musculoskeletal:         General: Normal range of motion.      Cervical back: Normal range of motion and neck supple.   Skin:     General: Skin is warm and dry.      Findings: No rash.      Comments: Skin changes of chronic venous stasis in lower legs.   Neurological:      Mental Status: He is alert and oriented to person, place, and time.     AICD interrogation from the hospital showed 3 shocsk on 9/20/2023: 1 at 20J and then 1 at 35J, and then another 3 minutes later at 20J. Interrogation is NOT done today.     RESULTS OF RIGHT HEART CATH OF 9/21/2022:  Indication/Preoperative diagnosis:  Cardiomyopathy  Precardiac transplant evaluation     Postoperative diagnosis:  Normal pulmonary artery pressures  Normal cardiac filling pressures  Reduced cardiac output     Interpretation Summary of Echocardiogram of 5/2/2022:  1. Severe LV enlargement with severely reduced " systolic function. Estimated EF = 19% by MOD. Normal RV size with mild to moderately reduced systolic function.  2. Mild TR. Estimated RVSP = 24 mmHg (RAP 3 mmHg). Trace AR/LA/MR.  3. No prior echo for comparison.     CONCLUSIONS OF CAROTID US OF 2/17/2021:   Very mild plaque of the carotid bifurcation bilaterally.     CONCLUSIONS OF ECHOCARDIOGRAM OF 2/17/2021:  No significant chnage compared to prior echo in 2019  Left ventricle is severely dilated.  Left ventricular ejection fraction is visually estimated to be 20%.  No significant valve disease or flow abnormalities.      CONCLUSIONS OF ECHOCARDIOGRAM OF 1/30/2019:  Prior echo 08/09/17, no significant change.  Severely reduced left ventricular systolic function.  Left ventricular ejection fraction is visually estimated to be 20-25%.  Global hypokinesis with regional variation, see report.  Grade II diastolic dysfunction.  Reduced right ventricular systolic function.  Pacer/ICD wire seen in right ventricle.  No significant valve abnormality.  Estimated right ventricular systolic pressure  is 25 mmHg.  Inferior vena cava is not well visualized.     Lab Results   Component Value Date/Time    SODIUM 139 09/20/2023 11:24 AM    POTASSIUM 4.6 09/20/2023 11:24 AM    CHLORIDE 101 09/20/2023 11:24 AM    CO2 22 09/20/2023 11:24 AM    GLUCOSE 154 (H) 09/20/2023 11:24 AM    BUN 31 (H) 09/20/2023 11:24 AM    CREATININE 1.22 09/20/2023 11:24 AM    CREATININE 1.0 01/08/2008 07:45 AM      Lab Results   Component Value Date/Time    WBC 8.7 09/21/2023 02:07 AM    RBC 4.81 09/21/2023 02:07 AM    HEMOGLOBIN 15.4 09/21/2023 02:07 AM    HEMATOCRIT 45.6 09/21/2023 02:07 AM    MCV 94.8 09/21/2023 02:07 AM    MCH 32.0 09/21/2023 02:07 AM    MCHC 33.8 09/21/2023 02:07 AM    MPV 11.8 09/21/2023 02:07 AM       Lab Results   Component Value Date/Time    ASTSGOT 21 09/20/2023 11:24 AM    ALTSGPT 11 09/20/2023 11:24 AM       Lab Results   Component Value Date/Time    CHOLSTRLTOT 173 08/02/2023  07:51 AM    LDL 90 08/02/2023 07:51 AM    HDL 64 08/02/2023 07:51 AM    TRIGLYCERIDE 93 08/02/2023 07:51 AM           Assessment & Plan     1. AICD (automatic cardioverter/defibrillator) present        2. Atherosclerosis of native coronary artery of native heart with angina pectoris (HCC)  CL-LEFT HEART CATHETERIZATION WITH POSSIBLE INTERVENTION      3. Ischemic cardiomyopathy  CL-LEFT HEART CATHETERIZATION WITH POSSIBLE INTERVENTION      4. S/P CABG x 4  CL-LEFT HEART CATHETERIZATION WITH POSSIBLE INTERVENTION      5. Chronic systolic congestive heart failure, NYHA class 2 (HCC)  CL-LEFT HEART CATHETERIZATION WITH POSSIBLE INTERVENTION      6. Ventricular tachycardia (HCC)  CL-LEFT HEART CATHETERIZATION WITH POSSIBLE INTERVENTION      7. History of pulmonary embolus (PE)        8. Secondary hypercoagulable state (formerly [facility])        9. Mixed hyperlipidemia        10. Type 2 diabetes mellitus with stage 3a chronic kidney disease, without long-term current use of insulin (formerly [facility])        11. Chronic kidney disease (CKD) stage G3a/A1, moderately decreased glomerular filtration rate (GFR) between 45-59 mL/min/1.73 square meter and albuminuria creatinine ratio less than 30 mg/g (formerly [facility])            Medical Decision Making: Today's Assessment/Status/Plan:         1. CAD/ischemic cardiomyopathy, status post remote CABG x 4 in 2000, with history of VT and HFrEF, LVEF 20-25%, with AICD. He did have 3 shocks last Thursday and is now on Amiodarone 400mg BID x 14 days, and then decrease to 200mg once daily. We discussed proceeding with [facility], and he is agreeable. We will get this scheduled.  Continue:  Amiodarone 400mg BID x 14 days, and then 200mg once daily  Xarelto 20mg once daily  Toprol XL 50mg once daily  Losartan 12.5mg once daily  Aldactone 12.5mg once daily  Lasix 20mg once daily  Jardiance 10mg once daily  Crestor 5mg M/W/F     2. History of VT, now on Amidarone and Toprol XL. Continue same dose.     3. History of DVT/PE,  anticoagulated with Xarelto. No bleeding problems.     4. Hyperlipidemia, treated with Crestor 5mg M/W/F.     5. Diabetes mellitus, treated with Metformin and Jardiance.     6. CKD, stage 3a, stable.     Same medications for now, and we will proceed with [facility]. Based on results, and then consider rescheduled VO2 max stress testing per [facility]'s request.      Follow-up with me after [facility] to assess overall status, and re interrogate device.    Keep follow-up with [facility] as well as PCP.    ADDENDUM:  Per request of cardiologist from [facility] (Dr. Ortiz), to obtain right and left heart cath; order is changed and schedule is notified. To be completed this Thursday, 9/28/2023.

## 2023-09-27 ENCOUNTER — PRE-ADMISSION TESTING (OUTPATIENT)
Dept: ADMISSIONS | Facility: MEDICAL CENTER | Age: 73
End: 2023-09-27
Attending: INTERNAL MEDICINE
Payer: MEDICARE

## 2023-09-27 RX ORDER — CHOLECALCIFEROL (VITAMIN D3) 125 MCG
1 CAPSULE ORAL NIGHTLY
COMMUNITY
End: 2023-10-05

## 2023-09-28 ENCOUNTER — APPOINTMENT (OUTPATIENT)
Dept: CARDIOLOGY | Facility: MEDICAL CENTER | Age: 73
End: 2023-09-28
Attending: NURSE PRACTITIONER
Payer: MEDICARE

## 2023-09-28 ENCOUNTER — HOSPITAL ENCOUNTER (OUTPATIENT)
Facility: MEDICAL CENTER | Age: 73
End: 2023-09-29
Attending: INTERNAL MEDICINE | Admitting: INTERNAL MEDICINE
Payer: MEDICARE

## 2023-09-28 DIAGNOSIS — I25.119 ATHEROSCLEROSIS OF NATIVE CORONARY ARTERY OF NATIVE HEART WITH ANGINA PECTORIS (HCC): ICD-10-CM

## 2023-09-28 DIAGNOSIS — Z95.1 S/P CABG X 4: ICD-10-CM

## 2023-09-28 DIAGNOSIS — I25.5 ISCHEMIC CARDIOMYOPATHY: ICD-10-CM

## 2023-09-28 DIAGNOSIS — I25.10 CAD IN NATIVE ARTERY: ICD-10-CM

## 2023-09-28 DIAGNOSIS — I50.22 CHRONIC SYSTOLIC CONGESTIVE HEART FAILURE, NYHA CLASS 2 (HCC): ICD-10-CM

## 2023-09-28 DIAGNOSIS — Z86.711 HISTORY OF PULMONARY EMBOLUS (PE): ICD-10-CM

## 2023-09-28 DIAGNOSIS — I47.20 VENTRICULAR TACHYCARDIA (HCC): ICD-10-CM

## 2023-09-28 DIAGNOSIS — Z95.810 AICD (AUTOMATIC CARDIOVERTER/DEFIBRILLATOR) PRESENT: ICD-10-CM

## 2023-09-28 LAB
ACT BLD: 354 SEC (ref 74–137)
ALBUMIN SERPL BCP-MCNC: 4.5 G/DL (ref 3.2–4.9)
ALBUMIN/GLOB SERPL: 1.5 G/DL
ALP SERPL-CCNC: 65 U/L (ref 30–99)
ALT SERPL-CCNC: 7 U/L (ref 2–50)
ANION GAP SERPL CALC-SCNC: 14 MMOL/L (ref 7–16)
APTT PPP: 24.9 SEC (ref 24.7–36)
AST SERPL-CCNC: 15 U/L (ref 12–45)
BILIRUB SERPL-MCNC: 1.2 MG/DL (ref 0.1–1.5)
BUN SERPL-MCNC: 35 MG/DL (ref 8–22)
CALCIUM ALBUM COR SERPL-MCNC: 9 MG/DL (ref 8.5–10.5)
CALCIUM SERPL-MCNC: 9.4 MG/DL (ref 8.5–10.5)
CALCULATED OXYGEN CONTENT: 15.1
CALCULATED OXYGEN CONTENT: 20.2
CHLORIDE SERPL-SCNC: 101 MMOL/L (ref 96–112)
CO2 SERPL-SCNC: 23 MMOL/L (ref 20–33)
CREAT SERPL-MCNC: 1.34 MG/DL (ref 0.5–1.4)
EKG IMPRESSION: NORMAL
EKG IMPRESSION: NORMAL
ERYTHROCYTE [DISTWIDTH] IN BLOOD BY AUTOMATED COUNT: 46.5 FL (ref 35.9–50)
GFR SERPLBLD CREATININE-BSD FMLA CKD-EPI: 56 ML/MIN/1.73 M 2
GLOBULIN SER CALC-MCNC: 3 G/DL (ref 1.9–3.5)
GLUCOSE BLD STRIP.AUTO-MCNC: 121 MG/DL (ref 65–99)
GLUCOSE SERPL-MCNC: 127 MG/DL (ref 65–99)
HCT VFR BLD AUTO: 48.3 % (ref 42–52)
HGB BLD-MCNC: 16 G/DL (ref 14–18)
INR PPP: 1.16 (ref 0.87–1.13)
MCH RBC QN AUTO: 31.3 PG (ref 27–33)
MCHC RBC AUTO-ENTMCNC: 33.1 G/DL (ref 32.3–36.5)
MCV RBC AUTO: 94.5 FL (ref 81.4–97.8)
OXYHEMOGLOBIN: 72.6
OXYHEMOGLOBIN: 96.3
PLATELET # BLD AUTO: 194 K/UL (ref 164–446)
PMV BLD AUTO: 11.7 FL (ref 9–12.9)
POTASSIUM SERPL-SCNC: 5 MMOL/L (ref 3.6–5.5)
PROT SERPL-MCNC: 7.5 G/DL (ref 6–8.2)
PROTHROMBIN TIME: 14.9 SEC (ref 12–14.6)
RBC # BLD AUTO: 5.11 M/UL (ref 4.7–6.1)
SODIUM SERPL-SCNC: 138 MMOL/L (ref 135–145)
TOTAL HEMOGLOBIN: 15
TOTAL HEMOGLOBIN: 15.1
WBC # BLD AUTO: 6.7 K/UL (ref 4.8–10.8)

## 2023-09-28 PROCEDURE — 85347 COAGULATION TIME ACTIVATED: CPT

## 2023-09-28 PROCEDURE — 160002 HCHG RECOVERY MINUTES (STAT)

## 2023-09-28 PROCEDURE — 82962 GLUCOSE BLOOD TEST: CPT

## 2023-09-28 PROCEDURE — 80053 COMPREHEN METABOLIC PANEL: CPT

## 2023-09-28 PROCEDURE — 700117 HCHG RX CONTRAST REV CODE 255: Performed by: INTERNAL MEDICINE

## 2023-09-28 PROCEDURE — A9270 NON-COVERED ITEM OR SERVICE: HCPCS

## 2023-09-28 PROCEDURE — 160036 HCHG PACU - EA ADDL 30 MINS PHASE I

## 2023-09-28 PROCEDURE — 700102 HCHG RX REV CODE 250 W/ 637 OVERRIDE(OP)

## 2023-09-28 PROCEDURE — A9270 NON-COVERED ITEM OR SERVICE: HCPCS | Performed by: NURSE PRACTITIONER

## 2023-09-28 PROCEDURE — 93010 ELECTROCARDIOGRAM REPORT: CPT | Mod: 59 | Performed by: INTERNAL MEDICINE

## 2023-09-28 PROCEDURE — 93461 R&L HRT ART/VENTRICLE ANGIO: CPT | Mod: 26,59 | Performed by: INTERNAL MEDICINE

## 2023-09-28 PROCEDURE — 85730 THROMBOPLASTIN TIME PARTIAL: CPT

## 2023-09-28 PROCEDURE — 93005 ELECTROCARDIOGRAM TRACING: CPT | Performed by: INTERNAL MEDICINE

## 2023-09-28 PROCEDURE — 700105 HCHG RX REV CODE 258: Performed by: INTERNAL MEDICINE

## 2023-09-28 PROCEDURE — 92943 PRQ TRLUML REVSC CH OCC ANT: CPT | Mod: RC | Performed by: INTERNAL MEDICINE

## 2023-09-28 PROCEDURE — G0378 HOSPITAL OBSERVATION PER HR: HCPCS

## 2023-09-28 PROCEDURE — 85610 PROTHROMBIN TIME: CPT

## 2023-09-28 PROCEDURE — 99152 MOD SED SAME PHYS/QHP 5/>YRS: CPT | Performed by: INTERNAL MEDICINE

## 2023-09-28 PROCEDURE — 700102 HCHG RX REV CODE 250 W/ 637 OVERRIDE(OP): Performed by: NURSE PRACTITIONER

## 2023-09-28 PROCEDURE — 700111 HCHG RX REV CODE 636 W/ 250 OVERRIDE (IP): Mod: JZ

## 2023-09-28 PROCEDURE — 85027 COMPLETE CBC AUTOMATED: CPT

## 2023-09-28 PROCEDURE — 700101 HCHG RX REV CODE 250

## 2023-09-28 PROCEDURE — 160035 HCHG PACU - 1ST 60 MINS PHASE I

## 2023-09-28 PROCEDURE — 85018 HEMOGLOBIN: CPT | Performed by: INTERNAL MEDICINE

## 2023-09-28 PROCEDURE — 99153 MOD SED SAME PHYS/QHP EA: CPT

## 2023-09-28 RX ORDER — CLOPIDOGREL BISULFATE 75 MG/1
75 TABLET ORAL DAILY
Status: DISCONTINUED | OUTPATIENT
Start: 2023-09-29 | End: 2023-09-29 | Stop reason: HOSPADM

## 2023-09-28 RX ORDER — LOSARTAN POTASSIUM 25 MG/1
12.5 TABLET ORAL EVERY EVENING
Status: DISCONTINUED | OUTPATIENT
Start: 2023-09-28 | End: 2023-09-29 | Stop reason: HOSPADM

## 2023-09-28 RX ORDER — AMIODARONE HYDROCHLORIDE 200 MG/1
400 TABLET ORAL TWICE DAILY
Status: DISCONTINUED | OUTPATIENT
Start: 2023-09-28 | End: 2023-09-29 | Stop reason: HOSPADM

## 2023-09-28 RX ORDER — ASPIRIN 81 MG/1
TABLET, CHEWABLE ORAL
Status: COMPLETED
Start: 2023-09-28 | End: 2023-09-28

## 2023-09-28 RX ORDER — SODIUM CHLORIDE 9 MG/ML
1.5 INJECTION, SOLUTION INTRAVENOUS CONTINUOUS
Status: ACTIVE | OUTPATIENT
Start: 2023-09-28 | End: 2023-09-28

## 2023-09-28 RX ORDER — HEPARIN SODIUM 1000 [USP'U]/ML
INJECTION, SOLUTION INTRAVENOUS; SUBCUTANEOUS
Status: COMPLETED
Start: 2023-09-28 | End: 2023-09-28

## 2023-09-28 RX ORDER — VERAPAMIL HYDROCHLORIDE 2.5 MG/ML
INJECTION, SOLUTION INTRAVENOUS
Status: COMPLETED
Start: 2023-09-28 | End: 2023-09-28

## 2023-09-28 RX ORDER — MIDAZOLAM HYDROCHLORIDE 1 MG/ML
INJECTION INTRAMUSCULAR; INTRAVENOUS
Status: COMPLETED
Start: 2023-09-28 | End: 2023-09-28

## 2023-09-28 RX ORDER — METOPROLOL SUCCINATE 50 MG/1
50 TABLET, EXTENDED RELEASE ORAL EVERY MORNING
Status: DISCONTINUED | OUTPATIENT
Start: 2023-09-29 | End: 2023-09-29 | Stop reason: HOSPADM

## 2023-09-28 RX ORDER — SODIUM CHLORIDE 9 MG/ML
1000 INJECTION, SOLUTION INTRAVENOUS CONTINUOUS
Status: DISCONTINUED | OUTPATIENT
Start: 2023-09-28 | End: 2023-09-28

## 2023-09-28 RX ORDER — HEPARIN SODIUM 200 [USP'U]/100ML
INJECTION, SOLUTION INTRAVENOUS
Status: COMPLETED
Start: 2023-09-28 | End: 2023-09-28

## 2023-09-28 RX ORDER — AMIODARONE HYDROCHLORIDE 200 MG/1
400 TABLET ORAL TWICE DAILY
Status: DISCONTINUED | OUTPATIENT
Start: 2023-09-28 | End: 2023-09-28

## 2023-09-28 RX ORDER — CLOPIDOGREL 300 MG/1
600 TABLET, FILM COATED ORAL ONCE
Status: DISCONTINUED | OUTPATIENT
Start: 2023-09-28 | End: 2023-09-28

## 2023-09-28 RX ORDER — AMIODARONE HYDROCHLORIDE 200 MG/1
200 TABLET ORAL
Status: DISCONTINUED | OUTPATIENT
Start: 2023-10-06 | End: 2023-09-29 | Stop reason: HOSPADM

## 2023-09-28 RX ORDER — SPIRONOLACTONE 25 MG/1
12.5 TABLET ORAL EVERY MORNING
Status: DISCONTINUED | OUTPATIENT
Start: 2023-09-29 | End: 2023-09-29 | Stop reason: HOSPADM

## 2023-09-28 RX ORDER — CLOPIDOGREL 300 MG/1
TABLET, FILM COATED ORAL
Status: COMPLETED
Start: 2023-09-28 | End: 2023-09-28

## 2023-09-28 RX ORDER — ASPIRIN 81 MG/1
81 TABLET ORAL DAILY
Status: DISCONTINUED | OUTPATIENT
Start: 2023-09-28 | End: 2023-09-29 | Stop reason: HOSPADM

## 2023-09-28 RX ORDER — LIDOCAINE HYDROCHLORIDE 20 MG/ML
INJECTION, SOLUTION INFILTRATION; PERINEURAL
Status: COMPLETED
Start: 2023-09-28 | End: 2023-09-28

## 2023-09-28 RX ORDER — ROSUVASTATIN CALCIUM 5 MG/1
5 TABLET, COATED ORAL
Status: DISCONTINUED | OUTPATIENT
Start: 2023-09-29 | End: 2023-09-29 | Stop reason: HOSPADM

## 2023-09-28 RX ADMIN — AMIODARONE HYDROCHLORIDE 400 MG: 200 TABLET ORAL at 16:55

## 2023-09-28 RX ADMIN — ASPIRIN 324 MG: 81 TABLET, CHEWABLE ORAL at 08:14

## 2023-09-28 RX ADMIN — MIDAZOLAM 2 MG: 1 INJECTION, SOLUTION INTRAMUSCULAR; INTRAVENOUS at 09:01

## 2023-09-28 RX ADMIN — LIDOCAINE HYDROCHLORIDE: 20 INJECTION, SOLUTION INFILTRATION; PERINEURAL at 08:13

## 2023-09-28 RX ADMIN — NITROGLYCERIN 10 ML: 20 INJECTION INTRAVENOUS at 08:13

## 2023-09-28 RX ADMIN — IOHEXOL 90 ML: 350 INJECTION, SOLUTION INTRAVENOUS at 10:08

## 2023-09-28 RX ADMIN — HEPARIN SODIUM: 1000 INJECTION, SOLUTION INTRAVENOUS; SUBCUTANEOUS at 08:13

## 2023-09-28 RX ADMIN — CLOPIDOGREL BISULFATE 600 MG: 300 TABLET, FILM COATED ORAL at 10:18

## 2023-09-28 RX ADMIN — SODIUM CHLORIDE 1000 ML: 9 INJECTION, SOLUTION INTRAVENOUS at 07:19

## 2023-09-28 RX ADMIN — LOSARTAN POTASSIUM 12.5 MG: 25 TABLET, FILM COATED ORAL at 18:13

## 2023-09-28 RX ADMIN — FENTANYL CITRATE 100 MCG: 50 INJECTION, SOLUTION INTRAMUSCULAR; INTRAVENOUS at 08:44

## 2023-09-28 RX ADMIN — MIDAZOLAM 2 MG: 1 INJECTION, SOLUTION INTRAMUSCULAR; INTRAVENOUS at 08:44

## 2023-09-28 RX ADMIN — HEPARIN SODIUM 2000 UNITS: 200 INJECTION, SOLUTION INTRAVENOUS at 08:13

## 2023-09-28 ASSESSMENT — COGNITIVE AND FUNCTIONAL STATUS - GENERAL
HELP NEEDED FOR BATHING: A LITTLE
STANDING UP FROM CHAIR USING ARMS: A LITTLE
CLIMB 3 TO 5 STEPS WITH RAILING: A LITTLE
SUGGESTED CMS G CODE MODIFIER DAILY ACTIVITY: CJ
DRESSING REGULAR UPPER BODY CLOTHING: A LITTLE
DAILY ACTIVITIY SCORE: 21
SUGGESTED CMS G CODE MODIFIER MOBILITY: CK
MOBILITY SCORE: 19
MOVING FROM LYING ON BACK TO SITTING ON SIDE OF FLAT BED: A LITTLE
TOILETING: A LITTLE
MOVING TO AND FROM BED TO CHAIR: A LITTLE
WALKING IN HOSPITAL ROOM: A LITTLE

## 2023-09-28 ASSESSMENT — LIFESTYLE VARIABLES
EVER HAD A DRINK FIRST THING IN THE MORNING TO STEADY YOUR NERVES TO GET RID OF A HANGOVER: NO
HOW MANY TIMES IN THE PAST YEAR HAVE YOU HAD 5 OR MORE DRINKS IN A DAY: 0
ON A TYPICAL DAY WHEN YOU DRINK ALCOHOL HOW MANY DRINKS DO YOU HAVE: 1
CONSUMPTION TOTAL: NEGATIVE
TOTAL SCORE: 0
HAVE PEOPLE ANNOYED YOU BY CRITICIZING YOUR DRINKING: NO
TOTAL SCORE: 0
EVER FELT BAD OR GUILTY ABOUT YOUR DRINKING: NO
HAVE YOU EVER FELT YOU SHOULD CUT DOWN ON YOUR DRINKING: NO
TOTAL SCORE: 0
DOES PATIENT WANT TO STOP DRINKING: NO
AVERAGE NUMBER OF DAYS PER WEEK YOU HAVE A DRINK CONTAINING ALCOHOL: 2
ALCOHOL_USE: YES

## 2023-09-28 ASSESSMENT — PAIN DESCRIPTION - PAIN TYPE
TYPE: SURGICAL PAIN
TYPE: SURGICAL PAIN;ACUTE PAIN
TYPE: SURGICAL PAIN

## 2023-09-28 ASSESSMENT — PATIENT HEALTH QUESTIONNAIRE - PHQ9
1. LITTLE INTEREST OR PLEASURE IN DOING THINGS: NOT AT ALL
2. FEELING DOWN, DEPRESSED, IRRITABLE, OR HOPELESS: NOT AT ALL
SUM OF ALL RESPONSES TO PHQ9 QUESTIONS 1 AND 2: 0

## 2023-09-28 ASSESSMENT — FIBROSIS 4 INDEX: FIB4 SCORE: 3.08

## 2023-09-28 NOTE — PROCEDURES
Cardiac Catheterization and Percutaneous Intervention Procedure Report    9/28/2023    Referring MD: Mrs. Berry    Primary Care Provider: Rafael Park M.D.    Indication for procedure: Congestive heart failure systolic  Ventricular tachycardia    Procedures:  Coronary, bypass graft coronary angiogram  Right heart catheterization   left heart catheterization  Successful percutaneous coronary intervention of chronic total occlusion distal RCA      Recommendations: Follow-up with Dr. Rock to discuss residual coronary artery disease      Coronary arteriograms:  Left main: normal  Left anterior descending: Occluded in the proximal portion after large septal branches takeoff, fills through LIMA.  Left circumflex: Occluded ostially  Ramus intermedius: normal  Right coronary: Chronic total occlusion in distal portion, TIMI1 flow into distal portion, through bridging collaterals.dominant.  Vein graft to RCA occluded  Vein graft to diagonal occluded  Vein graft to marginal branch widely patent with possible moderate stenosis versus tortuosity at anastomosis  LIMA to LAD overall small, after anastomosis left anterior descending artery bifurcates into a small distal LAD, large diagonal branch.  Large diagonal branch has 70% disease in the proximal portion    Hemodynamics:  Left Ventriculogram: Not performed  Left Ventricular EDP: 3 mm Hg No significant AV gradient noted  [place] cardiac output 2.9 L/min, cardiac index 1.59  Cardiac output by thermodilution 2.9 L/min  Right atrial pressure 3 mmHg  Right ventricular pressure 28/9 with EDP 11 mmHg  Pulmonary arterial pressure 31/13 with a mean of 20 mmHg  Pulmonary capillary wedge pressure 12 mmHg    Procedure details:  Jaime Lee was brought to the cardiac catheterization lab where the right groin was prepped and draped in the usual manner for cardiac catheterization.  Timeout was performed.  The area was anesthetized with lidocaine and a 5/6 FR sheath was  inserted into the right femoral artery without difficulty.  Right brachial artery cannulation was performed, a 6 [place]ese sheath was placed, a 6 [place]ese balloontipped [place] catheter floated, right heart catheterization was performed in usual fashion.  Coronary, bypass graft angiogram was performed using JL 4.5, JR4 diagnostic catheters.  JR4 catheter was used to cross aortic valve, LVEDP was measured.  Patient underwent percutaneous coronary revascularization as outlined below.  At the completion of the case the sheath was removed and hemostasis achieved utilizing an AngioSeal device .  Patient was pain-free and hemodynamically stable at the completion of the case.  There were no apparent complications.    Interventional Procedure RCA:     His RCA has very slow flow, given his ventricular tachycardia, ICD shocks, this territory most likely to be ischemic.  That is why I elected to proceeded with PCI of RCA.    Indication for PCI:  Ischemic cardiomyopathy, ventricular tachycardia    Pre: 100 %, 15 mm length, MAYRA 1 flow  Post: 0%, MAYRA 3 flow    Lesion complexity  High  Severe calcification yes  Bifurcation  No    Guide catheter: AR2 was advanced to the ostia of the right coronary artery.    Lesion was crossed using [employer] microcatheter,  200 guidewire.    Distal RCA serially dilated using 1.2, 2.0, 2.5 mm balloons.  A 3.0 x 26 [place] drug-eluting stent was placed in distal RCA.  Proximal portion of the stent was severely underexpanded, this was postdilated using 3.0 x 12 mm NC balloon at 20 pat pressures with adequate expansion.      Anticoagulant: Heparin  Antiplatelet: clopidogrel  EBL <25 cc  Complications: none  Specimens: none  Contrast: 98 cc  Fluorotime : see cath lab flowsheet      Sedation: I supervised moderate sedation over a trained independent observer.    Sedation start time: 08:26  End time: 10:06        Electronically signed by   Tenzin Ching M.D., ALIDA  Interventional  cardiologist  9/28/2023  10:50 AM

## 2023-09-28 NOTE — PROGRESS NOTES
Dr. Ching confirmed that patient should be consented for right heart cath, left heart cath, and possible percutaneous intervention.     RN confirmed with patient that he is okay receiving blood in case of an emergency (previously was a bloodless patient).

## 2023-09-29 ENCOUNTER — PHARMACY VISIT (OUTPATIENT)
Dept: PHARMACY | Facility: MEDICAL CENTER | Age: 73
End: 2023-09-29
Payer: COMMERCIAL

## 2023-09-29 VITALS
WEIGHT: 145.72 LBS | OXYGEN SATURATION: 94 % | BODY MASS INDEX: 20.4 KG/M2 | TEMPERATURE: 97.8 F | SYSTOLIC BLOOD PRESSURE: 124 MMHG | RESPIRATION RATE: 18 BRPM | HEART RATE: 88 BPM | HEIGHT: 71 IN | DIASTOLIC BLOOD PRESSURE: 71 MMHG

## 2023-09-29 LAB
ANION GAP SERPL CALC-SCNC: 12 MMOL/L (ref 7–16)
BUN SERPL-MCNC: 28 MG/DL (ref 8–22)
CALCIUM SERPL-MCNC: 8.7 MG/DL (ref 8.5–10.5)
CHLORIDE SERPL-SCNC: 105 MMOL/L (ref 96–112)
CO2 SERPL-SCNC: 22 MMOL/L (ref 20–33)
CREAT SERPL-MCNC: 1.22 MG/DL (ref 0.5–1.4)
ERYTHROCYTE [DISTWIDTH] IN BLOOD BY AUTOMATED COUNT: 46.5 FL (ref 35.9–50)
GFR SERPLBLD CREATININE-BSD FMLA CKD-EPI: 63 ML/MIN/1.73 M 2
GLUCOSE SERPL-MCNC: 100 MG/DL (ref 65–99)
HCT VFR BLD AUTO: 43.3 % (ref 42–52)
HGB BLD-MCNC: 14.8 G/DL (ref 14–18)
MCH RBC QN AUTO: 32.2 PG (ref 27–33)
MCHC RBC AUTO-ENTMCNC: 34.2 G/DL (ref 32.3–36.5)
MCV RBC AUTO: 94.3 FL (ref 81.4–97.8)
PLATELET # BLD AUTO: 162 K/UL (ref 164–446)
PMV BLD AUTO: 11.3 FL (ref 9–12.9)
POTASSIUM SERPL-SCNC: 4.3 MMOL/L (ref 3.6–5.5)
RBC # BLD AUTO: 4.59 M/UL (ref 4.7–6.1)
SODIUM SERPL-SCNC: 139 MMOL/L (ref 135–145)
WBC # BLD AUTO: 8.2 K/UL (ref 4.8–10.8)

## 2023-09-29 PROCEDURE — A9270 NON-COVERED ITEM OR SERVICE: HCPCS | Performed by: INTERNAL MEDICINE

## 2023-09-29 PROCEDURE — 85027 COMPLETE CBC AUTOMATED: CPT

## 2023-09-29 PROCEDURE — 97161 PT EVAL LOW COMPLEX 20 MIN: CPT

## 2023-09-29 PROCEDURE — 700102 HCHG RX REV CODE 250 W/ 637 OVERRIDE(OP): Performed by: NURSE PRACTITIONER

## 2023-09-29 PROCEDURE — 700102 HCHG RX REV CODE 250 W/ 637 OVERRIDE(OP): Performed by: INTERNAL MEDICINE

## 2023-09-29 PROCEDURE — G0378 HOSPITAL OBSERVATION PER HR: HCPCS

## 2023-09-29 PROCEDURE — A9270 NON-COVERED ITEM OR SERVICE: HCPCS | Performed by: NURSE PRACTITIONER

## 2023-09-29 PROCEDURE — RXMED WILLOW AMBULATORY MEDICATION CHARGE: Performed by: NURSE PRACTITIONER

## 2023-09-29 PROCEDURE — 99238 HOSP IP/OBS DSCHRG MGMT 30/<: CPT | Performed by: NURSE PRACTITIONER

## 2023-09-29 PROCEDURE — 80048 BASIC METABOLIC PNL TOTAL CA: CPT

## 2023-09-29 RX ORDER — CLOPIDOGREL BISULFATE 75 MG/1
75 TABLET ORAL DAILY
Qty: 90 TABLET | Refills: 3 | Status: SHIPPED | OUTPATIENT
Start: 2023-09-30

## 2023-09-29 RX ADMIN — ASPIRIN 81 MG: 81 TABLET, COATED ORAL at 06:00

## 2023-09-29 RX ADMIN — SPIRONOLACTONE 12.5 MG: 25 TABLET ORAL at 05:59

## 2023-09-29 RX ADMIN — AMIODARONE HYDROCHLORIDE 400 MG: 200 TABLET ORAL at 05:59

## 2023-09-29 RX ADMIN — CLOPIDOGREL BISULFATE 75 MG: 75 TABLET ORAL at 05:59

## 2023-09-29 RX ADMIN — ROSUVASTATIN CALCIUM 5 MG: 5 TABLET, FILM COATED ORAL at 09:31

## 2023-09-29 RX ADMIN — METOPROLOL SUCCINATE 50 MG: 50 TABLET, EXTENDED RELEASE ORAL at 05:59

## 2023-09-29 ASSESSMENT — COGNITIVE AND FUNCTIONAL STATUS - GENERAL
MOBILITY SCORE: 24
SUGGESTED CMS G CODE MODIFIER MOBILITY: CH

## 2023-09-29 ASSESSMENT — GAIT ASSESSMENTS
DISTANCE (FEET): 500
GAIT LEVEL OF ASSIST: SUPERVISED

## 2023-09-29 ASSESSMENT — PAIN DESCRIPTION - PAIN TYPE: TYPE: ACUTE PAIN

## 2023-09-29 ASSESSMENT — FIBROSIS 4 INDEX: FIB4 SCORE: 2.55

## 2023-09-29 NOTE — THERAPY
Physical Therapy   Initial Evaluation     Patient Name: Jaime Lee  Age:  73 y.o., Sex:  male  Medical Record #: [id number]  Today's Date: 9/29/2023          Assessment  Patient is 73 y.o. male with a diagnosis of catheterization on 9/28.       Pt tolerated session well and does not require acute PT services. He ambulated over 500' with supervision and performed 3 stairs with supervision. PT was provided with cardiac rehab handout which was verbally reviewed.     Pt will benefit from outpatient cardiac rehab.   Plan    Physical Therapy Initial Treatment Plan   Duration: (P) Evaluation only    DC Equipment Recommendations: (P) Unable to determine at this time  Discharge Recommendations: (P) Recommend outpatient physical therapy services to address higher level deficits       Subjective    Pt is pleasant and cooperative.      Objective       09/29/23 0844   Charge Group   PT Evaluation PT Evaluation Low   Initial Contact Note    Initial Contact Note Order Received and Verified, Evaluation Only - Patient Does Not Require Further Acute Physical Therapy at this Time.  However, May Benefit from Post Acute Therapy for Higher Level Functional Deficits.   Prior Living Situation   Housing / Facility 1 Story House   Equipment Owned Front-Wheel Walker   Lives with - Patient's Self Care Capacity Spouse   Prior Level of Functional Mobility   Bed Mobility Independent   Transfer Status Independent   Ambulation Independent   Ambulation Distance community   Assistive Devices Used None   Stairs Independent   Gait Analysis   Gait Level Of Assist Supervised   Assistive Device None   Distance (Feet) 500   # of Times Distance was Traveled 1   # of Stairs Climbed 3   Level of Assist with Stairs Supervised   How much difficulty does the patient currently have...   Turning over in bed (including adjusting bedclothes, sheets and blankets)? 4   Sitting down on and standing up from a chair with arms (e.g., wheelchair, bedside  commode, etc.) 4   Moving from lying on back to sitting on the side of the bed? 4   How much help from another person does the patient currently need...   Moving to and from a bed to a chair (including a wheelchair)? 4   Need to walk in a hospital room? 4   Climbing 3-5 steps with a railing? 4   6 clicks Mobility Score 24   Education Group   Education Provided Role of Physical Therapist;Cardiac Precautions   Cardiac Precautions Patient Response Patient;Acceptance;Explanation;Handout;Demonstration;Verbal Demonstration   Role of Physical Therapist Patient Response Patient;Family;Acceptance;Explanation;Verbal Demonstration   Physical Therapy Initial Treatment Plan    Duration Evaluation only   Problem List    Problems None   Anticipated Discharge Equipment and Recommendations   DC Equipment Recommendations Unable to determine at this time   Discharge Recommendations Recommend outpatient physical therapy services to address higher level deficits   Session Information   Date / Session Number  9/29-1 (DC Needs)

## 2023-09-29 NOTE — PROGRESS NOTES
4 Eyes Skin Assessment Completed by TOMASZ Rodriguez and TOMASZ Fernando.    Head WDL  Ears WDL  Nose WDL  Mouth WDL  Neck WDL  Breast/Chest WDL  Shoulder Blades WDL  Spine WDL  (R) Arm/Elbow/Hand Bruising and Abrasion  (L) Arm/Elbow/Hand Bruising and Abrasion  Abdomen WDL  Groin WDL  Scrotum/Coccyx/Buttocks Blanching  (R) Leg Redness and Blanching  (L) Leg Redness and Blanching  (R) Heel/Foot/Toe Redness and Blanching  (L) Heel/Foot/Toe Redness and Blanching          Devices In Places Tele Box, Blood Pressure Cuff, and Pulse Ox      Interventions In Place Pressure Redistribution Mattress    Possible Skin Injury No    Pictures Uploaded Into Epic N/A  Wound Consult Placed N/A  RN Wound Prevention Protocol Ordered No

## 2023-09-29 NOTE — CARE PLAN
The patient is Stable - Low risk of patient condition declining or worsening    Shift Goals  Clinical Goals: monitor for signs of bleeding, hemodynamically stable  Patient Goals: comfort, rest  Family Goals: No family present    Progress made toward(s) clinical / shift goals: Plan of care and medications discussed and reviewed over with pt. Fall education given to pt. Pt will call when need to ambulate to bathroom. Call light within reach. Cath sites assessed. No sign of bleeding except for blood urine when pt voids. Pt hemodynamically stable. No complaints.      Problem: Knowledge Deficit - Standard  Goal: Patient and family/care givers will demonstrate understanding of plan of care, disease process/condition, diagnostic tests and medications  Outcome: Progressing     Problem: Fall Risk  Goal: Patient will remain free from falls  Outcome: Progressing     Problem: Acute Care of the Cardiac Cath Patient  Goal: Post Procedure Optimal Outcome for the Cardiac Cath Patient  Outcome: Progressing     Problem: Hemodynamics  Goal: Patient's hemodynamics, fluid balance and neurologic status will be stable or improve  Outcome: Progressing

## 2023-09-29 NOTE — DISCHARGE SUMMARY
Admission date     9/28/2023    Discharge date      9/29/2023      Discharge diagnosis    -Ventricular tachycardia/AICD shock underwent coronary angiography 9/28/2023 status post successful PCI of  distal RCA.  -Occluded VG to RCA, occluded VG to diagonal, patent VG to marginal branch, patent LIMA to LAD, occluded ostial LCx.  -High risk med on amiodarone.  -Severe ischemic cardiomyopathy LVEF 20 to 25%.  -Remote CABG x4v, 2000.  -AICD in situ, 2003, GEN change 2015.  -CKD stage III .  -History of DVT/PE, on Xarelto.      Procedure 9/28/2023   successful percutaneous coronary intervention of chronic total occlusion distal RCA        Recommendations: Follow-up with Dr. Rock to discuss residual coronary artery disease        Coronary arteriograms:  Left main: normal  Left anterior descending: Occluded in the proximal portion after large septal branches takeoff, fills through LIMA.  Left circumflex: Occluded ostially  Ramus intermedius: normal  Right coronary: Chronic total occlusion in distal portion, TIMI1 flow into distal portion, through bridging collaterals.dominant.  Vein graft to RCA occluded  Vein graft to diagonal occluded  Vein graft to marginal branch widely patent with possible moderate stenosis versus tortuosity at anastomosis  LIMA to LAD overall small, after anastomosis left anterior descending artery bifurcates into a small distal LAD, large diagonal branch.  Large diagonal branch has 70% disease in the proximal portion8/2023.    HPI/Hospital course    73-year-old with CABG x4v in 2009, severe ischemic cardiomyopathy LVEF 20 to 25%, AICD in situ, recent recurrent AICD shocks/ventricular tachycardia underwent successful PCI distal RCA 9/28/2023.    9/29/2023 ambulated with PT he is asymptomatic, right groin cath site without evidence of hematoma, telemetry revealed paced rhythm, no recurrent ventricular arrhythmia, wife at bedside all questions answered patient is to follow-up with Dr. Rock for  further evaluation of residual coronary artery disease.  Stable for discharge.      Discharge meds     Medication List        START taking these medications        Instructions   clopidogrel 75 MG Tabs  Start taking on: September 30, 2023  Commonly known as: Plavix   Take 1 Tablet by mouth every day.  Dose: 75 mg            CHANGE how you take these medications        Instructions   furosemide 40 MG Tabs  What changed: when to take this  Commonly known as: Lasix   Take 0.5 Tablets by mouth every day.  Dose: 20 mg     Jardiance 10 MG Tabs tablet  What changed: when to take this  Generic drug: Empagliflozin   Doctor's comments: Plan requires a 100 day supply. Thank you  Take 1 Tablet by mouth every day.  Dose: 10 mg     losartan 25 MG Tabs  What changed: when to take this  Commonly known as: Cozaar   Doctor's comments: Replace lisinopril  Take 0.5 Tablets by mouth every day.  Dose: 12.5 mg     Magnesium 500 MG Caps  What changed: when to take this   Take 1 Cap by mouth every day.  Dose: 500 mg     potassium chloride SA 20 MEQ Tbcr  What changed: when to take this  Commonly known as: Kdur   Take 0.5 Tablets by mouth every day. 1/2 tab daily  Dose: 10 mEq     spironolactone 25 MG Tabs  What changed: when to take this  Commonly known as: Aldactone   Take 0.5 Tablets by mouth every day.  Dose: 12.5 mg            CONTINUE taking these medications        Instructions   amiodarone 200 MG Tabs  Start taking on: September 21, 2023  Commonly known as: Cordarone   Take 2 Tablets by mouth 2 times a day for 14 days, THEN 1 Tablet every day for 30 days.     * D3 PO   Take 1 Capsule by mouth every day.  Dose: 1 Capsule     * Vitamin D 125 MCG (5000 UT) Caps   Take 1 Tablet by mouth every evening.  Dose: 1 Tablet     metFORMIN 500 MG Tabs  Commonly known as: Glucophage   TAKE 1 TABLET BY MOUTH TWICE A DAY WITH FOOD     metoprolol SR 50 MG Tb24  Commonly known as: Toprol XL   Take 50 mg by mouth every morning.  Dose: 50 mg      rivaroxaban 20 MG Tabs tablet  Commonly known as: Xarelto   Take 1 Tablet by mouth with dinner.  Dose: 20 mg     rosuvastatin 5 MG Tabs  Commonly known as: Crestor   Take 1 Tablet by mouth every Monday, Wednesday, and Friday.  Dose: 5 mg           * This list has 2 medication(s) that are the same as other medications prescribed for you. Read the directions carefully, and ask your doctor or other care provider to review them with you.                   Labs reviewed    CBC stable, platelet count 162, BMP in good order, glucose 100, BUN 28    Discharge instructions    With recent PCI to RCA patient was started on Plavix discussed the continuation of Plavix, DC aspirin upon discharge being on rivaroxaban.    Follow-up with Dr. Leonard Rock for evaluation of residual coronary artery disease.      Continue amiodarone 400 twice daily x1 more week then 200 mg daily.

## 2023-09-29 NOTE — DISCHARGE PLANNING
HTH/SCP TCN chart review completed. Collaborated with CM prior to meeting with the pt. The most current review of medical record, knowledge of pt's PLOF and social support, LACE+ score of 63, 6 clicks scores of 19 mobility were considered.      Pt seen at bedside with spouse present. Introduced TCN program. Provided education regarding post acute levels of care. Discussed HTH/SCP plan benefits (Meds to Beds, medical uber and [facility] transitional care). Pt verbalizes understanding.     Patient reports he lives with spouse, no concerns about discharge home, owns FWW, and reports he saw PT who is recommending OP cardiac rehab.  Patient reports no concerns with balance or strength.  Noted per longitudinal care, patient have October 6th, and reports he plans to follow up with cardiology as instructed.  No further TCN needs.     No choice proactively obtained given no needs. TCN will continue to follow and collaborate with discharge planning team as additional post acute needs arise. Thank you.     Completed today:  Choice obtained: none  SCP with [facility] PCP. Patient has follow up scheduled.

## 2023-09-29 NOTE — CARE PLAN
Problem: Acute Care of the Cardiac Cath Patient  Goal: Post Procedure Optimal Outcome for the Cardiac Cath Patient  Outcome: Progressing     Problem: Communication  Goal: The ability to communicate needs accurately and effectively will improve  Outcome: Progressing  Flowsheets (Taken 9/28/2023 1803)  Communication:   Assessed patient's ability to understand and communicate   Oriented family/support system to call light   Oriented patient to call light   Reoriented patient to environment   The patient is Stable - Low risk of patient condition declining or worsening         Progress made toward(s) clinical / shift goals:  Right groin cath site and right brachial site c/d/I, soft, no oozing. Plan of care discussed with patient. All questions answered.       Patient is not progressing towards the following goals:

## 2023-09-29 NOTE — CARE PLAN
The patient is Stable - Low risk of patient condition declining or worsening    Shift Goals  Clinical Goals: access sites safety and dc  Patient Goals: dc  Family Goals: dc    Progress made toward(s) clinical / shift goals:      Problem: Fall Risk  Goal: Patient will remain free from falls  Outcome: Progressing     Problem: Knowledge Deficit - Standard  Goal: Patient and family/care givers will demonstrate understanding of plan of care, disease process/condition, diagnostic tests and medications  Outcome: Progressing     Problem: Acute Care of the Cardiac Cath Patient  Goal: Post Procedure Optimal Outcome for the Cardiac Cath Patient  Outcome: Progressing     Problem: Psychosocial  Goal: Patient's level of anxiety will decrease  Outcome: Progressing  Goal: Patient's ability to verbalize feelings about condition will improve  Outcome: Progressing  Goal: Patient's ability to re-evaluate and adapt role responsibilities will improve  Outcome: Progressing  Goal: Patient and family will demonstrate ability to cope with life altering diagnosis and/or procedure  Outcome: Progressing  Goal: Spiritual and cultural needs incorporated into hospitalization  Outcome: Progressing     Problem: Communication  Goal: The ability to communicate needs accurately and effectively will improve  Outcome: Progressing     Problem: Discharge Barriers/Planning  Goal: Patient's continuum of care needs are met  Outcome: Progressing     Problem: Hemodynamics  Goal: Patient's hemodynamics, fluid balance and neurologic status will be stable or improve  Outcome: Progressing     Problem: Respiratory  Goal: Patient will achieve/maintain optimum respiratory ventilation and gas exchange  Outcome: Progressing     Problem: Chest Tube Management  Goal: Complications related to chest tube will be avoided or minimized  Outcome: Progressing     Problem: Fluid Volume  Goal: Fluid volume balance will be maintained  Outcome: Progressing     Problem: Mechanical  Ventilation  Goal: Safe management of artificial airway and ventilation  Outcome: Progressing  Goal: Successful weaning off mechanical ventilator, spontaneously maintains adequate gas exchange  Outcome: Progressing  Goal: Patient will be able to express needs and understand communication  Outcome: Progressing     Problem: Dysphagia  Goal: Dysphagia will improve  Outcome: Progressing     Problem: Risk for Aspiration  Goal: Patient's risk for aspiration will be absent or decrease  Outcome: Progressing     Problem: Nutrition  Goal: Patient's nutritional and fluid intake will be adequate or improve  Outcome: Progressing  Goal: Enteral nutrition will be maintained or improve  Outcome: Progressing  Goal: Enteral nutrition will be maintained or improve  Outcome: Progressing     Problem: Urinary Elimination  Goal: Establish and maintain regular urinary output  Outcome: Progressing     Problem: Bowel Elimination  Goal: Establish and maintain regular bowel function  Outcome: Progressing     Problem: Gastrointestinal Irritability  Goal: Nausea and vomiting will be absent or improve  Outcome: Progressing  Goal: Diarrhea will be absent or improved  Outcome: Progressing     Problem: Rectal Tube  Goal: Fecal output will be contained and skin will remain free from irritation  Outcome: Progressing     Problem: Mobility  Goal: Patient's capacity to carry out activities will improve  Outcome: Progressing     Problem: Self Care  Goal: Patient will have the ability to perform ADLs independently or with assistance (bathe, groom, dress, toilet and feed)  Outcome: Progressing     Problem: Infection - Standard  Goal: Patient will remain free from infection  Outcome: Progressing     Problem: Wound/ / Incision Healing  Goal: Patient's wound/surgical incision will decrease in size and heals properly  Outcome: Progressing         Patient is not progressing towards the following goals:  Pt is progressing as expected

## 2023-09-29 NOTE — PROGRESS NOTES
1750  Pt a & ox 4.   Right brachial site c/d/I with gauze and tegaderm. Site soft with no oozing or hematoma. Right hand cool to touch but equally cool to LUE. Unchanged from pacu. Right arm, red but blanching. <3 sec cap refill. Pt denies numnbess and tingling to RUE. 2+ radial pulse.   Right groin site c/d/I with gauze and tegaderm. Site soft with no oozing or hematoma. Right lower extremity cool to touch but equally cool to LLE. Unchanged from pacu. Right lower extremity red but blanching. <3 sec cap refill. Pt denies numnbess and tingling to RLL. 2+ right pedal pulse.   Pt was on bedrest until 1630.   Pt had 4 small drops of blood from urethra the first and third time of urinating. No owusu placed in OR. Liliana WHITAKER cards notified. Update rounding cardiology team tomorrow if continues.

## 2023-10-04 NOTE — PROGRESS NOTES
INTERVENTIONAL CARDIOLOGY  CONSULTATION NOTE      Date of Visit: 10/4/2023    Primary Care Provider: Rafael Park M.D.    Patient Name: Jaime Lee  YOB: 1950  MRN: [id number]     Reason for Visit:    evaluation     Patient Story:   Jaime Lee is a 73 year-old gentleman with a past medical history of severe coronary artery disease (s/p 4V CABG in 2000), prior acute anterior MI, chronic systolic heart failure/ischemic cardiomyopathy (s/p AICD in 2003), ventricular arrhythmia with prior AICD discharges, unprovoked DVT/PE (on chronic anticoagulation), and type 2 diabetes with stage III chronic kidney disease.  Briefly, he has a known history of severe coronary artery disease and underwent a four-vessel coronary artery bypass grafting surgery in 2000 in the setting of an acute anterior myocardial infarction.  More recently, he had episodes of syncope due to ventricular arrhythmias requiring defibrillation by his AICD.  He was referred for coronary angiography to evaluate his increased arrhythmic burden, which demonstrated stump occlusions of his SVG-RCA and SVG-D1, probably moderate disease in the anastomosis of the SVG-OM, severe disease in a large diagonal branch after the LIMA anastomosis, a short  of the distal RCA that was treated with PCI, a  of the mid LAD, and a  of the ostial LCx.    He presents today for consultation regarding his recent cardiac catheterization results. Currently, he reports no clear anginal symptoms, although he states that he has had significant nausea since beginning amiodarone and he also feels that his exertional tolerance is limited by fatigue that he attributes to bradycardia from his medications, particularly metoprolol.  He also notes that he is currently undergoing a transplant evaluation at [place], although he has not been sick enough to warrant a transplant at this time.     Medications and Allergies:      Current Outpatient Medications   Medication Sig Dispense Refill    clopidogrel (PLAVIX) 75 MG Tab Take 1 Tablet by mouth every day. 90 Tablet 3    Cholecalciferol (VITAMIN D) 125 MCG (5000 UT) Cap Take 1 Tablet by mouth every evening.      amiodarone (CORDARONE) 200 MG Tab Take 2 Tablets by mouth 2 times a day for 14 days, THEN 1 Tablet every day for 30 days. 86 Tablet 0    Cholecalciferol (D3 PO) Take 1 Capsule by mouth every day.      rosuvastatin (CRESTOR) 5 MG Tab Take 1 Tablet by mouth every Monday, Wednesday, and Friday. 50 Tablet 4    furosemide (LASIX) 40 MG Tab Take 0.5 Tablets by mouth every day. (Patient taking differently: Take 20 mg by mouth every morning.) 50 Tablet 3    potassium chloride SA (KDUR) 20 MEQ Tab CR Take 0.5 Tablets by mouth every day. 1/2 tab daily (Patient taking differently: Take 10 mEq by mouth every morning. 1/2 tab daily) 50 Tablet 3    metFORMIN (GLUCOPHAGE) 500 MG Tab TAKE 1 TABLET BY MOUTH TWICE A DAY WITH FOOD (Patient taking differently: Take 500 mg by mouth 2 times a day with meals.) 200 Tablet 3    Empagliflozin (JARDIANCE) 10 MG Tab tablet Take 1 Tablet by mouth every day. (Patient taking differently: Take 10 mg by mouth every morning.) 30 Tablet 3    metoprolol SR (TOPROL XL) 50 MG TABLET SR 24 HR Take 50 mg by mouth every morning.      rivaroxaban (XARELTO) 20 MG Tab tablet Take 1 Tablet by mouth with dinner. 100 Tablet 3    spironolactone (ALDACTONE) 25 MG Tab Take 0.5 Tablets by mouth every day. (Patient taking differently: Take 12.5 mg by mouth every morning.) 50 Tablet 3    losartan (COZAAR) 25 MG Tab Take 0.5 Tablets by mouth every day. (Patient taking differently: Take 12.5 mg by mouth every morning.) 50 Tablet 3    Magnesium 500 MG Cap Take 1 Cap by mouth every day. (Patient taking differently: Take 500 mg by mouth every evening.) 180 Cap 4     No current facility-administered medications for this visit.       Allergies   Allergen Reactions    Pcn [Penicillins]  Anaphylaxis    Ace Inhibitors Cough     Cough        Medical Decision Making:   I reviewed the results of the patient's angiogram with him and his family.  He has had a significant cardiomyopathy for several years and previously experienced an anterior myocardial infarction.  Given the duration of his ischemic disease, I think that it is unlikely that he would achieve much improvement in his systolic function with revascularization and, in the absence of clear anginal symptoms, revascularization would not provide any symptomatic benefit.  Ultimately, after reviewing his angiogram, I think that he would gain the greatest long-term benefit from a cardiac transplant if he were a candidate.  At this time, I think that the potential risks of either a  intervention (with blunt, ambiguous proximal caps for both the ostial LCx and mid LAD) or a graft intervention (probably moderate disease in the SVG-OM anastomosis and a long course for intervention on the diagonal through the LIMA) outweigh the likely benefits.  If he were to have clear angina, then possibly these could be reconsidered.  Additionally, he has a long history of ventricular arrhythmias that are likely scar-based and not ischemic in etiology and would not clearly improve in burden with revascularization.    For now, I recommended trying to optimize his medical regimen by transitioning from losartan to Entresto if tolerated and to increase his spironolactone back to 25 mg daily (this had been previously reduced by his PCP).  He does note significant side effects on amiodarone and we will place a consult for EP evaluation to see if he can be transitioned to another agent.  His bradycardia is also a challenge as this limits intensification of his beta-blocker to goal doses and may be causing some of his symptoms.  However, treatment of this would likely require upgrade to a CRT device and loss of AV synchrony to base above his native sinus rate.    Care  Time  A total of 61 minutes of care time was spent on this complex patient encounter.    Follow Up  As needed if clear symptomatic angina develops.     Cardiac Studies and Procedures:   Echocardiography  TTE (5/2/2022)  Severe LV enlargement with severely reduced systolic function.  Estimated LVEF = 19% by MOD.  Normal RV size with mild to moderately reduced systolic function.   Mild TR. Estimated RVSP = 24 mmHg (RAP 3 mmHg). Trace AR/VA/MR.     TTE (3/17/2021)  The left ventricle is severely dilated.  Left ventricular ejection fraction is visually estimated to be 20%.  No significant valve disease or flow abnormalities.     Coronary Angiography/Cardiac Catheterization  CAG (9/28/2023)  Left main: Luminal disease  Left anterior descending: Occluded in the mid portion after large septal branches takeoff, fills through LIMA.  Left circumflex: Flush occluded ostially  Ramus intermedius: Luminal disease  Right coronary: Dominant,  in distal portion, TIMI1 flow into distal portion bridging collaterals.  Vein graft to RCA occluded  Vein graft to diagonal occluded  Vein graft to marginal branch widely patent with possible moderate stenosis versus tortuosity at the anastomosis  LIMA to LAD overall small, after anastomosis the LAD bifurcates into a small ongoing distal LAD and a large diagonal branch.  The diagonal branch has 70% disease in the proximal portion  Left Ventricular EDP: 3 mm Hg  No significant AV gradient noted    Percutaneous Coronary Intervention  PCI (9/28/2023)  Successful PCI to the distal RCA  with one 3.0 x 26 mm Shar AZRA      Vital Signs:   There were no vitals taken for this visit.   BP Readings from Last 4 Encounters:   09/29/23 124/71   09/25/23 110/58   09/21/23 111/59   09/20/23 126/78     Wt Readings from Last 4 Encounters:   09/29/23 66.1 kg (145 lb 11.6 oz)   09/25/23 66.7 kg (147 lb)   09/21/23 66.2 kg (145 lb 15.1 oz)   09/20/23 68 kg (149 lb 14.6 oz)     There is no height or weight on  "file to calculate BMI.     Laboratories:   Lipids  Lab Results   Component Value Date/Time    LDL 90 08/02/2023 07:51 AM    LDL 87 04/02/2022 08:51 AM    LDL 85 03/05/2021 09:07 AM    LDL 98 05/27/2020 06:09 AM    LDL 86 04/25/2019 06:43 AM       Lab Results   Component Value Date/Time    HDL 64 08/02/2023 07:51 AM    HDL 58 04/02/2022 08:51 AM    HDL 57 03/05/2021 09:07 AM    HDL 60 05/27/2020 06:09 AM    HDL 43 04/25/2019 06:43 AM       Lab Results   Component Value Date/Time    TRIGLYCERIDE 93 08/02/2023 07:51 AM    TRIGLYCERIDE 118 04/02/2022 08:51 AM    TRIGLYCERIDE 120 03/05/2021 09:07 AM    TRIGLYCERIDE 137 05/27/2020 06:09 AM    TRIGLYCERIDE 179 (H) 04/25/2019 06:43 AM       Lab Results   Component Value Date/Time    CHOLSTRLTOT 173 08/02/2023 07:51 AM    CHOLSTRLTOT 169 04/02/2022 08:51 AM    CHOLSTRLTOT 166 03/05/2021 09:07 AM    CHOLSTRLTOT 185 05/27/2020 06:09 AM    CHOLSTRLTOT 165 04/25/2019 06:43 AM       No components found for: \"LPA\"      Chemistries  Lab Results   Component Value Date/Time    CREATININE 1.22 09/29/2023 02:25 AM    CREATININE 1.34 09/28/2023 07:03 AM    CREATININE 1.22 09/20/2023 11:24 AM    CREATININE 1.28 08/02/2023 07:51 AM    CREATININE 1.37 01/06/2023 09:00 AM    CREATININE 1.0 01/08/2008 07:45 AM    CREATININE 1.2 12/21/2007 07:00 AM    CREATININE 1.1 04/19/2007 06:40 AM    CREATININE 1.2 03/20/2006 07:14 AM    CREATININE 1.3 06/17/2005 07:30 AM     Lab Results   Component Value Date/Time    BUN 28 (H) 09/29/2023 02:25 AM    BUN 35 (H) 09/28/2023 07:03 AM    BUN 31 (H) 09/20/2023 11:24 AM    BUN 22 08/02/2023 07:51 AM    BUN 19 01/06/2023 09:00 AM     Lab Results   Component Value Date/Time    POTASSIUM 4.3 09/29/2023 02:25 AM    POTASSIUM 5.0 09/28/2023 07:03 AM    POTASSIUM 4.6 09/20/2023 11:24 AM     Lab Results   Component Value Date/Time    SODIUM 139 09/29/2023 02:25 AM    SODIUM 138 09/28/2023 07:03 AM    SODIUM 139 09/20/2023 11:24 AM     Lab Results   Component Value " "Date/Time    GLUCOSE 100 (H) 09/29/2023 02:25 AM    GLUCOSE 127 (H) 09/28/2023 07:03 AM    GLUCOSE 154 (H) 09/20/2023 11:24 AM     Lab Results   Component Value Date/Time    ASTSGOT 15 09/28/2023 07:03 AM    ASTSGOT 21 09/20/2023 11:24 AM    ASTSGOT 14 08/02/2023 07:51 AM     Lab Results   Component Value Date/Time    ALTSGPT 7 09/28/2023 07:03 AM    ALTSGPT 11 09/20/2023 11:24 AM    ALTSGPT 8 08/02/2023 07:51 AM     Lab Results   Component Value Date/Time    ALKPHOSPHAT 65 09/28/2023 07:03 AM    ALKPHOSPHAT 51 09/20/2023 11:24 AM    ALKPHOSPHAT 58 08/02/2023 07:51 AM     Lab Results   Component Value Date/Time    HBA1C 6.2 (A) 06/22/2023 01:57 PM    HBA1C 6.6 (H) 01/06/2023 09:00 AM    HBA1C 6.9 (H) 04/02/2022 08:51 AM     No results found for: \"TSH\"  No results found for: \"NTPROBNP\"  Lab Results   Component Value Date/Time    TROPONINT 89 (H) 09/20/2023 10:16 PM    TROPONINT 37 (H) 09/20/2023 11:24 AM       Blood Counts  Lab Results   Component Value Date/Time    HEMOGLOBIN 14.8 09/29/2023 02:25 AM    HEMOGLOBIN 16.0 09/28/2023 07:03 AM    HEMOGLOBIN 15.4 09/21/2023 02:07 AM     Lab Results   Component Value Date/Time    PLATELETCT 162 (L) 09/29/2023 02:25 AM    PLATELETCT 194 09/28/2023 07:03 AM    PLATELETCT 150 (L) 09/21/2023 02:07 AM     Lab Results   Component Value Date/Time    WBC 8.2 09/29/2023 02:25 AM    WBC 6.7 09/28/2023 07:03 AM    WBC 8.7 09/21/2023 02:07 AM        Physical Examination:   General: Thin, somewhat frail appearing, but in no acute distress  Eyes: Extraocular movements intact, anicteric  Neck: Full range of motion, no jugular venous distension  Pulmonary: Normal respiratory effort, no distress  Cardiovascular: Regular rate, regular rhythm  Gastrointestinal: Thin, nondistended  Extremities: Dense bilateral chronic venous stasis changes, but no significant edema  Neurological: Alert and oriented, no gross focal motor deficits  Psychiatric: Normal affect, normal judgment     Past History: "   Past Medical History  The patient's past medical history was reviewed.  See HPI and self-reported patient medical history form for pertinent medical history to consultation.    Past Social History  The patient's social history was reviewed.  See HPI self-reported patient medical history form for pertinent social history to consultation.    Past Family History  The patient's family history was reviewed.  See HPI self-reported patient medical history form for pertinent family history to consultation.    Review of Systems  A pertinent cardiac review of systems was performed and was otherwise unremarkable except as per HPI and self-reported patient medical history form.        Leonard Rock MD, [facility]  Interventional Cardiology  [facility], [address]  [address] [address]  Phone: [phone]  Fax: [phone]

## 2023-10-05 ENCOUNTER — OFFICE VISIT (OUTPATIENT)
Dept: CARDIOLOGY | Facility: MEDICAL CENTER | Age: 73
End: 2023-10-05
Attending: INTERNAL MEDICINE
Payer: MEDICARE

## 2023-10-05 VITALS
WEIGHT: 145 LBS | HEART RATE: 44 BPM | RESPIRATION RATE: 16 BRPM | BODY MASS INDEX: 20.3 KG/M2 | HEIGHT: 71 IN | OXYGEN SATURATION: 97 % | DIASTOLIC BLOOD PRESSURE: 62 MMHG | SYSTOLIC BLOOD PRESSURE: 128 MMHG

## 2023-10-05 DIAGNOSIS — I25.5 ISCHEMIC CARDIOMYOPATHY: ICD-10-CM

## 2023-10-05 DIAGNOSIS — I50.22 CHRONIC SYSTOLIC CONGESTIVE HEART FAILURE, NYHA CLASS 2 (HCC): ICD-10-CM

## 2023-10-05 DIAGNOSIS — I25.10 CORONARY ARTERY DISEASE DUE TO CALCIFIED CORONARY LESION: ICD-10-CM

## 2023-10-05 DIAGNOSIS — E11.22 TYPE 2 DIABETES MELLITUS WITH STAGE 3A CHRONIC KIDNEY DISEASE, WITHOUT LONG-TERM CURRENT USE OF INSULIN (HCC): ICD-10-CM

## 2023-10-05 DIAGNOSIS — I47.20 VENTRICULAR TACHYCARDIA (HCC): ICD-10-CM

## 2023-10-05 DIAGNOSIS — I25.84 CORONARY ARTERY DISEASE DUE TO CALCIFIED CORONARY LESION: ICD-10-CM

## 2023-10-05 DIAGNOSIS — Z95.810 AICD (AUTOMATIC CARDIOVERTER/DEFIBRILLATOR) PRESENT: ICD-10-CM

## 2023-10-05 DIAGNOSIS — I25.2 OLD ANTERIOR MYOCARDIAL INFARCTION: ICD-10-CM

## 2023-10-05 DIAGNOSIS — N18.31 TYPE 2 DIABETES MELLITUS WITH STAGE 3A CHRONIC KIDNEY DISEASE, WITHOUT LONG-TERM CURRENT USE OF INSULIN (HCC): ICD-10-CM

## 2023-10-05 DIAGNOSIS — Z95.1 S/P CABG X 4: ICD-10-CM

## 2023-10-05 DIAGNOSIS — I47.20 VENTRICULAR TACHYARRHYTHMIA (HCC): ICD-10-CM

## 2023-10-05 DIAGNOSIS — R00.1 BRADYCARDIA: ICD-10-CM

## 2023-10-05 PROBLEM — D68.69 SECONDARY HYPERCOAGULABLE STATE (HCC): Status: RESOLVED | Noted: 2022-03-03 | Resolved: 2023-10-05

## 2023-10-05 PROBLEM — Z85.828 HX OF BASAL CELL CARCINOMA: Status: RESOLVED | Noted: 2017-11-28 | Resolved: 2023-10-05

## 2023-10-05 PROBLEM — R55 SYNCOPE AND COLLAPSE: Status: RESOLVED | Noted: 2023-09-20 | Resolved: 2023-10-05

## 2023-10-05 PROBLEM — E26.9 ALDOSTERONISM (HCC): Status: RESOLVED | Noted: 2023-06-22 | Resolved: 2023-10-05

## 2023-10-05 PROBLEM — Z76.89 ENCOUNTER TO ESTABLISH CARE WITH NEW DOCTOR: Status: RESOLVED | Noted: 2023-06-22 | Resolved: 2023-10-05

## 2023-10-05 PROBLEM — E26.1 SECONDARY HYPERALDOSTERONISM (HCC): Status: ACTIVE | Noted: 2023-06-22

## 2023-10-05 PROBLEM — I45.10 RIGHT BUNDLE BRANCH BLOCK: Status: RESOLVED | Noted: 2019-02-04 | Resolved: 2023-10-05

## 2023-10-05 LAB — EKG IMPRESSION: NORMAL

## 2023-10-05 PROCEDURE — 93010 ELECTROCARDIOGRAM REPORT: CPT | Performed by: INTERNAL MEDICINE

## 2023-10-05 PROCEDURE — RXMED WILLOW AMBULATORY MEDICATION CHARGE: Performed by: INTERNAL MEDICINE

## 2023-10-05 PROCEDURE — 3078F DIAST BP <80 MM HG: CPT | Performed by: INTERNAL MEDICINE

## 2023-10-05 PROCEDURE — 99417 PROLNG OP E/M EACH 15 MIN: CPT | Performed by: INTERNAL MEDICINE

## 2023-10-05 PROCEDURE — 99213 OFFICE O/P EST LOW 20 MIN: CPT | Performed by: INTERNAL MEDICINE

## 2023-10-05 PROCEDURE — 99215 OFFICE O/P EST HI 40 MIN: CPT | Mod: 25 | Performed by: INTERNAL MEDICINE

## 2023-10-05 PROCEDURE — 93005 ELECTROCARDIOGRAM TRACING: CPT | Performed by: INTERNAL MEDICINE

## 2023-10-05 PROCEDURE — 3074F SYST BP LT 130 MM HG: CPT | Performed by: INTERNAL MEDICINE

## 2023-10-05 RX ORDER — AMIODARONE HYDROCHLORIDE 200 MG/1
200 TABLET ORAL DAILY
COMMUNITY
End: 2023-10-18 | Stop reason: SDUPTHER

## 2023-10-05 RX ORDER — SACUBITRIL AND VALSARTAN 24; 26 MG/1; MG/1
1 TABLET, FILM COATED ORAL 2 TIMES DAILY
Qty: 180 TABLET | Refills: 3 | Status: SHIPPED | OUTPATIENT
Start: 2023-10-05

## 2023-10-05 ASSESSMENT — FIBROSIS 4 INDEX: FIB4 SCORE: 2.55

## 2023-10-05 NOTE — PATIENT INSTRUCTIONS
Stop losartan  Start Entresto 24-26 mg twice daily  Start taking a full tablet of spironolactone (25 mg dose)  Have labs checked in about 1 week after these changes  A referral has been placed to EP for assessment of amiodarone

## 2023-10-06 ENCOUNTER — PHARMACY VISIT (OUTPATIENT)
Dept: PHARMACY | Facility: MEDICAL CENTER | Age: 73
End: 2023-10-06
Payer: COMMERCIAL

## 2023-10-18 ENCOUNTER — TELEPHONE (OUTPATIENT)
Dept: CARDIOLOGY | Facility: MEDICAL CENTER | Age: 73
End: 2023-10-18
Payer: MEDICARE

## 2023-10-18 ENCOUNTER — OFFICE VISIT (OUTPATIENT)
Dept: CARDIOLOGY | Facility: MEDICAL CENTER | Age: 73
End: 2023-10-18
Attending: INTERNAL MEDICINE
Payer: MEDICARE

## 2023-10-18 VITALS
OXYGEN SATURATION: 94 % | HEIGHT: 71 IN | RESPIRATION RATE: 16 BRPM | BODY MASS INDEX: 20.58 KG/M2 | HEART RATE: 41 BPM | WEIGHT: 147 LBS | DIASTOLIC BLOOD PRESSURE: 58 MMHG | SYSTOLIC BLOOD PRESSURE: 98 MMHG

## 2023-10-18 DIAGNOSIS — R00.1 BRADYCARDIA: ICD-10-CM

## 2023-10-18 DIAGNOSIS — I50.22 CHRONIC SYSTOLIC CONGESTIVE HEART FAILURE, NYHA CLASS 2 (HCC): ICD-10-CM

## 2023-10-18 DIAGNOSIS — N18.31 TYPE 2 DIABETES MELLITUS WITH STAGE 3A CHRONIC KIDNEY DISEASE, WITHOUT LONG-TERM CURRENT USE OF INSULIN (HCC): ICD-10-CM

## 2023-10-18 DIAGNOSIS — I25.5 ISCHEMIC CARDIOMYOPATHY: ICD-10-CM

## 2023-10-18 DIAGNOSIS — Z86.711 HISTORY OF PULMONARY EMBOLUS (PE): ICD-10-CM

## 2023-10-18 DIAGNOSIS — E11.22 TYPE 2 DIABETES MELLITUS WITH STAGE 3A CHRONIC KIDNEY DISEASE, WITHOUT LONG-TERM CURRENT USE OF INSULIN (HCC): ICD-10-CM

## 2023-10-18 DIAGNOSIS — Z95.810 AICD (AUTOMATIC CARDIOVERTER/DEFIBRILLATOR) PRESENT: ICD-10-CM

## 2023-10-18 DIAGNOSIS — I47.20 VENTRICULAR TACHYCARDIA (HCC): ICD-10-CM

## 2023-10-18 PROCEDURE — 3078F DIAST BP <80 MM HG: CPT | Performed by: INTERNAL MEDICINE

## 2023-10-18 PROCEDURE — 99213 OFFICE O/P EST LOW 20 MIN: CPT | Performed by: INTERNAL MEDICINE

## 2023-10-18 PROCEDURE — 93282 PRGRMG EVAL IMPLANTABLE DFB: CPT | Performed by: INTERNAL MEDICINE

## 2023-10-18 PROCEDURE — 93282 PRGRMG EVAL IMPLANTABLE DFB: CPT | Mod: 26 | Performed by: INTERNAL MEDICINE

## 2023-10-18 PROCEDURE — 3074F SYST BP LT 130 MM HG: CPT | Performed by: INTERNAL MEDICINE

## 2023-10-18 PROCEDURE — 99214 OFFICE O/P EST MOD 30 MIN: CPT | Mod: 25 | Performed by: INTERNAL MEDICINE

## 2023-10-18 RX ORDER — AMIODARONE HYDROCHLORIDE 200 MG/1
200 TABLET ORAL DAILY
Qty: 90 TABLET | Refills: 3 | Status: SHIPPED | OUTPATIENT
Start: 2023-10-18 | End: 2024-01-17

## 2023-10-18 ASSESSMENT — FIBROSIS 4 INDEX: FIB4 SCORE: 2.55

## 2023-10-18 NOTE — PROGRESS NOTES
Chief Complaint   Patient presents with    Bradycardia       Subjective     Prudencio Lee is a 73 y.o. male who presents today with history of ischemic cardiomyopathy and congestive heart failure.  Old bypass surgery.  Recent intervention.  Followed by Dr. Rock.  Recent shocks x3 from the device.  Appropriate.  Placed on amiodarone.  Also followed by Praveen.  Bradycardia with some RV pacing at 15 % with VVI pacing at 40.  Flat histogram. Some fatigue.  Some dizziness.  Old single-chamber implantable defibrillator from 2003.    Past Medical History:   Diagnosis Date    Acute anterior myocardial infarction (HCC) 12/28/2000    PCI attempted by Dr. Malloy but unsuccessful    AICD (automatic cardioverter/defibrillator) present 2003/2008/2015    Defibrillator is a DEXMA model XVUA3T4, serial #[id number], implanted Dr. Stephenson November 2015. The ventricular lead is a Medtronic model #6947-65, lead serial #[id number], implant 7/2/2003    Allergy     within 2 years    Arthritis 9/23    osteoarthritis    CAD (coronary artery disease) 12/28/2000    CABG x 4 (LIMA to LAD, rSVG to first diagonal, left circumflex and PDA).2000: CABG x 4 (LIMA to LAD, rSVG to first diagonal, left circumflex and PDA).  2003: LIMA and circumflex grafts patent, diagonal and PDA grafts closed. September 2023: PCI/AZRA (Shar 3.0 x 26mm) the distal RCA.    Chronic anticoagulation 02/01/2018    Chronic deep vein thrombosis (DVT) of femoral vein of left lower extremity (HCC) 01/04/2018    Congestive heart failure (HCC) 12/28/2000    COVID 08/28/2023    pt denies symptoms now 9/27/23    Dental disorder     upper denture    GERD (gastroesophageal reflux disease)     High cholesterol     Hypertension     Ischemic cardiomyopathy 05/2022    Echocardiogram with severely dilated LV, LVEF <25%. Severe diffuse hypokinesis. Septal akinesis. Normal LA and RV. Moderately dilated RA. Trace MR, mild TR. RVSP 23.8mmHg.    Left bundle branch block       Pneumonia     Pulmonary embolism (HCC) 2014    Thrombocytopenia (HCC) 2014    Type 2 diabetes mellitus without complication (HCC)      oral medication    Vascular disorder of extremity (HCC) 2020    Ventricular tachyarrhythmia (HCC)      Past Surgical History:   Procedure Laterality Date    RECOVERY  11/3/2015    Procedure: CATH LAB-STEPHENSON-ICD GENERATOR CHANGE 75418- EUNICE T82.111A-MEDTRONIC BLOODLESS;  Surgeon: Recoveryonly Surgery;  Location: SURGERY PRE-POST PROC UNIT [facility];  Service:     AICD BATTERY CHANGE  2015    Generator replacement with Medtronic Evera S VR [id number] implanted by Dr. Marcus Stephenson.    AICD IMPLANT  2008    Medtronic Virtuoso VR [id number] implanted by Dr. Stephenson.    OTHER CARDIAC SURGERY  2000    CABG x 4    APPENDECTOMY  1969    MULTIPLE CORONARY ARTERY BYPASS       Family History   Problem Relation Age of Onset    Diabetes Mother     Heart Disease Mother     Diabetes Sister     Alcohol/Drug Brother      Social History     Socioeconomic History    Marital status:      Spouse name: Not on file    Number of children: Not on file    Years of education: Not on file    Highest education level: Not on file   Occupational History     Comment: new   retired   Tobacco Use    Smoking status: Former     Current packs/day: 0.00     Average packs/day: 1.3 packs/day for 48.0 years (64.0 ttl pk-yrs)     Types: Cigarettes     Start date: 1968     Quit date: 2000     Years since quittin.8    Smokeless tobacco: Never   Vaping Use    Vaping Use: Never used   Substance and Sexual Activity    Alcohol use: Yes     Alcohol/week: 1.2 oz     Types: 2 Standard drinks or equivalent per week     Comment: occ    Drug use: Not Currently    Sexual activity: Never   Other Topics Concern    Not on file   Social History Narrative    Not on file     Social Determinants of Health     Financial Resource Strain: Not on file   Food Insecurity: Not on file  "  Transportation Needs: Not on file   Physical Activity: Not on file   Stress: Not on file   Social Connections: Not on file   Intimate Partner Violence: Not on file   Housing Stability: Not on file     Allergies   Allergen Reactions    Pcn [Penicillins] Anaphylaxis    Ace Inhibitors Cough     Cough     Outpatient Encounter Medications as of 10/18/2023   Medication Sig Dispense Refill    amiodarone (CORDARONE) 200 MG Tab Take 1 Tablet by mouth every day. 90 Tablet 3    sacubitril-valsartan (ENTRESTO) 24-26 MG Tab Take 1 Tablet by mouth 2 times a day. 180 Tablet 3    clopidogrel (PLAVIX) 75 MG Tab Take 1 Tablet by mouth every day. 90 Tablet 3    Cholecalciferol (D3 PO) Take 1 Capsule by mouth every day.      rosuvastatin (CRESTOR) 5 MG Tab Take 1 Tablet by mouth every Monday, Wednesday, and Friday. 50 Tablet 4    furosemide (LASIX) 40 MG Tab Take 0.5 Tablets by mouth every day. 50 Tablet 3    potassium chloride SA (KDUR) 20 MEQ Tab CR Take 0.5 Tablets by mouth every day. 1/2 tab daily 50 Tablet 3    metFORMIN (GLUCOPHAGE) 500 MG Tab TAKE 1 TABLET BY MOUTH TWICE A DAY WITH FOOD 200 Tablet 3    Empagliflozin (JARDIANCE) 10 MG Tab tablet Take 1 Tablet by mouth every day. 30 Tablet 3    metoprolol SR (TOPROL XL) 50 MG TABLET SR 24 HR Take 50 mg by mouth every morning.      rivaroxaban (XARELTO) 20 MG Tab tablet Take 1 Tablet by mouth with dinner. 100 Tablet 3    spironolactone (ALDACTONE) 25 MG Tab Take 0.5 Tablets by mouth every day. (Patient taking differently: Take 25 mg by mouth every day.) 50 Tablet 3    Magnesium 500 MG Cap Take 1 Cap by mouth every day. 180 Cap 4    [DISCONTINUED] amiodarone (CORDARONE) 200 MG Tab Take 200 mg by mouth every day.       No facility-administered encounter medications on file as of 10/18/2023.     ROS           Objective     BP 98/58 (BP Location: Left arm, Patient Position: Sitting, BP Cuff Size: Adult)   Pulse (!) 41   Resp 16   Ht 1.803 m (5' 11\")   Wt 66.7 kg (147 lb)   SpO2 " 94%   BMI 20.50 kg/m²     Physical Exam  Constitutional:       Appearance: He is well-developed.   HENT:      Head: Normocephalic and atraumatic.   Cardiovascular:      Rate and Rhythm: Normal rate and regular rhythm.      Heart sounds: No murmur heard.     No friction rub. No gallop.   Pulmonary:      Effort: Pulmonary effort is normal.      Breath sounds: Normal breath sounds.   Abdominal:      Palpations: Abdomen is soft.   Musculoskeletal:         General: Normal range of motion.      Cervical back: Normal range of motion and neck supple.   Skin:     General: Skin is warm and dry.   Neurological:      Mental Status: He is alert and oriented to person, place, and time.   Psychiatric:         Behavior: Behavior normal.         Thought Content: Thought content normal.         Judgment: Judgment normal.                Assessment & Plan     1. AICD (automatic cardioverter/defibrillator) present        2. Bradycardia        3. Ischemic cardiomyopathy        4. History of pulmonary embolus (PE)        5. Type 2 diabetes mellitus with stage 3a chronic kidney disease, without long-term current use of insulin ([facility])        6. Ventricular tachycardia (HCC)  amiodarone (CORDARONE) 200 MG Tab      7. Chronic systolic congestive heart failure, NYHA class 2 ([facility])            Medical Decision Making: Today's Assessment/Status/Plan:   1.  Bradycardia patient would benefit from upgrade plus or minus an LV lead.  Certainly an atrial lead.  The risks, benefits, and alternatives to cardiac resynchronization therapy defibrillator (CRT-D) placement were discussed in great detail, specific risks mentioned include bleeding, infection, cardiac perforation with possible tamponade possibly requiring pericardiocentesis or open heart surgery. In addition the possibility of lead dislodgment (2-3%), inappropriate shocks, pneumothorax (3%), hemothorax were discussed. The following specific CRT procedural complications were also mentioned  including the possibility of diaphragmatic stimulation necessitating lead repositioning, and a  possibility of being unable to place a left ventricular lead in 10% of patient's. Also mentioned were the possibilities of death, stroke, and myocardial infarction. The patient verbalized understanding of these potential complications and wishes to proceed with the procedure.   2.  VT continue amiodarone.  3.  Anticoagulation hold 2 days.  4.  Recent stenting.  5.  Also followed at [place]..

## 2023-10-18 NOTE — TELEPHONE ENCOUNTER
Mara,    Can you please enter the order for this procedure?    Upgrade to BiV ICD    Thank You,  Katrin

## 2023-10-18 NOTE — TELEPHONE ENCOUNTER
Patient scheduled for upgrade to BiV ICD on 11-1-23 with Dr. Stephenson. Patient has been instructed to check in at 11:00 for 1:00 case time. Hold Metformin am of. Hold Xarelto 2 days. Message sent to socrates Pollard with [employer] notified.

## 2023-10-24 ENCOUNTER — APPOINTMENT (OUTPATIENT)
Dept: ADMISSIONS | Facility: MEDICAL CENTER | Age: 73
End: 2023-10-24
Attending: INTERNAL MEDICINE
Payer: MEDICARE

## 2023-10-24 ENCOUNTER — OFFICE VISIT (OUTPATIENT)
Dept: MEDICAL GROUP | Facility: LAB | Age: 73
End: 2023-10-24
Payer: MEDICARE

## 2023-10-24 VITALS
OXYGEN SATURATION: 99 % | TEMPERATURE: 98.2 F | DIASTOLIC BLOOD PRESSURE: 60 MMHG | SYSTOLIC BLOOD PRESSURE: 108 MMHG | BODY MASS INDEX: 20.16 KG/M2 | HEIGHT: 71 IN | RESPIRATION RATE: 16 BRPM | WEIGHT: 144 LBS | HEART RATE: 56 BPM

## 2023-10-24 DIAGNOSIS — E11.65 TYPE 2 DIABETES MELLITUS WITH HYPERGLYCEMIA, WITHOUT LONG-TERM CURRENT USE OF INSULIN (HCC): ICD-10-CM

## 2023-10-24 DIAGNOSIS — E11.22 TYPE 2 DIABETES MELLITUS WITH STAGE 3A CHRONIC KIDNEY DISEASE, WITHOUT LONG-TERM CURRENT USE OF INSULIN (HCC): ICD-10-CM

## 2023-10-24 DIAGNOSIS — I25.10 CORONARY ARTERY DISEASE INVOLVING NATIVE CORONARY ARTERY OF NATIVE HEART WITHOUT ANGINA PECTORIS: ICD-10-CM

## 2023-10-24 DIAGNOSIS — N18.31 TYPE 2 DIABETES MELLITUS WITH STAGE 3A CHRONIC KIDNEY DISEASE, WITHOUT LONG-TERM CURRENT USE OF INSULIN (HCC): ICD-10-CM

## 2023-10-24 DIAGNOSIS — M25.522 LEFT ELBOW PAIN: ICD-10-CM

## 2023-10-24 LAB
HBA1C MFR BLD: 6.5 % (ref ?–5.8)
POCT INT CON NEG: NEGATIVE
POCT INT CON POS: POSITIVE

## 2023-10-24 PROCEDURE — RXMED WILLOW AMBULATORY MEDICATION CHARGE: Performed by: FAMILY MEDICINE

## 2023-10-24 PROCEDURE — 83036 HEMOGLOBIN GLYCOSYLATED A1C: CPT | Performed by: FAMILY MEDICINE

## 2023-10-24 PROCEDURE — 3074F SYST BP LT 130 MM HG: CPT | Performed by: FAMILY MEDICINE

## 2023-10-24 PROCEDURE — 99214 OFFICE O/P EST MOD 30 MIN: CPT | Performed by: FAMILY MEDICINE

## 2023-10-24 PROCEDURE — RXMED WILLOW AMBULATORY MEDICATION CHARGE: Performed by: INTERNAL MEDICINE

## 2023-10-24 PROCEDURE — 92250 FUNDUS PHOTOGRAPHY W/I&R: CPT | Mod: TC | Performed by: FAMILY MEDICINE

## 2023-10-24 PROCEDURE — 3078F DIAST BP <80 MM HG: CPT | Performed by: FAMILY MEDICINE

## 2023-10-24 RX ORDER — METFORMIN HYDROCHLORIDE 500 MG/1
500 TABLET, EXTENDED RELEASE ORAL DAILY
Qty: 90 TABLET | Refills: 3 | Status: SHIPPED | OUTPATIENT
Start: 2023-10-24

## 2023-10-24 ASSESSMENT — FIBROSIS 4 INDEX: FIB4 SCORE: 2.55

## 2023-10-24 NOTE — PROGRESS NOTES
Chief Complaint:   Chief Complaint   Patient presents with    Diabetes Follow-up       HPI: Established patient  Jaime Lee is a 73 y.o. male who presents for follow-up, discussed the following today:    Type 2 diabetes mellitus     On metformin 500 mg twice a day.  Would like to change to once a day.  Chronic, A1c rechecked in the office today 6.5      Left elbow pain  new concern, had a fall around 3 to 4 weeks ago, related to the syncope that he had recently and seen at the hospital and evaluated.  Patient is on blood thinners and now he is having the swelling since he had the fall on his left elbow, not increasing in size, and is not painful.    Neck pain:  Also after the recent fall, started to have pain on and off, denies numbness or tingling.  CT of the spine was done at the hospital for evaluation after the fall it shows degenerative changes without other acute findings.     Coronary artery disease involving native coronary artery of native heart without angina pectoris    Chronic, patient underwent angiography recently, he is seen by cardiology on regular basis, scheduled for pacemaker placement.  Patient on Plavix and blood thinners at this time.    Reviewed hospital records and cardiology notes    Past medical history, family history, social history and medications reviewed and updated in the record. Today   Current medications, problem list and allergies reviewed in Epic today   Health maintenance topics are reviewed and updated.    Patient Active Problem List    Diagnosis Date Noted    Bradycardia 10/05/2023    Coronary artery disease due to calcified coronary lesion 09/28/2023    Secondary hyperaldosteronism (HCC) 06/22/2023    Ventricular tachycardia (HCC) 04/25/2019    Macrocytosis without anemia 04/24/2018    Basal cell carcinoma of skin of left ear 02/01/2018    Hyperlipidemia 01/18/2017    S/P CABG x 4 06/22/2016    Type 2 diabetes mellitus with stage 3a chronic kidney disease,  without long-term current use of insulin ([facility]) 2016    Old anterior myocardial infarction 2016    Chronic systolic congestive heart failure, NYHA class 2 ([facility]) 2016    AICD (automatic cardioverter/defibrillator) present 2014    Ischemic cardiomyopathy 2014    History of pulmonary embolus (PE) 2014    Chronic kidney disease (CKD) stage G3a/A1, moderately decreased glomerular filtration rate (GFR) between 45-59 mL/min/1.73 square meter and albuminuria creatinine ratio less than 30 mg/g ([facility]) 2014     Family History   Problem Relation Age of Onset    Diabetes Mother     Heart Disease Mother     Diabetes Sister     Alcohol/Drug Brother      Social History     Socioeconomic History    Marital status:      Spouse name: Not on file    Number of children: Not on file    Years of education: Not on file    Highest education level: Not on file   Occupational History     Comment: new   retired   Tobacco Use    Smoking status: Former     Current packs/day: 0.00     Average packs/day: 1.3 packs/day for 48.0 years (64.0 ttl pk-yrs)     Types: Cigarettes     Start date: 1968     Quit date: 2000     Years since quittin.8    Smokeless tobacco: Never   Vaping Use    Vaping Use: Never used   Substance and Sexual Activity    Alcohol use: Yes     Alcohol/week: 1.2 oz     Types: 2 Standard drinks or equivalent per week     Comment: occ    Drug use: Not Currently    Sexual activity: Never   Other Topics Concern    Not on file   Social History Narrative    Not on file     Social Determinants of Health     Financial Resource Strain: Not on file   Food Insecurity: Not on file   Transportation Needs: Not on file   Physical Activity: Not on file   Stress: Not on file   Social Connections: Not on file   Intimate Partner Violence: Not on file   Housing Stability: Not on file     Current Outpatient Medications   Medication Sig Dispense Refill    metFORMIN ER  "(GLUCOPHAGE XR) 500 MG TABLET SR 24 HR Take 1 Tablet by mouth every day. 90 Tablet 3    amiodarone (CORDARONE) 200 MG Tab Take 1 Tablet by mouth every day. 90 Tablet 3    sacubitril-valsartan (ENTRESTO) 24-26 MG Tab Take 1 Tablet by mouth 2 times a day. 180 Tablet 3    clopidogrel (PLAVIX) 75 MG Tab Take 1 Tablet by mouth every day. 90 Tablet 3    Cholecalciferol (D3 PO) Take 1 Capsule by mouth every day.      rosuvastatin (CRESTOR) 5 MG Tab Take 1 Tablet by mouth every Monday, Wednesday, and Friday. 50 Tablet 4    furosemide (LASIX) 40 MG Tab Take 0.5 Tablets by mouth every day. 50 Tablet 3    potassium chloride SA (KDUR) 20 MEQ Tab CR Take 0.5 Tablets by mouth every day. 1/2 tab daily 50 Tablet 3    Empagliflozin (JARDIANCE) 10 MG Tab tablet Take 1 Tablet by mouth every day. 30 Tablet 3    metoprolol SR (TOPROL XL) 50 MG TABLET SR 24 HR Take 50 mg by mouth every morning.      rivaroxaban (XARELTO) 20 MG Tab tablet Take 1 Tablet by mouth with dinner. 100 Tablet 3    spironolactone (ALDACTONE) 25 MG Tab Take 0.5 Tablets by mouth every day. (Patient taking differently: Take 25 mg by mouth every day.) 50 Tablet 3    Magnesium 500 MG Cap Take 1 Cap by mouth every day. 180 Cap 4     No current facility-administered medications for this visit.           Review Of Systems  As documented in HPI above  PHYSICAL EXAMINATION:    /60 (BP Location: Right arm, Patient Position: Sitting, BP Cuff Size: Adult)   Pulse (!) 56   Temp 36.8 °C (98.2 °F) (Temporal)   Resp 16   Ht 1.803 m (5' 11\")   Wt 65.3 kg (144 lb)   SpO2 99%   BMI 20.08 kg/m²   Gen.: Well-developed, well-nourished, no apparent distress, pleasant and cooperative with the examination  HEENT: Normocephalic/atraumatic, sinuses nontender with palpation, TMs clear, nares patent with pink mucosa and clear rhinorrhea, oropharynx clear  Neck: No JVD or bruits, no adenopathy  Cor: Regular rate and rhythm without murmur gallop or rub  Lungs: Clear to auscultation " with equal breath sounds bilaterally. No wheezes, rhonchi.  Abdomen: Soft nontender without hepatosplenomegaly or masses appreciated, normoactive bowel sounds  Extremities: No cyanosis, clubbing or edema     Left elbow exam: Swollen with some bruising around it.  Nontender, no deformity noted.  ASSESSMENT/Plan:  1. Type 2 diabetes mellitus with hyperglycemia, without long-term current use of insulin (HCC)  Chronic, controlled, continue with 500 mg of metformin extended release once a day along with dietary restrictions and healthy lifestyle  POCT Hemoglobin A1C    metFORMIN ER (GLUCOPHAGE XR) 500 MG TABLET SR 24 HR      2. Type 2 diabetes mellitus with stage 3a chronic kidney disease, without long-term current use of insulin (HCC)  POCT Retinal Eye Exam      3. Left elbow pain  Status post fall, will do an x-ray for further evaluation most likely hematoma in the area of the elbow.  Will rule out other pathology like fracture.  Advised if pain or increased swelling to come back for further evaluation will consider orthopedics follow-up  DX-ELBOW-COMPLETE 3+ LEFT      4. Coronary artery disease involving native coronary artery of native heart without angina pectoris  Status post angiography, patient continues to follow-up with cardiologist, he is scheduled for pacemaker placement, stable clinically today, continue all medications as directed.   5.neck pain: After recent fall and trauma, CT from the hospital done for evaluation of the neck pain reviewed, no red flags or acute findings.  Advised to use Tylenol every 4-6 hours for pain control.  If not resolved to come back for further assessment      Please note that this dictation was created using voice recognition software. I have worked with consultants from the vendor as well as technical experts from [employer] to optimize the interface. I have made every reasonable attempt to correct obvious errors, but I expect that there are errors of grammar and possibly  content that I did not discover before finalizing the note.

## 2023-10-26 LAB — RETINAL SCREEN: NEGATIVE

## 2023-10-27 ENCOUNTER — HOSPITAL ENCOUNTER (OUTPATIENT)
Dept: RADIOLOGY | Facility: MEDICAL CENTER | Age: 73
End: 2023-10-27
Attending: FAMILY MEDICINE
Payer: MEDICARE

## 2023-10-27 ENCOUNTER — PHARMACY VISIT (OUTPATIENT)
Dept: PHARMACY | Facility: MEDICAL CENTER | Age: 73
End: 2023-10-27
Payer: COMMERCIAL

## 2023-10-27 DIAGNOSIS — M25.522 LEFT ELBOW PAIN: ICD-10-CM

## 2023-10-27 DIAGNOSIS — I82.512 CHRONIC DEEP VEIN THROMBOSIS (DVT) OF FEMORAL VEIN OF LEFT LOWER EXTREMITY (HCC): ICD-10-CM

## 2023-10-27 PROCEDURE — 73080 X-RAY EXAM OF ELBOW: CPT | Mod: LT

## 2023-10-27 PROCEDURE — RXMED WILLOW AMBULATORY MEDICATION CHARGE: Performed by: NURSE PRACTITIONER

## 2023-10-27 RX ORDER — RIVAROXABAN 20 MG/1
20 TABLET, FILM COATED ORAL
Qty: 100 TABLET | Refills: 3 | Status: CANCELLED | OUTPATIENT
Start: 2023-10-27

## 2023-10-30 ENCOUNTER — PRE-ADMISSION TESTING (OUTPATIENT)
Dept: ADMISSIONS | Facility: MEDICAL CENTER | Age: 73
End: 2023-10-30
Attending: INTERNAL MEDICINE
Payer: MEDICARE

## 2023-10-30 ENCOUNTER — PHARMACY VISIT (OUTPATIENT)
Dept: PHARMACY | Facility: MEDICAL CENTER | Age: 73
End: 2023-10-30
Payer: COMMERCIAL

## 2023-10-30 DIAGNOSIS — Z01.810 PRE-OPERATIVE CARDIOVASCULAR EXAMINATION: ICD-10-CM

## 2023-10-30 DIAGNOSIS — E11.65 TYPE 2 DIABETES MELLITUS WITH HYPERGLYCEMIA, WITHOUT LONG-TERM CURRENT USE OF INSULIN (HCC): ICD-10-CM

## 2023-10-30 DIAGNOSIS — Z01.812 PRE-OPERATIVE LABORATORY EXAMINATION: ICD-10-CM

## 2023-10-30 LAB
ALBUMIN SERPL BCP-MCNC: 4.7 G/DL (ref 3.2–4.9)
ALBUMIN/GLOB SERPL: 1.8 G/DL
ALP SERPL-CCNC: 64 U/L (ref 30–99)
ALT SERPL-CCNC: 9 U/L (ref 2–50)
ANION GAP SERPL CALC-SCNC: 14 MMOL/L (ref 7–16)
AST SERPL-CCNC: 9 U/L (ref 12–45)
BILIRUB SERPL-MCNC: 0.9 MG/DL (ref 0.1–1.5)
BUN SERPL-MCNC: 28 MG/DL (ref 8–22)
CALCIUM ALBUM COR SERPL-MCNC: 9.3 MG/DL (ref 8.5–10.5)
CALCIUM SERPL-MCNC: 9.9 MG/DL (ref 8.5–10.5)
CHLORIDE SERPL-SCNC: 101 MMOL/L (ref 96–112)
CO2 SERPL-SCNC: 24 MMOL/L (ref 20–33)
CREAT SERPL-MCNC: 1.75 MG/DL (ref 0.5–1.4)
EKG IMPRESSION: NORMAL
ERYTHROCYTE [DISTWIDTH] IN BLOOD BY AUTOMATED COUNT: 50 FL (ref 35.9–50)
GFR SERPLBLD CREATININE-BSD FMLA CKD-EPI: 41 ML/MIN/1.73 M 2
GLOBULIN SER CALC-MCNC: 2.6 G/DL (ref 1.9–3.5)
GLUCOSE SERPL-MCNC: 176 MG/DL (ref 65–99)
HCT VFR BLD AUTO: 54.7 % (ref 42–52)
HGB BLD-MCNC: 17.9 G/DL (ref 14–18)
INR PPP: 1.25 (ref 0.87–1.13)
MCH RBC QN AUTO: 31.8 PG (ref 27–33)
MCHC RBC AUTO-ENTMCNC: 32.7 G/DL (ref 32.3–36.5)
MCV RBC AUTO: 97.2 FL (ref 81.4–97.8)
PLATELET # BLD AUTO: 184 K/UL (ref 164–446)
PMV BLD AUTO: 11.3 FL (ref 9–12.9)
POTASSIUM SERPL-SCNC: 5.5 MMOL/L (ref 3.6–5.5)
PROT SERPL-MCNC: 7.3 G/DL (ref 6–8.2)
PROTHROMBIN TIME: 15.8 SEC (ref 12–14.6)
RBC # BLD AUTO: 5.63 M/UL (ref 4.7–6.1)
SODIUM SERPL-SCNC: 139 MMOL/L (ref 135–145)
TSH SERPL DL<=0.005 MIU/L-ACNC: 3.56 UIU/ML (ref 0.38–5.33)
WBC # BLD AUTO: 9.5 K/UL (ref 4.8–10.8)

## 2023-10-30 PROCEDURE — 85027 COMPLETE CBC AUTOMATED: CPT

## 2023-10-30 PROCEDURE — 93010 ELECTROCARDIOGRAM REPORT: CPT | Performed by: INTERNAL MEDICINE

## 2023-10-30 PROCEDURE — 36415 COLL VENOUS BLD VENIPUNCTURE: CPT

## 2023-10-30 PROCEDURE — 84443 ASSAY THYROID STIM HORMONE: CPT

## 2023-10-30 PROCEDURE — 93005 ELECTROCARDIOGRAM TRACING: CPT

## 2023-10-30 PROCEDURE — 85610 PROTHROMBIN TIME: CPT

## 2023-10-30 PROCEDURE — 80053 COMPREHEN METABOLIC PANEL: CPT

## 2023-10-31 DIAGNOSIS — M25.522 LEFT ELBOW PAIN: ICD-10-CM

## 2023-10-31 PROCEDURE — RXMED WILLOW AMBULATORY MEDICATION CHARGE: Performed by: FAMILY MEDICINE

## 2023-10-31 RX ORDER — EMPAGLIFLOZIN 10 MG/1
10 TABLET, FILM COATED ORAL DAILY
Qty: 100 TABLET | Refills: 3 | Status: SHIPPED | OUTPATIENT
Start: 2023-10-31

## 2023-11-01 ENCOUNTER — APPOINTMENT (OUTPATIENT)
Dept: CARDIOLOGY | Facility: MEDICAL CENTER | Age: 73
End: 2023-11-01
Attending: INTERNAL MEDICINE
Payer: MEDICARE

## 2023-11-01 ENCOUNTER — HOSPITAL ENCOUNTER (OUTPATIENT)
Facility: MEDICAL CENTER | Age: 73
End: 2023-11-02
Attending: INTERNAL MEDICINE | Admitting: INTERNAL MEDICINE
Payer: MEDICARE

## 2023-11-01 ENCOUNTER — APPOINTMENT (OUTPATIENT)
Dept: RADIOLOGY | Facility: MEDICAL CENTER | Age: 73
End: 2023-11-01
Attending: INTERNAL MEDICINE
Payer: MEDICARE

## 2023-11-01 DIAGNOSIS — I50.22 CHRONIC SYSTOLIC CONGESTIVE HEART FAILURE, NYHA CLASS 2 (HCC): ICD-10-CM

## 2023-11-01 DIAGNOSIS — I47.20 VENTRICULAR TACHYCARDIA (HCC): ICD-10-CM

## 2023-11-01 DIAGNOSIS — Z01.810 PRE-OPERATIVE CARDIOVASCULAR EXAMINATION: ICD-10-CM

## 2023-11-01 DIAGNOSIS — Z01.812 PRE-OPERATIVE LABORATORY EXAMINATION: ICD-10-CM

## 2023-11-01 PROBLEM — Z95.0 CARDIAC PACEMAKER IN SITU: Status: ACTIVE | Noted: 2023-11-01

## 2023-11-01 LAB — EKG IMPRESSION: NORMAL

## 2023-11-01 PROCEDURE — 93005 ELECTROCARDIOGRAM TRACING: CPT | Performed by: INTERNAL MEDICINE

## 2023-11-01 PROCEDURE — 160002 HCHG RECOVERY MINUTES (STAT)

## 2023-11-01 PROCEDURE — A9270 NON-COVERED ITEM OR SERVICE: HCPCS | Performed by: INTERNAL MEDICINE

## 2023-11-01 PROCEDURE — 700102 HCHG RX REV CODE 250 W/ 637 OVERRIDE(OP): Performed by: INTERNAL MEDICINE

## 2023-11-01 PROCEDURE — 99152 MOD SED SAME PHYS/QHP 5/>YRS: CPT | Performed by: INTERNAL MEDICINE

## 2023-11-01 PROCEDURE — 33225 L VENTRIC PACING LEAD ADD-ON: CPT | Performed by: INTERNAL MEDICINE

## 2023-11-01 PROCEDURE — 700111 HCHG RX REV CODE 636 W/ 250 OVERRIDE (IP): Mod: JZ

## 2023-11-01 PROCEDURE — 93010 ELECTROCARDIOGRAM REPORT: CPT | Performed by: INTERNAL MEDICINE

## 2023-11-01 PROCEDURE — 160035 HCHG PACU - 1ST 60 MINS PHASE I

## 2023-11-01 PROCEDURE — C1882 AICD, OTHER THAN SING/DUAL: HCPCS

## 2023-11-01 PROCEDURE — 700117 HCHG RX CONTRAST REV CODE 255: Performed by: INTERNAL MEDICINE

## 2023-11-01 PROCEDURE — 33264 RMVL & RPLCMT DFB GEN MLT LD: CPT | Performed by: INTERNAL MEDICINE

## 2023-11-01 PROCEDURE — G0378 HOSPITAL OBSERVATION PER HR: HCPCS

## 2023-11-01 PROCEDURE — 700101 HCHG RX REV CODE 250

## 2023-11-01 PROCEDURE — 71045 X-RAY EXAM CHEST 1 VIEW: CPT

## 2023-11-01 RX ORDER — ROSUVASTATIN CALCIUM 5 MG/1
5 TABLET, COATED ORAL
Status: DISCONTINUED | OUTPATIENT
Start: 2023-11-03 | End: 2023-11-02 | Stop reason: HOSPADM

## 2023-11-01 RX ORDER — LIDOCAINE HYDROCHLORIDE 20 MG/ML
INJECTION, SOLUTION INFILTRATION; PERINEURAL
Status: COMPLETED
Start: 2023-11-01 | End: 2023-11-01

## 2023-11-01 RX ORDER — SPIRONOLACTONE 25 MG/1
12.5 TABLET ORAL
Status: DISCONTINUED | OUTPATIENT
Start: 2023-11-01 | End: 2023-11-01

## 2023-11-01 RX ORDER — CEFAZOLIN SODIUM 1 G/3ML
INJECTION, POWDER, FOR SOLUTION INTRAMUSCULAR; INTRAVENOUS
Status: COMPLETED
Start: 2023-11-01 | End: 2023-11-01

## 2023-11-01 RX ORDER — CLOPIDOGREL BISULFATE 75 MG/1
75 TABLET ORAL DAILY
Status: DISCONTINUED | OUTPATIENT
Start: 2023-11-02 | End: 2023-11-02 | Stop reason: HOSPADM

## 2023-11-01 RX ORDER — MIDAZOLAM HYDROCHLORIDE 1 MG/ML
INJECTION INTRAMUSCULAR; INTRAVENOUS
Status: COMPLETED
Start: 2023-11-01 | End: 2023-11-01

## 2023-11-01 RX ORDER — AMIODARONE HYDROCHLORIDE 200 MG/1
200 TABLET ORAL DAILY
Status: DISCONTINUED | OUTPATIENT
Start: 2023-11-02 | End: 2023-11-02 | Stop reason: HOSPADM

## 2023-11-01 RX ORDER — TRAMADOL HYDROCHLORIDE 50 MG/1
50 TABLET ORAL EVERY 6 HOURS PRN
Status: DISCONTINUED | OUTPATIENT
Start: 2023-11-01 | End: 2023-11-02 | Stop reason: HOSPADM

## 2023-11-01 RX ORDER — ACETAMINOPHEN 325 MG/1
650 TABLET ORAL EVERY 4 HOURS PRN
Status: DISCONTINUED | OUTPATIENT
Start: 2023-11-01 | End: 2023-11-02 | Stop reason: HOSPADM

## 2023-11-01 RX ORDER — FUROSEMIDE 20 MG/1
20 TABLET ORAL DAILY
Status: DISCONTINUED | OUTPATIENT
Start: 2023-11-02 | End: 2023-11-02 | Stop reason: HOSPADM

## 2023-11-01 RX ORDER — DAPAGLIFLOZIN 10 MG/1
10 TABLET, FILM COATED ORAL DAILY
Status: DISCONTINUED | OUTPATIENT
Start: 2023-11-02 | End: 2023-11-02 | Stop reason: HOSPADM

## 2023-11-01 RX ORDER — POTASSIUM CHLORIDE 20 MEQ/1
10 TABLET, EXTENDED RELEASE ORAL DAILY
Status: DISCONTINUED | OUTPATIENT
Start: 2023-11-02 | End: 2023-11-02 | Stop reason: HOSPADM

## 2023-11-01 RX ORDER — CEFAZOLIN 2 G/1
2 INJECTION, POWDER, FOR SOLUTION INTRAMUSCULAR; INTRAVENOUS ONCE
Status: COMPLETED | OUTPATIENT
Start: 2023-11-01 | End: 2023-11-01

## 2023-11-01 RX ORDER — METOPROLOL SUCCINATE 50 MG/1
50 TABLET, EXTENDED RELEASE ORAL EVERY MORNING
Status: DISCONTINUED | OUTPATIENT
Start: 2023-11-02 | End: 2023-11-02 | Stop reason: HOSPADM

## 2023-11-01 RX ADMIN — SACUBITRIL AND VALSARTAN 1 TABLET: 24; 26 TABLET, FILM COATED ORAL at 21:00

## 2023-11-01 RX ADMIN — LIDOCAINE HYDROCHLORIDE: 20 INJECTION, SOLUTION INFILTRATION; PERINEURAL at 13:53

## 2023-11-01 RX ADMIN — CEFAZOLIN 1000 MG: 1 INJECTION, POWDER, FOR SOLUTION INTRAMUSCULAR; INTRAVENOUS at 13:50

## 2023-11-01 RX ADMIN — MIDAZOLAM 2 MG: 1 INJECTION, SOLUTION INTRAMUSCULAR; INTRAVENOUS at 14:37

## 2023-11-01 RX ADMIN — FENTANYL CITRATE 50 MCG: 50 INJECTION, SOLUTION INTRAMUSCULAR; INTRAVENOUS at 15:41

## 2023-11-01 RX ADMIN — MIDAZOLAM 1 MG: 1 INJECTION, SOLUTION INTRAMUSCULAR; INTRAVENOUS at 15:41

## 2023-11-01 RX ADMIN — CEFAZOLIN 2000 MG: 1 INJECTION, POWDER, FOR SOLUTION INTRAMUSCULAR; INTRAVENOUS at 13:51

## 2023-11-01 RX ADMIN — FENTANYL CITRATE 100 MCG: 50 INJECTION, SOLUTION INTRAMUSCULAR; INTRAVENOUS at 14:34

## 2023-11-01 RX ADMIN — IOHEXOL 35 ML: 350 INJECTION, SOLUTION INTRAVENOUS at 15:53

## 2023-11-01 ASSESSMENT — FIBROSIS 4 INDEX: FIB4 SCORE: 1.19

## 2023-11-01 NOTE — DISCHARGE INSTRUCTIONS
CRT-D Discharge Instructions/[facility]    1.  No showers until seen in follow up; may take sponge bath.  Keep dressing dry & in place until seen at for you follow up visit at the cardiology office.     2.  No lifting over 10 lbs with affected arm for six weeks.  3.  Do not raise affected arm above shoulder level or behind head for six weeks.  4.  Avoid excessive pushing, pulling, or twisting for six weeks.  5.  No driving for the first week.  6.  Ok to place indirect ICE pack to site for comfort.   7.  Call our office ([phone]) if you notice any increased swelling, redness, warmth, or drainage at the implant site.  8.  Needs to be seen in emergency if you develop fever > 101F or uncontrolled pain.  9.  Always check with device clinic before any planned MRI to see if device is MRI compatible.  10.  No routine dental work or cleanings for 3 months.  11.  May remove arm sling after one day, but please wear if you have trouble remembering to keep your arm down.  Please wear at night as a reminder.   12. Do not place cell phones or mobile devices directly over implanted device.   13. You will need to be seen at least twice in the device clinic for checks while the pacemaker is healing.  Expect appointment approximately one week after implant and 6-7 weeks post implant.     ICD Instructions  If you have 1 shock: if you are feeling well post shock, please notify cardiology office and be seen in office for check.  If you are feeling poorly after shock, you or someone with you needs to call 911.  If you experience 2 Shocks or more in 24 hours, please call 911.

## 2023-11-01 NOTE — OP REPORT
Electrophysiology Procedure Note  [facility]    PROCEDURE PERFORMED: Removal and replacement of multipolar defibrillator generator, placement of a LV lead, placement of right atrial lead,moderate sedation administered by RN and supervised by physician    : Marcus Stephenson MD    ASSISTANT: none    ANESTHESIA: Moderate sedation,  start time 1422, stop time 1541  the moderate sedation document has been reviewed, signed and scanned into media     EBL: 30 cc    SPECIMENS: None    INDICATION: Sinus bradycardia with intermittent RV pacing, bundle branch, , congestive heart failure systolic.  Ischemic cardiomyopathy    PRE PROCEDURE ECG: Sinus rhythm    POST PROCEDURE ECG: Sinus rhythm with biventricular pacing    COMPLICATIONS:  None    DESCRIPTION OF PROCEDURE:  After informed written consent, the patient was brought to the electrophysiology lab in the fasting, unsedated state. The patient was prepped and draped in the usual sterile fashion. The procedure was performed under moderate sedation with local anesthetic.   A left infraclavicular incision was made with a scalpel and the pectoral device pocket was opened using a combination of blunt dissection and electrocautery. The old leads were freed up from adhesions and tested fine.The modified Seldinger technique was used to gain access to the left axillary vein x2. A peel-away hemostasis sheath was placed in the vein.  Next, the CS cannulated with a electrophysiology catheter and the inner sheath was placed in the CS. CS angiography showed adequate branches at mid lateral. The quadripolar lead was placed over the wire with adequate thresholds and with vectors without phrenic nerve simulation.  The CS lead was sutured to the underlying pectoral muscle with interrupted non absorbable suture over a silastic suture sleeve.  Next the right atrial lead is placed location with adequate sensing and thresholds.  Lead was sewn down to the muscle  using 2 nonabsorbable sutures.  The device pocket was irrigated with antibiotic solution, inspected, and no bleeding was seen. The leads were connected to the CRT-D pulse generator and the device was inserted into the pocket. The wound was closed with three layers of absorbable sutures.  I personally supervised the administration of moderate sedation by the RN and observed the level of consciousness and physiologic status throughout the procedure.  Following recovery from sedation, the patient was transferred to a monitored bed in good condition.     IMPLANTED DEVICE INFORMATION:  Pulse generator is a Medtronic model [id number] Serial # [id number]    LEAD INFORMATION:  1)Right atrial lead is a Medtronic model # 5076-52 , serial # [id number] ,P wave 2.5 millivolts, threshold 1 Volts, pacing impedance 646 Ohms. Date of implant 11/1/2023    2)Right ventricular lead is a Medtronic model # 6947-65 , serial # [id number] ,R wave 4.5 millivolts, threshold 0.75 Volts, pacing impedance 741 Ohms. Date of implant  7/2/2003  3)Left ventricular lead is a Medtronic model # 4798-88 , serial # [id number] ,R wave Paced millivolts, threshold 1.75 Volts, pacing impedance 742 Ohms. Date of implant 11/1/2023    DEVICE PROGRAMMING:  DDDR    FLUOROSCOPY TIME: 31 mGy    IMPRESSIONS:  1. Successful CRT-D upgrade    RECOMMENDATIONS:  1. Transfer to monitored bed  2. Chest x-ray  3. Device interrogation prior to hospital discharge  4. Followup in device clinic

## 2023-11-02 ENCOUNTER — APPOINTMENT (OUTPATIENT)
Dept: RADIOLOGY | Facility: MEDICAL CENTER | Age: 73
End: 2023-11-02
Attending: INTERNAL MEDICINE
Payer: MEDICARE

## 2023-11-02 VITALS
OXYGEN SATURATION: 95 % | RESPIRATION RATE: 17 BRPM | WEIGHT: 144.18 LBS | TEMPERATURE: 97.9 F | BODY MASS INDEX: 20.19 KG/M2 | SYSTOLIC BLOOD PRESSURE: 96 MMHG | HEIGHT: 71 IN | HEART RATE: 60 BPM | DIASTOLIC BLOOD PRESSURE: 59 MMHG

## 2023-11-02 LAB
ALBUMIN SERPL BCP-MCNC: 3.8 G/DL (ref 3.2–4.9)
ALBUMIN/GLOB SERPL: 1.6 G/DL
ALP SERPL-CCNC: 57 U/L (ref 30–99)
ALT SERPL-CCNC: 8 U/L (ref 2–50)
ANION GAP SERPL CALC-SCNC: 10 MMOL/L (ref 7–16)
AST SERPL-CCNC: 14 U/L (ref 12–45)
BILIRUB SERPL-MCNC: 1 MG/DL (ref 0.1–1.5)
BUN SERPL-MCNC: 22 MG/DL (ref 8–22)
CALCIUM ALBUM COR SERPL-MCNC: 8.9 MG/DL (ref 8.5–10.5)
CALCIUM SERPL-MCNC: 8.7 MG/DL (ref 8.5–10.5)
CHLORIDE SERPL-SCNC: 101 MMOL/L (ref 96–112)
CO2 SERPL-SCNC: 25 MMOL/L (ref 20–33)
CREAT SERPL-MCNC: 1.34 MG/DL (ref 0.5–1.4)
EKG IMPRESSION: NORMAL
ERYTHROCYTE [DISTWIDTH] IN BLOOD BY AUTOMATED COUNT: 48.9 FL (ref 35.9–50)
GFR SERPLBLD CREATININE-BSD FMLA CKD-EPI: 56 ML/MIN/1.73 M 2
GLOBULIN SER CALC-MCNC: 2.4 G/DL (ref 1.9–3.5)
GLUCOSE SERPL-MCNC: 111 MG/DL (ref 65–99)
HCT VFR BLD AUTO: 46.3 % (ref 42–52)
HGB BLD-MCNC: 15 G/DL (ref 14–18)
MCH RBC QN AUTO: 31.4 PG (ref 27–33)
MCHC RBC AUTO-ENTMCNC: 32.4 G/DL (ref 32.3–36.5)
MCV RBC AUTO: 96.9 FL (ref 81.4–97.8)
PLATELET # BLD AUTO: 143 K/UL (ref 164–446)
PMV BLD AUTO: 11.3 FL (ref 9–12.9)
POTASSIUM SERPL-SCNC: 4 MMOL/L (ref 3.6–5.5)
PROT SERPL-MCNC: 6.2 G/DL (ref 6–8.2)
RBC # BLD AUTO: 4.78 M/UL (ref 4.7–6.1)
SODIUM SERPL-SCNC: 136 MMOL/L (ref 135–145)
WBC # BLD AUTO: 8.5 K/UL (ref 4.8–10.8)

## 2023-11-02 PROCEDURE — A9270 NON-COVERED ITEM OR SERVICE: HCPCS | Performed by: INTERNAL MEDICINE

## 2023-11-02 PROCEDURE — 71045 X-RAY EXAM CHEST 1 VIEW: CPT

## 2023-11-02 PROCEDURE — 93005 ELECTROCARDIOGRAM TRACING: CPT | Performed by: INTERNAL MEDICINE

## 2023-11-02 PROCEDURE — 700102 HCHG RX REV CODE 250 W/ 637 OVERRIDE(OP): Performed by: INTERNAL MEDICINE

## 2023-11-02 PROCEDURE — 96365 THER/PROPH/DIAG IV INF INIT: CPT

## 2023-11-02 PROCEDURE — 93010 ELECTROCARDIOGRAM REPORT: CPT | Performed by: INTERNAL MEDICINE

## 2023-11-02 PROCEDURE — 80053 COMPREHEN METABOLIC PANEL: CPT

## 2023-11-02 PROCEDURE — 85027 COMPLETE CBC AUTOMATED: CPT

## 2023-11-02 PROCEDURE — G0378 HOSPITAL OBSERVATION PER HR: HCPCS

## 2023-11-02 PROCEDURE — 700105 HCHG RX REV CODE 258: Performed by: INTERNAL MEDICINE

## 2023-11-02 PROCEDURE — 700111 HCHG RX REV CODE 636 W/ 250 OVERRIDE (IP): Performed by: INTERNAL MEDICINE

## 2023-11-02 RX ADMIN — DAPAGLIFLOZIN 10 MG: 10 TABLET, FILM COATED ORAL at 05:07

## 2023-11-02 RX ADMIN — CEFAZOLIN 2 G: 2 INJECTION, POWDER, FOR SOLUTION INTRAMUSCULAR; INTRAVENOUS at 00:26

## 2023-11-02 RX ADMIN — POTASSIUM CHLORIDE 10 MEQ: 1500 TABLET, EXTENDED RELEASE ORAL at 05:07

## 2023-11-02 RX ADMIN — AMIODARONE HYDROCHLORIDE 200 MG: 200 TABLET ORAL at 05:07

## 2023-11-02 RX ADMIN — CLOPIDOGREL BISULFATE 75 MG: 75 TABLET ORAL at 05:07

## 2023-11-02 ASSESSMENT — FIBROSIS 4 INDEX: FIB4 SCORE: 2.53

## 2023-11-02 ASSESSMENT — PAIN DESCRIPTION - PAIN TYPE: TYPE: ACUTE PAIN

## 2023-11-02 NOTE — DISCHARGE PLANNING
SCP TCN chart review completed. Collaborated with MYRNA Lopez prior to meeting with the pt. The most current review of medical record, knowledge of pt's PLOF and social support, LACE+ score of 63 and 6 clicks scores (none available yet) were considered.      TCN attempted to visit patient, but he was already in discharge lounge. Per chart review, he has f/u appt with [facility] vascular medicine 11/8/23. He lives with his spouse Rosy in [place], [place]. Per kardex, he is ambulating 15 minutes without assistance.     TCN will continue to follow and collaborate with discharge planning team as additional post acute needs arise. Thank you.     Completed today:  Choice obtained: none as patient appears to be at this functional baseline  SCP with [facility] Vascular Medicine f/u appt 11/8

## 2023-11-02 NOTE — CARE PLAN
The patient is Stable - Low risk of patient condition declining or worsening    Shift Goals  Clinical Goals: safety and comfort  Patient Goals: rest    Progress made toward(s) clinical / shift goals:    Problem: Knowledge Deficit - Standard  Goal: Patient and family/care givers will demonstrate understanding of plan of care, disease process/condition, diagnostic tests and medications  Outcome: Progressing     Low SBP, Held meds. Alert ofelia oriented. not on physical or respiratory distress    Patient is not progressing towards the following goals:NA

## 2023-11-02 NOTE — PROGRESS NOTES
Telemetry Shift Summary    Rhythm PACED   HR Range 60S  Ectopy PVC  Measurements .20/.09/.37        Normal Values  Rhythm SR  HR Range    Measurements 0.12-0.20 / 0.06-0.10  / 0.30-0.52

## 2023-11-02 NOTE — OR NURSING
Patient recovered well post op. A&Ox4. VSS, room air. Surgical sites, pressure dressing in place as ordered, Criselda ALEXIS notified of oozing to the Left chest. Surgical pain managed. Patient able to drink fluids without Nausea and vomiting. PT belongings on the bed claimed by the patient. Spouse updated and discussed plan of care. Report called to Damir TOLBERT. Patient transported to his room on a monitor with family following along.

## 2023-11-02 NOTE — CARE PLAN
The patient is Stable - Low risk of patient condition declining or worsening         Progress made toward(s) clinical / shift goals:  yes    Patient is not progressing towards the following goals:      Problem: Knowledge Deficit - Standard  Goal: Patient and family/care givers will demonstrate understanding of plan of care, disease process/condition, diagnostic tests and medications  Outcome: Progressing

## 2023-11-02 NOTE — PROGRESS NOTES
4 Eyes Skin Assessment Completed by TOMASZ Reich and TOMASZ Zapata.    Head WDL  Ears WDL  Nose WDL  Mouth WDL  Neck WDL  Breast/Chest Bruising, Non-Blanching, and Incision  Shoulder Blades Redness and Blanching  Spine Redness and Blanching  (R) Arm/Elbow/Hand Redness and Blanching  (L) Arm/Elbow/Hand Non-Blanching, Bruising, and Swelling  Abdomen WDL  Groin Redness and Blanching  Scrotum/Coccyx/Buttocks Redness and Blanching  (R) Leg Redness and Blanching  (L) Leg Redness and Blanching  (R) Heel/Foot/Toe Redness, Blanching, and Discoloration  (L) Heel/Foot/Toe Redness, Blanching, and Discoloration          Devices In Places Tele Box, Blood Pressure Cuff, Pulse Ox, Pacer, and Compression Dressing      Interventions In Place Sacral Mepilex, Pillows, Low Air Loss Mattress, and Heels Loaded W/Pillows    Possible Skin Injury No    Pictures Uploaded Into Epic N/A  Wound Consult Placed N/A  RN Wound Prevention Protocol Ordered No

## 2023-11-02 NOTE — DISCHARGE SUMMARY
Discharge Summary    CHIEF COMPLAINT ON ADMISSION  Elective admission for CRT-D  upgrade.     CODE STATUS  DNAR/DNI    HPI & HOSPITAL COURSE  Prudencio Lee is a 73 y.o. year old male here for elective admission for CRT-D implantation due to his history of HFrEF and BBB.     Underwent successful MDT CRT-D upgrade with Dr. Stephenson on 11/1/2023.     Patient has been seen and examined in EP rounds this AM. V paced on tele.  Patient denies any chest pain, dyspnea, dizziness, paraesthesias, or other complaints. Vital signs are stable. PPM dressing changed. Site is with mild swelling with no active bleeding, ice was applied.    Device interrogation this am showed normal sensing and function. CXR show no evidence of late PTX, leads appear to be in correct position.     Post CRT-D instructions were reviewed with patient and his wife, all questions were answered.     Therefore, he is discharged in good and stable condition with close outpatient follow-up.    PROCEDURES  CRT-D upgrade, Dr. Stephenson.  Please see procedural note for full details.     CONSULTATIONS  None.     FOLLOW UP  Future Appointments   Date Time Provider Department Center   11/8/2023  3:15 PM PACER CHECK-[facility] None   12/1/2023  7:00 AM [facility] [place] TREADMILL ONLY [facility]   2/13/2024 10:30 AM CHRIS Rodríguez None   2/13/2024 10:45 AM CHRIS Rodríguez None       MEDICATIONS ON DISCHARGE     Medication List        CHANGE how you take these medications        Instructions   rivaroxaban 20 MG Tabs tablet  What changed:   when to take this  Another medication with the same name was removed. Continue taking this medication, and follow the directions you see here.  Commonly known as: Xarelto   Take 1 Tablet by mouth with dinner.  Dose: 20 mg            CONTINUE taking these medications        Instructions   amiodarone 200 MG Tabs  Commonly known as: Cordarone   Take 1 Tablet by mouth every day.  Dose: 200 mg     clopidogrel 75 MG  Tabs  Commonly known as: Plavix   Take 1 Tablet by mouth every day.  Dose: 75 mg     D3 PO   Take 1 Capsule by mouth every day.  Dose: 1 Capsule     Entresto 24-26 MG Tabs  Generic drug: sacubitril-valsartan   Doctor's comments: Please fill 90 days - Patient will  10/6/23  Take 1 Tablet by mouth 2 times a day.  Dose: 1 Tablet     furosemide 40 MG Tabs  Commonly known as: Lasix   Take 0.5 Tablets by mouth every day.  Dose: 20 mg     Jardiance 10 MG Tabs tablet  Generic drug: Empagliflozin   Doctor's comments: Plan requires a 100 day supply. Thank you  Take 1 Tablet by mouth every day.  Dose: 10 mg     Magnesium 500 MG Caps   Take 1 Cap by mouth every day.  Dose: 500 mg     metFORMIN  MG Tb24  Commonly known as: Glucophage XR   Take 1 Tablet by mouth every day.  Dose: 500 mg     metoprolol SR 50 MG Tb24  Commonly known as: Toprol XL   Take 50 mg by mouth every morning.  Dose: 50 mg     potassium chloride SA 20 MEQ Tbcr  Commonly known as: Kdur   Take 0.5 Tablets by mouth every day. 1/2 tab daily  Dose: 10 mEq     rosuvastatin 5 MG Tabs  Commonly known as: Crestor   Take 1 Tablet by mouth every Monday, Wednesday, and Friday.  Dose: 5 mg     spironolactone 25 MG Tabs  Commonly known as: Aldactone   Take 0.5 Tablets by mouth every day.  Dose: 12.5 mg

## 2023-11-03 ENCOUNTER — PHARMACY VISIT (OUTPATIENT)
Dept: PHARMACY | Facility: MEDICAL CENTER | Age: 73
End: 2023-11-03
Payer: COMMERCIAL

## 2023-11-08 ENCOUNTER — NON-PROVIDER VISIT (OUTPATIENT)
Dept: CARDIOLOGY | Facility: MEDICAL CENTER | Age: 73
End: 2023-11-08

## 2023-11-08 ENCOUNTER — NON-PROVIDER VISIT (OUTPATIENT)
Dept: CARDIOLOGY | Facility: MEDICAL CENTER | Age: 73
End: 2023-11-08
Attending: INTERNAL MEDICINE
Payer: MEDICARE

## 2023-11-08 DIAGNOSIS — Z95.810 PRESENCE OF BIVENTRICULAR AUTOMATIC CARDIOVERTER/DEFIBRILLATOR (AICD): ICD-10-CM

## 2023-11-08 DIAGNOSIS — I25.5 ISCHEMIC CARDIOMYOPATHY: ICD-10-CM

## 2023-11-08 PROCEDURE — 93284 PRGRMG EVAL IMPLANTABLE DFB: CPT | Mod: 26 | Performed by: INTERNAL MEDICINE

## 2023-11-08 PROCEDURE — 93284 PRGRMG EVAL IMPLANTABLE DFB: CPT | Performed by: INTERNAL MEDICINE

## 2023-11-09 NOTE — CARDIAC REMOTE MONITOR - SCAN
Device transmission reviewed. Device demonstrated appropriate function.       Electronically Signed by: Fransico Flores M.D.    11/11/2023  2:49 PM

## 2023-11-09 NOTE — PROGRESS NOTES
Wound site is healing well--some swelling noted at site, patient states swelling has decreased since implant.  Patient advised to watch for increased redness, swelling, oozing or fever--verbalized understanding.

## 2023-11-10 ENCOUNTER — TELEPHONE (OUTPATIENT)
Dept: CARDIOLOGY | Facility: MEDICAL CENTER | Age: 73
End: 2023-11-10
Payer: MEDICARE

## 2023-11-10 NOTE — TELEPHONE ENCOUNTER
Walk in paperwork received for patient requesting a provider statement in order to receive reimbursement for travel that had to be cancelled due to a previous ER visit. Patient seen by AB 8/8/2023, ER visit 9/20-9/21 for VT/AICD shock x3, and followed up with AB on 9/25/2023. Cancelled trip was scheduled 9/23/23.    Paperwork filled out to the best of my ability and awaiting for provider to be in clinic for signature on Monday. [employer] currently down. Will scan once functioning.

## 2023-11-16 NOTE — TELEPHONE ENCOUNTER
Paperwork completed and signed by AB. Uploaded to chart. Notified wife that paperwork is ready and they will pick it up tomorrow morning. Paperwork at charge desk in a folder. All questions/concerns answered at this time, patient and spouse appreciative of information given.

## 2023-12-01 ENCOUNTER — APPOINTMENT (OUTPATIENT)
Dept: RADIOLOGY | Facility: MEDICAL CENTER | Age: 73
End: 2023-12-01
Attending: NURSE PRACTITIONER
Payer: MEDICARE

## 2023-12-08 ENCOUNTER — PHARMACY VISIT (OUTPATIENT)
Dept: PHARMACY | Facility: MEDICAL CENTER | Age: 73
End: 2023-12-08
Payer: COMMERCIAL

## 2023-12-08 PROCEDURE — RXMED WILLOW AMBULATORY MEDICATION CHARGE: Performed by: INTERNAL MEDICINE

## 2023-12-08 RX ORDER — RESPIRATORY SYNCYTIAL VISUS VACCINE RECOMBINANT, ADJUVANTED 120MCG/0.5
0.5 KIT INTRAMUSCULAR
Qty: 0.5 ML | Refills: 0 | Status: SHIPPED | OUTPATIENT
Start: 2023-12-08 | End: 2024-01-17

## 2023-12-08 RX ORDER — COVID-19 VACCINE, MRNA 0.04 MG/.418ML
0.3 INJECTION, SUSPENSION INTRAMUSCULAR
Qty: 0.3 ML | Refills: 0 | Status: SHIPPED | OUTPATIENT
Start: 2023-12-08 | End: 2024-01-17

## 2023-12-12 DIAGNOSIS — E78.2 MIXED HYPERLIPIDEMIA: ICD-10-CM

## 2023-12-13 ENCOUNTER — TELEPHONE (OUTPATIENT)
Dept: CARDIOLOGY | Facility: MEDICAL CENTER | Age: 73
End: 2023-12-13

## 2023-12-13 ENCOUNTER — NON-PROVIDER VISIT (OUTPATIENT)
Dept: CARDIOLOGY | Facility: MEDICAL CENTER | Age: 73
End: 2023-12-13
Payer: MEDICARE

## 2023-12-13 ENCOUNTER — DOCUMENTATION (OUTPATIENT)
Dept: CARDIOLOGY | Facility: MEDICAL CENTER | Age: 73
End: 2023-12-13

## 2023-12-13 ENCOUNTER — NON-PROVIDER VISIT (OUTPATIENT)
Dept: CARDIOLOGY | Facility: MEDICAL CENTER | Age: 73
End: 2023-12-13
Attending: INTERNAL MEDICINE
Payer: MEDICARE

## 2023-12-13 DIAGNOSIS — I25.5 ISCHEMIC CARDIOMYOPATHY: ICD-10-CM

## 2023-12-13 DIAGNOSIS — I47.20 VENTRICULAR TACHYCARDIA (HCC): ICD-10-CM

## 2023-12-13 DIAGNOSIS — I50.22 CHRONIC SYSTOLIC CONGESTIVE HEART FAILURE, NYHA CLASS 2 (HCC): ICD-10-CM

## 2023-12-13 DIAGNOSIS — Z95.810 PRESENCE OF CARDIAC RESYNCHRONIZATION THERAPY DEFIBRILLATOR (CRT-D): ICD-10-CM

## 2023-12-13 PROCEDURE — 93284 PRGRMG EVAL IMPLANTABLE DFB: CPT | Performed by: INTERNAL MEDICINE

## 2023-12-13 PROCEDURE — RXMED WILLOW AMBULATORY MEDICATION CHARGE: Performed by: INTERNAL MEDICINE

## 2023-12-13 RX ORDER — ROSUVASTATIN CALCIUM 5 MG/1
5 TABLET, COATED ORAL
Qty: 50 TABLET | Refills: 2 | Status: SHIPPED | OUTPATIENT
Start: 2023-12-13 | End: 2024-01-18

## 2023-12-13 NOTE — TELEPHONE ENCOUNTER
Is the patient due for a refill? Yes    Was the patient seen the past year? Yes    Date of last office visit: 10/18/23    Does the patient have an upcoming appointment?  Yes   If yes, When? 2/13/24    Provider to refill:DS    Does the patients insurance require a 100 day supply?  Yes

## 2023-12-13 NOTE — PROGRESS NOTES
Wound site is healing well. Pt advised to watch for increased redness, swelling, oozing or fever. Pt verbalized understanding.See flowsheet.    Reconnected pts Medtronic Rosalind. Billing and remote monitoring explained and acknowledged.

## 2023-12-14 PROCEDURE — RXMED WILLOW AMBULATORY MEDICATION CHARGE: Performed by: INTERNAL MEDICINE

## 2023-12-14 NOTE — TELEPHONE ENCOUNTER
"Patient came in for a pacer check today.    Payton with the device team let me know the patient had some medication questions for me.    I went in and discussed with patient his concerns and questions.   Patient states he feels like he is taking to many medications.  Patient is requesting to go down to 15 mg of Xarelto daily. He believes this will help with his bruising. He states pharmacy told him 15 mg is an appropriate dose.     Patient also states he wants to stop taking amiodarone and that he believes \"its a horrible drug\"      I went over all of the patients medications with him and discussed the importance of each one.    Patient is requesting DS advise on his concerns and requests.    Thank you  "

## 2023-12-14 NOTE — CARDIAC REMOTE MONITOR - SCAN
Device transmission reviewed. Device demonstrated appropriate function.       Electronically Signed by: Marcus Stephenson M.D.    12/19/2023  12:21 PM

## 2023-12-15 ENCOUNTER — PHARMACY VISIT (OUTPATIENT)
Dept: PHARMACY | Facility: MEDICAL CENTER | Age: 73
End: 2023-12-15
Payer: COMMERCIAL

## 2023-12-15 DIAGNOSIS — I50.22 CHRONIC SYSTOLIC CONGESTIVE HEART FAILURE, NYHA CLASS 2 (HCC): Primary | ICD-10-CM

## 2023-12-15 DIAGNOSIS — I25.5 ISCHEMIC CARDIOMYOPATHY: ICD-10-CM

## 2023-12-15 DIAGNOSIS — Z79.899 LONG TERM CURRENT USE OF DIURETIC: ICD-10-CM

## 2023-12-15 DIAGNOSIS — I47.20 VENTRICULAR TACHYARRHYTHMIA (HCC): ICD-10-CM

## 2023-12-15 PROCEDURE — RXMED WILLOW AMBULATORY MEDICATION CHARGE: Performed by: NURSE PRACTITIONER

## 2023-12-15 RX ORDER — SPIRONOLACTONE 25 MG/1
12.5 TABLET ORAL
Qty: 50 TABLET | Refills: 3 | OUTPATIENT
Start: 2023-12-15

## 2023-12-15 RX ORDER — SPIRONOLACTONE 25 MG/1
25 TABLET ORAL DAILY
Qty: 90 TABLET | Refills: 0 | Status: SHIPPED | OUTPATIENT
Start: 2023-12-15 | End: 2024-03-22 | Stop reason: SDUPTHER

## 2023-12-15 NOTE — TELEPHONE ENCOUNTER
Per BN note, spironolactone increased to 25mg daily.   RX ordered for 90 days.   Magnesium lab ordered and mailed to the patient.

## 2023-12-19 PROCEDURE — 93284 PRGRMG EVAL IMPLANTABLE DFB: CPT | Mod: 26 | Performed by: INTERNAL MEDICINE

## 2023-12-26 PROCEDURE — RXMED WILLOW AMBULATORY MEDICATION CHARGE: Performed by: INTERNAL MEDICINE

## 2023-12-26 NOTE — TELEPHONE ENCOUNTER
Contact:            Phone number: [phone]    Name of person spoken with and relationship to patient: Rosy (spouse)  Patient’s Adherence:            How patient is doing on medication: doing well    How many missed doses and reason: 0    Any new medications: No    Any new conditions: No    Any new allergies: No    Any new side effects: No    Any new diagnoses: No    How many doses remaining: not sure, she is driving, at least a week    Did patient want to speak with pharmacist: No  Delivery:            Delivery date and method:  @ [facility]    Needs by Date: NA    Signature required: NA    Any additional details for :  Teach Appointment Date:  Shipping Address: NA  Medication (name, strength and dose): ENTRESTO 24-26 MG Tab  Copay: $92.58  Payment Method: pay at pharmacy  Supplies: None  Additional Information:            Next call date: 3/18/24

## 2023-12-28 ENCOUNTER — PHARMACY VISIT (OUTPATIENT)
Dept: PHARMACY | Facility: MEDICAL CENTER | Age: 73
End: 2023-12-28
Payer: COMMERCIAL

## 2024-01-02 ENCOUNTER — PATIENT MESSAGE (OUTPATIENT)
Dept: CARDIOLOGY | Facility: MEDICAL CENTER | Age: 74
End: 2024-01-02
Payer: MEDICARE

## 2024-01-02 ENCOUNTER — PHARMACY VISIT (OUTPATIENT)
Dept: PHARMACY | Facility: MEDICAL CENTER | Age: 74
End: 2024-01-02
Payer: COMMERCIAL

## 2024-01-02 PROCEDURE — RXMED WILLOW AMBULATORY MEDICATION CHARGE: Performed by: NURSE PRACTITIONER

## 2024-01-08 ENCOUNTER — HOSPITAL ENCOUNTER (OUTPATIENT)
Dept: LAB | Facility: MEDICAL CENTER | Age: 74
End: 2024-01-08
Attending: INTERNAL MEDICINE
Payer: MEDICARE

## 2024-01-08 DIAGNOSIS — Z79.899 LONG TERM CURRENT USE OF DIURETIC: ICD-10-CM

## 2024-01-08 LAB — MAGNESIUM SERPL-MCNC: 2.6 MG/DL (ref 1.5–2.5)

## 2024-01-08 PROCEDURE — 83735 ASSAY OF MAGNESIUM: CPT

## 2024-01-08 PROCEDURE — 36415 COLL VENOUS BLD VENIPUNCTURE: CPT

## 2024-01-16 ENCOUNTER — OFFICE VISIT (OUTPATIENT)
Dept: MEDICAL GROUP | Facility: LAB | Age: 74
End: 2024-01-16
Payer: MEDICARE

## 2024-01-16 VITALS
BODY MASS INDEX: 20.58 KG/M2 | HEIGHT: 71 IN | DIASTOLIC BLOOD PRESSURE: 70 MMHG | TEMPERATURE: 98.2 F | SYSTOLIC BLOOD PRESSURE: 100 MMHG | RESPIRATION RATE: 16 BRPM | WEIGHT: 147 LBS | HEART RATE: 74 BPM | OXYGEN SATURATION: 97 %

## 2024-01-16 DIAGNOSIS — I50.84 END STAGE HEART FAILURE (HCC): ICD-10-CM

## 2024-01-16 DIAGNOSIS — N18.31 TYPE 2 DIABETES MELLITUS WITH STAGE 3A CHRONIC KIDNEY DISEASE, WITHOUT LONG-TERM CURRENT USE OF INSULIN (HCC): ICD-10-CM

## 2024-01-16 DIAGNOSIS — N18.31 CHRONIC KIDNEY DISEASE (CKD) STAGE G3A/A1, MODERATELY DECREASED GLOMERULAR FILTRATION RATE (GFR) BETWEEN 45-59 ML/MIN/1.73 SQUARE METER AND ALBUMINURIA CREATININE RATIO LESS THAN 30 MG/G: ICD-10-CM

## 2024-01-16 DIAGNOSIS — E26.1 SECONDARY HYPERALDOSTERONISM (HCC): ICD-10-CM

## 2024-01-16 DIAGNOSIS — D68.69 OTHER THROMBOPHILIA (HCC): ICD-10-CM

## 2024-01-16 DIAGNOSIS — I50.22 CHRONIC SYSTOLIC CONGESTIVE HEART FAILURE, NYHA CLASS 2 (HCC): ICD-10-CM

## 2024-01-16 DIAGNOSIS — E11.22 TYPE 2 DIABETES MELLITUS WITH STAGE 3A CHRONIC KIDNEY DISEASE, WITHOUT LONG-TERM CURRENT USE OF INSULIN (HCC): ICD-10-CM

## 2024-01-16 DIAGNOSIS — I82.512 CHRONIC EMBOLISM AND THROMBOSIS OF LEFT FEMORAL VEIN (HCC): ICD-10-CM

## 2024-01-16 DIAGNOSIS — I99.8 ISCHEMIA OF FOOT: ICD-10-CM

## 2024-01-16 DIAGNOSIS — I48.91 ATRIAL FIBRILLATION, UNSPECIFIED TYPE (HCC): ICD-10-CM

## 2024-01-16 PROCEDURE — 3078F DIAST BP <80 MM HG: CPT | Performed by: FAMILY MEDICINE

## 2024-01-16 PROCEDURE — 99214 OFFICE O/P EST MOD 30 MIN: CPT | Performed by: FAMILY MEDICINE

## 2024-01-16 PROCEDURE — 3074F SYST BP LT 130 MM HG: CPT | Performed by: FAMILY MEDICINE

## 2024-01-16 ASSESSMENT — FIBROSIS 4 INDEX: FIB4 SCORE: 2.53

## 2024-01-16 NOTE — PROGRESS NOTES
Chief Complaint:   Chief Complaint   Patient presents with    Foot Problem     Both feet - right foot has spot on anteriorly,  Left foot - big toenail       HPI: Established patient, accompanied with his female partner today  Jaime Lee is a 73 y.o. male who presents for evaluation of the following today:    1. feet nail changes and lesion on the right foot  New concern  Patient reports around 1 or 2 weeks ago, he had a pedicure after that he noted changes on his left great toe nail he thought it might be fungal infection.  Also he has been having pain on his right foot, noticed there is a lesion near the heel area he would like it to be checked.  The pain is usually when he presses on the lesion or when he walks.  He reports pain with the right lower extremity whenever he walks or do any type of exercise.    2. 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 Other thrombophilia ([facility])  Chronic stable    4. End stage heart failure ([facility])  Chronic stable continues to follow-up with cardiology    5. Atrial fibrillation, unspecified type ([facility])  Chronic, continues to take medications and follow-up with cardiology    6. Chronic kidney disease (CKD) stage G3a/A1, moderately decreased glomerular filtration rate (GFR) between 45-59 mL/min/1.73 square meter and albuminuria creatinine ratio less than 30 mg/g ([facility])  Chronic stable, patient did not establish with kidney specialist yet,    7. Type 2 diabetes mellitus with stage 3a chronic kidney disease, without long-term current use of insulin ([facility])    Chronic, A1c 6.5 taking medications as directed  8. Secondary hyperaldosteronism (HCC)    Chronic stable  9. Chronic systolic congestive heart failure, NYHA class 2 ([facility])    Chronic stable continues to follow-up with cardiology and continues to take all medications as directed        Past medical history, family  history, social history and medications reviewed and updated in the record.  Today  Current medications, problem list and allergies reviewed in [facility] today  Health maintenance topics are reviewed and updated.    Patient Active Problem List    Diagnosis Date Noted    Chronic embolism and thrombosis of left femoral vein ([facility]) 01/16/2024    Other thrombophilia ([facility]) 01/16/2024    End stage heart failure ([facility]) 01/16/2024    Atrial fibrillation, unspecified type ([facility]) 01/16/2024    Cardiac pacemaker in situ 11/01/2023    Bradycardia 10/05/2023    Coronary artery disease due to calcified coronary lesion 09/28/2023    Secondary hyperaldosteronism ([facility]) 06/22/2023    Ventricular tachycardia ([facility]) 04/25/2019    Macrocytosis without anemia 04/24/2018    Basal cell carcinoma of skin of left ear 02/01/2018    Hyperlipidemia 01/18/2017    S/P CABG x 4 06/22/2016    Type 2 diabetes mellitus with stage 3a chronic kidney disease, without long-term current use of insulin ([facility]) 06/22/2016    Old anterior myocardial infarction 06/22/2016    Chronic systolic congestive heart failure, NYHA class 2 ([facility]) 06/22/2016    AICD (automatic cardioverter/defibrillator) present 03/11/2014    Ischemic cardiomyopathy 03/11/2014    History of pulmonary embolus (PE) 02/27/2014    Chronic kidney disease (CKD) stage G3a/A1, moderately decreased glomerular filtration rate (GFR) between 45-59 mL/min/1.73 square meter and albuminuria creatinine ratio less than 30 mg/g ([facility]) 02/24/2014     Family History   Problem Relation Age of Onset    Diabetes Mother     Heart Disease Mother     Diabetes Sister     Alcohol/Drug Brother      Social History     Socioeconomic History    Marital status:      Spouse name: Not on file    Number of children: Not on file    Years of education: Not on file    Highest education level: Not on file   Occupational History     Comment: new   retired   Tobacco Use    Smoking status: Former     Current packs/day:  0.00     Average packs/day: 1.3 packs/day for 48.0 years (64.0 ttl pk-yrs)     Types: Cigarettes     Start date: 1968     Quit date: 2000     Years since quittin.0    Smokeless tobacco: Never   Vaping Use    Vaping Use: Never used   Substance and Sexual Activity    Alcohol use: Yes     Alcohol/week: 1.2 oz     Types: 2 Standard drinks or equivalent per week     Comment: occ    Drug use: Not Currently    Sexual activity: Never   Other Topics Concern    Not on file   Social History Narrative    Not on file     Social Determinants of Health     Financial Resource Strain: Not on file   Food Insecurity: Not on file   Transportation Needs: Not on file   Physical Activity: Not on file   Stress: Not on file   Social Connections: Not on file   Intimate Partner Violence: Not on file   Housing Stability: Not on file       Current Outpatient Medications   Medication Sig Dispense Refill    spironolactone (ALDACTONE) 25 MG Tab Take 1 Tablet by mouth every day. 90 Tablet 0    rivaroxaban (XARELTO) 15 MG Tab tablet Take 1 Tablet by mouth with dinner. 90 Tablet 3    rosuvastatin (CRESTOR) 5 MG Tab Take 1 Tablet by mouth every Monday, Wednesday, and Friday. 50 Tablet 2    COVID-19 mRNA Vac-Karissa,Pfizer, (COMIRNATY) 30 MCG/0.3ML Suspension Prefilled Syringe injection Inject 0.3 mL into the shoulder, thigh, or buttocks. 0.3 mL 0    injection RSVPreF3 Vac Recomb Adjuvanted (AREXVY) 120 MCG/0.5ML Recon Susp Inject 0.5 mL into the shoulder, thigh, or buttocks. 0.5 mL 0    Empagliflozin (JARDIANCE) 10 MG Tab tablet Take 1 Tablet by mouth every day. 100 Tablet 3    metFORMIN ER (GLUCOPHAGE XR) 500 MG TABLET SR 24 HR Take 1 Tablet by mouth every day. 90 Tablet 3    amiodarone (CORDARONE) 200 MG Tab Take 1 Tablet by mouth every day. 90 Tablet 3    sacubitril-valsartan (ENTRESTO) 24-26 MG Tab Take 1 Tablet by mouth 2 times a day. 180 Tablet 3    clopidogrel (PLAVIX) 75 MG Tab Take 1 Tablet by mouth every day. 90 Tablet 3     "Cholecalciferol (D3 PO) Take 1 Capsule by mouth every day.      furosemide (LASIX) 40 MG Tab Take 0.5 Tablets by mouth every day. 50 Tablet 3    potassium chloride SA (KDUR) 20 MEQ Tab CR Take 0.5 Tablets by mouth every day. 1/2 tab daily 50 Tablet 3    metoprolol SR (TOPROL XL) 50 MG TABLET SR 24 HR Take 50 mg by mouth every morning.      Magnesium 500 MG Cap Take 1 Cap by mouth every day. 180 Cap 4     No current facility-administered medications for this visit.         Review Of Systems  As documented in HPI above  PHYSICAL EXAMINATION:    /70 (BP Location: Right arm, Patient Position: Sitting, BP Cuff Size: Adult)   Pulse 74   Temp 36.8 °C (98.2 °F) (Temporal)   Resp 16   Ht 1.803 m (5' 11\")   Wt 66.7 kg (147 lb)   SpO2 97%   BMI 20.50 kg/m²   Gen.: Well-developed, well-nourished, no apparent distress, pleasant and cooperative with the examination  HEENT: Normocephalic/atraumatic, sinuses nontender with palpation, TMs clear, nares patent w      Extremities: Unable to feel pulses on both feet, chronic hyperemic color of the skin.  Right foot with a lesion on the heel on the lateral side, very suggestive of possibly ischemic lesion rounded and with dark discoloration.  Nails on the left foot with changes that might suggest chronic ischemia    ASSESSMENT/Plan:  1. Ischemia of foot  New concern, these are chronic ischemic changes on both lower extremities, patient is now having a new lesion on the right heel that might suggest beginning of possibly gangrene and a lesion that might develop vascular ischemic ulcer.  Discussed with the patient that the nail changes are related to chronic ischemic, peripheral vascular disease.      I directed the patient since he is having concerns about pain in this new lesion on his heel to report to emergency room, so he can see the vascular surgeon urgently.   Patient completely declines,     I have placed an urgent referral for him to establish with vascular surgeon, I " will order a stat ultrasound/arterial ultrasound, advised to report to emergency room if condition is worse or if he is unable to see the vascular surgeon and do his ultrasound within few days.  P patient and his partner voices understanding, I was very clear about the urgency of this condition and complications like losing his lower extremity and gangrene and long-term sequelae.  Patient to follow-up with me after he sees the vascular surgeon  US-EXTREMITY ARTERY LOWER BILAT W/GWEN (COMBO)    Referral to Vascular Surgery      2. 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 Other thrombophilia (HCC)  Chronic stable we will continue monitoring      4. End stage heart failure (HCC)  Chronic stable continue follow-up with cardiology      5. Atrial fibrillation, unspecified type (HCC)  Chronic stable, continue follow-up with cardiology continue medications as directed      6. Chronic kidney disease (CKD) stage G3a/A1, moderately decreased glomerular filtration rate (GFR) between 45-59 mL/min/1.73 square meter and albuminuria creatinine ratio less than 30 mg/g (HCC)  Chronic stable follow-up with nephrology referral placed in the system today      7. Type 2 diabetes mellitus with stage 3a chronic kidney disease, without long-term current use of insulin (HCC)  Chronic, stable A1c 6.5 continue current regimen      8. Secondary hyperaldosteronism (HCC)  Chronic stable, continue follow-up with cardiology related to his congestive heart failure      9. Chronic systolic congestive heart failure, NYHA class 2 (HCC)  Chronic stable continue follow-up with cardiology as directed      Please note that this dictation was created using voice recognition software. I have worked with consultants from the vendor as well as technical experts from [employer] to optimize the  interface. I have made every reasonable attempt to correct obvious errors, but I expect that there are errors of grammar and possibly content that I did not discover before finalizing the note.

## 2024-01-17 ENCOUNTER — HOSPITAL ENCOUNTER (OUTPATIENT)
Dept: RADIOLOGY | Facility: MEDICAL CENTER | Age: 74
End: 2024-01-17
Attending: FAMILY MEDICINE
Payer: MEDICARE

## 2024-01-17 ENCOUNTER — PHARMACY VISIT (OUTPATIENT)
Dept: PHARMACY | Facility: MEDICAL CENTER | Age: 74
End: 2024-01-17
Payer: COMMERCIAL

## 2024-01-17 ENCOUNTER — TELEPHONE (OUTPATIENT)
Dept: CARDIOLOGY | Facility: MEDICAL CENTER | Age: 74
End: 2024-01-17

## 2024-01-17 ENCOUNTER — OFFICE VISIT (OUTPATIENT)
Dept: CARDIOLOGY | Facility: MEDICAL CENTER | Age: 74
End: 2024-01-17
Attending: PHYSICIAN ASSISTANT
Payer: MEDICARE

## 2024-01-17 VITALS
SYSTOLIC BLOOD PRESSURE: 100 MMHG | RESPIRATION RATE: 14 BRPM | BODY MASS INDEX: 20.72 KG/M2 | HEIGHT: 71 IN | WEIGHT: 148 LBS | OXYGEN SATURATION: 95 % | DIASTOLIC BLOOD PRESSURE: 52 MMHG | HEART RATE: 78 BPM

## 2024-01-17 DIAGNOSIS — N18.31 TYPE 2 DIABETES MELLITUS WITH STAGE 3A CHRONIC KIDNEY DISEASE, WITHOUT LONG-TERM CURRENT USE OF INSULIN (HCC): ICD-10-CM

## 2024-01-17 DIAGNOSIS — I48.91 ATRIAL FIBRILLATION, UNSPECIFIED TYPE (HCC): ICD-10-CM

## 2024-01-17 DIAGNOSIS — I82.512 CHRONIC EMBOLISM AND THROMBOSIS OF LEFT FEMORAL VEIN (HCC): ICD-10-CM

## 2024-01-17 DIAGNOSIS — Z86.711 HISTORY OF PULMONARY EMBOLUS (PE): ICD-10-CM

## 2024-01-17 DIAGNOSIS — I99.8 ISCHEMIA OF FOOT: ICD-10-CM

## 2024-01-17 DIAGNOSIS — N18.31 CHRONIC KIDNEY DISEASE (CKD) STAGE G3A/A1, MODERATELY DECREASED GLOMERULAR FILTRATION RATE (GFR) BETWEEN 45-59 ML/MIN/1.73 SQUARE METER AND ALBUMINURIA CREATININE RATIO LESS THAN 30 MG/G: ICD-10-CM

## 2024-01-17 DIAGNOSIS — I50.84 END STAGE HEART FAILURE (HCC): ICD-10-CM

## 2024-01-17 DIAGNOSIS — E11.22 TYPE 2 DIABETES MELLITUS WITH STAGE 3A CHRONIC KIDNEY DISEASE, WITHOUT LONG-TERM CURRENT USE OF INSULIN (HCC): ICD-10-CM

## 2024-01-17 DIAGNOSIS — Z95.810 AICD (AUTOMATIC CARDIOVERTER/DEFIBRILLATOR) PRESENT: ICD-10-CM

## 2024-01-17 DIAGNOSIS — I47.20 VENTRICULAR TACHYCARDIA (HCC): ICD-10-CM

## 2024-01-17 DIAGNOSIS — E78.2 MIXED HYPERLIPIDEMIA: ICD-10-CM

## 2024-01-17 DIAGNOSIS — Z95.1 S/P CABG X 4: ICD-10-CM

## 2024-01-17 PROCEDURE — 93922 UPR/L XTREMITY ART 2 LEVELS: CPT

## 2024-01-17 PROCEDURE — RXMED WILLOW AMBULATORY MEDICATION CHARGE: Performed by: INTERNAL MEDICINE

## 2024-01-17 PROCEDURE — 93284 PRGRMG EVAL IMPLANTABLE DFB: CPT | Performed by: INTERNAL MEDICINE

## 2024-01-17 PROCEDURE — 93925 LOWER EXTREMITY STUDY: CPT | Mod: 26 | Performed by: INTERNAL MEDICINE

## 2024-01-17 PROCEDURE — 93925 LOWER EXTREMITY STUDY: CPT

## 2024-01-17 PROCEDURE — 93922 UPR/L XTREMITY ART 2 LEVELS: CPT | Mod: 26 | Performed by: INTERNAL MEDICINE

## 2024-01-17 PROCEDURE — 99213 OFFICE O/P EST LOW 20 MIN: CPT | Performed by: INTERNAL MEDICINE

## 2024-01-17 PROCEDURE — 3074F SYST BP LT 130 MM HG: CPT | Performed by: INTERNAL MEDICINE

## 2024-01-17 PROCEDURE — 3078F DIAST BP <80 MM HG: CPT | Performed by: INTERNAL MEDICINE

## 2024-01-17 PROCEDURE — 99214 OFFICE O/P EST MOD 30 MIN: CPT | Mod: 25 | Performed by: INTERNAL MEDICINE

## 2024-01-17 RX ORDER — AMIODARONE HYDROCHLORIDE 200 MG/1
100 TABLET ORAL DAILY
Qty: 90 TABLET | Refills: 3 | Status: SHIPPED | OUTPATIENT
Start: 2024-01-17

## 2024-01-17 ASSESSMENT — FIBROSIS 4 INDEX: FIB4 SCORE: 2.53

## 2024-01-17 NOTE — PROGRESS NOTES
Chief Complaint   Patient presents with    Follow-Up     F/v Dx:AICD (automatic cardioverter/defibrillator) present       Subjective     Prudencio Lee is a 73 y.o. male who presents today status post upgrade to CRT-D.  Does feel improved.  Recent intervention this past summer.  Has been exercising.  Does feel sick with amiodarone at 200 mg daily.  Previous VT. No atrial fibrillation seen.  On anticoagulation    Past Medical History:   Diagnosis Date    Acute anterior myocardial infarction (HCC) 12/28/2000    PCI attempted by Dr. Malloy but unsuccessful    AICD (automatic cardioverter/defibrillator) present 2003/2008/2015    Defibrillator is a Medtronic model RTPE5V5, serial #[id number], implanted Dr. Stephenson November 2015. The ventricular lead is a Medtronic model #6947-65, lead serial #[id number], implant 7/2/2003    Allergy     within 2 years    Arthritis 9/23    osteoarthritis    CAD (coronary artery disease) 12/28/2000    CABG x 4 (LIMA to LAD, rSVG to first diagonal, left circumflex and PDA).2000: CABG x 4 (LIMA to LAD, rSVG to first diagonal, left circumflex and PDA).  2003: LIMA and circumflex grafts patent, diagonal and PDA grafts closed. September 2023: PCI/AZRA ([place] 3.0 x 26mm) the distal RCA.    Cardiac pacemaker in situ 11/1/2023    Chronic anticoagulation 02/01/2018    Chronic deep vein thrombosis (DVT) of femoral vein of left lower extremity ([facility]) 01/04/2018    Congestive heart failure ([facility]) 12/28/2000    COVID 08/28/2023    pt denies symptoms now 9/27/23    Dental disorder     upper denture    GERD (gastroesophageal reflux disease)     High cholesterol     Hypertension     Ischemic cardiomyopathy 05/2022    Echocardiogram with severely dilated LV, LVEF <25%. Severe diffuse hypokinesis. Septal akinesis. Normal LA and RV. Moderately dilated RA. Trace MR, mild TR. RVSP 23.8mmHg.    Left bundle branch block      Pneumonia 2014    Pulmonary embolism ([facility]) 03/2014    Thrombocytopenia ([facility])  2014    Type 2 diabetes mellitus without complication (HCC)      oral medication    Vascular disorder of extremity (HCC) 2020    Ventricular tachyarrhythmia (HCC)      Past Surgical History:   Procedure Laterality Date    RECOVERY  11/3/2015    Procedure: CATH LAB-STEPHENSON-ICD GENERATOR CHANGE 26845- EUNICE T82.111A-MEDTRONIC BLOODLESS;  Surgeon: Recoveryonly Surgery;  Location: SURGERY PRE-POST PROC UNIT [facility];  Service:     AICD BATTERY CHANGE  2015    Generator replacement with Medtronic Evera S VR [id number] implanted by Dr. Marcus Stephenson.    AICD IMPLANT  2008    Medtronic Virtuoso VR [id number] implanted by Dr. Stephenson.    OTHER CARDIAC SURGERY  2000    CABG x 4    APPENDECTOMY  1969    MULTIPLE CORONARY ARTERY BYPASS       Family History   Problem Relation Age of Onset    Diabetes Mother     Heart Disease Mother     Diabetes Sister     Alcohol/Drug Brother      Social History     Socioeconomic History    Marital status:      Spouse name: Not on file    Number of children: Not on file    Years of education: Not on file    Highest education level: Not on file   Occupational History     Comment: new   retired   Tobacco Use    Smoking status: Former     Current packs/day: 0.00     Average packs/day: 1.3 packs/day for 48.0 years (64.0 ttl pk-yrs)     Types: Cigarettes     Start date: 1968     Quit date: 2000     Years since quittin.0    Smokeless tobacco: Never   Vaping Use    Vaping Use: Never used   Substance and Sexual Activity    Alcohol use: Yes     Alcohol/week: 1.2 oz     Types: 2 Standard drinks or equivalent per week     Comment: occ    Drug use: Not Currently    Sexual activity: Never   Other Topics Concern    Not on file   Social History Narrative    Not on file     Social Determinants of Health     Financial Resource Strain: Not on file   Food Insecurity: Not on file   Transportation Needs: Not on file   Physical Activity: Not on file   Stress: Not  on file   Social Connections: Not on file   Intimate Partner Violence: Not on file   Housing Stability: Not on file     Allergies   Allergen Reactions    Pcn [Penicillins] Anaphylaxis    Ace Inhibitors Cough     Cough     Outpatient Encounter Medications as of 1/17/2024   Medication Sig Dispense Refill    spironolactone (ALDACTONE) 25 MG Tab Take 1 Tablet by mouth every day. 90 Tablet 0    rivaroxaban (XARELTO) 15 MG Tab tablet Take 1 Tablet by mouth with dinner. 90 Tablet 3    rosuvastatin (CRESTOR) 5 MG Tab Take 1 Tablet by mouth every Monday, Wednesday, and Friday. 50 Tablet 2    Empagliflozin (JARDIANCE) 10 MG Tab tablet Take 1 Tablet by mouth every day. 100 Tablet 3    metFORMIN ER (GLUCOPHAGE XR) 500 MG TABLET SR 24 HR Take 1 Tablet by mouth every day. 90 Tablet 3    amiodarone (CORDARONE) 200 MG Tab Take 1 Tablet by mouth every day. 90 Tablet 3    sacubitril-valsartan (ENTRESTO) 24-26 MG Tab Take 1 Tablet by mouth 2 times a day. 180 Tablet 3    clopidogrel (PLAVIX) 75 MG Tab Take 1 Tablet by mouth every day. 90 Tablet 3    Cholecalciferol (D3 PO) Take 1 Capsule by mouth every day.      furosemide (LASIX) 40 MG Tab Take 0.5 Tablets by mouth every day. 50 Tablet 3    potassium chloride SA (KDUR) 20 MEQ Tab CR Take 0.5 Tablets by mouth every day. 1/2 tab daily 50 Tablet 3    metoprolol SR (TOPROL XL) 50 MG TABLET SR 24 HR Take 50 mg by mouth every morning.      Magnesium 500 MG Cap Take 1 Cap by mouth every day. 180 Cap 4    COVID-19 mRNA Vac-Karissa,Pfizer, (COMIRNATY) 30 MCG/0.3ML Suspension Prefilled Syringe injection Inject 0.3 mL into the shoulder, thigh, or buttocks. (Patient not taking: Reported on 1/17/2024) 0.3 mL 0    injection RSVPreF3 Vac Recomb Adjuvanted (AREXVY) 120 MCG/0.5ML Recon Susp Inject 0.5 mL into the shoulder, thigh, or buttocks. (Patient not taking: Reported on 1/17/2024) 0.5 mL 0     No facility-administered encounter medications on file as of 1/17/2024.     ROS           Objective     BP  "100/52 (BP Location: Left arm, Patient Position: Sitting, BP Cuff Size: Adult)   Pulse 78   Resp 14   Ht 1.803 m (5' 11\")   Wt 67.1 kg (148 lb)   SpO2 95%   BMI 20.64 kg/m²     Physical Exam  Constitutional:       Appearance: He is well-developed.   HENT:      Head: Normocephalic and atraumatic.   Cardiovascular:      Rate and Rhythm: Normal rate and regular rhythm.      Heart sounds: No murmur heard.     No friction rub. No gallop.   Pulmonary:      Effort: Pulmonary effort is normal.      Breath sounds: Normal breath sounds.   Abdominal:      Palpations: Abdomen is soft.   Musculoskeletal:         General: Normal range of motion.      Cervical back: Normal range of motion and neck supple.   Skin:     General: Skin is warm and dry.   Neurological:      Mental Status: He is alert and oriented to person, place, and time.   Psychiatric:         Behavior: Behavior normal.         Thought Content: Thought content normal.         Judgment: Judgment normal.                Assessment & Plan     1. AICD (automatic cardioverter/defibrillator) present  EC-ECHOCARDIOGRAM COMPLETE W/O CONT    Comp Metabolic Panel      2. Atrial fibrillation, unspecified type ([facility])        3. Chronic embolism and thrombosis of left femoral vein ([facility])        4. History of pulmonary embolus (PE)        5. End stage heart failure ([facility])        6. Chronic kidney disease (CKD) stage G3a/A1, moderately decreased glomerular filtration rate (GFR) between 45-59 mL/min/1.73 square meter and albuminuria creatinine ratio less than 30 mg/g ([facility])  Comp Metabolic Panel      7. Type 2 diabetes mellitus with stage 3a chronic kidney disease, without long-term current use of insulin ([facility])        8. Ventricular tachycardia ([facility])  EC-ECHOCARDIOGRAM COMPLETE W/O CONT      9. S/P CABG x 4  Comp Metabolic Panel          Medical Decision Making: Today's Assessment/Status/Plan:   1.  Ventricular tachycardia continue amiodarone but cut it down to 100 mg daily possibly " needs to go to every other day.  2.  Ischemic cardiomyopathy status post upgrade to CRT-D recheck echo  and check CMP.  3.  Peripheral vascular study ordered by family practice.  4.  Diabetes mellitus.  5.  End-stage heart failure  6.  Anticoagulation continue.  7.  Follow-up with Dr. Rock and  follow-up with me.

## 2024-01-18 ENCOUNTER — PATIENT MESSAGE (OUTPATIENT)
Dept: CARDIOLOGY | Facility: MEDICAL CENTER | Age: 74
End: 2024-01-18
Payer: MEDICARE

## 2024-01-18 ENCOUNTER — PHARMACY VISIT (OUTPATIENT)
Dept: PHARMACY | Facility: MEDICAL CENTER | Age: 74
End: 2024-01-18
Payer: COMMERCIAL

## 2024-01-18 PROCEDURE — RXMED WILLOW AMBULATORY MEDICATION CHARGE: Performed by: INTERNAL MEDICINE

## 2024-01-18 PROCEDURE — RXOTC WILLOW AMBULATORY OTC CHARGE

## 2024-01-18 RX ORDER — ROSUVASTATIN CALCIUM 20 MG/1
20 TABLET, COATED ORAL EVERY EVENING
Qty: 90 TABLET | Refills: 3 | Status: SHIPPED | OUTPATIENT
Start: 2024-01-18

## 2024-01-19 NOTE — TELEPHONE ENCOUNTER
To DS: Clarification of Rosuvastatin. Previously taking 5mg M,W,F. Increasing to Rosuvastatin to 20 MG daily?

## 2024-01-30 PROCEDURE — RXMED WILLOW AMBULATORY MEDICATION CHARGE: Performed by: FAMILY MEDICINE

## 2024-02-02 ENCOUNTER — PHARMACY VISIT (OUTPATIENT)
Dept: PHARMACY | Facility: MEDICAL CENTER | Age: 74
End: 2024-02-02
Payer: COMMERCIAL

## 2024-02-05 PROCEDURE — RXMED WILLOW AMBULATORY MEDICATION CHARGE: Performed by: FAMILY MEDICINE

## 2024-02-09 ENCOUNTER — PHARMACY VISIT (OUTPATIENT)
Dept: PHARMACY | Facility: MEDICAL CENTER | Age: 74
End: 2024-02-09
Payer: COMMERCIAL

## 2024-02-12 ENCOUNTER — PHARMACY VISIT (OUTPATIENT)
Dept: PHARMACY | Facility: MEDICAL CENTER | Age: 74
End: 2024-02-12
Payer: COMMERCIAL

## 2024-02-12 ENCOUNTER — HOSPITAL ENCOUNTER (OUTPATIENT)
Dept: CARDIOLOGY | Facility: MEDICAL CENTER | Age: 74
End: 2024-02-12
Attending: INTERNAL MEDICINE
Payer: MEDICARE

## 2024-02-12 DIAGNOSIS — Z95.810 AICD (AUTOMATIC CARDIOVERTER/DEFIBRILLATOR) PRESENT: ICD-10-CM

## 2024-02-12 DIAGNOSIS — I47.20 VENTRICULAR TACHYCARDIA (HCC): ICD-10-CM

## 2024-02-12 PROCEDURE — 93306 TTE W/DOPPLER COMPLETE: CPT

## 2024-02-12 PROCEDURE — RXMED WILLOW AMBULATORY MEDICATION CHARGE: Performed by: INTERNAL MEDICINE

## 2024-02-12 PROCEDURE — 700117 HCHG RX CONTRAST REV CODE 255: Performed by: INTERNAL MEDICINE

## 2024-02-12 RX ADMIN — HUMAN ALBUMIN MICROSPHERES AND PERFLUTREN 3 ML: 10; .22 INJECTION, SOLUTION INTRAVENOUS at 12:00

## 2024-02-13 ENCOUNTER — APPOINTMENT (OUTPATIENT)
Dept: CARDIOLOGY | Facility: MEDICAL CENTER | Age: 74
End: 2024-02-13
Attending: NURSE PRACTITIONER
Payer: MEDICARE

## 2024-02-13 LAB
LV EJECT FRACT  99904: 20
LV EJECT FRACT MOD 2C 99903: 19.32
LV EJECT FRACT MOD 4C 99902: 9.05
LV EJECT FRACT MOD BP 99901: 17.85

## 2024-02-13 PROCEDURE — 93306 TTE W/DOPPLER COMPLETE: CPT | Mod: 26 | Performed by: INTERNAL MEDICINE

## 2024-02-26 ENCOUNTER — TELEPHONE (OUTPATIENT)
Dept: CARDIOLOGY | Facility: MEDICAL CENTER | Age: 74
End: 2024-02-26
Payer: MEDICARE

## 2024-02-26 NOTE — TELEPHONE ENCOUNTER
Attempted to call patient in regards to an upcoming appointment with Dr. Rock 3/5/24. LVM letting patient know to call us back. Attempting to verify where lab work is done. Patient does have  lab work to do.      Pending call back.

## 2024-02-27 ENCOUNTER — HOSPITAL ENCOUNTER (OUTPATIENT)
Dept: LAB | Facility: MEDICAL CENTER | Age: 74
End: 2024-02-27
Attending: INTERNAL MEDICINE
Payer: MEDICARE

## 2024-02-27 DIAGNOSIS — N18.31 CHRONIC KIDNEY DISEASE (CKD) STAGE G3A/A1, MODERATELY DECREASED GLOMERULAR FILTRATION RATE (GFR) BETWEEN 45-59 ML/MIN/1.73 SQUARE METER AND ALBUMINURIA CREATININE RATIO LESS THAN 30 MG/G: ICD-10-CM

## 2024-02-27 DIAGNOSIS — E78.2 MIXED HYPERLIPIDEMIA: ICD-10-CM

## 2024-02-27 DIAGNOSIS — Z95.810 AICD (AUTOMATIC CARDIOVERTER/DEFIBRILLATOR) PRESENT: ICD-10-CM

## 2024-02-27 DIAGNOSIS — Z95.1 S/P CABG X 4: ICD-10-CM

## 2024-02-27 LAB
ALBUMIN SERPL BCP-MCNC: 4.3 G/DL (ref 3.2–4.9)
ALBUMIN/GLOB SERPL: 1.3 G/DL
ALP SERPL-CCNC: 67 U/L (ref 30–99)
ALT SERPL-CCNC: 14 U/L (ref 2–50)
ANION GAP SERPL CALC-SCNC: 12 MMOL/L (ref 7–16)
AST SERPL-CCNC: 21 U/L (ref 12–45)
BILIRUB SERPL-MCNC: 0.6 MG/DL (ref 0.1–1.5)
BUN SERPL-MCNC: 26 MG/DL (ref 8–22)
CALCIUM ALBUM COR SERPL-MCNC: 9.4 MG/DL (ref 8.5–10.5)
CALCIUM SERPL-MCNC: 9.6 MG/DL (ref 8.5–10.5)
CHLORIDE SERPL-SCNC: 104 MMOL/L (ref 96–112)
CHOLEST SERPL-MCNC: 162 MG/DL (ref 100–199)
CO2 SERPL-SCNC: 25 MMOL/L (ref 20–33)
CREAT SERPL-MCNC: 1.52 MG/DL (ref 0.5–1.4)
FASTING STATUS PATIENT QL REPORTED: NORMAL
GFR SERPLBLD CREATININE-BSD FMLA CKD-EPI: 48 ML/MIN/1.73 M 2
GLOBULIN SER CALC-MCNC: 3.3 G/DL (ref 1.9–3.5)
GLUCOSE SERPL-MCNC: 146 MG/DL (ref 65–99)
HDLC SERPL-MCNC: 59 MG/DL
LDLC SERPL CALC-MCNC: 77 MG/DL
POTASSIUM SERPL-SCNC: 5 MMOL/L (ref 3.6–5.5)
PROT SERPL-MCNC: 7.6 G/DL (ref 6–8.2)
SODIUM SERPL-SCNC: 141 MMOL/L (ref 135–145)
TRIGL SERPL-MCNC: 132 MG/DL (ref 0–149)

## 2024-02-27 PROCEDURE — 80061 LIPID PANEL: CPT

## 2024-02-27 PROCEDURE — 36415 COLL VENOUS BLD VENIPUNCTURE: CPT

## 2024-02-27 PROCEDURE — 80053 COMPREHEN METABOLIC PANEL: CPT

## 2024-02-28 ENCOUNTER — TELEPHONE (OUTPATIENT)
Dept: CARDIOLOGY | Facility: MEDICAL CENTER | Age: 74
End: 2024-02-28
Payer: MEDICARE

## 2024-02-28 NOTE — TELEPHONE ENCOUNTER
[employer] message sent to patient    ----- Message from Marcus Stephenson M.D. sent at 2/28/2024 11:14 AM PST -----  Looks stable

## 2024-03-04 DIAGNOSIS — I25.119 CORONARY ARTERY DISEASE INVOLVING NATIVE CORONARY ARTERY OF NATIVE HEART WITH ANGINA PECTORIS (HCC): ICD-10-CM

## 2024-03-04 DIAGNOSIS — I50.22 CHRONIC SYSTOLIC CONGESTIVE HEART FAILURE, NYHA CLASS 2 (HCC): ICD-10-CM

## 2024-03-04 DIAGNOSIS — I25.5 ISCHEMIC CARDIOMYOPATHY: ICD-10-CM

## 2024-03-04 RX ORDER — POTASSIUM CHLORIDE 20 MEQ/1
10 TABLET, EXTENDED RELEASE ORAL DAILY
Qty: 100 TABLET | Refills: 1 | Status: SHIPPED | OUTPATIENT
Start: 2024-03-04 | End: 2024-03-05

## 2024-03-05 ENCOUNTER — OFFICE VISIT (OUTPATIENT)
Dept: CARDIOLOGY | Facility: MEDICAL CENTER | Age: 74
End: 2024-03-05
Attending: INTERNAL MEDICINE
Payer: MEDICARE

## 2024-03-05 VITALS
HEART RATE: 82 BPM | WEIGHT: 148 LBS | DIASTOLIC BLOOD PRESSURE: 60 MMHG | HEIGHT: 71 IN | RESPIRATION RATE: 16 BRPM | SYSTOLIC BLOOD PRESSURE: 92 MMHG | OXYGEN SATURATION: 96 % | BODY MASS INDEX: 20.72 KG/M2

## 2024-03-05 DIAGNOSIS — I25.10 CORONARY ARTERY DISEASE DUE TO CALCIFIED CORONARY LESION: ICD-10-CM

## 2024-03-05 DIAGNOSIS — I25.5 ISCHEMIC CARDIOMYOPATHY: ICD-10-CM

## 2024-03-05 DIAGNOSIS — I47.20 VENTRICULAR TACHYCARDIA (HCC): ICD-10-CM

## 2024-03-05 DIAGNOSIS — N18.31 CHRONIC KIDNEY DISEASE (CKD) STAGE G3A/A1, MODERATELY DECREASED GLOMERULAR FILTRATION RATE (GFR) BETWEEN 45-59 ML/MIN/1.73 SQUARE METER AND ALBUMINURIA CREATININE RATIO LESS THAN 30 MG/G: ICD-10-CM

## 2024-03-05 DIAGNOSIS — Z95.1 S/P CABG X 4: ICD-10-CM

## 2024-03-05 DIAGNOSIS — I25.84 CORONARY ARTERY DISEASE DUE TO CALCIFIED CORONARY LESION: ICD-10-CM

## 2024-03-05 PROCEDURE — 99215 OFFICE O/P EST HI 40 MIN: CPT | Performed by: INTERNAL MEDICINE

## 2024-03-05 PROCEDURE — 3078F DIAST BP <80 MM HG: CPT | Performed by: INTERNAL MEDICINE

## 2024-03-05 PROCEDURE — 3074F SYST BP LT 130 MM HG: CPT | Performed by: INTERNAL MEDICINE

## 2024-03-05 PROCEDURE — 99213 OFFICE O/P EST LOW 20 MIN: CPT | Performed by: INTERNAL MEDICINE

## 2024-03-05 RX ORDER — TORSEMIDE 5 MG/1
5 TABLET ORAL DAILY
Qty: 90 TABLET | Refills: 3 | Status: SHIPPED | OUTPATIENT
Start: 2024-03-05

## 2024-03-05 ASSESSMENT — FIBROSIS 4 INDEX: FIB4 SCORE: 2.87

## 2024-03-05 NOTE — PROGRESS NOTES
CARDIOLOGY CLINIC NOTE      Date of Visit: 3/5/2024  Primary Care Provider: Rafael Park M.D.    Patient Name: Jaime Lee  YOB: 1950  MRN: [id number]     Reason for Visit:   Follow up     Patient Story:   Jaime Lee is a 73 year-old gentleman with a past medical history of severe coronary artery disease (s/p 4V CABG in 2000), prior acute anterior MI, chronic systolic heart failure/ischemic cardiomyopathy (s/p AICD in 2003), ventricular arrhythmias with prior AICD discharges, unprovoked DVT/PE (on chronic anticoagulation), and type 2 diabetes with stage III chronic kidney disease.  Briefly, he has a known history of severe coronary artery disease and underwent four-vessel coronary artery bypass grafting surgery in 2000 in the setting of an acute anterior myocardial infarction.  More recently, he had episodes of syncope due to ventricular arrhythmias requiring defibrillation by his AICD.  He was referred for coronary angiography to evaluate his increased arrhythmic burden, which demonstrated stump occlusions of his SVG-RCA and SVG-D1, probably moderate disease in the anastomosis of the SVG-OM, severe disease in a large diagonal branch after the LIMA anastomosis, a short  of the distal RCA that was successfully treated with PCI, a  of the mid LAD, and a  of the ostial LCx.    He initially presented in my clinic to discuss possible  intervention.  After reviewing his cardiac catheterization films, most recent echocardiogram, and symptoms, he was not felt to be a good candidate for  intervention.  He did report significant nausea after starting amiodarone and felt that his exertional tolerance was limited by fatigue that he attributed to bradycardia from his medications, particularly metoprolol.    Today, he reports overall feeling better.  He has decided at this time not to pursue a transplant evaluation.  He notes that he has not had recent lower  extremity edema and denies specifically having chest pain with exertion.  He does feel fatigued throughout the day and continues to have significant lightheadedness/instability with standing, although this symptom has improved somewhat recently.     Medications and Allergies:     Current Outpatient Medications   Medication Sig Dispense Refill    torsemide (DEMADEX) 5 MG Tab Take 1 Tablet by mouth every day. 90 Tablet 3    rosuvastatin (CRESTOR) 20 MG Tab Take 1 Tablet by mouth every evening. 90 Tablet 3    amiodarone (CORDARONE) 200 MG Tab Take 0.5 Tablets by mouth every day. 90 Tablet 3    spironolactone (ALDACTONE) 25 MG Tab Take 1 Tablet by mouth every day. 90 Tablet 0    rivaroxaban (XARELTO) 15 MG Tab tablet Take 1 Tablet by mouth with dinner. 90 Tablet 3    Empagliflozin (JARDIANCE) 10 MG Tab tablet Take 1 Tablet by mouth every day. 100 Tablet 3    sacubitril-valsartan (ENTRESTO) 24-26 MG Tab Take 1 Tablet by mouth 2 times a day. 180 Tablet 3    clopidogrel (PLAVIX) 75 MG Tab Take 1 Tablet by mouth every day. 90 Tablet 3    Cholecalciferol (D3 PO) Take 1 Capsule by mouth every day.      metoprolol SR (TOPROL XL) 50 MG TABLET SR 24 HR Take 50 mg by mouth every morning.      Magnesium 500 MG Cap Take 1 Cap by mouth every day. 180 Cap 4    metFORMIN ER (GLUCOPHAGE XR) 500 MG TABLET SR 24 HR Take 1 Tablet by mouth every day. 90 Tablet 3     No current facility-administered medications for this visit.     Allergies   Allergen Reactions    Pcn [Penicillins] Anaphylaxis    Ace Inhibitors Cough     Cough      Medical Decision Making:   # Severe coronary artery disease, history of CABG, history of PCI to  RCA: Ultimately, after reviewing his angiogram, I think that he would gain the greatest long-term benefit from a cardiac transplant if he were a candidate.  I think the potential risks of either a  intervention (with blunt, ambiguous proximal caps for both the ostial LCx and mid LAD) or a graft intervention  (probably moderate disease in the SVG-OM anastomosis and a long course for intervention on the diagonal through the LIMA) outweigh the likely benefits as  intervention has not been definitively shown to reduce ventricular arrhythmias (which are likely scar based in his case) or improve left ventricular systolic function (particularly if there is already significant scarring/fibrosis).  If he were to have clear limiting anginal symptoms, then possibly these complex interventions could be reconsidered.  -Continue clopidogrel for 1 year, then stop    # Severe ischemic cardiomyopathy, chronic heart failure with reduced ejection fraction: At this time, he reports not being interested in pursuing a transplant evaluation at [place].  Therefore, we will attempt with optimization of his heart failure medications, unfortunately, his blood pressure and orthostatic symptoms are unlikely to tolerate up titration rapidly.  He otherwise appears euvolemic and well compensated.  -Continue metoprolol XL 50 mg daily  -Continue Entresto 24-26 mg twice daily  -Continue empagliflozin 10 mg daily  -Continue spironolactone 25 mg daily  -Discontinue potassium supplement and change furosemide to torsemide 5 mg daily  -Repeat BMP in 1-2 weeks    # History of ventricular arrhythmias requiring defibrillation  -Defer antiarrhythmic therapy and routine monitoring to EP service  -CRT-D device monitoring per [facility]    # Hyperlipidemia: His LDL is not optimally controlled  -Increase rosuvastatin to 40 mg nightly    # Type 2 diabetes, chronic kidney disease:  -Defer glucose control to PCP, he is on empagliflozin  -He is on Entresto    # History of recurrent DVT:  -When clopidogrel is discontinued, change rivaroxaban to 20 mg daily    Care Time  A total of 42 minutes was spent on this patient encounter.    Follow Up  6 months if clinically stable     Cardiac Studies and Procedures:   Echocardiography  TTE (2/12/2024)  The left ventricle is  "mildly dilated.  Severely reduced left ventricular systolic function.  The ejection fraction is measured to be 18% by Arroyo's biplane.  Mild tricuspid regurgitation.  Right ventricular systolic pressure is estimated to be 24 mmHg.  Normal inferior vena cava size and inspiratory collapse.    TTE (5/2/2022)  Severe LV enlargement with severely reduced systolic function.  Estimated LVEF = 19% by MOD.  Normal RV size with mild to moderately reduced systolic function.   Mild TR. Estimated RVSP = 24 mmHg (RAP 3 mmHg). Trace AR/NJ/MR.     TTE (3/17/2021)  The left ventricle is severely dilated.  Left ventricular ejection fraction is visually estimated to be 20%.  No significant valve disease or flow abnormalities.     Coronary Angiography/Cardiac Catheterization  CAG (9/28/2023)  Left main: Luminal disease  Left anterior descending: Occluded in the mid portion after large septal branches takeoff, fills through LIMA.  Left circumflex: Flush occluded ostially  Ramus intermedius: Luminal disease  Right coronary: Dominant,  in distal portion, TIMI1 flow into distal portion bridging collaterals.  Vein graft to RCA occluded  Vein graft to diagonal occluded  Vein graft to marginal branch widely patent with possible moderate stenosis versus tortuosity at the anastomosis  LIMA to LAD overall small, after anastomosis the LAD bifurcates into a small ongoing distal LAD and a large diagonal branch.  The diagonal branch has 70% disease in the proximal portion    Percutaneous Coronary Intervention  PCI (9/28/2023)  Successful PCI to the distal RCA  with one 3.0 x 26 mm Shar AZRA      Vital Signs:   BP 92/60 (BP Location: Left arm, Patient Position: Sitting)   Pulse 82   Resp 16   Ht 1.803 m (5' 11\")   Wt 67.1 kg (148 lb)   SpO2 96%    BP Readings from Last 4 Encounters:   03/05/24 92/60   01/17/24 100/52   01/16/24 100/70   11/02/23 96/59     Wt Readings from Last 4 Encounters:   03/05/24 67.1 kg (148 lb)   01/17/24 67.1 kg " "(148 lb)   01/16/24 66.7 kg (147 lb)   11/02/23 65.4 kg (144 lb 2.9 oz)     Body mass index is 20.64 kg/m².     Laboratories:   Lipids  Lab Results   Component Value Date/Time    LDL 77 02/27/2024 08:38 AM    LDL 90 08/02/2023 07:51 AM    LDL 87 04/02/2022 08:51 AM    LDL 85 03/05/2021 09:07 AM    LDL 98 05/27/2020 06:09 AM       Lab Results   Component Value Date/Time    HDL 59 02/27/2024 08:38 AM    HDL 64 08/02/2023 07:51 AM    HDL 58 04/02/2022 08:51 AM    HDL 57 03/05/2021 09:07 AM    HDL 60 05/27/2020 06:09 AM       Lab Results   Component Value Date/Time    TRIGLYCERIDE 132 02/27/2024 08:38 AM    TRIGLYCERIDE 93 08/02/2023 07:51 AM    TRIGLYCERIDE 118 04/02/2022 08:51 AM    TRIGLYCERIDE 120 03/05/2021 09:07 AM    TRIGLYCERIDE 137 05/27/2020 06:09 AM       Lab Results   Component Value Date/Time    CHOLSTRLTOT 162 02/27/2024 08:38 AM    CHOLSTRLTOT 173 08/02/2023 07:51 AM    CHOLSTRLTOT 169 04/02/2022 08:51 AM    CHOLSTRLTOT 166 03/05/2021 09:07 AM    CHOLSTRLTOT 185 05/27/2020 06:09 AM       No components found for: \"LPA\"      Chemistries  Lab Results   Component Value Date/Time    CREATININE 1.52 (H) 02/27/2024 08:38 AM    CREATININE 1.34 11/02/2023 02:34 AM    CREATININE 1.75 (H) 10/30/2023 12:33 PM    CREATININE 1.22 09/29/2023 02:25 AM    CREATININE 1.34 09/28/2023 07:03 AM    CREATININE 1.0 01/08/2008 07:45 AM    CREATININE 1.2 12/21/2007 07:00 AM    CREATININE 1.1 04/19/2007 06:40 AM    CREATININE 1.2 03/20/2006 07:14 AM    CREATININE 1.3 06/17/2005 07:30 AM     Lab Results   Component Value Date/Time    BUN 26 (H) 02/27/2024 08:38 AM    BUN 22 11/02/2023 02:34 AM    BUN 28 (H) 10/30/2023 12:33 PM    BUN 28 (H) 09/29/2023 02:25 AM    BUN 35 (H) 09/28/2023 07:03 AM     Lab Results   Component Value Date/Time    POTASSIUM 5.0 02/27/2024 08:38 AM    POTASSIUM 4.0 11/02/2023 02:34 AM    POTASSIUM 5.5 10/30/2023 12:33 PM     Lab Results   Component Value Date/Time    SODIUM 141 02/27/2024 08:38 AM    SODIUM " "136 11/02/2023 02:34 AM    SODIUM 139 10/30/2023 12:33 PM     Lab Results   Component Value Date/Time    GLUCOSE 146 (H) 02/27/2024 08:38 AM    GLUCOSE 111 (H) 11/02/2023 02:34 AM    GLUCOSE 176 (H) 10/30/2023 12:33 PM     Lab Results   Component Value Date/Time    ASTSGOT 21 02/27/2024 08:38 AM    ASTSGOT 14 11/02/2023 02:34 AM    ASTSGOT 9 (L) 10/30/2023 12:33 PM     Lab Results   Component Value Date/Time    ALTSGPT 14 02/27/2024 08:38 AM    ALTSGPT 8 11/02/2023 02:34 AM    ALTSGPT 9 10/30/2023 12:33 PM     Lab Results   Component Value Date/Time    ALKPHOSPHAT 67 02/27/2024 08:38 AM    ALKPHOSPHAT 57 11/02/2023 02:34 AM    ALKPHOSPHAT 64 10/30/2023 12:33 PM     Lab Results   Component Value Date/Time    HBA1C 6.5 (A) 10/24/2023 10:05 AM    HBA1C 6.2 (A) 06/22/2023 01:57 PM    HBA1C 6.6 (H) 01/06/2023 09:00 AM     No results found for: \"TSH\"  No results found for: \"NTPROBNP\"  Lab Results   Component Value Date/Time    TROPONINT 89 (H) 09/20/2023 10:16 PM    TROPONINT 37 (H) 09/20/2023 11:24 AM       Blood Counts  Lab Results   Component Value Date/Time    HEMOGLOBIN 15.0 11/02/2023 02:34 AM    HEMOGLOBIN 17.9 10/30/2023 12:33 PM    HEMOGLOBIN 14.8 09/29/2023 02:25 AM     Lab Results   Component Value Date/Time    PLATELETCT 143 (L) 11/02/2023 02:34 AM    PLATELETCT 184 10/30/2023 12:33 PM    PLATELETCT 162 (L) 09/29/2023 02:25 AM     Lab Results   Component Value Date/Time    WBC 8.5 11/02/2023 02:34 AM    WBC 9.5 10/30/2023 12:33 PM    WBC 8.2 09/29/2023 02:25 AM        Physical Examination:   General: Thin, somewhat frail appearing, but in no acute distress  Eyes: Extraocular movements intact, anicteric  Neck: Full range of motion, no jugular venous distension  Pulmonary: Normal respiratory effort, no distress  Cardiovascular: Regular rate, regular rhythm  Gastrointestinal: Thin, nondistended  Extremities: Dense bilateral chronic venous stasis changes, healing heel wound, no significant edema  Neurological: " Alert and oriented, no gross focal motor deficits  Psychiatric: Normal affect, normal judgment     Past History:   Past Medical History  The patient's past medical history was reviewed.  See HPI and self-reported patient medical history form for pertinent medical history to consultation.    Past Social History  The patient's social history was reviewed.  See HPI self-reported patient medical history form for pertinent social history to consultation.    Past Family History  The patient's family history was reviewed.  See HPI self-reported patient medical history form for pertinent family history to consultation.    Review of Systems  A pertinent cardiac review of systems was performed and was otherwise unremarkable except as per HPI and self-reported patient medical history form.        Leonard Rock MD, [facility]  Interventional Cardiology  [facility], [address]  [address] [address]  Phone: [phone]  Fax: [phone]

## 2024-03-06 PROBLEM — I48.91 ATRIAL FIBRILLATION, UNSPECIFIED TYPE (HCC): Status: RESOLVED | Noted: 2024-01-16 | Resolved: 2024-03-06

## 2024-03-06 PROBLEM — R00.1 BRADYCARDIA: Status: RESOLVED | Noted: 2023-10-05 | Resolved: 2024-03-06

## 2024-03-07 PROCEDURE — RXMED WILLOW AMBULATORY MEDICATION CHARGE: Performed by: INTERNAL MEDICINE

## 2024-03-08 ENCOUNTER — PHARMACY VISIT (OUTPATIENT)
Dept: PHARMACY | Facility: MEDICAL CENTER | Age: 74
End: 2024-03-08
Payer: COMMERCIAL

## 2024-03-11 PROCEDURE — RXMED WILLOW AMBULATORY MEDICATION CHARGE: Performed by: INTERNAL MEDICINE

## 2024-03-14 ENCOUNTER — PHARMACY VISIT (OUTPATIENT)
Dept: PHARMACY | Facility: MEDICAL CENTER | Age: 74
End: 2024-03-14
Payer: COMMERCIAL

## 2024-03-20 ENCOUNTER — PHARMACY VISIT (OUTPATIENT)
Dept: PHARMACY | Facility: MEDICAL CENTER | Age: 74
End: 2024-03-20
Payer: COMMERCIAL

## 2024-03-20 PROCEDURE — RXMED WILLOW AMBULATORY MEDICATION CHARGE: Performed by: NURSE PRACTITIONER

## 2024-03-22 DIAGNOSIS — I25.5 ISCHEMIC CARDIOMYOPATHY: ICD-10-CM

## 2024-03-22 PROCEDURE — RXMED WILLOW AMBULATORY MEDICATION CHARGE: Performed by: INTERNAL MEDICINE

## 2024-03-22 RX ORDER — SPIRONOLACTONE 25 MG/1
25 TABLET ORAL DAILY
Qty: 90 TABLET | Refills: 0 | Status: SHIPPED | OUTPATIENT
Start: 2024-03-22

## 2024-03-28 ENCOUNTER — PHARMACY VISIT (OUTPATIENT)
Dept: PHARMACY | Facility: MEDICAL CENTER | Age: 74
End: 2024-03-28
Payer: COMMERCIAL

## 2024-04-01 ENCOUNTER — APPOINTMENT (OUTPATIENT)
Dept: LAB | Facility: MEDICAL CENTER | Age: 74
End: 2024-04-01
Payer: MEDICARE

## 2024-04-01 DIAGNOSIS — I25.5 ISCHEMIC CARDIOMYOPATHY: ICD-10-CM

## 2024-04-01 LAB
ANION GAP SERPL CALC-SCNC: 13 MMOL/L (ref 7–16)
BUN SERPL-MCNC: 33 MG/DL (ref 8–22)
CALCIUM SERPL-MCNC: 9.4 MG/DL (ref 8.4–10.2)
CHLORIDE SERPL-SCNC: 97 MMOL/L (ref 96–112)
CO2 SERPL-SCNC: 24 MMOL/L (ref 20–33)
CREAT SERPL-MCNC: 1.59 MG/DL (ref 0.5–1.4)
FASTING STATUS PATIENT QL REPORTED: NORMAL
GFR SERPLBLD CREATININE-BSD FMLA CKD-EPI: 45 ML/MIN/1.73 M 2
GLUCOSE SERPL-MCNC: 144 MG/DL (ref 65–99)
POTASSIUM SERPL-SCNC: 4.7 MMOL/L (ref 3.6–5.5)
SODIUM SERPL-SCNC: 134 MMOL/L (ref 135–145)

## 2024-04-01 PROCEDURE — 36415 COLL VENOUS BLD VENIPUNCTURE: CPT

## 2024-04-01 PROCEDURE — 80048 BASIC METABOLIC PNL TOTAL CA: CPT

## 2024-04-10 PROCEDURE — RXMED WILLOW AMBULATORY MEDICATION CHARGE: Performed by: INTERNAL MEDICINE

## 2024-04-11 ENCOUNTER — PHARMACY VISIT (OUTPATIENT)
Dept: PHARMACY | Facility: MEDICAL CENTER | Age: 74
End: 2024-04-11
Payer: COMMERCIAL

## 2024-04-19 PROCEDURE — RXMED WILLOW AMBULATORY MEDICATION CHARGE: Performed by: INTERNAL MEDICINE

## 2024-04-22 ENCOUNTER — PHARMACY VISIT (OUTPATIENT)
Dept: PHARMACY | Facility: MEDICAL CENTER | Age: 74
End: 2024-04-22
Payer: COMMERCIAL

## 2024-04-29 ENCOUNTER — TELEPHONE (OUTPATIENT)
Dept: HEALTH INFORMATION MANAGEMENT | Facility: OTHER | Age: 74
End: 2024-04-29
Payer: MEDICARE

## 2024-05-06 ENCOUNTER — PHARMACY VISIT (OUTPATIENT)
Dept: PHARMACY | Facility: MEDICAL CENTER | Age: 74
End: 2024-05-06
Payer: COMMERCIAL

## 2024-05-06 PROCEDURE — RXMED WILLOW AMBULATORY MEDICATION CHARGE: Performed by: INTERNAL MEDICINE

## 2024-05-07 ENCOUNTER — PHARMACY VISIT (OUTPATIENT)
Dept: PHARMACY | Facility: MEDICAL CENTER | Age: 74
End: 2024-05-07
Payer: COMMERCIAL

## 2024-05-07 PROCEDURE — RXMED WILLOW AMBULATORY MEDICATION CHARGE: Performed by: FAMILY MEDICINE

## 2024-05-08 ENCOUNTER — OFFICE VISIT (OUTPATIENT)
Dept: CARDIOLOGY | Facility: MEDICAL CENTER | Age: 74
End: 2024-05-08
Attending: INTERNAL MEDICINE
Payer: MEDICARE

## 2024-05-08 VITALS
WEIGHT: 148 LBS | OXYGEN SATURATION: 94 % | RESPIRATION RATE: 16 BRPM | HEART RATE: 92 BPM | SYSTOLIC BLOOD PRESSURE: 80 MMHG | BODY MASS INDEX: 20.72 KG/M2 | HEIGHT: 71 IN | DIASTOLIC BLOOD PRESSURE: 66 MMHG

## 2024-05-08 DIAGNOSIS — Z86.711 HISTORY OF PULMONARY EMBOLUS (PE): ICD-10-CM

## 2024-05-08 DIAGNOSIS — E11.22 TYPE 2 DIABETES MELLITUS WITH STAGE 3A CHRONIC KIDNEY DISEASE, WITHOUT LONG-TERM CURRENT USE OF INSULIN (HCC): ICD-10-CM

## 2024-05-08 DIAGNOSIS — I82.512 CHRONIC EMBOLISM AND THROMBOSIS OF LEFT FEMORAL VEIN (HCC): ICD-10-CM

## 2024-05-08 DIAGNOSIS — Z95.810 BIVENTRICULAR ICD (IMPLANTABLE CARDIOVERTER-DEFIBRILLATOR) IN PLACE: ICD-10-CM

## 2024-05-08 DIAGNOSIS — D68.69 SECONDARY HYPERCOAGULABLE STATE (HCC): ICD-10-CM

## 2024-05-08 DIAGNOSIS — Z79.01 CHRONIC ANTICOAGULATION: ICD-10-CM

## 2024-05-08 DIAGNOSIS — I47.20 VENTRICULAR TACHYCARDIA (HCC): ICD-10-CM

## 2024-05-08 DIAGNOSIS — N18.31 CHRONIC KIDNEY DISEASE (CKD) STAGE G3A/A1, MODERATELY DECREASED GLOMERULAR FILTRATION RATE (GFR) BETWEEN 45-59 ML/MIN/1.73 SQUARE METER AND ALBUMINURIA CREATININE RATIO LESS THAN 30 MG/G: ICD-10-CM

## 2024-05-08 DIAGNOSIS — N18.31 TYPE 2 DIABETES MELLITUS WITH STAGE 3A CHRONIC KIDNEY DISEASE, WITHOUT LONG-TERM CURRENT USE OF INSULIN (HCC): ICD-10-CM

## 2024-05-08 DIAGNOSIS — Z95.1 S/P CABG X 4: ICD-10-CM

## 2024-05-08 DIAGNOSIS — E78.2 MIXED HYPERLIPIDEMIA: ICD-10-CM

## 2024-05-08 DIAGNOSIS — I25.5 ISCHEMIC CARDIOMYOPATHY: ICD-10-CM

## 2024-05-08 PROCEDURE — 3078F DIAST BP <80 MM HG: CPT | Performed by: INTERNAL MEDICINE

## 2024-05-08 PROCEDURE — 93284 PRGRMG EVAL IMPLANTABLE DFB: CPT | Mod: 26 | Performed by: INTERNAL MEDICINE

## 2024-05-08 PROCEDURE — RXMED WILLOW AMBULATORY MEDICATION CHARGE: Performed by: INTERNAL MEDICINE

## 2024-05-08 PROCEDURE — 99214 OFFICE O/P EST MOD 30 MIN: CPT | Mod: 25 | Performed by: INTERNAL MEDICINE

## 2024-05-08 PROCEDURE — 3074F SYST BP LT 130 MM HG: CPT | Performed by: INTERNAL MEDICINE

## 2024-05-08 RX ORDER — AMIODARONE HYDROCHLORIDE 100 MG/1
100 TABLET ORAL DAILY
Qty: 90 TABLET | Refills: 3 | Status: SHIPPED | OUTPATIENT
Start: 2024-05-08

## 2024-05-08 ASSESSMENT — FIBROSIS 4 INDEX: FIB4 SCORE: 2.87

## 2024-05-08 NOTE — PROGRESS NOTES
Chief Complaint   Patient presents with    Ventricular Tachycardia     F/V Dx: Ventricular tachycardia (HCC)       Subjective     Prudencio Lee is a 73 y.o. male who presents today with history of CRT-D.  Cardiac intervention last September.  Overall stable.  Some orthostatic symptoms and low blood pressure.  On low-dose amiodarone.  No VT. Normal CRT-D function.    Past Medical History:   Diagnosis Date    Acute anterior myocardial infarction (HCC) 12/28/2000    PCI attempted by Dr. Malloy but unsuccessful    Allergy     within 2 years    Arthritis 9/23    osteoarthritis    CAD (coronary artery disease) 12/28/2000    CABG x 4 (LIMA to LAD, rSVG to first diagonal, left circumflex and PDA).2000: CABG x 4 (LIMA to LAD, rSVG to first diagonal, left circumflex and PDA).  2003: LIMA and circumflex grafts patent, diagonal and PDA grafts closed. September 2023: PCI/AZRA ([place] 3.0 x 26mm) the distal RCA.    Cardiac pacemaker in situ 11/01/2023    Chronic anticoagulation 02/01/2018    Chronic deep vein thrombosis (DVT) of femoral vein of left lower extremity ([facility]) 01/04/2018    Congestive heart failure (HCC) 12/28/2000    COVID 08/28/2023    pt denies symptoms now 9/27/23    Dental disorder     upper denture    GERD (gastroesophageal reflux disease)     High cholesterol     Hypertension     Ischemic cardiomyopathy 05/2022    Echocardiogram with severely dilated LV, LVEF <25%. Severe diffuse hypokinesis. Septal akinesis. Normal LA and RV. Moderately dilated RA. Trace MR, mild TR. RVSP 23.8mmHg.    Left bundle branch block      Pneumonia 2014    Pulmonary embolism ([facility]) 03/2014    Thrombocytopenia ([facility]) 02/23/2014    Type 2 diabetes mellitus without complication ([facility])      oral medication    Vascular disorder of extremity ([facility]) 02/11/2020    Ventricular tachyarrhythmia ([facility])      Past Surgical History:   Procedure Laterality Date    RECOVERY  11/3/2015    Procedure: CATH LAB-[place]-ICD GENERATOR CHANGE 71497- EUNICE  T82.111A-MEDTRONIC BLOODLESS;  Surgeon: Recoveryonly Surgery;  [place] [facility] [place]:     AICD BATTERY CHANGE  2015    Generator replacement with Medtronic Evera S VR [id number] implanted by Dr. Marcus Stephenson.    AICD IMPLANT  2008    Medtronic Virtuoso VR [id number] implanted by Dr. Stephenson.    OTHER CARDIAC SURGERY  2000    CABG x 4    APPENDECTOMY  1969    MULTIPLE CORONARY ARTERY BYPASS       Family History   Problem Relation Age of Onset    Diabetes Mother     Heart Disease Mother     Diabetes Sister     Alcohol/Drug Brother      Social History     Socioeconomic History    Marital status:      Spouse name: Not on file    Number of children: Not on file    Years of education: Not on file    Highest education level: Not on file   Occupational History     Comment: new   retired   Tobacco Use    Smoking status: Former     Current packs/day: 0.00     Average packs/day: 1.3 packs/day for 48.0 years (64.0 ttl pk-yrs)     Types: Cigarettes     Start date: 1968     Quit date: 2000     Years since quittin.3    Smokeless tobacco: Never   Vaping Use    Vaping Use: Never used   Substance and Sexual Activity    Alcohol use: Yes     Alcohol/week: 1.2 oz     Types: 2 Standard drinks or equivalent per week     Comment: occ    Drug use: Not Currently    Sexual activity: Never   Other Topics Concern    Not on file   Social History Narrative    Not on file     Social Determinants of Health     Financial Resource Strain: Not on file   Food Insecurity: Not on file   Transportation Needs: Not on file   Physical Activity: Not on file   Stress: Not on file   Social Connections: Not on file   Intimate Partner Violence: Not on file   Housing Stability: Not on file     Allergies   Allergen Reactions    Pcn [Penicillins] Anaphylaxis    Ace Inhibitors Cough     Cough     Outpatient Encounter Medications as of 2024   Medication Sig Dispense Refill    amiodarone  "(CORDARONE) 100 MG tablet Take 1 Tablet by mouth every day. 90 Tablet 3    spironolactone (ALDACTONE) 25 MG Tab Take 1 Tablet by mouth every day. 90 Tablet 0    torsemide (DEMADEX) 5 MG Tab Take 1 Tablet by mouth every day. 90 Tablet 3    rosuvastatin (CRESTOR) 20 MG Tab Take 1 Tablet by mouth every evening. 90 Tablet 3    rivaroxaban (XARELTO) 15 MG Tab tablet Take 1 Tablet by mouth with dinner. 90 Tablet 3    Empagliflozin (JARDIANCE) 10 MG Tab tablet Take 1 Tablet by mouth every day. 100 Tablet 3    metFORMIN ER (GLUCOPHAGE XR) 500 MG TABLET SR 24 HR Take 1 Tablet by mouth every day. 90 Tablet 3    sacubitril-valsartan (ENTRESTO) 24-26 MG Tab Take 1 Tablet by mouth 2 times a day. 180 Tablet 3    clopidogrel (PLAVIX) 75 MG Tab Take 1 Tablet by mouth every day. 90 Tablet 3    Cholecalciferol (D3 PO) Take 1 Capsule by mouth every day.      metoprolol SR (TOPROL XL) 50 MG TABLET SR 24 HR Take 50 mg by mouth every morning.      Magnesium 500 MG Cap Take 1 Cap by mouth every day. 180 Cap 4    [DISCONTINUED] amiodarone (CORDARONE) 200 MG Tab Take 0.5 Tablets by mouth every day. 90 Tablet 3     No facility-administered encounter medications on file as of 5/8/2024.     ROS           Objective     BP (!) 80/66 (BP Location: Left arm, Patient Position: Sitting, BP Cuff Size: Adult)   Pulse 92   Resp 16   Ht 1.803 m (5' 11\")   Wt 67.1 kg (148 lb)   SpO2 94%   BMI 20.64 kg/m²     Physical Exam  Constitutional:       Appearance: He is well-developed.   HENT:      Head: Normocephalic and atraumatic.   Cardiovascular:      Rate and Rhythm: Normal rate and regular rhythm.      Heart sounds: No murmur heard.     No friction rub. No gallop.   Pulmonary:      Effort: Pulmonary effort is normal.      Breath sounds: Normal breath sounds.   Abdominal:      Palpations: Abdomen is soft.   Musculoskeletal:         General: Normal range of motion.      Cervical back: Normal range of motion and neck supple.   Skin:     General: Skin " is warm and dry.   Neurological:      Mental Status: He is alert and oriented to person, place, and time.   Psychiatric:         Behavior: Behavior normal.         Thought Content: Thought content normal.         Judgment: Judgment normal.                Assessment & Plan     1. Chronic kidney disease (CKD) stage G3a/A1, moderately decreased glomerular filtration rate (GFR) between 45-59 mL/min/1.73 square meter and albuminuria creatinine ratio less than 30 mg/g ([facility])        2. 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 Ischemic cardiomyopathy        4. S/P CABG x 4        5. Type 2 diabetes mellitus with stage 3a chronic kidney disease, without long-term current use of insulin ([facility])        6. Ventricular tachycardia (HCC)  amiodarone (CORDARONE) 100 MG tablet      7. History of pulmonary embolus (PE)        8. Secondary hypercoagulable state ([facility])        9. Chronic anticoagulation        10. Biventricular ICD (implantable cardioverter-defibrillator) in place            Medical Decision Making: Today's Assessment/Status/Plan:   1.  CRT-D.  Normal function.  2.  VT continue low-dose amiodarone  3.  History of PE continue anticoagulation.  4.  Recent stenting on Plavix for  5.  Mild hypotension cut Demadex down.  6.  Follow-up in 6 months.

## 2024-05-10 ENCOUNTER — PHARMACY VISIT (OUTPATIENT)
Dept: PHARMACY | Facility: MEDICAL CENTER | Age: 74
End: 2024-05-10
Payer: COMMERCIAL

## 2024-05-14 PROCEDURE — RXMED WILLOW AMBULATORY MEDICATION CHARGE: Performed by: FAMILY MEDICINE

## 2024-05-17 ENCOUNTER — PHARMACY VISIT (OUTPATIENT)
Dept: PHARMACY | Facility: MEDICAL CENTER | Age: 74
End: 2024-05-17
Payer: COMMERCIAL

## 2024-05-21 DIAGNOSIS — I25.84 CORONARY ARTERY DISEASE DUE TO CALCIFIED CORONARY LESION: ICD-10-CM

## 2024-05-21 DIAGNOSIS — I25.10 CORONARY ARTERY DISEASE DUE TO CALCIFIED CORONARY LESION: ICD-10-CM

## 2024-05-30 PROCEDURE — RXMED WILLOW AMBULATORY MEDICATION CHARGE: Performed by: INTERNAL MEDICINE

## 2024-05-31 ENCOUNTER — PHARMACY VISIT (OUTPATIENT)
Dept: PHARMACY | Facility: MEDICAL CENTER | Age: 74
End: 2024-05-31
Payer: COMMERCIAL

## 2024-06-05 ENCOUNTER — PHARMACY VISIT (OUTPATIENT)
Dept: PHARMACY | Facility: MEDICAL CENTER | Age: 74
End: 2024-06-05
Payer: COMMERCIAL

## 2024-06-05 PROCEDURE — RXMED WILLOW AMBULATORY MEDICATION CHARGE: Performed by: INTERNAL MEDICINE

## 2024-06-13 PROCEDURE — RXMED WILLOW AMBULATORY MEDICATION CHARGE: Performed by: FAMILY MEDICINE

## 2024-06-17 ENCOUNTER — PHARMACY VISIT (OUTPATIENT)
Dept: PHARMACY | Facility: MEDICAL CENTER | Age: 74
End: 2024-06-17
Payer: COMMERCIAL

## 2024-06-17 ASSESSMENT — PATIENT HEALTH QUESTIONNAIRE - PHQ9
2. FEELING DOWN, DEPRESSED, IRRITABLE, OR HOPELESS: NOT AT ALL
1. LITTLE INTEREST OR PLEASURE IN DOING THINGS: NOT AT ALL

## 2024-06-17 ASSESSMENT — ENCOUNTER SYMPTOMS: GENERAL WELL-BEING: FAIR

## 2024-06-17 ASSESSMENT — ACTIVITIES OF DAILY LIVING (ADL): BATHING_REQUIRES_ASSISTANCE: 0

## 2024-06-18 PROBLEM — I77.810 ECTATIC THORACIC AORTA (HCC): Status: ACTIVE | Noted: 2024-06-18

## 2024-06-18 PROBLEM — G31.9 CEREBRAL ATROPHY (HCC): Status: ACTIVE | Noted: 2024-06-18

## 2024-06-18 PROBLEM — Z86.718: Status: ACTIVE | Noted: 2024-01-16

## 2024-06-18 PROCEDURE — RXMED WILLOW AMBULATORY MEDICATION CHARGE: Performed by: NURSE PRACTITIONER

## 2024-06-18 ASSESSMENT — PATIENT HEALTH QUESTIONNAIRE - PHQ9: CLINICAL INTERPRETATION OF PHQ2 SCORE: 0

## 2024-06-18 NOTE — ASSESSMENT & PLAN NOTE
Chronic, stable. Diagnosed via findings on Head CT in 2023. Pt denies any significant memory or cognitive changes. Minicog today completed without error. Follow up with PCP at least annually for continued monitoring and management.

## 2024-06-18 NOTE — ASSESSMENT & PLAN NOTE
Chronic, ongoing. Etiology unknown. Pt with hx of DVT and PE. He maintains on Xarelto 15mg daily and clopidogrel 75mg daily. Follow up with cardiology per routine.

## 2024-06-18 NOTE — ASSESSMENT & PLAN NOTE
Chronic, stable. Pt has not required intervention but maintains on rosuvastatin 20mg daily, clopidogrel 75mg daily. Denies claudication. Follow up with vascular per routine.    US Lower extremity 1/2024  Diffuse medial plaque and multiphasic flow throughout the common femoral,    superficial femoral, and popliteal arteries with no hemodynamically    significant stenosis.    Heavy medial plaque with shadowing noted through the tibial arteries with    no hemodynamically significant stenosis.    Three vessel runoff to the ankles with biphasic flow.

## 2024-06-18 NOTE — ASSESSMENT & PLAN NOTE
Chronic, ongoing. Secondary to spironolactone use in the setting of CHF. Follow up with cardiology per routine.

## 2024-06-18 NOTE — ASSESSMENT & PLAN NOTE
Chronic, ongoing, mild issue with 2024 echocardiogram measuring ascending aorta to be 3.5cm. Follow up with cardiology per routine.

## 2024-06-18 NOTE — ASSESSMENT & PLAN NOTE
Chronic, stable. Pt with hx of ventricular tachycardia. He maintains on metoprolol 50mg daily and amiodarone 100mg daily. Pt also has a ICD placed. Follow up with cardiology per routine.

## 2024-06-18 NOTE — ASSESSMENT & PLAN NOTE
Chronic, stable. A1c today 6.8. Last A1c 6.5 as of October 2024. Associated with CKD3. Last monofilament foot exam: 6/2023, last urine microalb/creat: 6/2023, last retinal screening: 10/2023. Maintains on Jardiance 10mg daily with ARB on board for CHF. Continue to advise low carbohydrate diet with regular physical activity. Continue current treatment regime. Follow up with PCP as scheduled for continued monitoring and management.

## 2024-06-18 NOTE — ASSESSMENT & PLAN NOTE
Chronic, ongoing. Last echo 2/2024 with LVEF at 18%. Pt maintains on Entresto 24-26mg daily, spironolactone 25mg daily, torsemide 5mg daily, metoprolol 500mg daily, Jardiance 10mg daily. He has an biventricular ICD placed. Follow up with cardiology per routine.    2/2024 ECHO  CONCLUSIONS  Compared to the prior study on 2/17/21.  Unchanged.  The left ventricle is mildly dilated.  Severely reduced left ventricular systolic function.  The ejection fraction is measured to be 18 % by Arroyo's biplane.  Mild tricuspid regurgitation.  Right ventricular systolic pressure is estimated to be 24 mmHg.

## 2024-06-18 NOTE — ASSESSMENT & PLAN NOTE
Chronic, stable. Maintains on statin therapy without issue. Most recent lipid panel from 2/2024 WNL. Continue rosuvastatin 20mg daily. Recommend Mediterranean diet and regular physical activity. Follow up with PCP at least annually for continued monitoring and management.   Lab Results   Component Value Date/Time    CHOLSTRLTOT 162 02/27/2024 08:38 AM    LDL 77 02/27/2024 08:38 AM    HDL 59 02/27/2024 08:38 AM    TRIGLYCERIDE 132 02/27/2024 08:38 AM

## 2024-06-19 ENCOUNTER — PHARMACY VISIT (OUTPATIENT)
Dept: PHARMACY | Facility: MEDICAL CENTER | Age: 74
End: 2024-06-19
Payer: COMMERCIAL

## 2024-06-19 ENCOUNTER — OFFICE VISIT (OUTPATIENT)
Dept: FAMILY PLANNING/WOMEN'S HEALTH CLINIC | Facility: PHYSICIAN GROUP | Age: 74
End: 2024-06-19
Payer: MEDICARE

## 2024-06-19 ENCOUNTER — HOSPITAL ENCOUNTER (OUTPATIENT)
Facility: MEDICAL CENTER | Age: 74
End: 2024-06-19
Attending: PHYSICIAN ASSISTANT
Payer: MEDICARE

## 2024-06-19 VITALS
WEIGHT: 149 LBS | SYSTOLIC BLOOD PRESSURE: 106 MMHG | HEIGHT: 71 IN | BODY MASS INDEX: 20.86 KG/M2 | DIASTOLIC BLOOD PRESSURE: 60 MMHG

## 2024-06-19 DIAGNOSIS — Z91.81 HISTORY OF FALL: ICD-10-CM

## 2024-06-19 DIAGNOSIS — D68.69 OTHER THROMBOPHILIA (HCC): ICD-10-CM

## 2024-06-19 DIAGNOSIS — G31.9 CEREBRAL ATROPHY (HCC): ICD-10-CM

## 2024-06-19 DIAGNOSIS — I73.9 PERIPHERAL VASCULAR DISEASE OF LOWER EXTREMITY (HCC): ICD-10-CM

## 2024-06-19 DIAGNOSIS — I47.20 VENTRICULAR TACHYCARDIA (HCC): ICD-10-CM

## 2024-06-19 DIAGNOSIS — N18.31 TYPE 2 DIABETES MELLITUS WITH STAGE 3A CHRONIC KIDNEY DISEASE, WITHOUT LONG-TERM CURRENT USE OF INSULIN (HCC): ICD-10-CM

## 2024-06-19 DIAGNOSIS — I25.5 ISCHEMIC CARDIOMYOPATHY: ICD-10-CM

## 2024-06-19 DIAGNOSIS — E11.22 TYPE 2 DIABETES MELLITUS WITH STAGE 3A CHRONIC KIDNEY DISEASE, WITHOUT LONG-TERM CURRENT USE OF INSULIN (HCC): ICD-10-CM

## 2024-06-19 DIAGNOSIS — I25.10 CORONARY ARTERY DISEASE DUE TO CALCIFIED CORONARY LESION: ICD-10-CM

## 2024-06-19 DIAGNOSIS — E26.1 SECONDARY HYPERALDOSTERONISM (HCC): ICD-10-CM

## 2024-06-19 DIAGNOSIS — E78.2 MIXED HYPERLIPIDEMIA: ICD-10-CM

## 2024-06-19 DIAGNOSIS — I50.84 END STAGE HEART FAILURE (HCC): ICD-10-CM

## 2024-06-19 DIAGNOSIS — I25.84 CORONARY ARTERY DISEASE DUE TO CALCIFIED CORONARY LESION: ICD-10-CM

## 2024-06-19 DIAGNOSIS — I77.810 ECTATIC THORACIC AORTA (HCC): ICD-10-CM

## 2024-06-19 LAB
HBA1C MFR BLD: 6.8 % (ref ?–5.8)
POCT INT CON NEG: NEGATIVE
POCT INT CON POS: POSITIVE

## 2024-06-19 PROCEDURE — G0439 PPPS, SUBSEQ VISIT: HCPCS | Performed by: PHYSICIAN ASSISTANT

## 2024-06-19 PROCEDURE — 82043 UR ALBUMIN QUANTITATIVE: CPT

## 2024-06-19 PROCEDURE — 82570 ASSAY OF URINE CREATININE: CPT

## 2024-06-19 PROCEDURE — 83036 HEMOGLOBIN GLYCOSYLATED A1C: CPT | Performed by: PHYSICIAN ASSISTANT

## 2024-06-19 PROCEDURE — 1126F AMNT PAIN NOTED NONE PRSNT: CPT | Performed by: PHYSICIAN ASSISTANT

## 2024-06-19 SDOH — ECONOMIC STABILITY: FOOD INSECURITY: WITHIN THE PAST 12 MONTHS, YOU WORRIED THAT YOUR FOOD WOULD RUN OUT BEFORE YOU GOT MONEY TO BUY MORE.: NEVER TRUE

## 2024-06-19 SDOH — ECONOMIC STABILITY: FOOD INSECURITY: WITHIN THE PAST 12 MONTHS, THE FOOD YOU BOUGHT JUST DIDN'T LAST AND YOU DIDN'T HAVE MONEY TO GET MORE.: NEVER TRUE

## 2024-06-19 SDOH — ECONOMIC STABILITY: TRANSPORTATION INSECURITY
IN THE PAST 12 MONTHS, HAS THE LACK OF TRANSPORTATION KEPT YOU FROM MEDICAL APPOINTMENTS OR FROM GETTING MEDICATIONS?: NO

## 2024-06-19 SDOH — ECONOMIC STABILITY: TRANSPORTATION INSECURITY
IN THE PAST 12 MONTHS, HAS LACK OF TRANSPORTATION KEPT YOU FROM MEETINGS, WORK, OR FROM GETTING THINGS NEEDED FOR DAILY LIVING?: NO

## 2024-06-19 SDOH — ECONOMIC STABILITY: INCOME INSECURITY: HOW HARD IS IT FOR YOU TO PAY FOR THE VERY BASICS LIKE FOOD, HOUSING, MEDICAL CARE, AND HEATING?: NOT HARD AT ALL

## 2024-06-19 ASSESSMENT — PAIN SCALES - GENERAL: PAINLEVEL: NO PAIN

## 2024-06-19 ASSESSMENT — FIBROSIS 4 INDEX: FIB4 SCORE: 2.87

## 2024-06-19 NOTE — PROGRESS NOTES
Comprehensive Health Assessment Program     Jaime Lee is a 73 y.o. here for his comprehensive health assessment.    Patient Active Problem List    Diagnosis Date Noted    Cerebral atrophy ([facility]) 06/18/2024    Ectatic thoracic aorta ([facility]) 06/18/2024    History of femoral artery thrombosis 01/16/2024    Other thrombophilia ([facility]) 01/16/2024    End stage heart failure ([facility]) 01/16/2024    Coronary artery disease due to calcified coronary lesion 09/28/2023    Secondary hyperaldosteronism ([facility]) 06/22/2023    Peripheral vascular disease of lower extremity ([facility]) 02/11/2020    Ventricular tachycardia ([facility]) 04/25/2019    Macrocytosis without anemia 04/24/2018    Chronic anticoagulation 02/01/2018    Basal cell carcinoma of skin of left ear 02/01/2018    Hyperlipidemia 01/18/2017    S/P CABG x 4 06/22/2016    Type 2 diabetes mellitus with stage 3a chronic kidney disease, without long-term current use of insulin ([facility]) 06/22/2016    Old anterior myocardial infarction 06/22/2016    Biventricular ICD (implantable cardioverter-defibrillator) in place 03/11/2014    Ischemic cardiomyopathy 03/11/2014    History of pulmonary embolus (PE) 02/27/2014    Chronic kidney disease (CKD) stage G3a/A1, moderately decreased glomerular filtration rate (GFR) between 45-59 mL/min/1.73 square meter and albuminuria creatinine ratio less than 30 mg/g ([facility]) 02/24/2014       Current Outpatient Medications   Medication Sig Dispense Refill    amiodarone (CORDARONE) 100 MG tablet Take 1 Tablet by mouth every day. 90 Tablet 3    spironolactone (ALDACTONE) 25 MG Tab Take 1 Tablet by mouth every day. 90 Tablet 0    torsemide (DEMADEX) 5 MG Tab Take 1 Tablet by mouth every day. 90 Tablet 3    rosuvastatin (CRESTOR) 20 MG Tab Take 1 Tablet by mouth every evening. 90 Tablet 3    rivaroxaban (XARELTO) 15 MG Tab tablet Take 1 Tablet by mouth with dinner. 90 Tablet 3    Empagliflozin (JARDIANCE) 10 MG Tab tablet Take 1 Tablet by mouth every  day. 100 Tablet 3    metFORMIN ER (GLUCOPHAGE XR) 500 MG TABLET SR 24 HR Take 1 Tablet by mouth every day. 90 Tablet 3    sacubitril-valsartan (ENTRESTO) 24-26 MG Tab Take 1 Tablet by mouth 2 times a day. 180 Tablet 3    clopidogrel (PLAVIX) 75 MG Tab Take 1 Tablet by mouth every day. 90 Tablet 3    Cholecalciferol (D3 PO) Take 1 Capsule by mouth every day.      metoprolol SR (TOPROL XL) 50 MG TABLET SR 24 HR Take 50 mg by mouth every morning.      Magnesium 500 MG Cap Take 1 Cap by mouth every day. 180 Cap 4     No current facility-administered medications for this visit.          Current supplements as per medication list.     Allergies:   Pcn [penicillins] and Ace inhibitors  Social History     Tobacco Use    Smoking status: Former     Current packs/day: 0.00     Average packs/day: 1.3 packs/day for 48.0 years (64.0 ttl pk-yrs)     Types: Cigarettes     Start date: 1968     Quit date: 2000     Years since quittin.4    Smokeless tobacco: Never   Vaping Use    Vaping status: Never Used   Substance Use Topics    Alcohol use: Not Currently     Alcohol/week: 1.2 oz     Types: 2 Standard drinks or equivalent per week     Comment: occ    Drug use: Not Currently     Family History   Problem Relation Age of Onset    Diabetes Mother     Heart Disease Mother     Diabetes Sister     Alcohol/Drug Brother      Jaime  has a past medical history of Acute anterior myocardial infarction (HCC) (2000), Allergy, Arthritis (), CAD (coronary artery disease) (2000), Cardiac pacemaker in situ (2023), Chronic anticoagulation (2018), Chronic deep vein thrombosis (DVT) of femoral vein of left lower extremity (HCC) (2018), Congestive heart failure (HCC) (2000), COVID (2023), Dental disorder, GERD (gastroesophageal reflux disease), High cholesterol, Hypertension, Ischemic cardiomyopathy (2022), Left bundle branch block ( ), Pneumonia (), Pulmonary embolism (HCC) (2014),  Thrombocytopenia (HCC) (02/23/2014), Type 2 diabetes mellitus without complication (HCC) ( ), Vascular disorder of extremity (HCC) (02/11/2020), and Ventricular tachyarrhythmia (HCC).   Past Surgical History:   Procedure Laterality Date    RECOVERY  11/3/2015    Procedure: CATH LAB-STEPHENSON-ICD GENERATOR CHANGE 91149- EUNICE T82.111A-MEDTRONIC BLOODLESS;  Surgeon: Recoveryonly Surgery;  Location: SURGERY PRE-POST PROC UNIT [facility] [place];  Service:     AICD BATTERY CHANGE  November 2015    Generator replacement with Medtronic Evera S VR [id number] implanted by Dr. Marcus Stephenson.    AICD IMPLANT  January 2008    Medtronic Virtuoso VR [id number] implanted by Dr. Stephenson.    OTHER CARDIAC SURGERY  12/2000    CABG x 4    APPENDECTOMY  1969    MULTIPLE CORONARY ARTERY BYPASS         Screening:  In the last six months have you experienced any leakage of urine? No    Depression Screening  Little interest or pleasure in doing things?  0 - not at all  Feeling down, depressed , or hopeless? 0 - not at all  Patient Health Questionnaire Score: 0     If depressive symptoms identified deferred to follow up visit unless specifically addressed in assessment and plan.    Interpretation of PHQ-9 Total Score   Score Severity   1-4 No Depression   5-9 Mild Depression   10-14 Moderate Depression   15-19 Moderately Severe Depression   20-27 Severe Depression    Screening for Cognitive Impairment  Do you or any of your friends or family members have any concern about your memory? No  Three Minute Recall (Leader, Season, Table) 3/3    [place] clock face with all 12 numbers and set the hands to show 10 minutes after 11.  Yes 5  Cognitive concerns identified deferred for follow up unless specifically addressed in assessment and plan.    Fall Risk Assessment  Has the patient had two or more falls in the last year or any fall with injury in the last year?  No    Safety Assessment  Do you always wear your seatbelt?  Yes  Any changes to home needed to function safely?  No  Difficulty hearing.  Yes  Patient counseled about all safety risks that were identified.    Functional Assessment ADLs  Are there any barriers preventing you from cooking for yourself or meeting nutritional needs?  No.    Are there any barriers preventing you from driving safely or obtaining transportation?  No.    Are there any barriers preventing you from using a telephone or calling for help?  No    Are there any barriers preventing you from shopping?  No.    Are there any barriers preventing you from taking care of your own finances?  No    Are there any barriers preventing you from managing your medications?  No    Are there any barriers preventing you from showering, bathing or dressing yourself? No    Are there any barriers preventing you from doing housework or laundry? No  Are there any barriers preventing you from using the toilet?No  Are you currently engaging in any exercise or physical activity?  Yes. Rides stationary bike 20 min daily  and worksouts every now and then     Self-Assessment of Health  What is your perception of your health? Fair  Do you sleep more than six hours a night? Yes  In the past 7 days, how much did pain keep you from doing your normal work? None  Do you spend quality time with family or friends (virtually or in person)? Yes  Do you usually eat a heart healthy diet that constists of a variety of fruits, vegetables, whole grains and fiber? No  Do you eat foods high in fat and/or Fast Food more than three times per week? No    Advance Care Planning  Do you have an Advance Directive, Living Will, Durable Power of , or POLST? Yes  Advance Directive       is on file      Health Maintenance Summary            Overdue - Abdominal Aortic Aneurysm (AAA) Screening (Once) Never done      No completion history exists for this topic.              Overdue - Colorectal Cancer Screening (Colonoscopy - Every 10 Years) Overdue since 4/22/2024 04/22/2014  Colonoscopy (Patient  Declined)              Ordered - Diabetes: Urine Protein Screening (Yearly) Ordered on 6/19/2024 06/22/2023  Microalbumin Creat Ratio Urine - Clinic Collect    04/02/2022  MICROALBUMIN CREAT RATIO URINE    05/27/2020  MICROALBUMIN CREAT RATIO URINE    04/25/2019  MICROALBUMIN CREAT RATIO URINE (LAB COLLECT)    07/28/2017  MICROALBUMIN CREAT RATIO URINE    Only the first 5 history entries have been loaded, but more history exists.              Diabetes: Monofilament / LE Exam (Yearly) Due soon on 6/22/2024 06/22/2023  Diabetic Monofilament LE Exam    10/20/2021  Diabetic Monofilament LE Exam    10/20/2021  SmartData: WORKFLOW - DIABETES - DIABETIC FOOT EXAM PERFORMED    10/14/2020  SmartData: WORKFLOW - DIABETES - DIABETIC FOOT EXAM PERFORMED    10/14/2020  Diabetic Monofilament Lower Extremity Exam    Only the first 5 history entries have been loaded, but more history exists.              Diabetes: Retinopathy Screening (Yearly) Next due on 10/24/2024      10/24/2023  POCT Retinal Eye Exam    06/22/2021  REFERRAL FOR RETINAL SCREENING EXAM    04/25/2019  POCT Retinal Eye Exam    02/01/2018  POCT Retinal Eye Exam              A1c Screening (Every 6 Months) Next due on 12/19/2024 06/19/2024  POCT Hemoglobin A1C    10/24/2023  POCT Hemoglobin A1C    06/22/2023  POCT  A1C    01/06/2023  HEMOGLOBIN A1C    04/02/2022  HEMOGLOBIN A1C    Only the first 5 history entries have been loaded, but more history exists.              Ordered - Fasting Lipid Profile (Yearly) Ordered on 5/21/2024 02/27/2024  Lipid Profile    08/02/2023  Lipid Profile    04/02/2022  Lipid Profile    03/05/2021  Lipid Profile    05/27/2020  Lipid Profile    Only the first 5 history entries have been loaded, but more history exists.              SERUM CREATININE (Yearly) Next due on 4/1/2025 04/01/2024  Basic Metabolic Panel    02/27/2024  Comp Metabolic Panel    11/02/2023  Comp Metabolic Panel (CMP)    10/30/2023  Comp  Metabolic Panel    09/29/2023  Basic Metabolic Panel (BMP)    Only the first 5 history entries have been loaded, but more history exists.              Annual Wellness Visit (Yearly) Next due on 6/19/2025 06/19/2024  Level of Service: [place] ANNUAL WELLNESS VISIT-INCLUDES PPPS SUBSEQUE*    05/03/2023  Level of Service: [place] ANNUAL WELLNESS VISIT-INCLUDES PPPS SUBSEQUE*    03/09/2022  Level of Service: AK ANNUAL WELLNESS VISIT-INCLUDES PPPS SUBSEQUE*    03/09/2022  Visit Dx: Medicare annual wellness visit, subsequent    03/03/2022  Level of Service: [place] ANNUAL WELLNESS VISIT-INCLUDES PPPS SUBSEQUE*    Only the first 5 history entries have been loaded, but more history exists.              IMM DTaP/Tdap/Td Vaccine (2 - Td or Tdap) Next due on 2/11/2030 02/11/2020  Imm Admin: Tdap Vaccine    10/16/2011  Imm Admin: TD Vaccine    07/27/1993  Imm Admin: TD Vaccine              Hepatitis C Screening  Completed      06/02/2011  Hepatitis C Antibody component of HEP C VIRUS ANTIBODY              Pneumococcal Vaccine: 65+ Years (Series Information) Completed      09/05/2017  Imm Admin: Pneumococcal polysaccharide vaccine (PPSV-23)    09/06/2016  Imm Admin: Pneumococcal Conjugate Vaccine (Prevnar/PCV-13)              Hepatitis B Vaccine (Hep B) (Series Information) Completed      10/14/2020  Imm Admin: Hepatitis B Vaccine (Adol/Adult)    07/21/2020  Imm Admin: Hepatitis B Vaccine (Adol/Adult)    02/11/2020  Imm Admin: Hepatitis B Vaccine (Adol/Adult)              Zoster (Shingles) Vaccines (Series Information) Completed      10/14/2020  Imm Admin: Zoster Vaccine Recombinant (RZV) (SHINGRIX)    07/21/2020  Imm Admin: Zoster Vaccine Recombinant (RZV) (SHINGRIX)              Influenza Vaccine (Series Information) Completed      09/18/2023  Imm Admin: Influenza Vaccine, Quadrivalent, Adjuvanted (Pf)    09/28/2022  Imm Admin: Influenza, Unspecified - HISTORICAL DATA    09/17/2021  Imm Admin: Influenza Vaccine Quad Inj (Pf)     10/01/2020  Imm Admin: Influenza Vaccine Adult HD    09/03/2019  Imm Admin: Influenza Vaccine Adult HD    Only the first 5 history entries have been loaded, but more history exists.              COVID-19 Vaccine (Series Information) Completed      12/08/2023  Imm Admin: Comirnaty (Covid-19 Vaccine, Mrna, [id number] Formula)    10/27/2022  Imm Admin: PFIZER BIVALENT SARS-COV-2 VACCINE (12+)    05/25/2022  Imm Admin: PFIZER SHEETS CAP SARS-COV-2 VACCINATION (12+)    10/08/2021  Imm Admin: PFIZER PURPLE CAP SARS-COV-2 VACCINATION (12+)    03/03/2021  Imm Admin: PFIZER PURPLE CAP SARS-COV-2 VACCINATION (12+)    Only the first 5 history entries have been loaded, but more history exists.              Hepatitis A Vaccine (Hep A) (Series Information) Aged Out      No completion history exists for this topic.              HPV Vaccines (Series Information) Aged Out      No completion history exists for this topic.              Polio Vaccine (Inactivated Polio) (Series Information) Aged Out      No completion history exists for this topic.              Meningococcal Immunization (Series Information) Aged Out      No completion history exists for this topic.                    Patient Care Team:  Rafael Park M.D. as PCP - General (Family Medicine)  CHRIS Rodríguez as Consulting Physician (Cardiovascular Disease (Cardiology))  Lele Ortiz M.D. (Cardiovascular Disease (Cardiology))  Lissa Reyes C.N.A. as [facility]   Kori Jones R.N. as  (Registered Nurse)      Financial Resource Strain: Low Risk  (6/19/2024)    Overall Financial Resource Strain (CARDIA)     Difficulty of Paying Living Expenses: Not hard at all      Transportation Needs: No Transportation Needs (6/19/2024)    PRAPARE - Transportation     Lack of Transportation (Medical): No     Lack of Transportation (Non-Medical): No      Food Insecurity: No Food Insecurity (6/19/2024)    Hunger Vital Sign     Worried About  "Running Out of Food in the Last Year: Never true     Ran Out of Food in the Last Year: Never true        Encounter Vitals  Blood Pressure : 106/60  Weight: 67.6 kg (149 lb)  Height: 180.3 cm (5' 11\")  BMI (Calculated): 20.78  Pain Score: No pain     Alert, oriented in no acute distress.  Eye contact is good, speech goal directed, affect calm.    Assessment and Plan. The following treatment and monitoring plan is recommended:  Hyperlipidemia  Chronic, stable. Maintains on statin therapy without issue. Most recent lipid panel from 2/2024 WNL. Continue rosuvastatin 20mg daily. Recommend Mediterranean diet and regular physical activity. Follow up with PCP at least annually for continued monitoring and management.   Lab Results   Component Value Date/Time    CHOLSTRLTOT 162 02/27/2024 08:38 AM    LDL 77 02/27/2024 08:38 AM    HDL 59 02/27/2024 08:38 AM    TRIGLYCERIDE 132 02/27/2024 08:38 AM     Secondary hyperaldosteronism (HCC)  Chronic, ongoing. Secondary to spironolactone use in the setting of CHF. Follow up with cardiology per routine.    Type 2 diabetes mellitus with stage 3a chronic kidney disease, without long-term current use of insulin (HCC)  Chronic, stable. A1c today 6.8. Last A1c 6.5 as of October 2024. Associated with CKD3. Last monofilament foot exam: 6/2023, last urine microalb/creat: 6/2023, last retinal screening: 10/2023. Maintains on Jardiance 10mg daily with ARB on board for CHF. Continue to advise low carbohydrate diet with regular physical activity. Continue current treatment regime. Follow up with PCP as scheduled for continued monitoring and management.    Ventricular tachycardia (HCC)  Chronic, stable. Pt with hx of ventricular tachycardia. He maintains on metoprolol 50mg daily and amiodarone 100mg daily. Pt also has a ICD placed. Follow up with cardiology per routine.    Cerebral atrophy (HCC)  Chronic, stable. Diagnosed via findings on Head CT in 2023. Pt denies any significant memory or " cognitive changes. Minicog today completed without error. Follow up with PCP at least annually for continued monitoring and management.    Ectatic thoracic aorta (HCC)  Chronic, ongoing, mild issue with 2024 echocardiogram measuring ascending aorta to be 3.5cm. Follow up with cardiology per routine.    End stage heart failure (HCC)  Ischemic cardiomyopathy (HCC)  Biventricular ICD in place  Coronary artery disease due to calcified coronary lesion  Chronic, ongoing. Last echo 2/2024 with LVEF at 18%. Pt maintains on Entresto 24-26mg daily, spironolactone 25mg daily, torsemide 5mg daily, metoprolol 500mg daily, Jardiance 10mg daily for CHF. He has an biventricular ICD placed. He maintains on rosuvastatin and clopidogrel due for his CAD and hx of CABG. Denies CP, dyspnea, edema. Follow up with cardiology per routine.    2/2024 ECHO  CONCLUSIONS  Compared to the prior study on 2/17/21.  Unchanged.  The left ventricle is mildly dilated.  Severely reduced left ventricular systolic function.  The ejection fraction is measured to be 18 % by Arroyo's biplane.  Mild tricuspid regurgitation.  Right ventricular systolic pressure is estimated to be 24 mmHg.    Peripheral vascular disease of lower extremity ([facility])  Chronic, stable. Pt has not required intervention but maintains on rosuvastatin 20mg daily, clopidogrel 75mg daily. Denies claudication. Follow up with vascular per routine.     Lower extremity 1/2024  Diffuse medial plaque and multiphasic flow throughout the common femoral,    superficial femoral, and popliteal arteries with no hemodynamically    significant stenosis.    Heavy medial plaque with shadowing noted through the tibial arteries with    no hemodynamically significant stenosis.    Three vessel runoff to the ankles with biphasic flow.     Other thrombophilia ([facility])  Chronic, ongoing. Etiology unknown. Pt with hx of DVT and PE. He maintains on Xarelto 15mg daily and clopidogrel 75mg daily. Follow up with  cardiology per routine.    Services suggested: No services needed at this time  Health Care Screening: Age-appropriate preventive services recommended by USPTF and ACIP covered by Medicare were discussed today. Services ordered if indicated and agreed upon by the patient.  Referrals offered: Community-based lifestyle interventions to reduce health risks and promote self-management and wellness, fall prevention, nutrition, physical activity, tobacco-use cessation, weight loss, and mental health services as per orders if indicated.    Discussion today about general wellness and lifestyle habits:    Prevent falls and reduce trip hazards; Cautioned about securing or removing rugs.  Have a working fire alarm and carbon monoxide detector.  Engage in regular physical activity and social activities.    Follow-up: Return for follow up visit with PCP as previously scheduled.

## 2024-06-20 LAB
CREAT UR-MCNC: 20 MG/DL
MICROALBUMIN UR-MCNC: <1.2 MG/DL
MICROALBUMIN/CREAT UR: NORMAL MG/G (ref 0–30)

## 2024-06-22 DIAGNOSIS — I25.5 ISCHEMIC CARDIOMYOPATHY: ICD-10-CM

## 2024-06-24 PROCEDURE — RXMED WILLOW AMBULATORY MEDICATION CHARGE: Performed by: INTERNAL MEDICINE

## 2024-06-24 RX ORDER — SPIRONOLACTONE 25 MG/1
25 TABLET ORAL DAILY
Qty: 90 TABLET | Refills: 3 | Status: SHIPPED | OUTPATIENT
Start: 2024-06-24

## 2024-06-25 ENCOUNTER — PHARMACY VISIT (OUTPATIENT)
Dept: PHARMACY | Facility: MEDICAL CENTER | Age: 74
End: 2024-06-25
Payer: COMMERCIAL

## 2024-06-25 PROCEDURE — RXMED WILLOW AMBULATORY MEDICATION CHARGE: Performed by: INTERNAL MEDICINE

## 2024-06-27 ENCOUNTER — PHARMACY VISIT (OUTPATIENT)
Dept: PHARMACY | Facility: MEDICAL CENTER | Age: 74
End: 2024-06-27
Payer: COMMERCIAL

## 2024-07-03 PROCEDURE — RXMED WILLOW AMBULATORY MEDICATION CHARGE: Performed by: INTERNAL MEDICINE

## 2024-07-05 ENCOUNTER — PHARMACY VISIT (OUTPATIENT)
Dept: PHARMACY | Facility: MEDICAL CENTER | Age: 74
End: 2024-07-05
Payer: COMMERCIAL

## 2024-07-05 PROCEDURE — RXMED WILLOW AMBULATORY MEDICATION CHARGE: Performed by: INTERNAL MEDICINE

## 2024-07-08 ENCOUNTER — PHARMACY VISIT (OUTPATIENT)
Dept: PHARMACY | Facility: MEDICAL CENTER | Age: 74
End: 2024-07-08
Payer: COMMERCIAL

## 2024-07-11 PROCEDURE — RXMED WILLOW AMBULATORY MEDICATION CHARGE: Performed by: FAMILY MEDICINE

## 2024-07-12 ENCOUNTER — PHARMACY VISIT (OUTPATIENT)
Dept: PHARMACY | Facility: MEDICAL CENTER | Age: 74
End: 2024-07-12
Payer: COMMERCIAL

## 2024-07-17 ENCOUNTER — DOCUMENTATION (OUTPATIENT)
Dept: HEALTH INFORMATION MANAGEMENT | Facility: OTHER | Age: 74
End: 2024-07-17
Payer: MEDICARE

## 2024-07-22 PROCEDURE — RXMED WILLOW AMBULATORY MEDICATION CHARGE: Performed by: INTERNAL MEDICINE

## 2024-07-23 ENCOUNTER — PHARMACY VISIT (OUTPATIENT)
Dept: PHARMACY | Facility: MEDICAL CENTER | Age: 74
End: 2024-07-23
Payer: COMMERCIAL

## 2024-07-31 PROCEDURE — RXMED WILLOW AMBULATORY MEDICATION CHARGE: Performed by: INTERNAL MEDICINE

## 2024-07-31 SDOH — HEALTH STABILITY: PHYSICAL HEALTH: ON AVERAGE, HOW MANY MINUTES DO YOU ENGAGE IN EXERCISE AT THIS LEVEL?: 100 MIN

## 2024-07-31 SDOH — ECONOMIC STABILITY: FOOD INSECURITY: WITHIN THE PAST 12 MONTHS, THE FOOD YOU BOUGHT JUST DIDN'T LAST AND YOU DIDN'T HAVE MONEY TO GET MORE.: NEVER TRUE

## 2024-07-31 SDOH — ECONOMIC STABILITY: INCOME INSECURITY: IN THE LAST 12 MONTHS, WAS THERE A TIME WHEN YOU WERE NOT ABLE TO PAY THE MORTGAGE OR RENT ON TIME?: NO

## 2024-07-31 SDOH — ECONOMIC STABILITY: HOUSING INSECURITY: IN THE LAST 12 MONTHS, HOW MANY PLACES HAVE YOU LIVED?: 1

## 2024-07-31 SDOH — ECONOMIC STABILITY: HOUSING INSECURITY
IN THE LAST 12 MONTHS, WAS THERE A TIME WHEN YOU DID NOT HAVE A STEADY PLACE TO SLEEP OR SLEPT IN A SHELTER (INCLUDING NOW)?: NO

## 2024-07-31 SDOH — ECONOMIC STABILITY: INCOME INSECURITY: HOW HARD IS IT FOR YOU TO PAY FOR THE VERY BASICS LIKE FOOD, HOUSING, MEDICAL CARE, AND HEATING?: NOT HARD AT ALL

## 2024-07-31 SDOH — HEALTH STABILITY: MENTAL HEALTH
STRESS IS WHEN SOMEONE FEELS TENSE, NERVOUS, ANXIOUS, OR CAN'T SLEEP AT NIGHT BECAUSE THEIR MIND IS TROUBLED. HOW STRESSED ARE YOU?: NOT AT ALL

## 2024-07-31 SDOH — ECONOMIC STABILITY: FOOD INSECURITY: WITHIN THE PAST 12 MONTHS, YOU WORRIED THAT YOUR FOOD WOULD RUN OUT BEFORE YOU GOT MONEY TO BUY MORE.: NEVER TRUE

## 2024-07-31 SDOH — HEALTH STABILITY: PHYSICAL HEALTH: ON AVERAGE, HOW MANY DAYS PER WEEK DO YOU ENGAGE IN MODERATE TO STRENUOUS EXERCISE (LIKE A BRISK WALK)?: 3 DAYS

## 2024-07-31 SDOH — ECONOMIC STABILITY: TRANSPORTATION INSECURITY
IN THE PAST 12 MONTHS, HAS LACK OF RELIABLE TRANSPORTATION KEPT YOU FROM MEDICAL APPOINTMENTS, MEETINGS, WORK OR FROM GETTING THINGS NEEDED FOR DAILY LIVING?: NO

## 2024-07-31 ASSESSMENT — LIFESTYLE VARIABLES
AUDIT-C TOTAL SCORE: 3
HOW OFTEN DO YOU HAVE A DRINK CONTAINING ALCOHOL: 2-3 TIMES A WEEK
HOW MANY STANDARD DRINKS CONTAINING ALCOHOL DO YOU HAVE ON A TYPICAL DAY: 1 OR 2
HOW OFTEN DO YOU HAVE SIX OR MORE DRINKS ON ONE OCCASION: NEVER
SKIP TO QUESTIONS 9-10: 1

## 2024-07-31 ASSESSMENT — SOCIAL DETERMINANTS OF HEALTH (SDOH)
IN THE PAST 12 MONTHS, HAS THE ELECTRIC, GAS, OIL, OR WATER COMPANY THREATENED TO SHUT OFF SERVICE IN YOUR HOME?: NO
IN A TYPICAL WEEK, HOW MANY TIMES DO YOU TALK ON THE PHONE WITH FAMILY, FRIENDS, OR NEIGHBORS?: ONCE A WEEK
HOW OFTEN DO YOU GET TOGETHER WITH FRIENDS OR RELATIVES?: ONCE A WEEK
HOW OFTEN DO YOU HAVE A DRINK CONTAINING ALCOHOL: 2-3 TIMES A WEEK
IN A TYPICAL WEEK, HOW MANY TIMES DO YOU TALK ON THE PHONE WITH FAMILY, FRIENDS, OR NEIGHBORS?: ONCE A WEEK
DO YOU BELONG TO ANY CLUBS OR ORGANIZATIONS SUCH AS CHURCH GROUPS UNIONS, FRATERNAL OR ATHLETIC GROUPS, OR SCHOOL GROUPS?: NO
HOW OFTEN DO YOU ATTEND CHURCH OR RELIGIOUS SERVICES?: NEVER
HOW OFTEN DO YOU HAVE SIX OR MORE DRINKS ON ONE OCCASION: NEVER
WITHIN THE PAST 12 MONTHS, YOU WORRIED THAT YOUR FOOD WOULD RUN OUT BEFORE YOU GOT THE MONEY TO BUY MORE: NEVER TRUE
HOW OFTEN DO YOU ATTENT MEETINGS OF THE CLUB OR ORGANIZATION YOU BELONG TO?: NEVER
HOW OFTEN DO YOU GET TOGETHER WITH FRIENDS OR RELATIVES?: ONCE A WEEK
HOW OFTEN DO YOU ATTEND CHURCH OR RELIGIOUS SERVICES?: NEVER
HOW MANY DRINKS CONTAINING ALCOHOL DO YOU HAVE ON A TYPICAL DAY WHEN YOU ARE DRINKING: 1 OR 2
HOW HARD IS IT FOR YOU TO PAY FOR THE VERY BASICS LIKE FOOD, HOUSING, MEDICAL CARE, AND HEATING?: NOT HARD AT ALL
HOW OFTEN DO YOU ATTENT MEETINGS OF THE CLUB OR ORGANIZATION YOU BELONG TO?: NEVER
DO YOU BELONG TO ANY CLUBS OR ORGANIZATIONS SUCH AS CHURCH GROUPS UNIONS, FRATERNAL OR ATHLETIC GROUPS, OR SCHOOL GROUPS?: NO

## 2024-08-01 ENCOUNTER — PHARMACY VISIT (OUTPATIENT)
Dept: PHARMACY | Facility: MEDICAL CENTER | Age: 74
End: 2024-08-01
Payer: COMMERCIAL

## 2024-08-01 PROCEDURE — RXMED WILLOW AMBULATORY MEDICATION CHARGE: Performed by: INTERNAL MEDICINE

## 2024-08-02 ENCOUNTER — APPOINTMENT (OUTPATIENT)
Dept: MEDICAL GROUP | Facility: MEDICAL CENTER | Age: 74
End: 2024-08-02
Payer: MEDICARE

## 2024-08-02 VITALS
SYSTOLIC BLOOD PRESSURE: 99 MMHG | HEIGHT: 71 IN | BODY MASS INDEX: 20.83 KG/M2 | OXYGEN SATURATION: 99 % | DIASTOLIC BLOOD PRESSURE: 58 MMHG | HEART RATE: 89 BPM | WEIGHT: 148.8 LBS

## 2024-08-02 DIAGNOSIS — H47.231 OPTIC CUPPING OF RIGHT EYE: ICD-10-CM

## 2024-08-02 DIAGNOSIS — Z79.01 CHRONIC ANTICOAGULATION: ICD-10-CM

## 2024-08-02 DIAGNOSIS — N18.31 TYPE 2 DIABETES MELLITUS WITH STAGE 3A CHRONIC KIDNEY DISEASE, WITHOUT LONG-TERM CURRENT USE OF INSULIN (HCC): ICD-10-CM

## 2024-08-02 DIAGNOSIS — Z95.810 BIVENTRICULAR ICD (IMPLANTABLE CARDIOVERTER-DEFIBRILLATOR) IN PLACE: ICD-10-CM

## 2024-08-02 DIAGNOSIS — I47.20 VENTRICULAR TACHYCARDIA (HCC): ICD-10-CM

## 2024-08-02 DIAGNOSIS — E11.22 TYPE 2 DIABETES MELLITUS WITH STAGE 3A CHRONIC KIDNEY DISEASE, WITHOUT LONG-TERM CURRENT USE OF INSULIN (HCC): ICD-10-CM

## 2024-08-02 PROCEDURE — 3078F DIAST BP <80 MM HG: CPT | Performed by: STUDENT IN AN ORGANIZED HEALTH CARE EDUCATION/TRAINING PROGRAM

## 2024-08-02 PROCEDURE — 99214 OFFICE O/P EST MOD 30 MIN: CPT | Performed by: STUDENT IN AN ORGANIZED HEALTH CARE EDUCATION/TRAINING PROGRAM

## 2024-08-02 PROCEDURE — 3074F SYST BP LT 130 MM HG: CPT | Performed by: STUDENT IN AN ORGANIZED HEALTH CARE EDUCATION/TRAINING PROGRAM

## 2024-08-02 ASSESSMENT — FIBROSIS 4 INDEX: FIB4 SCORE: 2.87

## 2024-08-02 ASSESSMENT — ENCOUNTER SYMPTOMS
FEVER: 0
CHILLS: 0
HEMOPTYSIS: 0
DIARRHEA: 0
COUGH: 0
VOMITING: 0
ABDOMINAL PAIN: 0
PALPITATIONS: 0

## 2024-08-02 NOTE — PROGRESS NOTES
Jaime Lee is a 73 y.o. old male  who is here to establish care     Chief Complaint   Patient presents with    Establish Care        Problem   Ventricular Tachycardia (Hcc)    - Chronic stable     Chronic Anticoagulation   Type 2 Diabetes Mellitus With Stage 3a Chronic Kidney Disease, Without Long-Term Current Use of Insulin ([facility]ly [facility])      Current medications: Jardiance 10 mg daily, metformin 500 daily  Last A1c: 6.8 (6/24)  Last Microalb/Cr ratio: <1.2 (6/24)  Fasting sugars: Does not check at home, counseled  Last diabetic foot exam: Done today  Monofilament testing with a 10 gram force: sensation intact: intact bilaterally  Visual Inspection: Feet has bluish discoloration b/l , no maceration, ulcers, fissures.  Pedal pulses: b/l pedal pulse present , decreased  Last retinal eye exam: Performed today, patient has an optometrist but does not like to follow up with them  ACEi/ARB: Entresto  Statin: Rosuvatsatin 20 mg daily  Lab Results   Component Value Date/Time    CHOLSTRLTOT 162 02/27/2024 0838    TRIGLYCERIDE 132 02/27/2024 0838    HDL 59 02/27/2024 0838    LDL 77 02/27/2024 0838     Aspirin: On Xarelto for DVT  Concomitant HTN: BP in goal , on Entresto  Tobacco:No  Nightly foot checks- Discussed and counseled  Complications :CKD, Dermatitis, PVD           Review of Systems   Constitutional:  Negative for chills and fever.   Respiratory:  Negative for cough and hemoptysis.    Cardiovascular:  Negative for chest pain and palpitations.   Gastrointestinal:  Negative for abdominal pain, diarrhea and vomiting.        Patient  has a past medical history of Acute anterior myocardial infarction (HCC) (12/28/2000), Allergy, Arthritis (9/23), CAD (coronary artery disease) (12/28/2000), Cardiac pacemaker in situ (11/01/2023), Chronic anticoagulation (02/01/2018), Chronic deep vein thrombosis (DVT) of femoral vein of left lower extremity (HCC) (01/04/2018), Congestive heart failure (HCC) (12/28/2000), COVID  (2023), Dental disorder, GERD (gastroesophageal reflux disease), High cholesterol, Hypertension, Ischemic cardiomyopathy (2022), Left bundle branch block ( ), Pneumonia (), Pulmonary embolism (HCC) (2014), Thrombocytopenia (HCC) (2014), Type 2 diabetes mellitus without complication ([facility]) ( ), Vascular disorder of extremity (HCC) (2020), and Ventricular tachyarrhythmia ([facility]).  Patient  has a past surgical history that includes other cardiac surgery (2000); appendectomy (); aicd implant (2008); recovery (11/3/2015); aicd battery change (2015); and multiple coronary artery bypass.  Family History   Problem Relation Age of Onset    Diabetes Mother     Heart Disease Mother     Diabetes Sister     Alcohol/Drug Brother      Social History     Tobacco Use    Smoking status: Former     Current packs/day: 0.00     Average packs/day: 1.3 packs/day for 48.0 years (64.0 ttl pk-yrs)     Types: Cigarettes     Start date: 1968     Quit date: 2000     Years since quittin.6    Smokeless tobacco: Never   Vaping Use    Vaping status: Never Used   Substance Use Topics    Alcohol use: Not Currently     Alcohol/week: 1.2 oz     Types: 2 Standard drinks or equivalent per week     Comment: occ    Drug use: Not Currently     Patient Active Problem List    Diagnosis Date Noted    Cerebral atrophy (HCC) 2024    Ectatic thoracic aorta ([facility]) 2024    History of femoral artery thrombosis 2024    Other thrombophilia ([facility]) 2024    End stage heart failure ([facility]) 2024    Coronary artery disease due to calcified coronary lesion 2023    Secondary hyperaldosteronism ([facility]) 2023    Peripheral vascular disease of lower extremity ([facility]) 2020    Ventricular tachycardia ([facility]) 2019    Macrocytosis without anemia 2018    Chronic anticoagulation 2018    Basal cell carcinoma of skin of left ear 2018    Hyperlipidemia 2017  "   S/P CABG x 4 06/22/2016    Type 2 diabetes mellitus with stage 3a chronic kidney disease, without long-term current use of insulin ([facility]) 06/22/2016    Old anterior myocardial infarction 06/22/2016    Biventricular ICD (implantable cardioverter-defibrillator) in place 03/11/2014    Ischemic cardiomyopathy 03/11/2014    History of pulmonary embolus (PE) 02/27/2014    Chronic kidney disease (CKD) stage G3a/A1, moderately decreased glomerular filtration rate (GFR) between 45-59 mL/min/1.73 square meter and albuminuria creatinine ratio less than 30 mg/g ([facility]) 02/24/2014     Current Outpatient Medications   Medication Sig Dispense Refill    spironolactone (ALDACTONE) 25 MG Tab Take 1 Tablet by mouth every day. 90 Tablet 3    amiodarone (CORDARONE) 100 MG tablet Take 1 Tablet by mouth every day. 90 Tablet 3    torsemide (DEMADEX) 5 MG Tab Take 1 Tablet by mouth every day. 90 Tablet 3    rosuvastatin (CRESTOR) 20 MG Tab Take 1 Tablet by mouth every evening. 90 Tablet 3    rivaroxaban (XARELTO) 15 MG Tab tablet Take 1 Tablet by mouth with dinner. 90 Tablet 3    Empagliflozin (JARDIANCE) 10 MG Tab tablet Take 1 Tablet by mouth every day. 100 Tablet 3    metFORMIN ER (GLUCOPHAGE XR) 500 MG TABLET SR 24 HR Take 1 Tablet by mouth every day. 90 Tablet 3    sacubitril-valsartan (ENTRESTO) 24-26 MG Tab Take 1 Tablet by mouth 2 times a day. 180 Tablet 3    clopidogrel (PLAVIX) 75 MG Tab Take 1 Tablet by mouth every day. 90 Tablet 3    Cholecalciferol (D3 PO) Take 1 Capsule by mouth every day.      metoprolol SR (TOPROL XL) 50 MG TABLET SR 24 HR Take 50 mg by mouth every morning.      Magnesium 500 MG Cap Take 1 Cap by mouth every day. 180 Cap 4     No current facility-administered medications for this visit.    (including changes today)  Allergies: Pcn [penicillins] and Ace inhibitors    BP 99/58   Pulse 89   Ht 1.803 m (5' 11\")   Wt 67.5 kg (148 lb 12.8 oz)   SpO2 99%      Physical Exam  Constitutional:       Appearance: " Normal appearance.   Cardiovascular:      Rate and Rhythm: Normal rate.      Heart sounds: No murmur heard.  Pulmonary:      Effort: Pulmonary effort is normal. No respiratory distress.      Breath sounds: Normal breath sounds. No wheezing.   Abdominal:      Palpations: Abdomen is soft.      Tenderness: There is no abdominal tenderness.   Skin:     General: Skin is warm.      Coloration: Skin is not jaundiced or pale.   Neurological:      General: No focal deficit present.      Mental Status: He is alert and oriented to person, place, and time.              I reviewed with patient  lab results from 6/19/24    Assessment and plan:    Problem List Items Addressed This Visit       Biventricular ICD (implantable cardioverter-defibrillator) in place    Type 2 diabetes mellitus with stage 3a chronic kidney disease, without long-term current use of insulin (HCC)     - Continue metformin 500 mg XR 24 hours daily, Jardiance 10 mg daily  - Counseled extensively on diet modification, about decreasing sugar and carbohydrates  - Exercise: Patient has a  and is working out at the gym         Relevant Orders    POCT Retinal Eye Exam    Diabetic Monofilament Lower Extremity Exam (Completed)    Chronic anticoagulation     - Continue Xarelto 15 mg daily           Ventricular tachycardia (HCC)     - Follow-up with cardiology.  Next appointment in 1 month                Return in about 2 months (around 10/15/2024).          Please note that this dictation was created using voice recognition software. I have made every reasonable attempt to correct obvious errors, but I expect that there are errors of grammar and possibly content that I did not discover before finalizing the note.

## 2024-08-02 NOTE — ASSESSMENT & PLAN NOTE
- Continue metformin 500 mg XR 24 hours daily, Jardiance 10 mg daily  - Counseled extensively on diet modification, about decreasing sugar and carbohydrates  - Exercise: Patient has a  and is working out at the gym

## 2024-08-06 ENCOUNTER — PHARMACY VISIT (OUTPATIENT)
Dept: PHARMACY | Facility: MEDICAL CENTER | Age: 74
End: 2024-08-06
Payer: COMMERCIAL

## 2024-08-09 PROCEDURE — RXMED WILLOW AMBULATORY MEDICATION CHARGE: Performed by: FAMILY MEDICINE

## 2024-08-12 ENCOUNTER — PHARMACY VISIT (OUTPATIENT)
Dept: PHARMACY | Facility: MEDICAL CENTER | Age: 74
End: 2024-08-12
Payer: COMMERCIAL

## 2024-08-15 DIAGNOSIS — E11.65 TYPE 2 DIABETES MELLITUS WITH HYPERGLYCEMIA, WITHOUT LONG-TERM CURRENT USE OF INSULIN (HCC): ICD-10-CM

## 2024-08-15 PROCEDURE — RXMED WILLOW AMBULATORY MEDICATION CHARGE: Performed by: STUDENT IN AN ORGANIZED HEALTH CARE EDUCATION/TRAINING PROGRAM

## 2024-08-15 RX ORDER — METFORMIN HCL 500 MG
500 TABLET, EXTENDED RELEASE 24 HR ORAL DAILY
Qty: 100 TABLET | Refills: 0 | Status: SHIPPED | OUTPATIENT
Start: 2024-08-15

## 2024-08-15 NOTE — TELEPHONE ENCOUNTER
Received request via: Pharmacy    Was the patient seen in the last year in this department? Yes    Does the patient have an active prescription (recently filled or refills available) for medication(s) requested? No    Pharmacy Name: [facility] [address]    Does the patient have group home Plus and need 100-day supply? (This applies to ALL medications) Yes, quantity updated to 100 days

## 2024-08-16 ENCOUNTER — PHARMACY VISIT (OUTPATIENT)
Dept: PHARMACY | Facility: MEDICAL CENTER | Age: 74
End: 2024-08-16
Payer: COMMERCIAL

## 2024-08-19 PROCEDURE — RXMED WILLOW AMBULATORY MEDICATION CHARGE: Performed by: INTERNAL MEDICINE

## 2024-08-20 ENCOUNTER — PHARMACY VISIT (OUTPATIENT)
Dept: PHARMACY | Facility: MEDICAL CENTER | Age: 74
End: 2024-08-20
Payer: COMMERCIAL

## 2024-08-21 ENCOUNTER — DOCUMENTATION (OUTPATIENT)
Dept: HEALTH INFORMATION MANAGEMENT | Facility: OTHER | Age: 74
End: 2024-08-21
Payer: MEDICARE

## 2024-08-28 ENCOUNTER — TELEPHONE (OUTPATIENT)
Dept: CARDIOLOGY | Facility: MEDICAL CENTER | Age: 74
End: 2024-08-28
Payer: MEDICARE

## 2024-08-28 ENCOUNTER — PHARMACY VISIT (OUTPATIENT)
Dept: PHARMACY | Facility: MEDICAL CENTER | Age: 74
End: 2024-08-28
Payer: COMMERCIAL

## 2024-08-28 PROCEDURE — RXMED WILLOW AMBULATORY MEDICATION CHARGE: Performed by: INTERNAL MEDICINE

## 2024-08-28 NOTE — TELEPHONE ENCOUNTER
Attempted to call patient in regards to an upcoming appointment with Dr. Rock 9/5/24. LVM letting patient know to call us back. Attempting to verify where lab work is done. Patient does have  lab work to do.      Pending call back.

## 2024-08-29 ENCOUNTER — HOSPITAL ENCOUNTER (OUTPATIENT)
Dept: LAB | Facility: MEDICAL CENTER | Age: 74
End: 2024-08-29
Attending: INTERNAL MEDICINE
Payer: MEDICARE

## 2024-08-29 ENCOUNTER — APPOINTMENT (OUTPATIENT)
Dept: LAB | Facility: MEDICAL CENTER | Age: 74
End: 2024-08-29
Payer: MEDICARE

## 2024-08-29 DIAGNOSIS — I47.20 VENTRICULAR TACHYCARDIA (HCC): ICD-10-CM

## 2024-08-29 DIAGNOSIS — E78.2 MIXED HYPERLIPIDEMIA: ICD-10-CM

## 2024-08-29 LAB
CHOLEST SERPL-MCNC: 123 MG/DL (ref 100–199)
FASTING STATUS PATIENT QL REPORTED: NORMAL
HDLC SERPL-MCNC: 61 MG/DL
LDLC SERPL CALC-MCNC: 43 MG/DL
TRIGL SERPL-MCNC: 95 MG/DL (ref 0–149)

## 2024-08-29 PROCEDURE — 36415 COLL VENOUS BLD VENIPUNCTURE: CPT

## 2024-08-29 PROCEDURE — 80061 LIPID PANEL: CPT

## 2024-08-30 PROCEDURE — RXMED WILLOW AMBULATORY MEDICATION CHARGE: Performed by: INTERNAL MEDICINE

## 2024-09-02 PROCEDURE — RXMED WILLOW AMBULATORY MEDICATION CHARGE: Performed by: INTERNAL MEDICINE

## 2024-09-03 ENCOUNTER — PHARMACY VISIT (OUTPATIENT)
Dept: PHARMACY | Facility: MEDICAL CENTER | Age: 74
End: 2024-09-03
Payer: COMMERCIAL

## 2024-09-05 ENCOUNTER — PATIENT MESSAGE (OUTPATIENT)
Dept: CARDIOLOGY | Facility: MEDICAL CENTER | Age: 74
End: 2024-09-05

## 2024-09-05 ENCOUNTER — OFFICE VISIT (OUTPATIENT)
Dept: CARDIOLOGY | Facility: MEDICAL CENTER | Age: 74
End: 2024-09-05
Attending: INTERNAL MEDICINE
Payer: MEDICARE

## 2024-09-05 ENCOUNTER — PHARMACY VISIT (OUTPATIENT)
Dept: PHARMACY | Facility: MEDICAL CENTER | Age: 74
End: 2024-09-05
Payer: COMMERCIAL

## 2024-09-05 VITALS
HEART RATE: 84 BPM | HEIGHT: 71 IN | DIASTOLIC BLOOD PRESSURE: 88 MMHG | RESPIRATION RATE: 16 BRPM | OXYGEN SATURATION: 95 % | WEIGHT: 147 LBS | BODY MASS INDEX: 20.58 KG/M2 | SYSTOLIC BLOOD PRESSURE: 110 MMHG

## 2024-09-05 DIAGNOSIS — I25.5 ISCHEMIC CARDIOMYOPATHY: ICD-10-CM

## 2024-09-05 DIAGNOSIS — I25.2 OLD ANTERIOR MYOCARDIAL INFARCTION: ICD-10-CM

## 2024-09-05 DIAGNOSIS — I70.0 AORTIC ATHEROSCLEROSIS (HCC): ICD-10-CM

## 2024-09-05 DIAGNOSIS — E78.00 PURE HYPERCHOLESTEROLEMIA: ICD-10-CM

## 2024-09-05 DIAGNOSIS — I50.84 END STAGE HEART FAILURE (HCC): ICD-10-CM

## 2024-09-05 DIAGNOSIS — Z86.711 HISTORY OF PULMONARY EMBOLUS (PE): ICD-10-CM

## 2024-09-05 DIAGNOSIS — I25.10 CORONARY ARTERY DISEASE DUE TO CALCIFIED CORONARY LESION: ICD-10-CM

## 2024-09-05 DIAGNOSIS — I47.20 VENTRICULAR TACHYCARDIA (HCC): ICD-10-CM

## 2024-09-05 DIAGNOSIS — I25.84 CORONARY ARTERY DISEASE DUE TO CALCIFIED CORONARY LESION: ICD-10-CM

## 2024-09-05 DIAGNOSIS — D69.2 SENILE PURPURA (HCC): ICD-10-CM

## 2024-09-05 PROBLEM — I77.810 ECTATIC THORACIC AORTA (HCC): Status: RESOLVED | Noted: 2024-06-18 | Resolved: 2024-09-05

## 2024-09-05 PROBLEM — D68.69 OTHER THROMBOPHILIA (HCC): Status: RESOLVED | Noted: 2024-01-16 | Resolved: 2024-09-05

## 2024-09-05 PROBLEM — E78.5 HYPERLIPIDEMIA: Status: RESOLVED | Noted: 2017-01-18 | Resolved: 2024-09-05

## 2024-09-05 PROCEDURE — 99214 OFFICE O/P EST MOD 30 MIN: CPT | Performed by: INTERNAL MEDICINE

## 2024-09-05 PROCEDURE — 3079F DIAST BP 80-89 MM HG: CPT | Performed by: INTERNAL MEDICINE

## 2024-09-05 PROCEDURE — 99213 OFFICE O/P EST LOW 20 MIN: CPT | Performed by: INTERNAL MEDICINE

## 2024-09-05 PROCEDURE — RXMED WILLOW AMBULATORY MEDICATION CHARGE: Performed by: INTERNAL MEDICINE

## 2024-09-05 PROCEDURE — G2211 COMPLEX E/M VISIT ADD ON: HCPCS | Performed by: INTERNAL MEDICINE

## 2024-09-05 PROCEDURE — 3074F SYST BP LT 130 MM HG: CPT | Performed by: INTERNAL MEDICINE

## 2024-09-05 RX ORDER — METOPROLOL SUCCINATE 100 MG/1
100 TABLET, EXTENDED RELEASE ORAL DAILY
Qty: 90 TABLET | Refills: 3 | Status: SHIPPED | OUTPATIENT
Start: 2024-09-05

## 2024-09-05 ASSESSMENT — FIBROSIS 4 INDEX: FIB4 SCORE: 2.9

## 2024-09-05 NOTE — PROGRESS NOTES
CARDIOLOGY CLINIC NOTE      Date of Visit: 09/05/24  Primary Care Provider: Rafael Park M.D.    Patient Name: Jaime Lee  YOB: 1950  MRN: [id number]     Reason for Visit:   Follow up     Patient Story:   Jaime Lee is a 74 year-old gentleman with a past medical history of severe coronary artery disease (s/p 4V CABG in 2000), prior acute anterior MI, chronic systolic heart failure/ischemic cardiomyopathy (s/p AICD in 2003), ventricular arrhythmias with prior AICD discharges, unprovoked DVT/PE (on chronic anticoagulation), and type 2 diabetes with stage III chronic kidney disease.  Briefly, he has a known history of severe coronary artery disease and underwent 4V CABG in 2000 in the setting of an acute anterior myocardial infarction.  More recently, he had episodes of syncope due to ventricular arrhythmias requiring defibrillation by his AICD.  He was referred for repeat coronary angiography to evaluate his increased arrhythmic burden, which demonstrated stump occlusions of his SVG-RCA and SVG-D1, probably moderate disease in the anastomosis of the SVG-OM, severe disease in a large diagonal branch after the LIMA anastomosis, a short  of the distal RCA that was successfully treated with PCI, a  of the mid LAD, and a  of the ostial LCx.    He initially presented in my clinic to discuss possible  intervention.  After reviewing his cardiac catheterization films, most recent echocardiogram, and symptoms, he was not felt to be a good candidate for  intervention.  He did report significant nausea after starting amiodarone and felt that his exertional tolerance was limited by fatigue, which he attributed to bradycardia from his medications.  He also had decided not to aggressively pursue a transplant evaluation.    Today, he again notes that his main symptom is ongoing nausea.  This improved with reducing his amiodarone dose, but have not completely  resolved.  He continues to deny significant lower extremity edema or chest pain with exertion.  He does feel fatigued throughout the day and continues to have significant lightheadedness/instability with standing.     Medications and Allergies:     Current Outpatient Medications   Medication Sig Dispense Refill    metFORMIN ER (GLUCOPHAGE XR) 500 MG TABLET SR 24 HR Take 1 Tablet by mouth every day. 100 Tablet 0    spironolactone (ALDACTONE) 25 MG Tab Take 1 Tablet by mouth every day. 90 Tablet 3    amiodarone (CORDARONE) 100 MG tablet Take 1 Tablet by mouth every day. 90 Tablet 3    torsemide (DEMADEX) 5 MG Tab Take 1 Tablet by mouth every day. 90 Tablet 3    rosuvastatin (CRESTOR) 20 MG Tab Take 1 Tablet by mouth every evening. 90 Tablet 3    rivaroxaban (XARELTO) 15 MG Tab tablet Take 1 Tablet by mouth with dinner. 90 Tablet 3    Empagliflozin (JARDIANCE) 10 MG Tab tablet Take 1 Tablet by mouth every day. 100 Tablet 3    sacubitril-valsartan (ENTRESTO) 24-26 MG Tab Take 1 Tablet by mouth 2 times a day. 180 Tablet 3    Cholecalciferol (D3 PO) Take 1 Capsule by mouth every day.      metoprolol SR (TOPROL XL) 50 MG TABLET SR 24 HR Take 50 mg by mouth every morning.      Magnesium 500 MG Cap Take 1 Cap by mouth every day. 180 Cap 4     No current facility-administered medications for this visit.     Allergies   Allergen Reactions    Pcn [Penicillins] Anaphylaxis    Ace Inhibitors Cough     Cough      Medical Decision Making:   # Severe coronary artery disease, history of CABG (2000), history of PCI to  RCA (9/28/2023): Ultimately, after reviewing his angiogram, I think that he would gain the greatest long-term benefit from cardiac transplantion.  If he were a candidate.  I think the potential risks of either a  intervention (with blunt, ambiguous proximal caps for both the ostial LCx and mid LAD) or a graft intervention (probably moderate disease in the SVG-OM anastomosis and a long course for intervention on  the diagonal through the LIMA) outweigh the likely benefits as  intervention has not been definitively shown to reduce ventricular arrhythmias (which are likely scar based in his case) or improve left ventricular systolic function (particularly if there is already significant scarring/fibrosis).  If he were to have clear limiting anginal symptoms, then possibly these complex interventions could be reconsidered.  -Discontinue clopidogral  -Continue rivaroxaban as below    # Severe ischemic cardiomyopathy, chronic heart failure with reduced ejection fraction: At this time, he reports not being interested in pursuing a transplant evaluation at [facility].  Therefore, we will continue with optimization of his heart failure medications, unfortunately, his blood pressure and significant orthostatic symptoms have limited up titration.  He otherwise appears euvolemic and well compensated.  -Increase metoprolol XL to 100 mg daily  -Continue Entresto 24-26 mg twice daily  -Continue empagliflozin 10 mg daily  -Continue spironolactone 25 mg daily  -Continue torsemide 5 mg daily  -Repeat BMP    # History of ventricular arrhythmias requiring defibrillation  -Defer antiarrhythmic therapy and routine monitoring to EP service  -CRT-D device monitoring per [facility]    # Hyperlipidemia: His LDL is well controlled  -Continue rosuvastatin to 40 mg nightly    # Type 2 diabetes, chronic kidney disease:  -Defer glucose control to PCP, he is on empagliflozin and Entresto    # History of recurrent DVT:  -Continue rivaroxaban 15 mg daily in the setting of CKD with reduced CrCl    Longitudinal Care  Today's visit is associated with medical care services that serve as the continuing focal point for all necessary health care services related to the patient's single, serious condition or multiple chronic complex conditions.  This includes providing services to the patient on an ongoing basis that results in care that is collaborative and  personalized to the patient.  The services result in a comprehensive, longitudinal, and continuous relationship with the patient and involve delivery of team-based care that is accessible, coordinated with other practitioners and providers, and integrated with the broader health care landscape.    Follow Up  3-4 months with repeat echocardiogram     Cardiac Studies and Procedures:   Echocardiography  TTE (2/12/2024)  The left ventricle is mildly dilated.  Severely reduced left ventricular systolic function.  The ejection fraction is measured to be 18% by Arroyo's biplane.  Mild tricuspid regurgitation.  Right ventricular systolic pressure is estimated to be 24 mmHg.  Normal inferior vena cava size and inspiratory collapse.    TTE (5/2/2022)  Severe LV enlargement with severely reduced systolic function.  Estimated LVEF = 19% by MOD.  Normal RV size with mild to moderately reduced systolic function.   Mild TR. Estimated RVSP = 24 mmHg (RAP 3 mmHg). Trace AR/ND/MR.     TTE (3/17/2021)  The left ventricle is severely dilated.  Left ventricular ejection fraction is visually estimated to be 20%.  No significant valve disease or flow abnormalities.     Coronary Angiography/Cardiac Catheterization  CAG (9/28/2023)  Left main: Luminal disease  Left anterior descending: Occluded in the mid portion after large septal branches takeoff, fills through LIMA.  Left circumflex: Flush occluded ostially  Ramus intermedius: Luminal disease  Right coronary: Dominant,  in distal portion, TIMI1 flow into distal portion bridging collaterals.  Vein graft to RCA occluded  Vein graft to diagonal occluded  Vein graft to marginal branch widely patent with possible moderate stenosis versus tortuosity at the anastomosis  LIMA to LAD overall small, after anastomosis the LAD bifurcates into a small ongoing distal LAD and a large diagonal branch.  The diagonal branch has 70% disease in the proximal portion    Percutaneous Coronary  "Intervention  PCI (9/28/2023)  Successful PCI to the distal RCA  with one 3.0 x 26 mm [place] AZRA      Vital Signs:   /88 (BP Location: Left arm, Patient Position: Sitting)   Pulse 84   Resp 16   Ht 1.803 m (5' 11\")   Wt 66.7 kg (147 lb)   SpO2 95%    BP Readings from Last 4 Encounters:   09/05/24 110/88   08/02/24 99/58   06/19/24 106/60   05/08/24 (!) 80/66     Wt Readings from Last 4 Encounters:   09/05/24 66.7 kg (147 lb)   08/02/24 67.5 kg (148 lb 12.8 oz)   06/19/24 67.6 kg (149 lb)   05/08/24 67.1 kg (148 lb)     Body mass index is 20.5 kg/m².     Laboratories:   Lipids  Lab Results   Component Value Date/Time    LDL 43 08/29/2024 08:54 AM    LDL 77 02/27/2024 08:38 AM    LDL 90 08/02/2023 07:51 AM    LDL 87 04/02/2022 08:51 AM    LDL 85 03/05/2021 09:07 AM       Lab Results   Component Value Date/Time    HDL 61 08/29/2024 08:54 AM    HDL 59 02/27/2024 08:38 AM    HDL 64 08/02/2023 07:51 AM    HDL 58 04/02/2022 08:51 AM    HDL 57 03/05/2021 09:07 AM       Lab Results   Component Value Date/Time    TRIGLYCERIDE 95 08/29/2024 08:54 AM    TRIGLYCERIDE 132 02/27/2024 08:38 AM    TRIGLYCERIDE 93 08/02/2023 07:51 AM    TRIGLYCERIDE 118 04/02/2022 08:51 AM    TRIGLYCERIDE 120 03/05/2021 09:07 AM       Lab Results   Component Value Date/Time    CHOLSTRLTOT 123 08/29/2024 08:54 AM    CHOLSTRLTOT 162 02/27/2024 08:38 AM    CHOLSTRLTOT 173 08/02/2023 07:51 AM    CHOLSTRLTOT 169 04/02/2022 08:51 AM    CHOLSTRLTOT 166 03/05/2021 09:07 AM       No components found for: \"LPA\"      Chemistries  Lab Results   Component Value Date/Time    CREATININE 1.59 (H) 04/01/2024 10:28 AM    CREATININE 1.52 (H) 02/27/2024 08:38 AM    CREATININE 1.34 11/02/2023 02:34 AM    CREATININE 1.75 (H) 10/30/2023 12:33 PM    CREATININE 1.22 09/29/2023 02:25 AM    CREATININE 1.0 01/08/2008 07:45 AM    CREATININE 1.2 12/21/2007 07:00 AM    CREATININE 1.1 04/19/2007 06:40 AM    CREATININE 1.2 03/20/2006 07:14 AM    CREATININE 1.3 06/17/2005 " "07:30 AM     Lab Results   Component Value Date/Time    BUN 33 (H) 04/01/2024 10:28 AM    BUN 26 (H) 02/27/2024 08:38 AM    BUN 22 11/02/2023 02:34 AM    BUN 28 (H) 10/30/2023 12:33 PM    BUN 28 (H) 09/29/2023 02:25 AM     Lab Results   Component Value Date/Time    POTASSIUM 4.7 04/01/2024 10:28 AM    POTASSIUM 5.0 02/27/2024 08:38 AM    POTASSIUM 4.0 11/02/2023 02:34 AM     Lab Results   Component Value Date/Time    SODIUM 134 (L) 04/01/2024 10:28 AM    SODIUM 141 02/27/2024 08:38 AM    SODIUM 136 11/02/2023 02:34 AM     Lab Results   Component Value Date/Time    GLUCOSE 144 (H) 04/01/2024 10:28 AM    GLUCOSE 146 (H) 02/27/2024 08:38 AM    GLUCOSE 111 (H) 11/02/2023 02:34 AM     Lab Results   Component Value Date/Time    ASTSGOT 21 02/27/2024 08:38 AM    ASTSGOT 14 11/02/2023 02:34 AM    ASTSGOT 9 (L) 10/30/2023 12:33 PM     Lab Results   Component Value Date/Time    ALTSGPT 14 02/27/2024 08:38 AM    ALTSGPT 8 11/02/2023 02:34 AM    ALTSGPT 9 10/30/2023 12:33 PM     Lab Results   Component Value Date/Time    ALKPHOSPHAT 67 02/27/2024 08:38 AM    ALKPHOSPHAT 57 11/02/2023 02:34 AM    ALKPHOSPHAT 64 10/30/2023 12:33 PM     Lab Results   Component Value Date/Time    HBA1C 6.8 (A) 06/19/2024 08:30 AM    HBA1C 6.5 (A) 10/24/2023 10:05 AM    HBA1C 6.2 (A) 06/22/2023 01:57 PM     No results found for: \"TSH\"  No results found for: \"NTPROBNP\"  Lab Results   Component Value Date/Time    TROPONINT 89 (H) 09/20/2023 10:16 PM    TROPONINT 37 (H) 09/20/2023 11:24 AM       Blood Counts  Lab Results   Component Value Date/Time    HEMOGLOBIN 15.0 11/02/2023 02:34 AM    HEMOGLOBIN 17.9 10/30/2023 12:33 PM    HEMOGLOBIN 14.8 09/29/2023 02:25 AM     Lab Results   Component Value Date/Time    PLATELETCT 143 (L) 11/02/2023 02:34 AM    PLATELETCT 184 10/30/2023 12:33 PM    PLATELETCT 162 (L) 09/29/2023 02:25 AM     Lab Results   Component Value Date/Time    WBC 8.5 11/02/2023 02:34 AM    WBC 9.5 10/30/2023 12:33 PM    WBC 8.2 09/29/2023 " 02:25 AM      Physical Examination:   General: Thin, somewhat frail appearing, but in no acute distress  Eyes: Extraocular movements intact, anicteric  Neck: Full range of motion, no jugular venous distension  Pulmonary: Normal respiratory effort, no distress  Cardiovascular: Regular rate, regular rhythm  Gastrointestinal: Thin, nondistended  Extremities: Dense bilateral chronic venous stasis changes, no significant edema  Neurological: Alert and oriented, no gross focal motor deficits  Psychiatric: Normal affect, normal judgment     Past History:   Past Medical History  The patient's past medical history was reviewed.  See HPI and self-reported patient medical history form for pertinent medical history to consultation.    Past Social History  The patient's social history was reviewed.  See [facility] self-reported patient medical history form for pertinent social history to consultation.    Past Family History  The patient's family history was reviewed.  See [facility] self-reported patient medical history form for pertinent family history to consultation.    Review of Systems  A pertinent cardiac review of systems was performed and was otherwise unremarkable except as per HPI and self-reported patient medical history form.        Leonard Rock MD, [facility]  Interventional Cardiology  [facility], [address]  [address] [place] [id number]  Phone: [phone]  Fax: [phone]

## 2024-09-05 NOTE — PATIENT COMMUNICATION
"Upon chart review per BN last OV note, \"-Increase metoprolol XL to 100 mg daily.\"    Medication sent to preferred pharmacy as requested.    Replied to pt via [employer] regarding findings.    "

## 2024-09-05 NOTE — PATIENT INSTRUCTIONS
Take a total dose of metoprolol  mg daily (two 50 mg tablets)  If you do not have significant side effects, notify our office and we can send in a prescription for 100 mg tablets  Stop clopidogrel  Follow up in 3-4 months repeat echocardiogramwith

## 2024-09-06 ENCOUNTER — PATIENT MESSAGE (OUTPATIENT)
Dept: CARDIOLOGY | Facility: MEDICAL CENTER | Age: 74
End: 2024-09-06
Payer: MEDICARE

## 2024-09-06 DIAGNOSIS — I47.20 VENTRICULAR TACHYCARDIA (HCC): ICD-10-CM

## 2024-09-06 RX ORDER — AMIODARONE HYDROCHLORIDE 100 MG/1
TABLET ORAL
Qty: 50 TABLET | Refills: 3 | Status: SHIPPED | OUTPATIENT
Start: 2024-09-06

## 2024-09-09 PROCEDURE — RXMED WILLOW AMBULATORY MEDICATION CHARGE: Performed by: FAMILY MEDICINE

## 2024-09-18 DIAGNOSIS — I50.22 CHRONIC SYSTOLIC CONGESTIVE HEART FAILURE, NYHA CLASS 2 (HCC): ICD-10-CM

## 2024-09-18 RX ORDER — SACUBITRIL AND VALSARTAN 24; 26 MG/1; MG/1
1 TABLET, FILM COATED ORAL 2 TIMES DAILY
Qty: 200 TABLET | Refills: 1 | Status: SHIPPED | OUTPATIENT
Start: 2024-09-18

## 2024-09-18 NOTE — TELEPHONE ENCOUNTER
Is the patient due for a refill? Yes    Was the patient seen the past year? Yes    Date of last office visit: 09/05/2024    Does the patient have an upcoming appointment?  Yes   If yes, When? 12/05/2024    Provider to refill:BN    Does the patient have [facility] Plus and need 100-day supply? (This applies to ALL medications) Yes, quantity updated to 100 days

## 2024-09-22 PROCEDURE — RXMED WILLOW AMBULATORY MEDICATION CHARGE: Performed by: INTERNAL MEDICINE

## 2024-09-23 ENCOUNTER — TELEPHONE (OUTPATIENT)
Dept: CARDIOLOGY | Facility: MEDICAL CENTER | Age: 74
End: 2024-09-23
Payer: MEDICARE

## 2024-09-24 PROCEDURE — RXMED WILLOW AMBULATORY MEDICATION CHARGE: Performed by: INTERNAL MEDICINE

## 2024-09-24 NOTE — TELEPHONE ENCOUNTER
Received [employer] [facility] request via [employer]  for Entresto 24-26mg. (Quantity:200, Day Supply:100)     Insurance: [facility]  Member ID:  [id number]  BIN: [id number]  PCN[id number]  [id number]     Ran Test claim via [employer] & medication Pays for a $564.35 copay. Will outreach to patient to offer specialty pharmacy services and or release to preferred pharmacy      
Spoke to Prudencio and went over his copays. He verbalized understanding and requested his rx be released to the pharmacy on file.  
no fever and no chills.

## 2024-09-25 PROCEDURE — RXMED WILLOW AMBULATORY MEDICATION CHARGE: Performed by: INTERNAL MEDICINE

## 2024-09-26 ENCOUNTER — PHARMACY VISIT (OUTPATIENT)
Dept: PHARMACY | Facility: MEDICAL CENTER | Age: 74
End: 2024-09-26
Payer: COMMERCIAL

## 2024-09-27 ENCOUNTER — PHARMACY VISIT (OUTPATIENT)
Dept: PHARMACY | Facility: MEDICAL CENTER | Age: 74
End: 2024-09-27
Payer: COMMERCIAL

## 2024-09-27 PROCEDURE — RXMED WILLOW AMBULATORY MEDICATION CHARGE: Performed by: INTERNAL MEDICINE

## 2024-09-27 RX ORDER — COVID-19 VACCINE, MRNA 0.04 MG/.418ML
0.3 INJECTION, SUSPENSION INTRAMUSCULAR
Qty: 0.3 ML | Refills: 0 | OUTPATIENT
Start: 2024-09-27

## 2024-09-27 RX ORDER — INFLUENZA A VIRUS A/VICTORIA/4897/2022 IVR-238 (H1N1) ANTIGEN (FORMALDEHYDE INACTIVATED), INFLUENZA A VIRUS A/CALIFORNIA/122/2022 SAN-022 (H3N2) ANTIGEN (FORMALDEHYDE INACTIVATED), AND INFLUENZA B VIRUS B/MICHIGAN/01/2021 ANTIGEN (FORMALDEHYDE INACTIVATED) 60; 60; 60 UG/.5ML; UG/.5ML; UG/.5ML
0.5 INJECTION, SUSPENSION INTRAMUSCULAR
Qty: 0.5 ML | Refills: 0 | OUTPATIENT
Start: 2024-09-27

## 2024-09-30 PROCEDURE — RXMED WILLOW AMBULATORY MEDICATION CHARGE: Performed by: INTERNAL MEDICINE

## 2024-10-01 ENCOUNTER — PHARMACY VISIT (OUTPATIENT)
Dept: PHARMACY | Facility: MEDICAL CENTER | Age: 74
End: 2024-10-01
Payer: COMMERCIAL

## 2024-10-07 DIAGNOSIS — E78.2 MIXED HYPERLIPIDEMIA: ICD-10-CM

## 2024-10-07 RX ORDER — ROSUVASTATIN CALCIUM 20 MG/1
20 TABLET, COATED ORAL EVERY EVENING
Qty: 90 TABLET | Refills: 3 | OUTPATIENT
Start: 2024-10-07

## 2024-10-08 ENCOUNTER — PHARMACY VISIT (OUTPATIENT)
Dept: PHARMACY | Facility: MEDICAL CENTER | Age: 74
End: 2024-10-08
Payer: COMMERCIAL

## 2024-10-08 PROCEDURE — RXMED WILLOW AMBULATORY MEDICATION CHARGE: Performed by: INTERNAL MEDICINE

## 2024-10-11 ENCOUNTER — TELEPHONE (OUTPATIENT)
Dept: CARDIOLOGY | Facility: MEDICAL CENTER | Age: 74
End: 2024-10-11
Payer: MEDICARE

## 2024-10-11 DIAGNOSIS — I25.5 ISCHEMIC CARDIOMYOPATHY: ICD-10-CM

## 2024-10-11 DIAGNOSIS — I50.22 CHRONIC SYSTOLIC CONGESTIVE HEART FAILURE, NYHA CLASS 2 (HCC): ICD-10-CM

## 2024-10-11 RX ORDER — METOPROLOL SUCCINATE 100 MG/1
100 TABLET, EXTENDED RELEASE ORAL DAILY
Qty: 100 TABLET | Refills: 2 | Status: SHIPPED | OUTPATIENT
Start: 2024-10-11 | End: 2024-10-14 | Stop reason: SDUPTHER

## 2024-10-14 ENCOUNTER — OFFICE VISIT (OUTPATIENT)
Dept: MEDICAL GROUP | Facility: MEDICAL CENTER | Age: 74
End: 2024-10-14
Payer: MEDICARE

## 2024-10-14 VITALS
OXYGEN SATURATION: 97 % | DIASTOLIC BLOOD PRESSURE: 50 MMHG | HEIGHT: 71 IN | HEART RATE: 86 BPM | BODY MASS INDEX: 21.03 KG/M2 | TEMPERATURE: 97.1 F | WEIGHT: 150.2 LBS | SYSTOLIC BLOOD PRESSURE: 96 MMHG

## 2024-10-14 DIAGNOSIS — Z86.711 HISTORY OF PULMONARY EMBOLUS (PE): ICD-10-CM

## 2024-10-14 DIAGNOSIS — E78.00 PURE HYPERCHOLESTEROLEMIA: ICD-10-CM

## 2024-10-14 DIAGNOSIS — E11.22 TYPE 2 DIABETES MELLITUS WITH STAGE 3A CHRONIC KIDNEY DISEASE, WITHOUT LONG-TERM CURRENT USE OF INSULIN (HCC): ICD-10-CM

## 2024-10-14 DIAGNOSIS — N18.31 TYPE 2 DIABETES MELLITUS WITH STAGE 3A CHRONIC KIDNEY DISEASE, WITHOUT LONG-TERM CURRENT USE OF INSULIN (HCC): ICD-10-CM

## 2024-10-14 DIAGNOSIS — I25.5 ISCHEMIC CARDIOMYOPATHY: ICD-10-CM

## 2024-10-14 DIAGNOSIS — Z00.00 ENCOUNTER FOR PREVENTIVE CARE: ICD-10-CM

## 2024-10-14 LAB
HBA1C MFR BLD: 6.5 % (ref ?–5.8)
POCT INT CON NEG: NEGATIVE
POCT INT CON POS: POSITIVE

## 2024-10-14 PROCEDURE — 3078F DIAST BP <80 MM HG: CPT | Performed by: STUDENT IN AN ORGANIZED HEALTH CARE EDUCATION/TRAINING PROGRAM

## 2024-10-14 PROCEDURE — 83036 HEMOGLOBIN GLYCOSYLATED A1C: CPT | Performed by: STUDENT IN AN ORGANIZED HEALTH CARE EDUCATION/TRAINING PROGRAM

## 2024-10-14 PROCEDURE — 3074F SYST BP LT 130 MM HG: CPT | Performed by: STUDENT IN AN ORGANIZED HEALTH CARE EDUCATION/TRAINING PROGRAM

## 2024-10-14 PROCEDURE — 99214 OFFICE O/P EST MOD 30 MIN: CPT | Performed by: STUDENT IN AN ORGANIZED HEALTH CARE EDUCATION/TRAINING PROGRAM

## 2024-10-14 RX ORDER — METOPROLOL SUCCINATE 100 MG/1
100 TABLET, EXTENDED RELEASE ORAL DAILY
Qty: 100 TABLET | Refills: 2 | Status: SHIPPED | OUTPATIENT
Start: 2024-10-14

## 2024-10-14 ASSESSMENT — FIBROSIS 4 INDEX: FIB4 SCORE: 2.9

## 2024-10-21 PROCEDURE — RXMED WILLOW AMBULATORY MEDICATION CHARGE: Performed by: FAMILY MEDICINE

## 2024-10-21 PROCEDURE — RXMED WILLOW AMBULATORY MEDICATION CHARGE: Performed by: STUDENT IN AN ORGANIZED HEALTH CARE EDUCATION/TRAINING PROGRAM

## 2024-10-23 ENCOUNTER — DOCUMENTATION (OUTPATIENT)
Dept: HEALTH INFORMATION MANAGEMENT | Facility: OTHER | Age: 74
End: 2024-10-23
Payer: MEDICARE

## 2024-10-23 ENCOUNTER — PHARMACY VISIT (OUTPATIENT)
Dept: PHARMACY | Facility: MEDICAL CENTER | Age: 74
End: 2024-10-23
Payer: COMMERCIAL

## 2024-10-23 PROCEDURE — RXMED WILLOW AMBULATORY MEDICATION CHARGE: Performed by: STUDENT IN AN ORGANIZED HEALTH CARE EDUCATION/TRAINING PROGRAM

## 2024-11-05 PROCEDURE — RXMED WILLOW AMBULATORY MEDICATION CHARGE: Performed by: INTERNAL MEDICINE

## 2024-11-08 ENCOUNTER — PHARMACY VISIT (OUTPATIENT)
Dept: PHARMACY | Facility: MEDICAL CENTER | Age: 74
End: 2024-11-08
Payer: COMMERCIAL

## 2024-11-18 DIAGNOSIS — E11.65 TYPE 2 DIABETES MELLITUS WITH HYPERGLYCEMIA, WITHOUT LONG-TERM CURRENT USE OF INSULIN (HCC): ICD-10-CM

## 2024-11-18 NOTE — TELEPHONE ENCOUNTER
Received request via: Pharmacy    Was the patient seen in the last year in this department? Yes    Does the patient have an active prescription (recently filled or refills available) for medication(s) requested? No    Pharmacy Name: [email or web address]    Does the patient have longterm Plus and need 100-day supply? (This applies to ALL medications) Yes, quantity updated to 100 days

## 2024-11-19 PROCEDURE — RXMED WILLOW AMBULATORY MEDICATION CHARGE: Performed by: STUDENT IN AN ORGANIZED HEALTH CARE EDUCATION/TRAINING PROGRAM

## 2024-11-19 RX ORDER — EMPAGLIFLOZIN 10 MG/1
10 TABLET, FILM COATED ORAL DAILY
Qty: 100 TABLET | Refills: 3 | Status: SHIPPED | OUTPATIENT
Start: 2024-11-19

## 2024-11-20 ENCOUNTER — PHARMACY VISIT (OUTPATIENT)
Dept: PHARMACY | Facility: MEDICAL CENTER | Age: 74
End: 2024-11-20
Payer: COMMERCIAL

## 2024-11-21 DIAGNOSIS — E11.65 TYPE 2 DIABETES MELLITUS WITH HYPERGLYCEMIA, WITHOUT LONG-TERM CURRENT USE OF INSULIN (HCC): ICD-10-CM

## 2024-11-21 PROCEDURE — RXMED WILLOW AMBULATORY MEDICATION CHARGE: Performed by: STUDENT IN AN ORGANIZED HEALTH CARE EDUCATION/TRAINING PROGRAM

## 2024-11-21 RX ORDER — METFORMIN HYDROCHLORIDE 500 MG/1
500 TABLET, EXTENDED RELEASE ORAL DAILY
Qty: 100 TABLET | Refills: 0 | Status: SHIPPED | OUTPATIENT
Start: 2024-11-21

## 2024-11-25 ENCOUNTER — PHARMACY VISIT (OUTPATIENT)
Dept: PHARMACY | Facility: MEDICAL CENTER | Age: 74
End: 2024-11-25
Payer: COMMERCIAL

## 2024-11-26 PROCEDURE — RXMED WILLOW AMBULATORY MEDICATION CHARGE: Performed by: INTERNAL MEDICINE

## 2024-12-02 ENCOUNTER — PHARMACY VISIT (OUTPATIENT)
Dept: PHARMACY | Facility: MEDICAL CENTER | Age: 74
End: 2024-12-02
Payer: COMMERCIAL

## 2024-12-05 DIAGNOSIS — E78.2 MIXED HYPERLIPIDEMIA: ICD-10-CM

## 2024-12-06 RX ORDER — ROSUVASTATIN CALCIUM 20 MG/1
20 TABLET, COATED ORAL EVERY EVENING
Qty: 90 TABLET | Refills: 3 | OUTPATIENT
Start: 2024-12-06

## 2024-12-09 PROCEDURE — RXMED WILLOW AMBULATORY MEDICATION CHARGE: Performed by: INTERNAL MEDICINE

## 2024-12-10 ENCOUNTER — PHARMACY VISIT (OUTPATIENT)
Dept: PHARMACY | Facility: MEDICAL CENTER | Age: 74
End: 2024-12-10
Payer: COMMERCIAL

## 2024-12-18 PROCEDURE — RXMED WILLOW AMBULATORY MEDICATION CHARGE: Performed by: STUDENT IN AN ORGANIZED HEALTH CARE EDUCATION/TRAINING PROGRAM

## 2024-12-18 PROCEDURE — RXMED WILLOW AMBULATORY MEDICATION CHARGE: Performed by: INTERNAL MEDICINE

## 2024-12-23 ENCOUNTER — PHARMACY VISIT (OUTPATIENT)
Dept: PHARMACY | Facility: MEDICAL CENTER | Age: 74
End: 2024-12-23
Payer: COMMERCIAL

## 2024-12-23 DIAGNOSIS — E78.2 MIXED HYPERLIPIDEMIA: ICD-10-CM

## 2024-12-23 NOTE — TELEPHONE ENCOUNTER
Is the patient due for a refill? Yes    Was the patient seen the past year? Yes    Date of last office visit: 09/05/2024    Does the patient have an upcoming appointment?  Yes   If yes, When? 03/12/2025    Provider to refill:BN    Does the patient have [facility] and need 100-day supply? (This applies to ALL medications) Yes, quantity updated to 100 days

## 2024-12-26 PROCEDURE — RXMED WILLOW AMBULATORY MEDICATION CHARGE: Performed by: INTERNAL MEDICINE

## 2024-12-26 RX ORDER — ROSUVASTATIN CALCIUM 20 MG/1
20 TABLET, COATED ORAL EVERY EVENING
Qty: 100 TABLET | Refills: 0 | Status: SHIPPED | OUTPATIENT
Start: 2024-12-26

## 2024-12-28 PROCEDURE — RXMED WILLOW AMBULATORY MEDICATION CHARGE: Performed by: INTERNAL MEDICINE

## 2024-12-30 ENCOUNTER — PHARMACY VISIT (OUTPATIENT)
Dept: PHARMACY | Facility: MEDICAL CENTER | Age: 74
End: 2024-12-30
Payer: COMMERCIAL

## 2024-12-30 PROCEDURE — RXMED WILLOW AMBULATORY MEDICATION CHARGE: Performed by: INTERNAL MEDICINE

## 2025-01-01 ENCOUNTER — HOME CARE VISIT (OUTPATIENT)
Dept: HOSPICE | Facility: HOSPICE | Age: 75
End: 2025-01-01
Payer: MEDICARE

## 2025-01-01 ENCOUNTER — PHARMACY VISIT (OUTPATIENT)
Dept: PHARMACY | Facility: MEDICAL CENTER | Age: 75
End: 2025-01-01
Payer: COMMERCIAL

## 2025-01-01 ENCOUNTER — TELEPHONE (OUTPATIENT)
Dept: CARDIOLOGY | Facility: PHYSICIAN GROUP | Age: 75
End: 2025-01-01
Payer: MEDICARE

## 2025-01-01 ENCOUNTER — HOSPICE ADMISSION (OUTPATIENT)
Dept: HOSPICE | Facility: HOSPICE | Age: 75
End: 2025-01-01
Payer: MEDICARE

## 2025-01-01 ENCOUNTER — HOSPITAL ENCOUNTER (EMERGENCY)
Facility: MEDICAL CENTER | Age: 75
End: 2025-02-02
Attending: STUDENT IN AN ORGANIZED HEALTH CARE EDUCATION/TRAINING PROGRAM | Admitting: INTERNAL MEDICINE
Payer: MEDICARE

## 2025-01-01 ENCOUNTER — APPOINTMENT (OUTPATIENT)
Dept: RADIOLOGY | Facility: MEDICAL CENTER | Age: 75
End: 2025-01-01
Attending: STUDENT IN AN ORGANIZED HEALTH CARE EDUCATION/TRAINING PROGRAM
Payer: MEDICARE

## 2025-01-01 ENCOUNTER — HOSPITAL ENCOUNTER (EMERGENCY)
Facility: MEDICAL CENTER | Age: 75
End: 2025-02-03
Attending: EMERGENCY MEDICINE
Payer: MEDICARE

## 2025-01-01 ENCOUNTER — APPOINTMENT (OUTPATIENT)
Dept: RADIOLOGY | Facility: MEDICAL CENTER | Age: 75
End: 2025-01-01
Attending: EMERGENCY MEDICINE
Payer: MEDICARE

## 2025-01-01 VITALS
HEART RATE: 86 BPM | OXYGEN SATURATION: 90 % | BODY MASS INDEX: 19.75 KG/M2 | TEMPERATURE: 100.4 F | WEIGHT: 141.09 LBS | SYSTOLIC BLOOD PRESSURE: 87 MMHG | DIASTOLIC BLOOD PRESSURE: 53 MMHG | RESPIRATION RATE: 24 BRPM | HEIGHT: 71 IN

## 2025-01-01 VITALS
RESPIRATION RATE: 30 BRPM | DIASTOLIC BLOOD PRESSURE: 30 MMHG | OXYGEN SATURATION: 100 % | HEART RATE: 78 BPM | SYSTOLIC BLOOD PRESSURE: 52 MMHG

## 2025-01-01 VITALS — HEART RATE: 96 BPM | DIASTOLIC BLOOD PRESSURE: 60 MMHG | RESPIRATION RATE: 20 BRPM | SYSTOLIC BLOOD PRESSURE: 98 MMHG

## 2025-01-01 VITALS — RESPIRATION RATE: 16 BRPM | DIASTOLIC BLOOD PRESSURE: 40 MMHG | HEART RATE: 76 BPM | SYSTOLIC BLOOD PRESSURE: 80 MMHG

## 2025-01-01 VITALS
DIASTOLIC BLOOD PRESSURE: 62 MMHG | RESPIRATION RATE: 21 BRPM | HEART RATE: 82 BPM | OXYGEN SATURATION: 91 % | SYSTOLIC BLOOD PRESSURE: 95 MMHG | TEMPERATURE: 99.1 F | HEIGHT: 71 IN | WEIGHT: 141.09 LBS | BODY MASS INDEX: 19.75 KG/M2

## 2025-01-01 VITALS — RESPIRATION RATE: 18 BRPM | DIASTOLIC BLOOD PRESSURE: 58 MMHG | SYSTOLIC BLOOD PRESSURE: 92 MMHG | HEART RATE: 92 BPM

## 2025-01-01 VITALS — HEART RATE: 102 BPM | SYSTOLIC BLOOD PRESSURE: 90 MMHG | DIASTOLIC BLOOD PRESSURE: 52 MMHG | RESPIRATION RATE: 20 BRPM

## 2025-01-01 VITALS — RESPIRATION RATE: 16 BRPM | HEART RATE: 80 BPM

## 2025-01-01 DIAGNOSIS — I25.5 ISCHEMIC CARDIOMYOPATHY: ICD-10-CM

## 2025-01-01 DIAGNOSIS — R09.02 HYPOXIA: ICD-10-CM

## 2025-01-01 DIAGNOSIS — I50.23 ACUTE ON CHRONIC SYSTOLIC HEART FAILURE, NYHA CLASS 3 (HCC): ICD-10-CM

## 2025-01-01 DIAGNOSIS — I50.84 END STAGE HEART FAILURE (HCC): Primary | ICD-10-CM

## 2025-01-01 DIAGNOSIS — E86.0 DEHYDRATION: ICD-10-CM

## 2025-01-01 DIAGNOSIS — I50.22 CHRONIC SYSTOLIC CONGESTIVE HEART FAILURE, NYHA CLASS 2 (HCC): ICD-10-CM

## 2025-01-01 DIAGNOSIS — E11.65 TYPE 2 DIABETES MELLITUS WITH HYPERGLYCEMIA, WITHOUT LONG-TERM CURRENT USE OF INSULIN (HCC): ICD-10-CM

## 2025-01-01 DIAGNOSIS — I47.20 VENTRICULAR TACHYCARDIA (HCC): ICD-10-CM

## 2025-01-01 DIAGNOSIS — I50.84 END STAGE HEART FAILURE (HCC): ICD-10-CM

## 2025-01-01 DIAGNOSIS — R53.1 WEAKNESS: ICD-10-CM

## 2025-01-01 DIAGNOSIS — Z86.711 HISTORY OF PULMONARY EMBOLUS (PE): ICD-10-CM

## 2025-01-01 LAB
ALBUMIN SERPL BCP-MCNC: 4 G/DL (ref 3.2–4.9)
ALBUMIN/GLOB SERPL: 1.3 G/DL
ALP SERPL-CCNC: 71 U/L (ref 30–99)
ALT SERPL-CCNC: 10 U/L (ref 2–50)
ANION GAP SERPL CALC-SCNC: 15 MMOL/L (ref 7–16)
ANION GAP SERPL CALC-SCNC: 15 MMOL/L (ref 7–16)
AST SERPL-CCNC: 17 U/L (ref 12–45)
BASOPHILS # BLD AUTO: 0.2 % (ref 0–1.8)
BASOPHILS # BLD AUTO: 0.3 % (ref 0–1.8)
BASOPHILS # BLD: 0.03 K/UL (ref 0–0.12)
BASOPHILS # BLD: 0.05 K/UL (ref 0–0.12)
BILIRUB SERPL-MCNC: 0.9 MG/DL (ref 0.1–1.5)
BUN SERPL-MCNC: 23 MG/DL (ref 8–22)
BUN SERPL-MCNC: 27 MG/DL (ref 8–22)
CALCIUM ALBUM COR SERPL-MCNC: 9.4 MG/DL (ref 8.5–10.5)
CALCIUM SERPL-MCNC: 9.4 MG/DL (ref 8.4–10.2)
CALCIUM SERPL-MCNC: 9.4 MG/DL (ref 8.4–10.2)
CHLORIDE SERPL-SCNC: 95 MMOL/L (ref 96–112)
CHLORIDE SERPL-SCNC: 96 MMOL/L (ref 96–112)
CO2 SERPL-SCNC: 22 MMOL/L (ref 20–33)
CO2 SERPL-SCNC: 24 MMOL/L (ref 20–33)
CREAT SERPL-MCNC: 1.39 MG/DL (ref 0.5–1.4)
CREAT SERPL-MCNC: 1.46 MG/DL (ref 0.5–1.4)
D DIMER PPP IA.FEU-MCNC: 1.73 UG/ML (FEU) (ref 0–0.5)
EKG IMPRESSION: NORMAL
EOSINOPHIL # BLD AUTO: 0 K/UL (ref 0–0.51)
EOSINOPHIL # BLD AUTO: 0 K/UL (ref 0–0.51)
EOSINOPHIL NFR BLD: 0 % (ref 0–6.9)
EOSINOPHIL NFR BLD: 0 % (ref 0–6.9)
ERYTHROCYTE [DISTWIDTH] IN BLOOD BY AUTOMATED COUNT: 48.3 FL (ref 35.9–50)
ERYTHROCYTE [DISTWIDTH] IN BLOOD BY AUTOMATED COUNT: 49.8 FL (ref 35.9–50)
FLUAV RNA SPEC QL NAA+PROBE: NEGATIVE
FLUBV RNA SPEC QL NAA+PROBE: NEGATIVE
GFR SERPLBLD CREATININE-BSD FMLA CKD-EPI: 50 ML/MIN/1.73 M 2
GFR SERPLBLD CREATININE-BSD FMLA CKD-EPI: 53 ML/MIN/1.73 M 2
GLOBULIN SER CALC-MCNC: 3.2 G/DL (ref 1.9–3.5)
GLUCOSE SERPL-MCNC: 161 MG/DL (ref 65–99)
GLUCOSE SERPL-MCNC: 179 MG/DL (ref 65–99)
HCT VFR BLD AUTO: 45.1 % (ref 42–52)
HCT VFR BLD AUTO: 45.4 % (ref 42–52)
HGB BLD-MCNC: 15 G/DL (ref 14–18)
HGB BLD-MCNC: 15 G/DL (ref 14–18)
IMM GRANULOCYTES # BLD AUTO: 0.05 K/UL (ref 0–0.11)
IMM GRANULOCYTES # BLD AUTO: 0.12 K/UL (ref 0–0.11)
IMM GRANULOCYTES NFR BLD AUTO: 0.4 % (ref 0–0.9)
IMM GRANULOCYTES NFR BLD AUTO: 0.7 % (ref 0–0.9)
LYMPHOCYTES # BLD AUTO: 0.35 K/UL (ref 1–4.8)
LYMPHOCYTES # BLD AUTO: 0.41 K/UL (ref 1–4.8)
LYMPHOCYTES NFR BLD: 2.5 % (ref 22–41)
LYMPHOCYTES NFR BLD: 2.8 % (ref 22–41)
MCH RBC QN AUTO: 31.5 PG (ref 27–33)
MCH RBC QN AUTO: 31.7 PG (ref 27–33)
MCHC RBC AUTO-ENTMCNC: 33 G/DL (ref 32.3–36.5)
MCHC RBC AUTO-ENTMCNC: 33.3 G/DL (ref 32.3–36.5)
MCV RBC AUTO: 94.7 FL (ref 81.4–97.8)
MCV RBC AUTO: 96 FL (ref 81.4–97.8)
MONOCYTES # BLD AUTO: 0.95 K/UL (ref 0–0.85)
MONOCYTES # BLD AUTO: 0.99 K/UL (ref 0–0.85)
MONOCYTES NFR BLD AUTO: 6.1 % (ref 0–13.4)
MONOCYTES NFR BLD AUTO: 7.6 % (ref 0–13.4)
NEUTROPHILS # BLD AUTO: 11.16 K/UL (ref 1.82–7.42)
NEUTROPHILS # BLD AUTO: 14.59 K/UL (ref 1.82–7.42)
NEUTROPHILS NFR BLD: 89 % (ref 44–72)
NEUTROPHILS NFR BLD: 90.4 % (ref 44–72)
NRBC # BLD AUTO: 0 K/UL
NRBC # BLD AUTO: 0 K/UL
NRBC BLD-RTO: 0 /100 WBC (ref 0–0.2)
NRBC BLD-RTO: 0 /100 WBC (ref 0–0.2)
NT-PROBNP SERPL IA-MCNC: 1676 PG/ML (ref 0–125)
NT-PROBNP SERPL IA-MCNC: 1854 PG/ML (ref 0–125)
PLATELET # BLD AUTO: 172 K/UL (ref 164–446)
PLATELET # BLD AUTO: 219 K/UL (ref 164–446)
PMV BLD AUTO: 10.5 FL (ref 9–12.9)
PMV BLD AUTO: 11 FL (ref 9–12.9)
POTASSIUM SERPL-SCNC: 4.5 MMOL/L (ref 3.6–5.5)
POTASSIUM SERPL-SCNC: 4.5 MMOL/L (ref 3.6–5.5)
PROCALCITONIN SERPL-MCNC: 0.17 NG/ML
PROT SERPL-MCNC: 7.2 G/DL (ref 6–8.2)
RBC # BLD AUTO: 4.73 M/UL (ref 4.7–6.1)
RBC # BLD AUTO: 4.76 M/UL (ref 4.7–6.1)
RSV RNA SPEC QL NAA+PROBE: NEGATIVE
SARS-COV-2 RNA RESP QL NAA+PROBE: NOTDETECTED
SODIUM SERPL-SCNC: 133 MMOL/L (ref 135–145)
SODIUM SERPL-SCNC: 134 MMOL/L (ref 135–145)
SPECIMEN SOURCE: NORMAL
TROPONIN T SERPL-MCNC: 23 NG/L (ref 6–19)
TROPONIN T SERPL-MCNC: 26 NG/L (ref 6–19)
WBC # BLD AUTO: 12.5 K/UL (ref 4.8–10.8)
WBC # BLD AUTO: 16.2 K/UL (ref 4.8–10.8)

## 2025-01-01 PROCEDURE — 665036 HSPC NOTICE OF ELECTION NOE

## 2025-01-01 PROCEDURE — G0299 HHS/HOSPICE OF RN EA 15 MIN: HCPCS

## 2025-01-01 PROCEDURE — 71045 X-RAY EXAM CHEST 1 VIEW: CPT

## 2025-01-01 PROCEDURE — S9126 HOSPICE CARE, IN THE HOME, P: HCPCS

## 2025-01-01 PROCEDURE — G0156 HHCP-SVS OF AIDE,EA 15 MIN: HCPCS

## 2025-01-01 PROCEDURE — 83880 ASSAY OF NATRIURETIC PEPTIDE: CPT

## 2025-01-01 PROCEDURE — 0241U HCHG SARS-COV-2 COVID-19 NFCT DS RESP RNA 4 TRGT MIC: CPT

## 2025-01-01 PROCEDURE — RXMED WILLOW AMBULATORY MEDICATION CHARGE: Performed by: REHABILITATION PRACTITIONER

## 2025-01-01 PROCEDURE — 80048 BASIC METABOLIC PNL TOTAL CA: CPT

## 2025-01-01 PROCEDURE — 84484 ASSAY OF TROPONIN QUANT: CPT

## 2025-01-01 PROCEDURE — 36415 COLL VENOUS BLD VENIPUNCTURE: CPT

## 2025-01-01 PROCEDURE — 700101 HCHG RX REV CODE 250: Performed by: EMERGENCY MEDICINE

## 2025-01-01 PROCEDURE — 84145 PROCALCITONIN (PCT): CPT

## 2025-01-01 PROCEDURE — 99285 EMERGENCY DEPT VISIT HI MDM: CPT

## 2025-01-01 PROCEDURE — 93005 ELECTROCARDIOGRAM TRACING: CPT | Mod: TC | Performed by: STUDENT IN AN ORGANIZED HEALTH CARE EDUCATION/TRAINING PROGRAM

## 2025-01-01 PROCEDURE — 85025 COMPLETE CBC W/AUTO DIFF WBC: CPT

## 2025-01-01 PROCEDURE — 85379 FIBRIN DEGRADATION QUANT: CPT

## 2025-01-01 PROCEDURE — 665106 HSPC SERVICE INTENSITY ADD-ON

## 2025-01-01 PROCEDURE — 94760 N-INVAS EAR/PLS OXIMETRY 1: CPT

## 2025-01-01 PROCEDURE — 99284 EMERGENCY DEPT VISIT MOD MDM: CPT

## 2025-01-01 PROCEDURE — 94640 AIRWAY INHALATION TREATMENT: CPT

## 2025-01-01 PROCEDURE — G0155 HHCP-SVS OF CSW,EA 15 MIN: HCPCS

## 2025-01-01 PROCEDURE — 700105 HCHG RX REV CODE 258: Performed by: STUDENT IN AN ORGANIZED HEALTH CARE EDUCATION/TRAINING PROGRAM

## 2025-01-01 PROCEDURE — 80053 COMPREHEN METABOLIC PANEL: CPT

## 2025-01-01 RX ORDER — METFORMIN HYDROCHLORIDE 500 MG/1
500 TABLET, EXTENDED RELEASE ORAL DAILY
Qty: 30 TABLET | Refills: 11 | Status: SHIPPED | OUTPATIENT
Start: 2025-01-01 | End: 2025-01-01

## 2025-01-01 RX ORDER — ACETAMINOPHEN 500 MG
1000 TABLET ORAL EVERY 6 HOURS PRN
Qty: 30 TABLET | Refills: 10 | Status: SHIPPED | OUTPATIENT
Start: 2025-01-01 | End: 2025-01-01

## 2025-01-01 RX ORDER — SPIRONOLACTONE 25 MG/1
25 TABLET ORAL DAILY
Qty: 30 TABLET | Refills: 11 | Status: SHIPPED | OUTPATIENT
Start: 2025-01-01 | End: 2025-01-01

## 2025-01-01 RX ORDER — SACUBITRIL AND VALSARTAN 24; 26 MG/1; MG/1
1 TABLET, FILM COATED ORAL 2 TIMES DAILY
Status: DISCONTINUED | OUTPATIENT
Start: 2025-01-01 | End: 2025-01-01

## 2025-01-01 RX ORDER — ROSUVASTATIN CALCIUM 10 MG/1
20 TABLET, COATED ORAL EVERY EVENING
Status: DISCONTINUED | OUTPATIENT
Start: 2025-01-01 | End: 2025-01-01

## 2025-01-01 RX ORDER — FUROSEMIDE 20 MG/1
20 TABLET ORAL DAILY
Qty: 30 TABLET | Refills: 11 | Status: SHIPPED | OUTPATIENT
Start: 2025-01-01 | End: 2025-01-01

## 2025-01-01 RX ORDER — SPIRONOLACTONE 25 MG/1
25 TABLET ORAL DAILY
Status: DISCONTINUED | OUTPATIENT
Start: 2025-01-01 | End: 2025-01-01

## 2025-01-01 RX ORDER — GUAIFENESIN/DEXTROMETHORPHAN 100-10MG/5
10 SYRUP ORAL EVERY 4 HOURS PRN
Qty: 236 ML | Refills: 11 | Status: SHIPPED | OUTPATIENT
Start: 2025-01-01 | End: 2025-01-01

## 2025-01-01 RX ORDER — METOPROLOL SUCCINATE 100 MG/1
100 TABLET, EXTENDED RELEASE ORAL DAILY
Status: DISCONTINUED | OUTPATIENT
Start: 2025-01-01 | End: 2025-01-01

## 2025-01-01 RX ORDER — ONDANSETRON 4 MG/1
4 TABLET, ORALLY DISINTEGRATING ORAL EVERY 6 HOURS PRN
Qty: 15 TABLET | Refills: 10 | Status: SHIPPED | OUTPATIENT
Start: 2025-01-01 | End: 2026-02-04

## 2025-01-01 RX ORDER — MORPHINE SULFATE 100 MG/5ML
5-10 SOLUTION ORAL
Qty: 240 ML | Refills: 0 | Status: SHIPPED | OUTPATIENT
Start: 2025-01-01 | End: 2025-01-01 | Stop reason: SDUPTHER

## 2025-01-01 RX ORDER — SACUBITRIL AND VALSARTAN 24; 26 MG/1; MG/1
1 TABLET, FILM COATED ORAL 2 TIMES DAILY
Qty: 60 TABLET | Refills: 11 | Status: SHIPPED | OUTPATIENT
Start: 2025-01-01 | End: 2025-01-01

## 2025-01-01 RX ORDER — LORAZEPAM 2 MG/ML
1-2 CONCENTRATE ORAL
Qty: 360 ML | Refills: 0 | Status: SHIPPED | OUTPATIENT
Start: 2025-01-01 | End: 2026-02-04

## 2025-01-01 RX ORDER — ACETAMINOPHEN 650 MG/1
650 SUPPOSITORY RECTAL EVERY 6 HOURS PRN
Qty: 5 SUPPOSITORY | Refills: 10 | Status: SHIPPED | OUTPATIENT
Start: 2025-01-01 | End: 2026-02-04

## 2025-01-01 RX ORDER — METFORMIN HYDROCHLORIDE 500 MG/1
500 TABLET, EXTENDED RELEASE ORAL DAILY
Status: DISCONTINUED | OUTPATIENT
Start: 2025-01-01 | End: 2025-01-01

## 2025-01-01 RX ORDER — MORPHINE SULFATE 100 MG/5ML
10-20 SOLUTION ORAL
Qty: 240 ML | Refills: 0 | Status: SHIPPED | OUTPATIENT
Start: 2025-01-01 | End: 2025-02-14

## 2025-01-01 RX ORDER — EMPAGLIFLOZIN 10 MG/1
10 TABLET, FILM COATED ORAL DAILY
Qty: 30 TABLET | Refills: 11 | Status: SHIPPED | OUTPATIENT
Start: 2025-01-01 | End: 2025-01-01

## 2025-01-01 RX ORDER — SENNA AND DOCUSATE SODIUM 50; 8.6 MG/1; MG/1
2 TABLET, FILM COATED ORAL 2 TIMES DAILY PRN
Qty: 28 TABLET | Refills: 10 | Status: SHIPPED | OUTPATIENT
Start: 2025-01-01 | End: 2025-01-01

## 2025-01-01 RX ORDER — ALBUTEROL SULFATE 5 MG/ML
2.5 SOLUTION RESPIRATORY (INHALATION) ONCE
Status: COMPLETED | OUTPATIENT
Start: 2025-01-01 | End: 2025-01-01

## 2025-01-01 RX ORDER — METOPROLOL SUCCINATE 100 MG/1
100 TABLET, EXTENDED RELEASE ORAL DAILY
Qty: 30 TABLET | Refills: 11 | Status: SHIPPED | OUTPATIENT
Start: 2025-01-01 | End: 2025-01-01

## 2025-01-01 RX ORDER — BISACODYL 10 MG
10 SUPPOSITORY, RECTAL RECTAL PRN
Qty: 5 SUPPOSITORY | Refills: 10 | Status: SHIPPED | OUTPATIENT
Start: 2025-01-01 | End: 2026-02-04

## 2025-01-01 RX ORDER — SODIUM CHLORIDE 9 MG/ML
500 INJECTION, SOLUTION INTRAVENOUS ONCE
Status: COMPLETED | OUTPATIENT
Start: 2025-01-01 | End: 2025-01-01

## 2025-01-01 RX ORDER — ATROPINE SULFATE 10 MG/ML
1 SOLUTION/ DROPS OPHTHALMIC EVERY 4 HOURS PRN
Qty: 5 ML | Refills: 0 | Status: SHIPPED | OUTPATIENT
Start: 2025-01-01 | End: 2026-02-12

## 2025-01-01 RX ORDER — AMIODARONE HYDROCHLORIDE 200 MG/1
100 TABLET ORAL
Status: DISCONTINUED | OUTPATIENT
Start: 2025-01-01 | End: 2025-01-01

## 2025-01-01 RX ORDER — LORAZEPAM 2 MG/ML
0.5 CONCENTRATE ORAL EVERY 4 HOURS
Qty: 240 ML | Refills: 0 | Status: SHIPPED | OUTPATIENT
Start: 2025-01-01 | End: 2026-02-12

## 2025-01-01 RX ORDER — MORPHINE SULFATE 100 MG/5ML
15 SOLUTION ORAL EVERY 4 HOURS
Qty: 240 ML | Refills: 0 | Status: SHIPPED | OUTPATIENT
Start: 2025-01-01 | End: 2026-02-12

## 2025-01-01 RX ORDER — AMIODARONE HYDROCHLORIDE 100 MG/1
TABLET ORAL
Qty: 50 TABLET | Refills: 3 | Status: SHIPPED | OUTPATIENT
Start: 2025-01-01 | End: 2025-01-01

## 2025-01-01 RX ADMIN — ALBUTEROL SULFATE 2.5 MG: 2.5 SOLUTION RESPIRATORY (INHALATION) at 14:58

## 2025-01-01 RX ADMIN — SODIUM CHLORIDE 500 ML: 9 INJECTION, SOLUTION INTRAVENOUS at 23:40

## 2025-01-01 SDOH — ECONOMIC STABILITY: GENERAL

## 2025-01-01 SDOH — ECONOMIC STABILITY: HOUSING INSECURITY: EVIDENCE OF SMOKING MATERIAL: 0

## 2025-01-01 ASSESSMENT — ENCOUNTER SYMPTOMS
HYPOTENSION: 1
SHORTNESS OF BREATH: 1
DYSPNEA ON EXERTION: 1
SPUTUM COLOR: TAN
SPUTUM PRODUCTION: 1
STOOL FREQUENCY: LESS THAN DAILY
INCREASED FATIGUE: 1
DYSPNEA ON EXERTION: 1
MOTTLING: 1
MUSCLE WEAKNESS: 1
LIMITED RANGE OF MOTION: 1
COUGH CHARACTERISTICS: MOIST
SPUTUM AMOUNT: SCANT
COUGH CHARACTERISTICS: PRODUCTIVE
DYSPNEA ON EXERTION: 1
SPUTUM CONSISTENCY: THICK
DECREASED ORAL INTAKE: 1
SHORTNESS OF BREATH: 1
DYSPNEA ON EXERTION: 1
DECREASED ORAL INTAKE: 1
COUGH: 1
FORGETFULNESS: 1
PAIN LOCATION - PAIN FREQUENCY: INTERMITTENT
COUGH CHARACTERISTICS: PRODUCTIVE
PAIN LOCATION - PAIN DURATION: WEEKS
SPUTUM PRODUCTION: 1
ORTHOPNEA: 1
CONSTIPATION: 1
PAIN: 1
SHORTNESS OF BREATH: 1
SLEEP QUALITY: FAIR
PAIN LOCATION - EXACERBATING FACTORS: ACTIVITY
COUGH CHARACTERISTICS: NON-PRODUCTIVE
HYPOTENSION: 1
DYSPNEA ON EXERTION: 1
CHEST PAIN QUALITY: DULL
DYSPNEA ACTIVITY LEVEL: AT REST
COUGH: 1
COUGH: 1
STOOL FREQUENCY: LESS THAN DAILY
DENIES PAIN: 1
DENIES PAIN: 1
COUGH: 1
DYSPNEA ACTIVITY LEVEL: WHILE SPEAKING
SHORTNESS OF BREATH: 1
DECREASED BLOOD PRESSURE: 1
BOWEL INCONTINENCE: 1
PAIN SEVERITY GOAL: 0/10
PAIN LOCATION - PAIN QUALITY: SHARP
FATIGUES EASILY: 1
COUGH CHARACTERISTICS: STRONG
INCREASED FATIGUE: 1
INCREASED FATIGUE: 1
FATIGUES EASILY: 1
UNABLE TO COMMUNICATE PAIN: 1
MUSCLE WEAKNESS: 1
COUGH CHARACTERISTICS: MOIST
SUBJECTIVE PAIN PROGRESSION: WAXING AND WANING
COUGH CHARACTERISTICS: PRODUCTIVE
SPUTUM PRODUCTION: 1
SLEEP QUALITY: ADEQUATE
HYPERTENSION: 1
PAIN LOCATION - PAIN FREQUENCY: FREQUENT
BOWEL PATTERN NORMAL: 1
SLEEP QUALITY: ADEQUATE
HIGHEST PAIN SEVERITY IN PAST 24 HOURS: 5/10
STOOL FREQUENCY: LESS THAN DAILY
BOWEL INCONTINENCE: 1
BOWEL PATTERN NORMAL: 1
PAIN LOCATION - RELIEVING FACTORS: O2
LAST BOWEL MOVEMENT: 67246
LOWEST PAIN SEVERITY IN PAST 24 HOURS: 0/10
PAIN SEVERITY GOAL: 0/10
FATIGUE: 1
DYSPNEA ACTIVITY LEVEL: WHILE SPEAKING
SLEEP QUALITY: FAIR
BOWEL INCONTINENCE: 1
FORGETFULNESS: 1
COUGH: 1
PAIN LOCATION - PAIN DURATION: DAYS
SHORTNESS OF BREATH: 1
DYSPNEA ON EXERTION: 1
DENIES PAIN: 1
FATIGUES EASILY: 1
HYPOTENSION: 1
MOTTLING: 1
SUBJECTIVE PAIN PROGRESSION: WAXING AND WANING
LAST BOWEL MOVEMENT: 67246
DECREASED TO NO URINARY OUTPUT: 1
DECREASED ORAL INTAKE: 1
SPUTUM AMOUNT: MODERATE
PAIN LOCATION - PAIN SEVERITY: 0/10
MUSCLE WEAKNESS: 1
SPUTUM PRODUCTION: 1
SPUTUM PRODUCTION: 1
INCREASED SLEEPING: 1
DECREASED TO NO URINARY OUTPUT: 1
PAIN: 1
SPUTUM CONSISTENCY: THICK
INCREASED SLEEPING: 1
FATIGUES EASILY: 1
LAST BOWEL MOVEMENT: 67240
PAIN LOCATION - PAIN SEVERITY: 0/10
PAIN LOCATION - PAIN QUALITY: HEAVINESS
FATIGUES EASILY: 1
HIGHEST PAIN SEVERITY IN PAST 24 HOURS: 9/10
DYSPNEA ACTIVITY LEVEL: WHILE EATING
MUSCLE WEAKNESS: 1
DENIES PAIN: 1
COUGH CHARACTERISTICS: PRODUCTIVE
DYSPNEA ACTIVITY LEVEL: AT REST
DECREASED BLOOD PRESSURE: 1
MUSCLE WEAKNESS: 1
HYPOTENSION: 1
INCREASED SLEEPING: 1
FATIGUES EASILY: 1
INTRACTABLE COUGH: 1
LOWEST PAIN SEVERITY IN PAST 24 HOURS: 0/10
DENIES PAIN: 1
LIMITED RANGE OF MOTION: 1
STOOL FREQUENCY: DAILY
SPUTUM AMOUNT: MODERATE
DIZZINESS: 1
LAST BOWEL MOVEMENT: 67246
HYPOTENSION: 1
PAIN LOCATION: CHEST

## 2025-01-01 ASSESSMENT — SOCIAL DETERMINANTS OF HEALTH (SDOH)
ACTIVE STRESSOR - HEALTH CHANGES: 1
ACTIVE STRESSOR - LOSS OF CONTROL: 1
ACTIVE STRESSOR - EXHAUSTION: 1
ACTIVE STRESSOR - EXHAUSTION: 1
ACTIVE STRESSOR - NO STRESS FACTORS: 1
ACTIVE STRESSOR - HEALTH CHANGES: 1
ACTIVE STRESSOR - HEALTH CHANGES: 1
ACTIVE STRESSOR - LOSS OF CONTROL: 1

## 2025-01-01 ASSESSMENT — ACTIVITIES OF DAILY LIVING (ADL)
AMBULATION_REQUIRES_ASSISTANCE: 1
AMBULATION ASSISTANCE: ONE PERSON
AMBULATION ASSISTANCE: TWO PERSON
CURRENT_FUNCTION: STAND BY ASSIST
MONEY MANAGEMENT (EXPENSES/BILLS): INDEPENDENT
SHOPPING_REQUIRES_ASSISTANCE: 1
AMBULATION ASSISTANCE: STAND BY ASSIST
AMBULATION_REQUIRES_ASSISTANCE: 1
MONEY MANAGEMENT (EXPENSES/BILLS): TOTALLY DEPENDENT
CURRENT_FUNCTION: TWO PERSON
CURRENT_FUNCTION: ONE PERSON
AMBULATION_REQUIRES_ASSISTANCE: 1
LAUNDRY_REQUIRES_ASSISTANCE: 1
PHYSICAL_TRANSFER_REQUIRES_ASSISTANCE: 1
AMBULATION ASSISTANCE: ONE PERSON

## 2025-01-01 ASSESSMENT — PATIENT HEALTH QUESTIONNAIRE - PHQ9
CLINICAL INTERPRETATION OF PHQ2 SCORE: 3
5. POOR APPETITE OR OVEREATING: 3 - NEARLY EVERY DAY
SUM OF ALL RESPONSES TO PHQ QUESTIONS 1-9: 14
CLINICAL INTERPRETATION OF PHQ2 SCORE: 0

## 2025-01-01 ASSESSMENT — FIBROSIS 4 INDEX
FIB4 SCORE: 2.9
FIB4 SCORE: 2.31

## 2025-01-01 ASSESSMENT — PAIN SCALES - PAIN ASSESSMENT IN ADVANCED DEMENTIA (PAINAD)
TOTALSCORE: 0
BODYLANGUAGE: 0 - RELAXED.
NEGVOCALIZATION: 0 - NONE.
CONSOLABILITY: 0 - NO NEED TO CONSOLE.
FACIALEXPRESSION: 0 - SMILING OR INEXPRESSIVE.

## 2025-01-01 ASSESSMENT — NEW YORK HEART ASSOCIATION (NYHA) CLASSIFICATION: PATIENT MEETS NYHA AND SIGNIFICANT SYMPTOMS CRITERIA: 1

## 2025-01-02 ENCOUNTER — HOSPITAL ENCOUNTER (OUTPATIENT)
Dept: CARDIOLOGY | Facility: MEDICAL CENTER | Age: 75
End: 2025-01-02
Attending: INTERNAL MEDICINE
Payer: MEDICARE

## 2025-01-02 DIAGNOSIS — I25.5 ISCHEMIC CARDIOMYOPATHY: ICD-10-CM

## 2025-01-02 PROCEDURE — 93306 TTE W/DOPPLER COMPLETE: CPT

## 2025-01-02 PROCEDURE — 700117 HCHG RX CONTRAST REV CODE 255: Performed by: INTERNAL MEDICINE

## 2025-01-02 RX ADMIN — HUMAN ALBUMIN MICROSPHERES AND PERFLUTREN 3 ML: 10; .22 INJECTION, SOLUTION INTRAVENOUS at 11:00

## 2025-01-03 LAB
LV EJECT FRACT  99904: 15
LV EJECT FRACT MOD 2C 99903: -12.54
LV EJECT FRACT MOD 4C 99902: 19.44
LV EJECT FRACT MOD BP 99901: 5.32

## 2025-01-03 PROCEDURE — 93306 TTE W/DOPPLER COMPLETE: CPT | Mod: 26 | Performed by: INTERNAL MEDICINE

## 2025-01-15 PROCEDURE — RXMED WILLOW AMBULATORY MEDICATION CHARGE: Performed by: STUDENT IN AN ORGANIZED HEALTH CARE EDUCATION/TRAINING PROGRAM

## 2025-01-16 ENCOUNTER — PHARMACY VISIT (OUTPATIENT)
Dept: PHARMACY | Facility: MEDICAL CENTER | Age: 75
End: 2025-01-16
Payer: COMMERCIAL

## 2025-01-31 ENCOUNTER — APPOINTMENT (OUTPATIENT)
Dept: MEDICAL GROUP | Facility: MEDICAL CENTER | Age: 75
End: 2025-01-31
Payer: MEDICARE

## 2025-01-31 VITALS
HEIGHT: 71 IN | TEMPERATURE: 98.8 F | SYSTOLIC BLOOD PRESSURE: 130 MMHG | BODY MASS INDEX: 19.75 KG/M2 | WEIGHT: 141.09 LBS | HEART RATE: 80 BPM | DIASTOLIC BLOOD PRESSURE: 60 MMHG | OXYGEN SATURATION: 92 %

## 2025-01-31 DIAGNOSIS — J40 BRONCHITIS: ICD-10-CM

## 2025-01-31 RX ORDER — DOXYCYCLINE HYCLATE 100 MG
100 TABLET ORAL 2 TIMES DAILY
Qty: 14 TABLET | Refills: 0 | Status: SHIPPED | OUTPATIENT
Start: 2025-01-31 | End: 2025-02-07

## 2025-01-31 ASSESSMENT — ENCOUNTER SYMPTOMS
FEVER: 0
ABDOMINAL PAIN: 0
CHILLS: 0
PALPITATIONS: 0

## 2025-01-31 ASSESSMENT — PATIENT HEALTH QUESTIONNAIRE - PHQ9: CLINICAL INTERPRETATION OF PHQ2 SCORE: 0

## 2025-01-31 ASSESSMENT — FIBROSIS 4 INDEX: FIB4 SCORE: 2.9

## 2025-01-31 NOTE — PROGRESS NOTES
Jaime Lee is a 74 y.o. old male  who is here for cough    Chief Complaint   Patient presents with    URI     Patient sick after a cruise, started about 2 months ago, started getting bad a few days ago                  HPI  Patient has had a cough with sputum with starting 5 days ago after he returned after a cruise.  He reports severe cough with yellow sputum.  He does have stuffy nose.  He had sore throat in the beginning which is resolved.  He also feels tired overall.  No fever, chills at this time.  No shortness of breath.  He has previous history of pneumonia.  He reports recurrent episodes of bronchitis/upper respiratory infection in the last 2 months.  Patient requested a course of doxycycline.       Review of Systems   Constitutional:  Negative for chills and fever.   Cardiovascular:  Negative for chest pain and palpitations.   Gastrointestinal:  Negative for abdominal pain.      Patient Active Problem List    Diagnosis Date Noted    Bronchitis 01/31/2025    Senile purpura ([facility]) 09/05/2024    Pure hypercholesterolemia 09/05/2024    Aortic atherosclerosis ([facility]) 09/05/2024    Cerebral atrophy ([facility]) 06/18/2024    History of femoral artery thrombosis 01/16/2024    End stage heart failure ([facility]) 01/16/2024    Coronary artery disease due to calcified coronary lesion 09/28/2023    Secondary hyperaldosteronism ([facility]) 06/22/2023    Peripheral vascular disease of lower extremity ([facility]) 02/11/2020    Ventricular tachycardia ([facility]) 04/25/2019    Macrocytosis without anemia 04/24/2018    Chronic anticoagulation 02/01/2018    Basal cell carcinoma of skin of left ear 02/01/2018    S/P CABG x 4 06/22/2016    Type 2 diabetes mellitus with stage 3a chronic kidney disease, without long-term current use of insulin ([facility]) 06/22/2016    Old anterior myocardial infarction 06/22/2016    Biventricular ICD (implantable cardioverter-defibrillator) in place 03/11/2014    Ischemic cardiomyopathy 03/11/2014    History of  pulmonary embolus (PE) 02/27/2014    Chronic kidney disease (CKD) stage G3a/A1, moderately decreased glomerular filtration rate (GFR) between 45-59 mL/min/1.73 square meter and albuminuria creatinine ratio less than 30 mg/g ([facility]) 02/24/2014     Current Outpatient Medications   Medication Sig Dispense Refill    doxycycline (VIBRAMYCIN) 100 MG Tab Take 1 Tablet by mouth 2 times a day for 7 days. 14 Tablet 0    rosuvastatin (CRESTOR) 20 MG Tab Take 1 Tablet by mouth every evening. 100 Tablet 0    metFORMIN ER (GLUCOPHAGE XR) 500 MG TABLET SR 24 HR Take 1 Tablet by mouth every day. 100 Tablet 0    Empagliflozin (JARDIANCE) 10 MG Tab tablet Take 1 Tablet by mouth every day. 100 Tablet 3    metoprolol SR (TOPROL XL) 100 MG TABLET SR 24 HR Take 1 Tablet by mouth every day. 100 Tablet 2    rivaroxaban (XARELTO) 15 MG Tab tablet Take 1 Tablet by mouth with dinner. 100 Tablet 2    sacubitril-valsartan (ENTRESTO) 24-26 MG Tab Take 1 Tablet by mouth 2 times a day. 200 Tablet 1    amiodarone (CORDARONE) 100 MG tablet Take one tablet by mouth every other day. 50 Tablet 3    spironolactone (ALDACTONE) 25 MG Tab Take 1 Tablet by mouth every day. 90 Tablet 3    torsemide (DEMADEX) 5 MG Tab Take 1 Tablet by mouth every day. 90 Tablet 3    Cholecalciferol (D3 PO) Take 1 Capsule by mouth every day.      Magnesium 500 MG Cap Take 1 Cap by mouth every day. 180 Cap 4    COVID-19 mRNA Vac-Karissa,Pfizer, (COMIRNATY) 30 MCG/0.3ML Suspension Prefilled Syringe injection Inject 0.3 mL into the shoulder, thigh, or buttocks. 0.3 mL 0    influenza vaccine High-Dose (FLUZONE HIGH-DOSE) 0.5 ML Suspension Prefilled Syringe injection Inject 0.5 mL into the shoulder, thigh, or buttocks. 0.5 mL 0     No current facility-administered medications for this visit.    (including changes today)  Allergies: Pcn [penicillins] and Ace inhibitors    /60 (BP Location: Left arm, Patient Position: Sitting, BP Cuff Size: Adult)   Pulse 80   Temp 37.1 °C (98.8  "°F) (Temporal)   Ht 1.803 m (5' 11\")   Wt 64 kg (141 lb 1.5 oz)   SpO2 92%      Physical Exam  Constitutional:       Appearance: Normal appearance.   Cardiovascular:      Rate and Rhythm: Normal rate and regular rhythm.      Pulses: Normal pulses.      Heart sounds: Normal heart sounds. No murmur heard.  Pulmonary:      Effort: Pulmonary effort is normal. No respiratory distress.      Breath sounds: Normal breath sounds. No wheezing.   Abdominal:      Palpations: Abdomen is soft.      Tenderness: There is no abdominal tenderness.   Skin:     General: Skin is warm.   Neurological:      General: No focal deficit present.      Mental Status: He is alert and oriented to person, place, and time.            Assessment and plan:    Problem List Items Addressed This Visit       Bronchitis     - Course of doxycycline twice daily was provided.  Patient is allergic to penicillin.  Discussed antibiotic stewardship.  --Over-the-counter cough syrupcontaining guaifenesin and dextromethorphan and antihistamine for night.               Return if symptoms worsen or fail to improve.        Please note that this dictation was created using voice recognition software. I have made every reasonable attempt to correct obvious errors, but I expect that there are errors of grammar and possibly content that I did not discover before finalizing the note.        "

## 2025-01-31 NOTE — ASSESSMENT & PLAN NOTE
- Course of doxycycline twice daily was provided.  Patient is allergic to penicillin.  Discussed antibiotic stewardship.  --Over-the-counter cough syrupcontaining guaifenesin and dextromethorphan and antihistamine for night.

## 2025-02-02 PROBLEM — J96.01 ACUTE RESPIRATORY FAILURE WITH HYPOXEMIA (HCC): Status: ACTIVE | Noted: 2025-01-01

## 2025-02-02 NOTE — ED NOTES
Pt was seen at primary care yesterday diagnosis with bronchitis not sure if has developed to pneumonia. Pulse ox at home was 87, and experiencing symptoms of hypoxia, including dizziness and weakness.

## 2025-02-02 NOTE — ED PROVIDER NOTES
ED Provider Note    CHIEF COMPLAINT  Chief Complaint   Patient presents with    Weakness     Pt reports weakness, caregiver states pt was unable to stand at home.     Cough     Pt reports productive cough began 1 weeks ago. Pt was recently on a month long cruise and was diagnosed with bronchitis .     Shortness of Breath     Pt 88% on RA and now needing 4L NC. Denies chest pain.          EXTERNAL RECORDS REVIEWED  Outpatient Notes telemedicine note yesterday at 9 AM for productive cough, sore throat, fatigue    HPI/ROS  LIMITATION TO HISTORY   Select: : None  OUTSIDE HISTORIAN(S):  Family reports that the patient was nauseous last night.  Patient has not been taking doxycycline that he was prescribed, reported that he requested it in case he wanted to go on another cruise and felt sick again.    Jaime Lee is a 74 y.o. male who presents with fatigue, generalized weakness, dizziness, productive cough, shortness of breath, hypoxemia.  Patient has had no leg swelling.  Patient is on diuretics.  Patient has a history of severe CHF with ejection fraction of 15 to 20%.  Patient denies dysuria or hematuria or diarrhea.  Patient was recently on a cruise and returned a few days ago.  Patient denies chest pain.  No syncopal episodes.    PAST MEDICAL HISTORY   has a past medical history of Acute anterior myocardial infarction ([facility]) (12/28/2000), Allergy, Arthritis (9/23), CAD (coronary artery disease) (12/28/2000), Cardiac pacemaker in situ (11/01/2023), Chronic anticoagulation (02/01/2018), Chronic deep vein thrombosis (DVT) of femoral vein of left lower extremity ([facility]) (01/04/2018), Congestive heart failure ([facility]) (12/28/2000), COVID (08/28/2023), Dental disorder, GERD (gastroesophageal reflux disease), High cholesterol, Hypertension, Ischemic cardiomyopathy (05/2022), Left bundle branch block ( ), Pneumonia (2014), Pulmonary embolism ([facility]) (03/2014), Thrombocytopenia ([facility]) (02/23/2014), Type 2 diabetes  "mellitus without complication (HCC) ( ), Vascular disorder of extremity (HCC) (2020), and Ventricular tachyarrhythmia (HCC).    SURGICAL HISTORY   has a past surgical history that includes other cardiac surgery (2000); appendectomy (); aicd implant (2008); recovery (11/3/2015); aicd battery change (2015); and multiple coronary artery bypass.    FAMILY HISTORY  Family History   Problem Relation Age of Onset    Diabetes Mother     Heart Disease Mother     Diabetes Sister     Alcohol/Drug Brother        SOCIAL HISTORY  Social History     Tobacco Use    Smoking status: Former     Current packs/day: 0.00     Average packs/day: 1.3 packs/day for 48.0 years (64.0 ttl pk-yrs)     Types: Cigarettes     Start date: 1968     Quit date: 2000     Years since quittin.1    Smokeless tobacco: Never   Vaping Use    Vaping status: Never Used   Substance and Sexual Activity    Alcohol use: Yes     Alcohol/week: 1.2 oz     Types: 2 Standard drinks or equivalent per week     Comment: occ    Drug use: Not Currently    Sexual activity: Never       CURRENT MEDICATIONS  Home Medications    **Home medications have not yet been reviewed for this encounter**       Audit from Redirected Encounters    **Home medications have not yet been reviewed for this encounter**         ALLERGIES  Allergies   Allergen Reactions    Pcn [Penicillins] Anaphylaxis    Ace Inhibitors Cough     Cough       PHYSICAL EXAM  VITAL SIGNS: BP 99/57   Pulse 82   Temp 37.3 °C (99.1 °F) (Temporal)   Resp 19   Ht 1.803 m (5' 11\")   Wt 64 kg (141 lb 1.5 oz)   SpO2 90%   BMI 19.68 kg/m²    Vitals and nursing note reviewed.   Constitutional:       Comments: Patient is lying in bed supine, pleasant, conversant, speaking in complete sentences, elderly appearing, deconditioned  HENT:      Head: Normocephalic and atraumatic.   Mild posterior oropharyngeal erythema  TMs clear  Eyes:      Extraocular Movements: Extraocular " movements intact.      Conjunctiva/sclera: Conjunctivae normal.      Pupils: Pupils are equal, round, and reactive to light.   Cardiovascular:      Pulses: Normal pulses.      Comments: HR 91  Pulmonary:      Effort: Pulmonary effort is normal. No respiratory distress.   Inspiratory rhonchi  Abdominal:      Comments: Abdomen is soft, non-tender, non-distended, non-rigid  Musculoskeletal:         General: No lower extremity swelling. Normal range of motion.      Cervical back: Normal range of motion. No rigidity.   Skin:     General: Skin is warm and dry.      Capillary Refill: Capillary refill takes less than 2 seconds.   Neurological:      Mental Status: Alert.       EKG/LABS  Atrial paced rhythm  I have independently interpreted this EKG    RADIOLOGY/PROCEDURES   I have independently interpreted the diagnostic imaging associated with this visit and am waiting the final reading from the radiologist.   My preliminary interpretation is as follows: No pulmonary edema    Radiologist interpretation:  DX-CHEST-LIMITED (1 VIEW)   Final Result         No acute cardiac or pulmonary abnormality is identified.          COURSE & MEDICAL DECISION MAKING    ASSESSMENT, COURSE AND PLAN  Care Narrative: CBC to evaluate for acute anemia and leukocytosis.  CMP to evaluate for acute electrolyte abnormality, acute kidney injury, acute liver failure or dysfunction.  Chest x-ray to evaluate for acute cardiopulmonary process such as pneumonia, pulmonary edema, pneumothorax.  EKG, troponin to evaluate for ACS.  BNP to evaluate for heart failure.  Patient is not septic at this time but is mildly hypoxemic.    Electronically signed by: Jose Mckeon M.D., 2/1/2025 10:40 PM    Troponin mildly elevated but lower than his baseline, less likely ischemic disease is at work at this time.  BNP mild elevation consistent with underlying heart failure.  No pulmonary edema on chest x-ray or focal infiltrate did not note pneumonia.  Lab work  "demonstrates mild leukocytosis consistent with potential upper respiratory infection.  Patient still persistently hypoxemic to 87% on room air without any movement.  Admission has been requested to hospitalist however patient upon being informed he would be admitted has refused adamantly.  Patient would like to go home at this time.  Patient is alert and oriented x 3, demonstrates no signs of clinical intoxication, understands the risks of refusing medical treatment including hypoxic respiratory failure and death.  Patient understands that he can return to the emergency department at any time.    AMA  The patient desired to leave against medical advice because he does not want to stay in the hospital.  I advised them of the benefits of staying as well as the risks of leaving, including persistence of the emergency medical condition, worsening of the emergency medical condition, and death. The patient is alert and oriented to person, place and time, has no evidence of intoxication, and has capacity to refuse care.  I tried to convince the patient to stay without success. As an alternate plan of care they were instructed to follow up with their primary care physician within 24 hours.  The patient also agrees to return to the ED immediately if symptoms worsen in any way.  I advised them that they are welcome to return to the ED for care at any time if further care is desired.  The AMA form was signed and placed into the paper chart.      BP 99/57   Pulse 82   Temp 37.3 °C (99.1 °F) (Temporal)   Resp 19   Ht 1.803 m (5' 11\")   Wt 64 kg (141 lb 1.5 oz)   SpO2 90%   BMI 19.68 kg/m²     Electronically signed by: Jose Mckeon M.D., 2/2/2025          HEART SCORE:  History - 0  EKG - 0  Age - 2  Risk Factors - 2  Initial Troponin - 1  Total - 5        DISPOSITION AND DISCUSSIONS  Escalation of care considered, and ultimately not performed:after discussion with the patient / family, they have elected to decline " an escalation in care      FINAL DIAGNOSIS  1. Hypoxia    2. Weakness    3. Dehydration         Electronically signed by: Jose Mckeon M.D., 2/1/2025 10:36 PM

## 2025-02-02 NOTE — ASSESSMENT & PLAN NOTE
? Acute bronchitis  ? Early pneumonia  Covid/ Flu/ RSV -  Received small bolus in ER, consider diuresis tomorrow if BP's better  Supplemental O2  RT protocol  mucinex

## 2025-02-02 NOTE — ED TRIAGE NOTES
"Chief Complaint   Patient presents with    Weakness     Pt reports weakness, caregiver states pt was unable to stand at home.     Cough     Pt reports productive cough began 1 weeks ago. Pt was recently on a month long cruise and was diagnosed with bronchitis .     Shortness of Breath     Pt 88% on RA and now needing 4L NC. Denies chest pain.        BP 97/66   Pulse 99   Temp 37.3 °C (99.1 °F) (Temporal)   Ht 1.803 m (5' 11\")   Wt 64 kg (141 lb 1.5 oz)   SpO2 88%   BMI 19.68 kg/m²     "

## 2025-02-03 NOTE — DISCHARGE PLANNING
Anticipated Discharge Disposition: Hospice    Action: Met with pt and wife at bedside. Pt defers much to wife. AO x2-3 Interested in Hospice Choice form done. 1 )[facility] 2) [place] 3) [facility] . Call placed to [facility].Await call back. Needs to dc home o2.   CHF EF 15% 1.2.25 worsening. HX May 2022: Echocardiogram with severely dilated LV, LVEF <25%. Severe diffuse hypokinesis. Septal akinesis. Normal LA and RV. Moderately dilated RA. Trace MR, mild TR. RVSP 23.8mmHg.  February 2021: Echocardiogram with severely dilated LV, LVEF 20%. Small RV. Enlarged RA and mildly dilated LA.  January 2019: Echocardiogram with normal size, LVEF 20-25%. Grade II diastolic dysfunction. Trace MR, mild TR. RVSP 25mmHg.  August 2017: Echocardiogram with moderately dilated LV, LVEF 20%.  March 2014: Echocardiogram with severely dilated LV, LVEF 15-20%    Barriers to Discharge: Acceptance and interview with Hospce    Plan: Will cont to follow

## 2025-02-03 NOTE — ED PROVIDER NOTES
ED Provider Note    CHIEF COMPLAINT  Chief Complaint   Patient presents with    Shortness of Breath     Seen two days ago for c/o same  Required oxygen at that time, did not want to stay in the hospital         EXTERNAL RECORDS REVIEWED  Clinic note from January 31 reviewed for URI.  He had had 5 days of cough after a cruise.  He requested doxycycline.  He was given the antibiotic.    ER note from 2 days ago reviewed weakness cough and shortness of breath with O2 sats low at 88% on room air needing 4 L by nasal cannula here in the ER.  Has a history of heart failure with EF of 15 to 20%.  Patient left AGAINST MEDICAL ADVICE.  His white count that day was 12.5.  Hemoglobin normal.  CHEM panel that day demonstrated sodium 133 glucose 179 BUN 27 creatinine 1.39.  Opponent was 26.  Brain naturetic peptide was 1676.  He tested negative for influenza RSV and COVID.  His chest x-ray was negative that day.    HPI    Jaime Lee is a 74 y.o. male history of severe heart failure she believes it is controlled who presents to the Emergency Department for shortness of breath.  The patient has been short of breath for approximately 10 days.  He is had cough but no fever.  No vomiting or diarrhea.  He has had nasal congestion.  This started after he returned from a 1 week cruise.  The patient saw a clinic physician on the 31st and was prescribed doxycycline for the future but told not to take it.  He was here in the ER 2 days ago and found to be hypoxic but left AGAINST MEDICAL ADVICE.  Today he wants to be prescribed oxygen for home but again refuses to be admitted.  No history of asthma or COPD.  He is a former smoker.  He is never used a metered-dose inhaler.  He is had multiple DVTs and is anticoagulated.  He does not think he has a DVT or PE now.  Denies chest pain.  He has heart failure currently EF is 15%.  Blood pressure is low at baseline.    LIMITATION TO HISTORY   Patient minimizes his  symptoms    OUTSIDE HISTORIAN(S):  As wife provided much of the history such as he is dyspneic on exertion with walking 15 feet.  She said his O2 sats have been as low as 79% at home on a home pulse oximeter.    REVIEW OF SYSTEMS  Pertinent positives include: Shortness of breath, cough, congestion, heart failure, hypoxia.  Pertinent negatives include: Leg swelling calf pain abdominal pain.      PAST MEDICAL HISTORY  Past Medical History:   Diagnosis Date    Acute anterior myocardial infarction (HCC) 12/28/2000    PCI attempted by Dr. Malloy but unsuccessful    Allergy     within 2 years    Arthritis 9/23    osteoarthritis    CAD (coronary artery disease) 12/28/2000    CABG x 4 (LIMA to LAD, rSVG to first diagonal, left circumflex and PDA).2000: CABG x 4 (LIMA to LAD, rSVG to first diagonal, left circumflex and PDA).  2003: LIMA and circumflex grafts patent, diagonal and PDA grafts closed. September 2023: PCI/AZRA ([place] 3.0 x 26mm) the distal RCA.    Cardiac pacemaker in situ 11/01/2023    Chronic anticoagulation 02/01/2018    Chronic deep vein thrombosis (DVT) of femoral vein of left lower extremity ([facility]) 01/04/2018    Congestive heart failure (HCC) 12/28/2000    COVID 08/28/2023    pt denies symptoms now 9/27/23    Dental disorder     upper denture    GERD (gastroesophageal reflux disease)     High cholesterol     Hypertension     Ischemic cardiomyopathy 05/2022    Echocardiogram with severely dilated LV, LVEF <25%. Severe diffuse hypokinesis. Septal akinesis. Normal LA and RV. Moderately dilated RA. Trace MR, mild TR. RVSP 23.8mmHg.    Left bundle branch block      Pneumonia 2014    Pulmonary embolism ([facility]) 03/2014    Thrombocytopenia ([facility]) 02/23/2014    Type 2 diabetes mellitus without complication ([facility])      oral medication    Vascular disorder of extremity ([facility]) 02/11/2020    Ventricular tachyarrhythmia ([facility])        FAMILY HISTORY  Family History   Problem Relation Age of Onset    Diabetes Mother     Heart  Disease Mother     Diabetes Sister     Alcohol/Drug Brother        SOCIAL HISTORY  Social History     Tobacco Use    Smoking status: Former     Current packs/day: 0.00     Average packs/day: 1.3 packs/day for 48.0 years (64.0 ttl pk-yrs)     Types: Cigarettes     Start date: 1968     Quit date: 2000     Years since quittin.1    Smokeless tobacco: Never   Vaping Use    Vaping status: Never Used   Substance Use Topics    Alcohol use: Yes     Alcohol/week: 1.2 oz     Types: 2 Standard drinks or equivalent per week    Drug use: Not Currently     Social History     Substance and Sexual Activity   Drug Use Not Currently       SURGICAL HISTORY  Past Surgical History:   Procedure Laterality Date    RECOVERY  11/3/2015    Procedure: CATH LAB-STEPHENSON-ICD GENERATOR CHANGE 39837- EUNICE T82.111A-MEDTRONIC BLOODLESS;  Surgeon: Recoveryonly Surgery;  Location: SURGERY PRE-POST PROC UNIT [facility];  Service:     AICD BATTERY CHANGE  2015    Generator replacement with Medtronic Evera S VR [id number] implanted by Dr. Marcus Stephenson.    AICD IMPLANT  2008    Medtronic Somna Therapeuticsuoso VR [id number] implanted by Dr. Stephenson.    OTHER CARDIAC SURGERY  2000    CABG x 4    APPENDECTOMY  1969    MULTIPLE CORONARY ARTERY BYPASS         CURRENT MEDICATIONS  No current facility-administered medications for this encounter.    Current Outpatient Medications:     doxycycline (VIBRAMYCIN) 100 MG Tab, Take 1 Tablet by mouth 2 times a day for 7 days., Disp: 14 Tablet, Rfl: 0    rosuvastatin (CRESTOR) 20 MG Tab, Take 1 Tablet by mouth every evening., Disp: 100 Tablet, Rfl: 0    metFORMIN ER (GLUCOPHAGE XR) 500 MG TABLET SR 24 HR, Take 1 Tablet by mouth every day., Disp: 100 Tablet, Rfl: 0    Empagliflozin (JARDIANCE) 10 MG Tab tablet, Take 1 Tablet by mouth every day., Disp: 100 Tablet, Rfl: 3    metoprolol SR (TOPROL XL) 100 MG TABLET SR 24 HR, Take 1 Tablet by mouth every day., Disp: 100 Tablet, Rfl: 2    rivaroxaban (XARELTO) 15 MG Tab  "tablet, Take 1 Tablet by mouth with dinner., Disp: 100 Tablet, Rfl: 2    COVID-19 mRNA Vac-Karissa,Pfizer, (COMIRNATY) 30 MCG/0.3ML Suspension Prefilled Syringe injection, Inject 0.3 mL into the shoulder, thigh, or buttocks., Disp: 0.3 mL, Rfl: 0    influenza vaccine High-Dose (FLUZONE HIGH-DOSE) 0.5 ML Suspension Prefilled Syringe injection, Inject 0.5 mL into the shoulder, thigh, or buttocks., Disp: 0.5 mL, Rfl: 0    sacubitril-valsartan (ENTRESTO) 24-26 MG Tab, Take 1 Tablet by mouth 2 times a day., Disp: 200 Tablet, Rfl: 1    amiodarone (CORDARONE) 100 MG tablet, Take one tablet by mouth every other day., Disp: 50 Tablet, Rfl: 3    spironolactone (ALDACTONE) 25 MG Tab, Take 1 Tablet by mouth every day., Disp: 90 Tablet, Rfl: 3    torsemide (DEMADEX) 5 MG Tab, Take 1 Tablet by mouth every day., Disp: 90 Tablet, Rfl: 3    Cholecalciferol (D3 PO), Take 1 Capsule by mouth every day., Disp: , Rfl:     Magnesium 500 MG Cap, Take 1 Cap by mouth every day., Disp: 180 Cap, Rfl: 4    ALLERGIES  Allergies   Allergen Reactions    Pcn [Penicillins] Anaphylaxis    Ace Inhibitors Cough     Cough       PHYSICAL EXAM  VITAL SIGNS: /57   Pulse 85   Temp 37.1 °C (98.8 °F) (Temporal)   Resp 15   Ht 1.803 m (5' 11\")   Wt 64 kg (141 lb 1.5 oz)   SpO2 (!) 78%   BMI 19.68 kg/m²   Reviewed and afebrile  Constitutional: Well developed, Well nourished, hectic emphysematous build.  HENT: Normocephalic, atraumatic, bilateral external ears normal, No intraoral erythema, edema, exudate  Eyes: PERRLA, conjunctiva pink, no scleral icterus.   Cardiovascular: Regular rate and rhythm. No murmurs, rubs or gallops.  No dependent edema or calf tenderness  Respiratory: Lungs clear to auscultation bilaterally. No wheezes, rales, or rhonchi.  Is actually quite good airflow.  Abdominal:  Abdomen soft, non-tender, non distended. No rebound, or guarding.    Skin: No erythema, no rash. No wounds or bruising.  Genitourinary: No costovertebral angle " tenderness.   Musculoskeletal: no deformities.   Neurologic: Alert & oriented x 3, cranial nerves 2-12 intact by passive exam.  Moves 4 limbs with symmetric strength.  Psychiatric: Affect normal, Judgment normal, Mood normal.     LABS Ordered and Reviewed by Me:  Results for orders placed or performed during the hospital encounter of 02/03/25   CBC WITH DIFFERENTIAL    Collection Time: 02/03/25  3:10 PM   Result Value Ref Range    WBC 16.2 (H) 4.8 - 10.8 K/uL    RBC 4.73 4.70 - 6.10 M/uL    Hemoglobin 15.0 14.0 - 18.0 g/dL    Hematocrit 45.4 42.0 - 52.0 %    MCV 96.0 81.4 - 97.8 fL    MCH 31.7 27.0 - 33.0 pg    MCHC 33.0 32.3 - 36.5 g/dL    RDW 49.8 35.9 - 50.0 fL    Platelet Count 219 164 - 446 K/uL    MPV 10.5 9.0 - 12.9 fL    Neutrophils-Polys 90.40 (H) 44.00 - 72.00 %    Lymphocytes 2.50 (L) 22.00 - 41.00 %    Monocytes 6.10 0.00 - 13.40 %    Eosinophils 0.00 0.00 - 6.90 %    Basophils 0.30 0.00 - 1.80 %    Immature Granulocytes 0.70 0.00 - 0.90 %    Nucleated RBC 0.00 0.00 - 0.20 /100 WBC    Neutrophils (Absolute) 14.59 (H) 1.82 - 7.42 K/uL    Lymphs (Absolute) 0.41 (L) 1.00 - 4.80 K/uL    Monos (Absolute) 0.99 (H) 0.00 - 0.85 K/uL    Eos (Absolute) 0.00 0.00 - 0.51 K/uL    Baso (Absolute) 0.05 0.00 - 0.12 K/uL    Immature Granulocytes (abs) 0.12 (H) 0.00 - 0.11 K/uL    NRBC (Absolute) 0.00 K/uL   Basic Metabolic Panel    Collection Time: 02/03/25  3:10 PM   Result Value Ref Range    Sodium 134 (L) 135 - 145 mmol/L    Potassium 4.5 3.6 - 5.5 mmol/L    Chloride 95 (L) 96 - 112 mmol/L    Co2 24 20 - 33 mmol/L    Glucose 161 (H) 65 - 99 mg/dL    Bun 23 (H) 8 - 22 mg/dL    Creatinine 1.46 (H) 0.50 - 1.40 mg/dL    Calcium 9.4 8.4 - 10.2 mg/dL    Anion Gap 15.0 7.0 - 16.0   D-DIMER    Collection Time: 02/03/25  3:10 PM   Result Value Ref Range    D-Dimer 1.73 (H) 0.00 - 0.50 ug/mL (FEU)   proBrain Natriuretic Peptide, NT    Collection Time: 02/03/25  3:10 PM   Result Value Ref Range    NT-proBNP 1854 (H) 0 - 125  pg/mL   ESTIMATED GFR    Collection Time: 02/03/25  3:10 PM   Result Value Ref Range    GFR (CKD-EPI) 50 (A) >60 mL/min/1.73 m 2     *Note: Due to a large number of results and/or encounters for the requested time period, some results have not been displayed. A complete set of results can be found in Results Review.       Interpretations:    Pulse Ox: 78% on room air which I interpret is low      RADIOLOGY  I have independently interpreted the chest x-ray associated with this visit demonstrating no pneumonia.  I am awaiting the final reading from the radiologist.     Final Radiology Report  DX-CHEST-LIMITED (1 VIEW)   Final Result      Cardiomegaly.        Radiologist interpretation have been reviewed by me.     ED COURSE:      INTERVENTIONS BY ME:  Oxygen  Case work consultation  Hospice referral and consultation    ASSESSMENT, COURSE AND PLAN:  PROBLEMS EVALUATED THIS VISIT:    This patient with end-stage heart failure and recurrent recent respiratory infections that are probably viral presents with hypoxia.  There is no bronchospasm.  There is no pneumonia or pneumothorax on chest x-ray.  This is likely multifactorial due to both his heart failure and viral syndrome.  While in the ER patient decided he wished to be hospice.  He does not want to be hospitalized.  He wants to be discharged home on oxygen.  Hospice consult and referral were obtained and they will arrange for oxygen prior to his discharge home.      DISPOSITION AND DISCUSSIONS    I have discussed management of the patient with the following physicians and sources:    Gabino who helped arrange oxygen through hospice    RISK:  High given change to hospice status     PLAN:  Home oxygen    Continue current meds    Followup:  Ana Arroyo M.D.  [address]  [address]  [facility] [id number] [phone]      As needed      CONDITION: Fair.     FINAL IMPRESSION  1. Acute on chronic systolic heart failure, NYHA class 3 ([facility])    2. Hypoxia          Jacinto Dawkins M.D., 02/03/25 2:13 PM

## 2025-02-03 NOTE — DISCHARGE PLANNING
Anticipated Discharge Disposition: Hospice    Action: ER CM called to ask RN to have pt call her for o2 choice and now interested in Hospice. Will await MD order.     Barriers to Discharge: Hospice eval pending    Plan: Will cont to follow

## 2025-02-03 NOTE — ED TRIAGE NOTES
"Chief Complaint   Patient presents with    Shortness of Breath     Seen two days ago for c/o same  Required oxygen at that time, did not want to stay in the hospital     /57   Pulse 85   Temp 37.1 °C (98.8 °F) (Temporal)   Resp 15   Ht 1.803 m (5' 11\")   Wt 64 kg (141 lb 1.5 oz)   SpO2 (!) 78%   BMI 19.68 kg/m²     Pt to ED via WC w/ multiple visitors for c/o low SPo2.  Pt was seen on 02/01/25 for c/o same, advised to stay in hospital w/ oxygen, declined admission at that time.  On arrival to ED pt 78% on RA, placed on 8L oxygen via NC for SPo2 90%.   "

## 2025-02-03 NOTE — FACE TO FACE
"Face to Face Note  -  Durable Medical Equipment    Jacinto Dawkins M.D. - NPI: [id number]  I certify that this patient is under my care and that they had a durable medical equipment(DME)face to face encounter by myself that meets the physician DME face-to-face encounter requirements with this patient on:    Date of encounter:   Patient:                    MRN:                       YOB: 2025  Jaime Lee  [id number]  1950     The encounter with the patient was in whole, or in part, for the following medical condition, which is the primary reason for durable medical equipment:  Other - Heart Failure and Viral syndrome    I certify that, based on my findings, the following durable medical equipment is medically necessary:    Oxygen   HOME O2 Saturation Measurements:(Values must be present for Home Oxygen orders)         ,     ,       If patient feels more short of breath, they can go up to 6 liters per minute and contact healthcare provider.    Supporting Symptoms: The patient requires supplemental oxygen, as the following interventions have been tried with limited or no improvement: \"Bronchodilators and/or steroid inhalers.    My Clinical findings support the need for the above equipment due to:  Hypoxia 78% RA  "

## 2025-02-04 NOTE — DISCHARGE PLANNING
Received Choice Form at: 02/03/25 1616  Agency/Facility Name:  [facility]  Sent Referral per Choice Form at: 02/04/25 0968

## 2025-02-04 NOTE — DISCHARGE PLANNING
Anticipated Discharge Disposition: Hospice    Action: Call back from [facility] intake info reviewed. She will reach out and see if RN intake can be expedited tonight. ER CM advised he needs o2 to go home with but may not want to wait over night for intake hospice. 1622 Hospice RN here.    Barriers to Discharge: o2 measurement and orders.    Plan: will cont to follow

## 2025-02-04 NOTE — FACE TO FACE
"Face to Face Note  -  Durable Medical Equipment    Jacinto Dawkins M.D. - NPI: [id number]  I certify that this patient is under my care and that they had a durable medical equipment(DME)face to face encounter by myself that meets the physician DME face-to-face encounter requirements with this patient on:    Date of encounter:   Patient:                    MRN:                       YOB: 2025  Jaime Lee  [id number]  1950     The encounter with the patient was in whole, or in part, for the following medical condition, which is the primary reason for durable medical equipment:  Other - Endstage CHF and viral syndrome    I certify that, based on my findings, the following durable medical equipment is medically necessary:    Oxygen   HOME O2 Saturation Measurements:(Values must be present for Home Oxygen orders)         ,     ,       If patient feels more short of breath, they can go up to 6 liters per minute and contact healthcare provider.    Supporting Symptoms: The patient requires supplemental oxygen, as the following interventions have been tried with limited or no improvement: \"Bronchodilators and/or steroid inhalers.    My Clinical findings support the need for the above equipment due to:  Hypoxia  "

## 2025-02-04 NOTE — DISCHARGE PLANNING
Anticipated Discharge Disposition: Home with [facility]    Action: [facility] set up o2 and anticipate delivery to ER by 6- 6:30 accellance and concentrator to home with more tanks.     Barriers to Discharge: O2 delivery pending accellance [phone]     Plan: Will cont to follow

## 2025-02-04 NOTE — PROGRESS NOTES
Pt discussed with Shell Heller RN. Patient admitting to community hospice with primary diagnosis of end stage heart failure.

## 2025-02-04 NOTE — DISCHARGE PLANNING
Anticipated Discharge Disposition: Home with o2.    Action: Voalte from ER RN request delivery time check. Called to [facility]  spoke with Raphael. Reaching out to . Given ER direct line for ETA as well.  [facility] [phone]     Barriers to Discharge: o2 delivery    Plan: Will update NAM for after 7 pm.

## 2025-02-04 NOTE — DISCHARGE PLANNING
Anticipated Discharge Disposition: Home with o2 on Hospice with [facility]    Action: [facility]ell[facility] called back with eta.15 min or so Updated NAM    Barriers to Discharge: O2 pending.     Plan: NAm will follow

## 2025-02-12 NOTE — PROGRESS NOTES
Patient discussed with Sangita Elizabeth RN. Pt is transitionoing, family has dosed patient with MSIR 80 mg over past 24 hours and lorazepam 2 mg over 24 hours.   New scheduled dosing  Patient also has copious secretions, new order for oral atropine

## 2025-02-12 NOTE — TELEPHONE ENCOUNTER
Had a message from hospice RN Sangita Elizabeth, requesting AICD therapies to be turned off, as patient is in end stage heart failure.    Explained, due to Medtr[employer] guidelines, we are unable to go to patient's house to reprogram device, but magnet can be applied (taped) to the device, to disable any shocking therapies.  Asked if she needed a magnet, and she will let us know.    If family expresses wishes to proceed with above, OK to apply magnet to device.    REUBEN Whitt  [facility]

## 2025-03-13 ENCOUNTER — APPOINTMENT (OUTPATIENT)
Dept: MEDICAL GROUP | Facility: MEDICAL CENTER | Age: 75
End: 2025-03-13
Payer: MEDICARE